# Patient Record
Sex: MALE | Race: WHITE | NOT HISPANIC OR LATINO | Employment: PART TIME | URBAN - METROPOLITAN AREA
[De-identification: names, ages, dates, MRNs, and addresses within clinical notes are randomized per-mention and may not be internally consistent; named-entity substitution may affect disease eponyms.]

---

## 2017-02-15 ENCOUNTER — ALLSCRIPTS OFFICE VISIT (OUTPATIENT)
Dept: OTHER | Facility: OTHER | Age: 57
End: 2017-02-15

## 2017-02-15 DIAGNOSIS — R59.1 GENERALIZED ENLARGED LYMPH NODES: ICD-10-CM

## 2017-04-14 ENCOUNTER — GENERIC CONVERSION - ENCOUNTER (OUTPATIENT)
Dept: OTHER | Facility: OTHER | Age: 57
End: 2017-04-14

## 2017-08-14 ENCOUNTER — GENERIC CONVERSION - ENCOUNTER (OUTPATIENT)
Dept: OTHER | Facility: OTHER | Age: 57
End: 2017-08-14

## 2017-08-18 ENCOUNTER — APPOINTMENT (OUTPATIENT)
Dept: LAB | Facility: CLINIC | Age: 57
End: 2017-08-18
Payer: COMMERCIAL

## 2017-08-18 ENCOUNTER — TRANSCRIBE ORDERS (OUTPATIENT)
Dept: LAB | Facility: CLINIC | Age: 57
End: 2017-08-18

## 2017-08-18 DIAGNOSIS — E78.5 OTHER AND UNSPECIFIED HYPERLIPIDEMIA: Primary | ICD-10-CM

## 2017-08-18 LAB
ALBUMIN SERPL BCP-MCNC: 3.8 G/DL (ref 3.5–5)
ALP SERPL-CCNC: 86 U/L (ref 46–116)
ALT SERPL W P-5'-P-CCNC: 26 U/L (ref 12–78)
ANION GAP SERPL CALCULATED.3IONS-SCNC: 8 MMOL/L (ref 4–13)
AST SERPL W P-5'-P-CCNC: 13 U/L (ref 5–45)
BILIRUB SERPL-MCNC: 0.49 MG/DL (ref 0.2–1)
BUN SERPL-MCNC: 14 MG/DL (ref 5–25)
CALCIUM SERPL-MCNC: 9.1 MG/DL (ref 8.3–10.1)
CHLORIDE SERPL-SCNC: 102 MMOL/L (ref 100–108)
CHOLEST SERPL-MCNC: 179 MG/DL (ref 50–200)
CO2 SERPL-SCNC: 28 MMOL/L (ref 21–32)
CREAT SERPL-MCNC: 0.93 MG/DL (ref 0.6–1.3)
GFR SERPL CREATININE-BSD FRML MDRD: 90 ML/MIN/1.73SQ M
GLUCOSE P FAST SERPL-MCNC: 103 MG/DL (ref 65–99)
HDLC SERPL-MCNC: 55 MG/DL (ref 40–60)
LDLC SERPL CALC-MCNC: 102 MG/DL (ref 0–100)
POTASSIUM SERPL-SCNC: 4.4 MMOL/L (ref 3.5–5.3)
PROT SERPL-MCNC: 7.6 G/DL (ref 6.4–8.2)
SODIUM SERPL-SCNC: 138 MMOL/L (ref 136–145)
TRIGL SERPL-MCNC: 109 MG/DL

## 2017-08-18 PROCEDURE — 36415 COLL VENOUS BLD VENIPUNCTURE: CPT

## 2017-08-18 PROCEDURE — 80061 LIPID PANEL: CPT

## 2017-08-18 PROCEDURE — 80053 COMPREHEN METABOLIC PANEL: CPT

## 2017-08-25 ENCOUNTER — GENERIC CONVERSION - ENCOUNTER (OUTPATIENT)
Dept: OTHER | Facility: OTHER | Age: 57
End: 2017-08-25

## 2017-08-25 DIAGNOSIS — E72.01 CYSTINURIA (HCC): ICD-10-CM

## 2017-08-25 DIAGNOSIS — N20.0 CALCULUS OF KIDNEY: ICD-10-CM

## 2017-11-14 ENCOUNTER — GENERIC CONVERSION - ENCOUNTER (OUTPATIENT)
Dept: OTHER | Facility: OTHER | Age: 57
End: 2017-11-14

## 2018-01-13 NOTE — RESULT NOTES
Message   lipids significantly elevated  Recommend statins, such as Lipitor 20mg daily  Check again in 3 months  Verified Results  (1) COMPREHENSIVE METABOLIC PANEL [id number] 90:93WO Yumiko Dye Order Number: [id number]  TW Order Number: [id number] Order Number: [id number]     Test Name Result Flag Reference   GLUCOSE,RANDM 96 mg/dL     If the patient is fasting, the ADA then defines impaired fasting glucose as > 100 mg/dL and diabetes as > or equal to 123 mg/dL  SODIUM 139 mmol/L  136-145   POTASSIUM 4 7 mmol/L  3 5-5 3   CHLORIDE 103 mmol/L  100-108   CARBON DIOXIDE 30 mmol/L  21-32   ANION GAP (CALC) 6 mmol/L  4-13   BLOOD UREA NITROGEN 12 mg/dL  5-25   CREATININE 0 96 mg/dL  0 60-1 30   Standardized to IDMS reference method   CALCIUM 9 0 mg/dL  8 3-10 1   BILI, TOTAL 0 43 mg/dL  0 20-1 00   ALK PHOSPHATAS 65 U/L     ALT (SGPT) 26 U/L  12-78   AST(SGOT) 14 U/L  5-45   ALBUMIN 3 6 g/dL  3 5-5 0   TOTAL PROTEIN 6 6 g/dL  6 4-8 2   eGFR Non-African American      >60 0 ml/min/1 73sq [facility] Disease Education Program recommendations are as follows:  GFR calculation is accurate only with a steady state creatinine  Chronic Kidney disease less than 60 ml/min/1 73 sq  meters  Kidney failure less than 15 ml/min/1 73 sq  meters  (1) LIPID PANEL, FASTING [id number] 12:07PM Yumiko Dye Order Number: [id number]   Order Number: [id number] Order Number: [id number]     Test Name Result Flag Reference   CHOLESTEROL 240 mg/dL H    HDL,DIRECT 49 mg/dL  40-60   Specimen collection should occur prior to Metamizole administration due to the potential for falsely depressed results     LDL CHOLESTEROL CALCULATED 168 mg/dL H 0-100   Triglyceride:         Normal              <150 mg/dl       Borderline High    150-199 mg/dl       High               200-499 mg/dl       Very High          >499 mg/dl  Cholesterol:         Desirable        <200 mg/dl      Borderline High 200-239 mg/dl      High             >239 mg/dl  HDL Cholesterol:        High    >59 mg/dL      Low     <41 mg/dL  LDL CALCULATED:    This screening LDL is a calculated result  It does not have the accuracy of the Direct Measured LDL in the monitoring of patients with hyperlipidemia and/or statin therapy  Direct Measure LDL (BAS015) must be ordered separately in these patients  TRIGLYCERIDES 114 mg/dL  <=150   Specimen collection should occur prior to N-Acetylcysteine or Metamizole administration due to the potential for falsely depressed results         Plan  Allergic rhinitis    · Fluticasone Propionate 50 MCG/ACT Nasal Suspension; 2 SPRAYS EACH  NOSTRIL EVERY DAY

## 2018-01-14 VITALS
SYSTOLIC BLOOD PRESSURE: 122 MMHG | BODY MASS INDEX: 24.95 KG/M2 | RESPIRATION RATE: 16 BRPM | WEIGHT: 184.25 LBS | DIASTOLIC BLOOD PRESSURE: 64 MMHG | HEIGHT: 72 IN | HEART RATE: 64 BPM

## 2018-01-22 VITALS
WEIGHT: 188.13 LBS | HEIGHT: 72 IN | SYSTOLIC BLOOD PRESSURE: 112 MMHG | DIASTOLIC BLOOD PRESSURE: 70 MMHG | HEART RATE: 72 BPM | BODY MASS INDEX: 25.48 KG/M2

## 2018-01-31 DIAGNOSIS — F41.9 ANXIETY: Primary | ICD-10-CM

## 2018-01-31 RX ORDER — ALPRAZOLAM 0.5 MG/1
0.5 TABLET ORAL
Qty: 30 TABLET | Refills: 1 | OUTPATIENT
Start: 2018-01-31 | End: 2018-03-16 | Stop reason: SDUPTHER

## 2018-01-31 RX ORDER — ALPRAZOLAM 0.5 MG/1
0.5 TABLET ORAL
COMMUNITY
Start: 2012-11-08 | End: 2018-01-31 | Stop reason: SDUPTHER

## 2018-02-02 ENCOUNTER — TELEPHONE (OUTPATIENT)
Dept: FAMILY MEDICINE CLINIC | Facility: CLINIC | Age: 58
End: 2018-02-02

## 2018-02-07 DIAGNOSIS — F41.9 ANXIETY: Primary | ICD-10-CM

## 2018-02-07 DIAGNOSIS — F51.01 PRIMARY INSOMNIA: ICD-10-CM

## 2018-02-07 RX ORDER — TRAZODONE HYDROCHLORIDE 50 MG/1
TABLET ORAL
Qty: 30 TABLET | Refills: 5 | Status: SHIPPED | OUTPATIENT
Start: 2018-02-07 | End: 2018-08-01 | Stop reason: SDUPTHER

## 2018-02-25 DIAGNOSIS — D72.829 ELEVATED WHITE BLOOD CELL COUNT: ICD-10-CM

## 2018-02-25 DIAGNOSIS — E78.5 HYPERLIPIDEMIA: ICD-10-CM

## 2018-02-25 DIAGNOSIS — I10 ESSENTIAL (PRIMARY) HYPERTENSION: ICD-10-CM

## 2018-02-25 DIAGNOSIS — G89.29 OTHER CHRONIC PAIN: ICD-10-CM

## 2018-03-12 DIAGNOSIS — J30.9 CHRONIC ALLERGIC RHINITIS, UNSPECIFIED SEASONALITY, UNSPECIFIED TRIGGER: Primary | ICD-10-CM

## 2018-03-12 DIAGNOSIS — M19.91 PRIMARY OSTEOARTHRITIS, UNSPECIFIED SITE: Primary | ICD-10-CM

## 2018-03-12 RX ORDER — MELOXICAM 15 MG/1
TABLET ORAL
Qty: 30 TABLET | Refills: 5 | Status: SHIPPED | OUTPATIENT
Start: 2018-03-12 | End: 2019-03-17 | Stop reason: SDUPTHER

## 2018-03-13 RX ORDER — FLUTICASONE PROPIONATE 50 MCG
SPRAY, SUSPENSION (ML) NASAL
Qty: 1 BOTTLE | Refills: 5 | Status: SHIPPED | OUTPATIENT
Start: 2018-03-13 | End: 2018-08-28 | Stop reason: SDUPTHER

## 2018-03-14 ENCOUNTER — OFFICE VISIT (OUTPATIENT)
Dept: FAMILY MEDICINE CLINIC | Facility: CLINIC | Age: 58
End: 2018-03-14
Payer: COMMERCIAL

## 2018-03-14 VITALS
HEART RATE: 72 BPM | SYSTOLIC BLOOD PRESSURE: 114 MMHG | HEIGHT: 72 IN | BODY MASS INDEX: 25.87 KG/M2 | WEIGHT: 191 LBS | DIASTOLIC BLOOD PRESSURE: 72 MMHG

## 2018-03-14 DIAGNOSIS — Z72.0 TOBACCO ABUSE: ICD-10-CM

## 2018-03-14 DIAGNOSIS — F41.9 ANXIETY: ICD-10-CM

## 2018-03-14 DIAGNOSIS — E72.01 CYSTINURIA (HCC): Primary | ICD-10-CM

## 2018-03-14 DIAGNOSIS — I10 ESSENTIAL HYPERTENSION: ICD-10-CM

## 2018-03-14 DIAGNOSIS — N20.0 NEPHROLITHIASIS: ICD-10-CM

## 2018-03-14 LAB
SL AMB  POCT GLUCOSE, UA: NORMAL
SL AMB LEUKOCYTE ESTERASE,UA: NORMAL
SL AMB POCT BILIRUBIN,UA: NORMAL
SL AMB POCT BLOOD,UA: NORMAL
SL AMB POCT CLARITY,UA: CLEAR
SL AMB POCT COLOR,UA: YELLOW
SL AMB POCT KETONES,UA: NORMAL
SL AMB POCT NITRITE,UA: NORMAL
SL AMB POCT PH,UA: 8.5
SL AMB POCT SPECIFIC GRAVITY,UA: 1.01
SL AMB POCT URINE PROTEIN: NORMAL
SL AMB POCT UROBILINOGEN: 0.2

## 2018-03-14 PROCEDURE — 99214 OFFICE O/P EST MOD 30 MIN: CPT | Performed by: FAMILY MEDICINE

## 2018-03-14 PROCEDURE — 81003 URINALYSIS AUTO W/O SCOPE: CPT | Performed by: FAMILY MEDICINE

## 2018-03-14 RX ORDER — SILDENAFIL 100 MG/1
100 TABLET, FILM COATED ORAL
COMMUNITY
Start: 2017-11-29 | End: 2021-10-05

## 2018-03-14 RX ORDER — MONTELUKAST SODIUM 10 MG/1
10 TABLET ORAL
COMMUNITY
Start: 2016-01-29 | End: 2021-10-05

## 2018-03-14 RX ORDER — AMLODIPINE BESYLATE 5 MG/1
1 TABLET ORAL DAILY
COMMUNITY
Start: 2016-01-12 | End: 2018-03-16 | Stop reason: SDUPTHER

## 2018-03-14 RX ORDER — MORPHINE SULFATE 30 MG/1
TABLET, FILM COATED, EXTENDED RELEASE ORAL
COMMUNITY
Start: 2016-01-08 | End: 2021-06-14

## 2018-03-14 RX ORDER — SODIUM BICARBONATE 650 MG/1
TABLET ORAL 4 TIMES DAILY
COMMUNITY
Start: 2016-07-29 | End: 2018-03-16 | Stop reason: SDUPTHER

## 2018-03-14 RX ORDER — TIOPRONIN 100 MG/1
1 TABLET, SUGAR COATED ORAL 4 TIMES DAILY
COMMUNITY
Start: 2014-09-10 | End: 2021-06-14 | Stop reason: SDUPTHER

## 2018-03-14 RX ORDER — OXYCODONE AND ACETAMINOPHEN 10; 325 MG/1; MG/1
1 TABLET ORAL EVERY 6 HOURS PRN
COMMUNITY
Start: 2016-02-12 | End: 2021-06-14

## 2018-03-14 RX ORDER — ATORVASTATIN CALCIUM 20 MG/1
1 TABLET, FILM COATED ORAL DAILY
COMMUNITY
Start: 2016-06-07 | End: 2018-03-16 | Stop reason: SDUPTHER

## 2018-03-14 RX ORDER — BENAZEPRIL HYDROCHLORIDE 20 MG/1
1 TABLET ORAL DAILY
COMMUNITY
Start: 2016-01-12 | End: 2018-03-16 | Stop reason: SDUPTHER

## 2018-03-14 NOTE — PATIENT INSTRUCTIONS
Problem List Items Addressed This Visit     Anxiety     Stable  Negative SI, positive contract  No changes  Cystinuria [facility]) - Primary     Patient should follow with Urology and Nephrology  At this point, I would not make any changes  Relevant Orders    POCT urine dip auto non-scope (Completed)    Ambulatory referral to Urology    Hypertension     Stable  Blood pressure doing quite well today  No changes  Relevant Medications    benazepril (LOTENSIN) 20 mg tablet    amLODIPine (NORVASC) 5 mg tablet    Nephrolithiasis     Urinalysis did not reveal any abnormalities  There were small nonobstructing stones  Again, follow with Urology in the near future  Relevant Medications    sodium bicarbonate 650 mg tablet    tiopronin (THIOLA) 100 MG tablet    morphine (MS CONTIN) 30 mg 12 hr tablet    oxyCODONE-acetaminophen (PERCOCET)  mg per tablet    Other Relevant Orders    Ambulatory referral to Urology    Tobacco abuse     Recommend quit smoking  Patient does not wish to stop at this point  He understands risks, will consider for the future

## 2018-03-14 NOTE — PROGRESS NOTES
Assessment and Plan:    Problem List Items Addressed This Visit     Anxiety     Stable  Negative SI, positive contract  No changes  Cystinuria [facility]) - Primary     Patient should follow with Urology and Nephrology  At this point, I would not make any changes  Relevant Orders    POCT urine dip auto non-scope (Completed)    Ambulatory referral to Urology    Hypertension     Stable  Blood pressure doing quite well today  No changes  Relevant Medications    benazepril (LOTENSIN) 20 mg tablet    amLODIPine (NORVASC) 5 mg tablet    Nephrolithiasis     Urinalysis did not reveal any abnormalities  There were small nonobstructing stones  Again, follow with Urology in the near future  Relevant Medications    sodium bicarbonate 650 mg tablet    tiopronin (THIOLA) 100 MG tablet    morphine (MS CONTIN) 30 mg 12 hr tablet    oxyCODONE-acetaminophen (PERCOCET)  mg per tablet    Other Relevant Orders    Ambulatory referral to Urology    Tobacco abuse     Recommend quit smoking  Patient does not wish to stop at this point  He understands risks, will consider for the future  Diagnoses and all orders for this visit:    Cystinuria ([address] )  -     POCT urine dip auto non-scope  -     Ambulatory referral to Urology; Future    Nephrolithiasis  -     Ambulatory referral to Urology; Future    Essential hypertension    Anxiety    Tobacco abuse    Other orders  -     sodium bicarbonate 650 mg tablet; Take by mouth 4 times daily  -     tiopronin (THIOLA) 100 MG tablet; Take 1 tablet by mouth 4 (four) times a day  -     morphine (MS CONTIN) 30 mg 12 hr tablet; daily  Reported on 2/28/2017   -     oxyCODONE-acetaminophen (PERCOCET)  mg per tablet; Take 1 tablet by mouth  -     sildenafil (VIAGRA) 100 mg tablet; Take 100 mg by mouth  -     montelukast (SINGULAIR) 10 mg tablet; Take 10 mg by mouth  -     benazepril (LOTENSIN) 20 mg tablet;  Take 1 tablet by mouth daily  - atorvastatin (LIPITOR) 20 mg tablet; Take 1 tablet by mouth daily  -     amLODIPine (NORVASC) 5 mg tablet; Take 1 tablet by mouth daily              Subjective:      Patient ID: Ju Gonzalez is a 60 y o  male  CC:Follow up and discuss November labs  kw    No chief complaint on file  HPI:    Patient did have a CT scan in September of 2017 at [facility]  Reviewed this in [employer]  It did show a 2 x 3 mm stone on the right, as well as a 2 mm stone on the right  There was no ureteral obstructions on either side  There was a significant amount of left cortical scarring  There was no to minimal right cortical scarring  Patient reports that he is currently having a significant amount of pain on the left  Also, and feels that he is passing a stone for the last 2 weeks or so  He does report that he was at Urology recently, i e  the beginning of this year  He is still following Urology for cystinuria  For hypertension, no particular concerns at the moment  He did mention that he is occasionally having some dysphagia  He was unsure what it was from  Liquid seem to be the worst for him  The following portions of the patient's history were reviewed and updated as appropriate: allergies, current medications, past family history, past medical history, past social history, past surgical history and problem list       Review of Systems   Constitutional: Negative  HENT:        Per HPI   Eyes: Negative  Respiratory: Negative  Genitourinary:        Per HPI   All other systems reviewed and are negative  Data to review:       Objective:    Vitals:    03/14/18 1453   BP: 114/72   BP Location: Left arm   Pulse: 72   Weight: 86 6 kg (191 lb)   Height: 6' (1 829 m)        Physical Exam   Constitutional: He appears well-developed and well-nourished  HENT:   Head: Normocephalic and atraumatic     Mouth/Throat: No oropharyngeal exudate (Posterior oropharynx with a significant amount of postnasal drip noted  )  Neck: Normal range of motion  Neck supple  Cardiovascular: Normal rate, regular rhythm and normal heart sounds  Exam reveals no gallop and no friction rub  No murmur heard  Pulses:       Carotid pulses are 2+ on the right side, and 2+ on the left side  Pulmonary/Chest: Effort normal and breath sounds normal  No respiratory distress  He has no wheezes  He has no rales  Abdominal: There is CVA tenderness (Left side, quite significant  )  Lymphadenopathy:     He has no cervical adenopathy  Nursing note and vitals reviewed

## 2018-03-14 NOTE — ASSESSMENT & PLAN NOTE
Urinalysis did not reveal any abnormalities  There were small nonobstructing stones  Again, follow with Urology in the near future

## 2018-03-14 NOTE — ASSESSMENT & PLAN NOTE
Recommend quit smoking  Patient does not wish to stop at this point  He understands risks, will consider for the future

## 2018-03-16 DIAGNOSIS — I10 ESSENTIAL HYPERTENSION: Primary | ICD-10-CM

## 2018-03-16 DIAGNOSIS — N20.0 NEPHROLITHIASIS: ICD-10-CM

## 2018-03-16 DIAGNOSIS — F41.9 ANXIETY: ICD-10-CM

## 2018-03-16 DIAGNOSIS — E78.2 MIXED HYPERLIPIDEMIA: ICD-10-CM

## 2018-03-16 RX ORDER — AMLODIPINE BESYLATE 5 MG/1
5 TABLET ORAL DAILY
Qty: 30 TABLET | Refills: 5 | Status: SHIPPED | OUTPATIENT
Start: 2018-03-16 | End: 2018-12-22 | Stop reason: SDUPTHER

## 2018-03-16 RX ORDER — SODIUM BICARBONATE 650 MG/1
TABLET ORAL
Qty: 360 TABLET | Refills: 0 | Status: SHIPPED | OUTPATIENT
Start: 2018-03-16 | End: 2021-10-05

## 2018-03-16 RX ORDER — ATORVASTATIN CALCIUM 20 MG/1
20 TABLET, FILM COATED ORAL DAILY
Qty: 30 TABLET | Refills: 5 | Status: SHIPPED | OUTPATIENT
Start: 2018-03-16 | End: 2018-10-23 | Stop reason: SDUPTHER

## 2018-03-16 RX ORDER — BENAZEPRIL HYDROCHLORIDE 20 MG/1
20 TABLET ORAL DAILY
Qty: 30 TABLET | Refills: 5 | Status: SHIPPED | OUTPATIENT
Start: 2018-03-16 | End: 2018-10-23 | Stop reason: SDUPTHER

## 2018-03-16 RX ORDER — ALPRAZOLAM 0.5 MG/1
0.5 TABLET ORAL
Qty: 30 TABLET | Refills: 0 | OUTPATIENT
Start: 2018-03-16 | End: 2018-05-02 | Stop reason: SDUPTHER

## 2018-04-13 ENCOUNTER — TRANSCRIBE ORDERS (OUTPATIENT)
Dept: FAMILY MEDICINE CLINIC | Facility: CLINIC | Age: 58
End: 2018-04-13

## 2018-04-13 DIAGNOSIS — H33.20: Primary | ICD-10-CM

## 2018-04-20 ENCOUNTER — TRANSCRIBE ORDERS (OUTPATIENT)
Dept: FAMILY MEDICINE CLINIC | Facility: CLINIC | Age: 58
End: 2018-04-20

## 2018-04-20 DIAGNOSIS — G89.29 CHRONIC BILATERAL LOW BACK PAIN WITHOUT SCIATICA: Primary | ICD-10-CM

## 2018-04-20 DIAGNOSIS — M54.50 CHRONIC BILATERAL LOW BACK PAIN WITHOUT SCIATICA: Primary | ICD-10-CM

## 2018-05-02 DIAGNOSIS — F41.9 ANXIETY: ICD-10-CM

## 2018-05-03 RX ORDER — ALPRAZOLAM 0.5 MG/1
TABLET ORAL
Qty: 30 TABLET | Refills: 0 | Status: SHIPPED | OUTPATIENT
Start: 2018-05-03 | End: 2018-06-04 | Stop reason: SDUPTHER

## 2018-05-04 ENCOUNTER — TRANSCRIBE ORDERS (OUTPATIENT)
Dept: FAMILY MEDICINE CLINIC | Facility: CLINIC | Age: 58
End: 2018-05-04

## 2018-05-04 DIAGNOSIS — G89.29 CHRONIC RIGHT-SIDED LOW BACK PAIN WITHOUT SCIATICA: Primary | ICD-10-CM

## 2018-05-04 DIAGNOSIS — M54.50 CHRONIC RIGHT-SIDED LOW BACK PAIN WITHOUT SCIATICA: Primary | ICD-10-CM

## 2018-06-04 DIAGNOSIS — F41.9 ANXIETY: ICD-10-CM

## 2018-06-05 RX ORDER — ALPRAZOLAM 0.5 MG/1
TABLET ORAL
Qty: 30 TABLET | Refills: 0 | Status: SHIPPED | OUTPATIENT
Start: 2018-06-05 | End: 2018-07-09 | Stop reason: SDUPTHER

## 2018-06-07 ENCOUNTER — OFFICE VISIT (OUTPATIENT)
Dept: FAMILY MEDICINE CLINIC | Facility: CLINIC | Age: 58
End: 2018-06-07
Payer: COMMERCIAL

## 2018-06-07 VITALS
BODY MASS INDEX: 26.48 KG/M2 | HEIGHT: 70 IN | SYSTOLIC BLOOD PRESSURE: 110 MMHG | HEART RATE: 72 BPM | WEIGHT: 185 LBS | DIASTOLIC BLOOD PRESSURE: 60 MMHG

## 2018-06-07 DIAGNOSIS — D72.829 LEUKOCYTOSIS, UNSPECIFIED TYPE: ICD-10-CM

## 2018-06-07 DIAGNOSIS — E72.01 CYSTINURIA (HCC): ICD-10-CM

## 2018-06-07 DIAGNOSIS — E78.2 MIXED HYPERLIPIDEMIA: ICD-10-CM

## 2018-06-07 DIAGNOSIS — F41.9 ANXIETY: ICD-10-CM

## 2018-06-07 DIAGNOSIS — Z12.5 SCREENING FOR PROSTATE CANCER: ICD-10-CM

## 2018-06-07 DIAGNOSIS — I10 ESSENTIAL HYPERTENSION: ICD-10-CM

## 2018-06-07 DIAGNOSIS — Z72.0 TOBACCO ABUSE: ICD-10-CM

## 2018-06-07 DIAGNOSIS — Z12.11 COLON CANCER SCREENING: ICD-10-CM

## 2018-06-07 DIAGNOSIS — N20.0 NEPHROLITHIASIS: Primary | ICD-10-CM

## 2018-06-07 PROCEDURE — 99214 OFFICE O/P EST MOD 30 MIN: CPT | Performed by: FAMILY MEDICINE

## 2018-06-07 PROCEDURE — 3074F SYST BP LT 130 MM HG: CPT | Performed by: FAMILY MEDICINE

## 2018-06-07 PROCEDURE — 3078F DIAST BP <80 MM HG: CPT | Performed by: FAMILY MEDICINE

## 2018-06-07 PROCEDURE — 3725F SCREEN DEPRESSION PERFORMED: CPT | Performed by: FAMILY MEDICINE

## 2018-06-07 PROCEDURE — 4004F PT TOBACCO SCREEN RCVD TLK: CPT | Performed by: FAMILY MEDICINE

## 2018-06-07 PROCEDURE — 3008F BODY MASS INDEX DOCD: CPT | Performed by: FAMILY MEDICINE

## 2018-06-07 NOTE — ASSESSMENT & PLAN NOTE
Patient does continue to smoke  He understands risks  He was not interested in quitting at this point

## 2018-06-07 NOTE — ASSESSMENT & PLAN NOTE
Patient currently using Xanax, as well as trazodone  I did review with him about decreasing to Xanax usage, but he is only using 1 a day  He does understand the risk of addiction potential, and the fact that we can ask for a urinalysis at any point  He is stable at the moment, and I did not change anything

## 2018-06-07 NOTE — PROGRESS NOTES
Assessment/Plan:    No problem-specific Assessment & Plan notes found for this encounter  Diagnoses and all orders for this visit:    Screening for prostate cancer  -     PSA, Total Screen; Future          Subjective: Follow up for Anxiety, cystinuria, HTN, and Nephrolithiasis  Pt declines colonoscopy and FIT testing--bb     Patient ID: Amber Barbosa is a 60 y o  male  Patient did pass a very large kidney stone recently  He does report that there was no blood prior, during, or after the stone passed  The stone was exceptionally large, approximately 5-10 mm in diameter  Denies any problems since  Patient has not had any labs ordered recently for cholesterol  Ordered labs today  He is on atorvastatin currently  No concerns  Currently using Xanax and trazodone for anxiety  With regard to this, I did discuss with him the possibility of decreasing the Xanax  At the moment, he is using it approximately 1 per day  This does seem to help him quite a bit  He was unsure about decreasing its use  The following portions of the patient's history were reviewed and updated as appropriate: allergies, current medications, past family history, past medical history, past social history, past surgical history and problem list     Review of Systems      Objective: There were no vitals taken for this visit           Physical Exam

## 2018-06-07 NOTE — ASSESSMENT & PLAN NOTE
Patient did pass a quite large stone recently  He has also had several smaller stones  At this point, he is following with Urology, but they are unsure what to do with him other than come into the ER if he has severe pain and can't pass a stone  For the moment, we will re-evaluate only as needed  Continue on Thiola, sodium bicarb, fluids, re-evaluate as needed

## 2018-06-07 NOTE — PATIENT INSTRUCTIONS
Problem List Items Addressed This Visit        Cardiovascular and Mediastinum    Hypertension     Blood pressure today is normal   No changes  Follow-up in the future  Relevant Orders    Comprehensive metabolic panel    Comprehensive metabolic panel       Genitourinary    Cystinuria ([address] )     Stable at the moment  Patient continues to have stones  Follow-up with urology/nephrology  Relevant Orders    Comprehensive metabolic panel    Nephrolithiasis - Primary     Patient did pass a quite large stone recently  He has also had several smaller stones  At this point, he is following with Urology, but they are unsure what to do with him other than come into the ER if he has severe pain and can't pass a stone  For the moment, we will re-evaluate only as needed  Continue on Thiola, sodium bicarb, fluids, re-evaluate as needed  Relevant Orders    Comprehensive metabolic panel       Other    Anxiety     Patient currently using Xanax, as well as trazodone  I did review with him about decreasing to Xanax usage, but he is only using 1 a day  He does understand the risk of addiction potential, and the fact that we can ask for a urinalysis at any point  He is stable at the moment, and I did not change anything  Hyperlipidemia     Patient is currently on atorvastatin at 20 mg daily  Recommend follow-up in the future, check laboratory studies now, and 6 months from now  Will need to follow up after that  Relevant Orders    Comprehensive metabolic panel    Lipid Panel with Direct LDL reflex    Comprehensive metabolic panel    Lipid panel    Leucocytosis     Stable at the moment  Check CB C  Follow up after that  Relevant Orders    CBC and differential    Tobacco abuse     Patient does continue to smoke  He understands risks  He was not interested in quitting at this point             Other Visit Diagnoses     Screening for prostate cancer        Relevant Orders    PSA, Total Screen    Colon cancer screening        Recommended stool for blood test

## 2018-06-07 NOTE — ASSESSMENT & PLAN NOTE
Patient is currently on atorvastatin at 20 mg daily  Recommend follow-up in the future, check laboratory studies now, and 6 months from now  Will need to follow up after that

## 2018-06-08 ENCOUNTER — APPOINTMENT (OUTPATIENT)
Dept: LAB | Facility: CLINIC | Age: 58
End: 2018-06-08
Payer: COMMERCIAL

## 2018-06-08 ENCOUNTER — TRANSCRIBE ORDERS (OUTPATIENT)
Dept: LAB | Facility: CLINIC | Age: 58
End: 2018-06-08

## 2018-06-08 DIAGNOSIS — I10 ESSENTIAL HYPERTENSION: ICD-10-CM

## 2018-06-08 DIAGNOSIS — E78.2 MIXED HYPERLIPIDEMIA: ICD-10-CM

## 2018-06-08 DIAGNOSIS — N20.0 NEPHROLITHIASIS: ICD-10-CM

## 2018-06-08 DIAGNOSIS — E72.01 CYSTINURIA (HCC): ICD-10-CM

## 2018-06-08 DIAGNOSIS — N17.9 AKI (ACUTE KIDNEY INJURY) (HCC): Primary | ICD-10-CM

## 2018-06-08 DIAGNOSIS — Z12.5 SCREENING FOR PROSTATE CANCER: ICD-10-CM

## 2018-06-08 DIAGNOSIS — D72.829 LEUKOCYTOSIS, UNSPECIFIED TYPE: ICD-10-CM

## 2018-06-08 LAB
ALBUMIN SERPL BCP-MCNC: 3.9 G/DL (ref 3.5–5)
ALP SERPL-CCNC: 75 U/L (ref 46–116)
ALT SERPL W P-5'-P-CCNC: 23 U/L (ref 12–78)
ANION GAP SERPL CALCULATED.3IONS-SCNC: 6 MMOL/L (ref 4–13)
AST SERPL W P-5'-P-CCNC: 12 U/L (ref 5–45)
BASOPHILS # BLD AUTO: 0.09 THOUSANDS/ΜL (ref 0–0.1)
BASOPHILS NFR BLD AUTO: 1 % (ref 0–1)
BILIRUB SERPL-MCNC: 0.41 MG/DL (ref 0.2–1)
BUN SERPL-MCNC: 33 MG/DL (ref 5–25)
CALCIUM SERPL-MCNC: 9.3 MG/DL (ref 8.3–10.1)
CHLORIDE SERPL-SCNC: 102 MMOL/L (ref 100–108)
CHOLEST SERPL-MCNC: 171 MG/DL (ref 50–200)
CO2 SERPL-SCNC: 29 MMOL/L (ref 21–32)
CREAT SERPL-MCNC: 2.06 MG/DL (ref 0.6–1.3)
EOSINOPHIL # BLD AUTO: 0.71 THOUSAND/ΜL (ref 0–0.61)
EOSINOPHIL NFR BLD AUTO: 8 % (ref 0–6)
ERYTHROCYTE [DISTWIDTH] IN BLOOD BY AUTOMATED COUNT: 12.2 % (ref 11.6–15.1)
GFR SERPL CREATININE-BSD FRML MDRD: 34 ML/MIN/1.73SQ M
GLUCOSE P FAST SERPL-MCNC: 99 MG/DL (ref 65–99)
HCT VFR BLD AUTO: 36.2 % (ref 36.5–49.3)
HDLC SERPL-MCNC: 43 MG/DL (ref 40–60)
HGB BLD-MCNC: 11.4 G/DL (ref 12–17)
IMM GRANULOCYTES # BLD AUTO: 0.02 THOUSAND/UL (ref 0–0.2)
IMM GRANULOCYTES NFR BLD AUTO: 0 % (ref 0–2)
LDLC SERPL CALC-MCNC: 98 MG/DL (ref 0–100)
LYMPHOCYTES # BLD AUTO: 2.11 THOUSANDS/ΜL (ref 0.6–4.47)
LYMPHOCYTES NFR BLD AUTO: 24 % (ref 14–44)
MCH RBC QN AUTO: 29.7 PG (ref 26.8–34.3)
MCHC RBC AUTO-ENTMCNC: 31.5 G/DL (ref 31.4–37.4)
MCV RBC AUTO: 94 FL (ref 82–98)
MONOCYTES # BLD AUTO: 0.91 THOUSAND/ΜL (ref 0.17–1.22)
MONOCYTES NFR BLD AUTO: 11 % (ref 4–12)
NEUTROPHILS # BLD AUTO: 4.85 THOUSANDS/ΜL (ref 1.85–7.62)
NEUTS SEG NFR BLD AUTO: 56 % (ref 43–75)
NRBC BLD AUTO-RTO: 0 /100 WBCS
PLATELET # BLD AUTO: 297 THOUSANDS/UL (ref 149–390)
PMV BLD AUTO: 12.4 FL (ref 8.9–12.7)
POTASSIUM SERPL-SCNC: 4.5 MMOL/L (ref 3.5–5.3)
PROT SERPL-MCNC: 7.4 G/DL (ref 6.4–8.2)
PSA SERPL-MCNC: 0.2 NG/ML (ref 0–4)
RBC # BLD AUTO: 3.84 MILLION/UL (ref 3.88–5.62)
SODIUM SERPL-SCNC: 137 MMOL/L (ref 136–145)
TRIGL SERPL-MCNC: 148 MG/DL
WBC # BLD AUTO: 8.69 THOUSAND/UL (ref 4.31–10.16)

## 2018-06-08 PROCEDURE — 36415 COLL VENOUS BLD VENIPUNCTURE: CPT

## 2018-06-08 PROCEDURE — G0103 PSA SCREENING: HCPCS

## 2018-06-08 PROCEDURE — 80053 COMPREHEN METABOLIC PANEL: CPT

## 2018-06-08 PROCEDURE — 85025 COMPLETE CBC W/AUTO DIFF WBC: CPT

## 2018-06-08 PROCEDURE — 80061 LIPID PANEL: CPT

## 2018-06-08 NOTE — PROGRESS NOTES
Problem List Items Addressed This Visit        Genitourinary    Nephrolithiasis    Relevant Orders    US kidney and bladder    Basic metabolic panel    ROSENDO (acute kidney injury) ([facility] [address] ) - Primary    Relevant Orders    US kidney and bladder    Basic metabolic panel

## 2018-07-09 DIAGNOSIS — F41.9 ANXIETY: ICD-10-CM

## 2018-07-09 RX ORDER — ALPRAZOLAM 0.5 MG/1
0.5 TABLET ORAL
Qty: 30 TABLET | Refills: 0 | Status: SHIPPED | OUTPATIENT
Start: 2018-07-09 | End: 2018-08-09 | Stop reason: SDUPTHER

## 2018-08-01 DIAGNOSIS — F41.9 ANXIETY: ICD-10-CM

## 2018-08-01 DIAGNOSIS — F51.01 PRIMARY INSOMNIA: ICD-10-CM

## 2018-08-01 RX ORDER — TRAZODONE HYDROCHLORIDE 50 MG/1
TABLET ORAL
Qty: 30 TABLET | Refills: 5 | Status: SHIPPED | OUTPATIENT
Start: 2018-08-01 | End: 2019-01-21 | Stop reason: SDUPTHER

## 2018-08-09 DIAGNOSIS — F41.9 ANXIETY: ICD-10-CM

## 2018-08-09 RX ORDER — ALPRAZOLAM 0.5 MG/1
0.5 TABLET ORAL
Qty: 30 TABLET | Refills: 0 | Status: SHIPPED | OUTPATIENT
Start: 2018-08-09 | End: 2018-09-11 | Stop reason: SDUPTHER

## 2018-08-28 DIAGNOSIS — J30.9 CHRONIC ALLERGIC RHINITIS: ICD-10-CM

## 2018-08-28 RX ORDER — FLUTICASONE PROPIONATE 50 MCG
SPRAY, SUSPENSION (ML) NASAL
Qty: 1 BOTTLE | Refills: 5 | Status: SHIPPED | OUTPATIENT
Start: 2018-08-28 | End: 2021-06-14 | Stop reason: SDUPTHER

## 2018-09-11 DIAGNOSIS — F41.9 ANXIETY: ICD-10-CM

## 2018-09-12 RX ORDER — ALPRAZOLAM 0.5 MG/1
0.5 TABLET ORAL
Qty: 30 TABLET | Refills: 0 | Status: SHIPPED | OUTPATIENT
Start: 2018-09-12 | End: 2018-10-12 | Stop reason: SDUPTHER

## 2018-09-17 ENCOUNTER — APPOINTMENT (OUTPATIENT)
Dept: URGENT CARE | Facility: MEDICAL CENTER | Age: 58
End: 2018-09-17
Payer: OTHER MISCELLANEOUS

## 2018-09-17 PROCEDURE — 99283 EMERGENCY DEPT VISIT LOW MDM: CPT

## 2018-09-17 PROCEDURE — G0382 LEV 3 HOSP TYPE B ED VISIT: HCPCS

## 2018-09-19 ENCOUNTER — APPOINTMENT (OUTPATIENT)
Dept: URGENT CARE | Facility: MEDICAL CENTER | Age: 58
End: 2018-09-19
Payer: OTHER MISCELLANEOUS

## 2018-09-19 PROCEDURE — 99213 OFFICE O/P EST LOW 20 MIN: CPT

## 2018-10-12 DIAGNOSIS — F41.9 ANXIETY: ICD-10-CM

## 2018-10-12 RX ORDER — ALPRAZOLAM 0.5 MG/1
0.5 TABLET ORAL
Qty: 30 TABLET | Refills: 0 | Status: SHIPPED | OUTPATIENT
Start: 2018-10-12 | End: 2018-11-12 | Stop reason: SDUPTHER

## 2018-10-23 DIAGNOSIS — F41.9 ANXIETY: ICD-10-CM

## 2018-10-23 DIAGNOSIS — E78.2 MIXED HYPERLIPIDEMIA: ICD-10-CM

## 2018-10-23 DIAGNOSIS — I10 ESSENTIAL HYPERTENSION: ICD-10-CM

## 2018-10-23 DIAGNOSIS — F51.01 PRIMARY INSOMNIA: Primary | ICD-10-CM

## 2018-10-23 RX ORDER — TRAZODONE HYDROCHLORIDE 100 MG/1
TABLET ORAL
Qty: 30 TABLET | Refills: 5 | Status: SHIPPED | OUTPATIENT
Start: 2018-10-23 | End: 2019-04-23 | Stop reason: SDUPTHER

## 2018-10-23 RX ORDER — ATORVASTATIN CALCIUM 20 MG/1
20 TABLET, FILM COATED ORAL DAILY
Qty: 30 TABLET | Refills: 5 | Status: SHIPPED | OUTPATIENT
Start: 2018-10-23 | End: 2019-04-23 | Stop reason: SDUPTHER

## 2018-10-23 RX ORDER — BENAZEPRIL HYDROCHLORIDE 20 MG/1
20 TABLET ORAL DAILY
Qty: 30 TABLET | Refills: 5 | Status: SHIPPED | OUTPATIENT
Start: 2018-10-23 | End: 2019-04-23 | Stop reason: SDUPTHER

## 2018-10-30 ENCOUNTER — TRANSCRIBE ORDERS (OUTPATIENT)
Dept: FAMILY MEDICINE CLINIC | Facility: CLINIC | Age: 58
End: 2018-10-30

## 2018-10-30 DIAGNOSIS — M54.50 LOW BACK PAIN: Primary | ICD-10-CM

## 2018-11-12 DIAGNOSIS — F41.9 ANXIETY: ICD-10-CM

## 2018-11-14 RX ORDER — ALPRAZOLAM 0.5 MG/1
0.5 TABLET ORAL
Qty: 30 TABLET | Refills: 0 | Status: SHIPPED | OUTPATIENT
Start: 2018-11-14 | End: 2018-12-14 | Stop reason: SDUPTHER

## 2018-11-14 NOTE — TELEPHONE ENCOUNTER
Problem List Items Addressed This Visit        Other    Anxiety     Controlled Substance Prescription   Libia Dsouza is a 60 y o  male, and has requested a medication that is a controlled substance  PDMP queried  Patient has been counseled about medication side effects and addiction potential: yes  Patient has a Controlled Substance agreement in chart: no     Prescription signed           Relevant Medications    ALPRAZolam (XANAX) 0 5 mg tablet

## 2018-11-14 NOTE — ASSESSMENT & PLAN NOTE
Controlled Substance Prescription   Tori Diggs is a 60 y o  male, and has requested a medication that is a controlled substance  PDMP queried  Patient has been counseled about medication side effects and addiction potential: yes  Patient has a Controlled Substance agreement in chart: no     Prescription signed

## 2018-12-14 ENCOUNTER — OFFICE VISIT (OUTPATIENT)
Dept: FAMILY MEDICINE CLINIC | Facility: CLINIC | Age: 58
End: 2018-12-14
Payer: COMMERCIAL

## 2018-12-14 VITALS
BODY MASS INDEX: 26.34 KG/M2 | RESPIRATION RATE: 16 BRPM | HEART RATE: 68 BPM | DIASTOLIC BLOOD PRESSURE: 72 MMHG | HEIGHT: 70 IN | SYSTOLIC BLOOD PRESSURE: 114 MMHG | WEIGHT: 184 LBS

## 2018-12-14 DIAGNOSIS — Z12.11 SCREENING FOR COLON CANCER: Primary | ICD-10-CM

## 2018-12-14 DIAGNOSIS — Z72.0 TOBACCO ABUSE: ICD-10-CM

## 2018-12-14 DIAGNOSIS — N17.9 AKI (ACUTE KIDNEY INJURY) (HCC): ICD-10-CM

## 2018-12-14 DIAGNOSIS — E66.3 OVERWEIGHT (BMI 25.0-29.9): ICD-10-CM

## 2018-12-14 DIAGNOSIS — E78.2 MIXED HYPERLIPIDEMIA: ICD-10-CM

## 2018-12-14 DIAGNOSIS — E72.01 CYSTINURIA (HCC): ICD-10-CM

## 2018-12-14 DIAGNOSIS — I10 ESSENTIAL HYPERTENSION: ICD-10-CM

## 2018-12-14 DIAGNOSIS — F41.9 ANXIETY: ICD-10-CM

## 2018-12-14 PROCEDURE — 4004F PT TOBACCO SCREEN RCVD TLK: CPT | Performed by: FAMILY MEDICINE

## 2018-12-14 PROCEDURE — 3078F DIAST BP <80 MM HG: CPT | Performed by: FAMILY MEDICINE

## 2018-12-14 PROCEDURE — 3008F BODY MASS INDEX DOCD: CPT | Performed by: FAMILY MEDICINE

## 2018-12-14 PROCEDURE — 3074F SYST BP LT 130 MM HG: CPT | Performed by: FAMILY MEDICINE

## 2018-12-14 PROCEDURE — 99214 OFFICE O/P EST MOD 30 MIN: CPT | Performed by: FAMILY MEDICINE

## 2018-12-14 RX ORDER — ALPRAZOLAM 0.5 MG/1
0.5 TABLET ORAL
Qty: 30 TABLET | Refills: 0 | Status: SHIPPED | OUTPATIENT
Start: 2018-12-14 | End: 2019-01-14 | Stop reason: SDUPTHER

## 2018-12-14 NOTE — ASSESSMENT & PLAN NOTE
Reviewed smoking  Patient is not interested in quitting at this point  He understands risks including lung cancer, death, heart disease, strokes, oral cancer, COPD

## 2018-12-14 NOTE — ASSESSMENT & PLAN NOTE
Continues to follow with Urology  Patient had been referred to Nephrology before  Of note, his urine pH was 8 5 at urology office recently    Continue on Thiola, follow-up with Urology and Renal

## 2018-12-14 NOTE — PROGRESS NOTES
Assessment/Plan:    ROSENDO (acute kidney injury) ([facility] )  Patient's kidney function previously was significantly elevated  Recommend repeat  Follow up after that  Cystinuria ([facility] )  Continues to follow with Urology  Patient had been referred to Nephrology before  Of note, his urine pH was 8 5 at urology office recently  Continue on Thiola, follow-up with Urology and Renal     Hyperlipidemia  Patient is on Lipitor 20 mg   Recommend check labs  Follow up after that  Hypertension  Blood pressure stable  Continue current treatments  No changes  Continue medications  Tobacco abuse  Reviewed smoking  Patient is not interested in quitting at this point  He understands risks including lung cancer, death, heart disease, strokes, oral cancer, COPD  Anxiety  Controlled Substance Prescription   Mary Schreiber is a 60 y o  male, and has requested a medication that is a controlled substance  PDMP queried  Patient has been counseled about medication side effects and addiction potential: yes  Patient has a Controlled Substance agreement in chart: no     Reviewed the potential interactions of the alprazolam with his medications from Pain Management  Patient does follow with pain management for his narcotics  Prescription signed  Diagnoses and all orders for this visit:    Screening for colon cancer  -     Ambulatory referral to Gastroenterology; Future    Overweight (BMI 25 0-29  9)    ROSENDO (acute kidney injury) ([facility] )  -     Comprehensive metabolic panel; Future    Mixed hyperlipidemia  -     Lipid Panel with Direct LDL reflex; Future  -     Comprehensive metabolic panel; Future  -     Comprehensive metabolic panel; Future  -     Lipid panel; Future    Essential hypertension  -     Comprehensive metabolic panel; Future  -     CBC and differential; Future    Cystinuria ([facility] )  -     Comprehensive metabolic panel; Future    Tobacco abuse    Anxiety  -     ALPRAZolam (XANAX) 0 5 mg tablet;  Take 1 tablet (0 5 mg total) by mouth daily at bedtime          Subjective: Pt here for a follow up on chronic conditions  JUSTIN Marcelino     Patient ID: Mary Schreiber is a 60 y o  male  Patient did see Urology recently  At that point, he was doing relatively well with the exception of some right-sided pain  Urology ordered a stat CT scan  Please see the result in note today  Patient did mention that in May he had some kidney stone symptoms, and did pass a stone then  Since then, is not passed a stone  Patient reports that the CT scan from June did show a large stone, and he does not recall passing any stones between June and now  Patient smoking 1/2 PPD  Patient is on atorvastatin for cholesterol  Denies any current problems with it  Does have history of hypertension, is currently on multiple meds  Seems to be doing well  No orthostasis  Cystinuria:  Patient is following with Urology as well as recommending follow with Nephrology  No changes at the moment  He is on Thiola, and has been doing relatively well  He has had some stone issues recently, however  He did have flank pain, and was seen by Urology  CT scan as above  The following portions of the patient's history were reviewed and updated as appropriate: allergies, current medications, past family history, past medical history, past social history, past surgical history and problem list     Review of Systems   Constitutional: Negative  HENT: Negative  Eyes: Negative  Respiratory: Negative  Cardiovascular: Negative  Gastrointestinal: Negative  Endocrine: Negative  Genitourinary: Negative  Musculoskeletal: Negative  Skin: Negative  Allergic/Immunologic: Negative  Neurological: Negative  Hematological: Negative  Psychiatric/Behavioral: Negative  CT scan reviewed from [facility]  Final impression no hydronephrosis or ureteral calculus    Left small calculi potentially related to rim calcifications of the renal cyst or small nephrolithiasis  Left renal cortical scarring  Objective:        Vitals:    12/14/18 0939   BP: 114/72   BP Location: Left arm   Patient Position: Sitting   Cuff Size: Large   Pulse: 68   Resp: 16   Weight: 83 5 kg (184 lb)   Height: 5' 10" (1 778 m)        Physical Exam   Constitutional: He appears well-developed and well-nourished  HENT:   Head: Normocephalic and atraumatic  Neck: Normal range of motion  Neck supple  Cardiovascular: Normal rate, regular rhythm and normal heart sounds  Exam reveals no gallop and no friction rub  No murmur heard  Pulses:       Carotid pulses are 2+ on the right side, and 2+ on the left side  Pulmonary/Chest: Effort normal and breath sounds normal  No respiratory distress  He has no wheezes  He has no rales  Nursing note and vitals reviewed

## 2018-12-14 NOTE — ASSESSMENT & PLAN NOTE
Patient's kidney function previously was significantly elevated  Recommend repeat  Follow up after that

## 2018-12-14 NOTE — PATIENT INSTRUCTIONS
Low Fat Diet   AMBULATORY CARE:   A low-fat diet  is an eating plan that is low in total fat, unhealthy fat, and cholesterol  You may need to follow a low-fat diet if you have trouble digesting or absorbing fat  You may also need to follow this diet if you have high cholesterol  You can also lower your cholesterol by increasing the amount of fiber in your diet  Soluble fiber is a type of fiber that helps to decrease cholesterol levels  Different types of fat in food:   · Limit unhealthy fats  A diet that is high in cholesterol, saturated fat, and trans fat may cause unhealthy cholesterol levels  Unhealthy cholesterol levels increase your risk of heart disease  ¨ Cholesterol:  Limit intake of cholesterol to less than 200 mg per day  Cholesterol is found in meat, eggs, and dairy  ¨ Saturated fat:  Limit saturated fat to less than 7% of your total daily calories  Ask your dietitian how many calories you need each day  Saturated fat is found in butter, cheese, ice cream, whole milk, and palm oil  Saturated fat is also found in meat, such as beef, pork, chicken skin, and processed meats  Processed meats include sausage, hot dogs, and bologna  ¨ Trans fat:  Avoid trans fat as much as possible  Trans fat is used in fried and baked foods  Foods that say trans fat free on the label may still have up to 0 5 grams of trans fat per serving  · Include healthy fats  Replace foods that are high in saturated and trans fat with foods high in healthy fats  This may help to decrease high cholesterol levels  ¨ Monounsaturated fats: These are found in avocados, nuts, and vegetable oils, such as olive, canola, and sunflower oil  ¨ Polyunsaturated fats: These can be found in vegetable oils, such as soybean or corn oil  Omega-3 fats can help to decrease the risk of heart disease  Omega-3 fats are found in fish, such as salmon, herring, trout, and tuna   Omega-3 fats can also be found in plant foods, such as walnuts, flaxseed, soybeans, and canola oil    Foods to limit or avoid:   · Grains:      ¨ Snacks that are made with partially hydrogenated oils, such as chips, regular crackers, and butter-flavored popcorn    ¨ High-fat baked goods, such as biscuits, croissants, doughnuts, pies, cookies, and pastries    · Dairy:      ¨ Whole milk, 2% milk, and yogurt and ice cream made with whole milk    ¨ Half and half creamer, heavy cream, and whipping cream    ¨ Cheese, cream cheese, and sour cream    · Meats and proteins:      ¨ High-fat cuts of meat (T-bone steak, regular hamburger, and ribs)    ¨ Fried meat, poultry (turkey and chicken), and fish    ¨ Poultry (chicken and turkey) with skin    ¨ Cold cuts (salami or bologna), hot dogs, healy, and sausage    ¨ Whole eggs and egg yolks    · Vegetables and fruits with added fat:      ¨ Fried vegetables or vegetables in butter or high-fat sauces, such as cream or cheese sauces    ¨ Fried fruit or fruit served with butter or cream    · Fats:      ¨ Butter, stick margarine, and shortening    ¨ Coconut, palm oil, and palm kernel oil  Foods to include:   · Grains:      ¨ Whole-grain breads, cereals, pasta, and brown rice    ¨ Low-fat crackers and pretzels    · Vegetables and fruits:      ¨ Fresh, frozen, or canned vegetables (no salt or low-sodium)    ¨ Fresh, frozen, dried, or canned fruit (canned in light syrup or fruit juice)    ¨ Avocado    · Low-fat dairy products:      ¨ Nonfat (skim) or 1% milk    ¨ Nonfat or low-fat cheese, yogurt, and cottage cheese    · Meats and proteins:      ¨ Chicken or turkey with no skin    ¨ Baked or broiled fish    ¨ Lean beef and pork (loin, round, extra lean hamburger)    ¨ Beans and peas, unsalted nuts, soy products    ¨ Egg whites and substitutes    ¨ Seeds and nuts    · Fats:      ¨ Unsaturated oil, such as canola, olive, peanut, soybean, or sunflower oil    ¨ Soft or liquid margarine and vegetable oil spread    ¨ Low-fat salad dressing  Other ways to decrease fat:   · Read food labels before you buy foods  Choose foods that have less than 30% of calories from fat  Choose low-fat or fat-free dairy products  Remember that fat free does not mean calorie free  These foods still contain calories, and too many calories can lead to weight gain  · Trim fat from meat and avoid fried food  Trim all visible fat from meat before you cook it  Remove the skin from poultry  Do not quiñonez meat, fish, or poultry  Bake, roast, boil, or broil these foods instead  Avoid fried foods  Eat a baked potato instead of [place] fries  Steam vegetables instead of sautéing them in butter  · Add less fat to foods  Use imitation healy bits on salads and baked potatoes instead of regular healy bits  Use fat-free or low-fat salad dressings instead of regular dressings  Use low-fat or nonfat butter-flavored topping instead of regular butter or margarine on popcorn and other foods  Ways to decrease fat in recipes:  Replace high-fat ingredients with low-fat or nonfat ones  This may cause baked goods to be drier than usual  You may need to use nonfat cooking spray on pans to prevent food from sticking  You also may need to change the amount of other ingredients, such as water, in the recipe  Try the following:  · Use low-fat or light margarine instead of regular margarine or shortening  · Use lean ground turkey breast or chicken, or lean ground beef (less than 5% fat) instead of hamburger  · Add 1 teaspoon of canola oil to 8 ounces of skim milk instead of using cream or half and half  · Use grated zucchini, carrots, or apples in breads instead of coconut  · Use blenderized, low-fat cottage cheese, plain tofu, or low-fat ricotta cheese instead of cream cheese  · Use 1 egg white and 1 teaspoon of canola oil, or use ¼ cup (2 ounces) of fat-free egg substitute instead of a whole egg       · Replace half of the oil that is called for in a recipe with applesauce when you bake  Use 3 tablespoons of cocoa powder and 1 tablespoon of canola oil instead of a square of baking chocolate  How to increase fiber:  Eat enough high-fiber foods to get 20 to 30 grams of fiber every day  Slowly increase your fiber intake to avoid stomach cramps, gas, and other problems  · Eat 3 ounces of whole-grain foods each day  An ounce is about 1 slice of bread  Eat whole-grain breads, such as whole-wheat bread  Whole wheat, whole-wheat flour, or other whole grains should be listed as the first ingredient on the food label  Replace white flour with whole-grain flour or use half of each in recipes  Whole-grain flour is heavier than white flour, so you may have to add more yeast or baking powder  · Eat a high-fiber cereal for breakfast   Oatmeal is a good source of soluble fiber  Look for cereals that have bran or fiber in the name  Choose whole-grain products, such as brown rice, barley, and whole-wheat pasta  · Eat more beans, peas, and lentils  For example, add beans to soups or salads  Eat at least 5 cups of fruits and vegetables each day  Eat fruits and vegetables with the peel because the peel is high in fiber  © 2017 [address] [employer] Information is for End User's use only and may not be sold, redistributed or otherwise used for commercial purposes  All illustrations and images included in CareNotes® are the copyrighted property of A [employer] M [employer]  or Tyrone Engel  The above information is an  only  It is not intended as medical advice for individual conditions or treatments  Talk to your doctor, nurse or pharmacist before following any medical regimen to see if it is safe and effective for you  Problem List Items Addressed This Visit        Cardiovascular and Mediastinum    Hypertension     Blood pressure stable  Continue current treatments  No changes  Continue medications           Relevant Orders    Comprehensive metabolic panel    CBC and differential       Genitourinary    Cystinuria ([facility] [address] )     Continues to follow with Urology  Patient had been referred to Nephrology before  Of note, his urine pH was 8 5 at urology office recently  Continue on Thiola, follow-up with Urology and Renal          Relevant Orders    Comprehensive metabolic panel    ROSENDO (acute kidney injury) ([facility] [address] )     Patient's kidney function previously was significantly elevated  Recommend repeat  Follow up after that  Relevant Orders    Comprehensive metabolic panel       Other    Anxiety     Controlled Substance Prescription   Mila Giordano is a 60 y o  male, and has requested a medication that is a controlled substance  PDMP queried  Patient has been counseled about medication side effects and addiction potential: yes  Patient has a Controlled Substance agreement in chart: no     Reviewed the potential interactions of the alprazolam with his medications from Pain Management  Patient does follow with pain management for his narcotics  Prescription signed  Relevant Medications    ALPRAZolam (XANAX) 0 5 mg tablet    Hyperlipidemia     Patient is on Lipitor 20 mg   Recommend check labs  Follow up after that  Relevant Orders    Lipid Panel with Direct LDL reflex    Comprehensive metabolic panel    Comprehensive metabolic panel    Lipid panel    Tobacco abuse     Reviewed smoking  Patient is not interested in quitting at this point  He understands risks including lung cancer, death, heart disease, strokes, oral cancer, COPD  Other Visit Diagnoses     Screening for colon cancer    -  Primary    Relevant Orders    Ambulatory referral to Gastroenterology    Overweight (BMI 25 0-29  9)

## 2018-12-14 NOTE — ASSESSMENT & PLAN NOTE
Controlled Substance Prescription   Esdras Su is a 60 y o  male, and has requested a medication that is a controlled substance  PDMP queried  Patient has been counseled about medication side effects and addiction potential: yes  Patient has a Controlled Substance agreement in chart: no     Reviewed the potential interactions of the alprazolam with his medications from Pain Management  Patient does follow with pain management for his narcotics  Prescription signed

## 2018-12-14 NOTE — PROGRESS NOTES
BMI Counseling: Body mass index is 26 4 kg/m²  Discussed the patient's BMI with him  The BMI is above average  No BMI follow-up plan is appropriate  Patient refused follow-up plan

## 2018-12-22 DIAGNOSIS — I10 ESSENTIAL HYPERTENSION: ICD-10-CM

## 2018-12-22 RX ORDER — AMLODIPINE BESYLATE 5 MG/1
5 TABLET ORAL DAILY
Qty: 30 TABLET | Refills: 5 | Status: SHIPPED | OUTPATIENT
Start: 2018-12-22 | End: 2019-12-15 | Stop reason: SDUPTHER

## 2019-01-14 DIAGNOSIS — F41.9 ANXIETY: ICD-10-CM

## 2019-01-14 RX ORDER — ALPRAZOLAM 0.5 MG/1
0.5 TABLET ORAL
Qty: 30 TABLET | Refills: 0 | Status: SHIPPED | OUTPATIENT
Start: 2019-01-14 | End: 2019-02-18 | Stop reason: SDUPTHER

## 2019-01-21 DIAGNOSIS — F51.01 PRIMARY INSOMNIA: ICD-10-CM

## 2019-01-21 DIAGNOSIS — F41.9 ANXIETY: ICD-10-CM

## 2019-01-21 RX ORDER — TRAZODONE HYDROCHLORIDE 50 MG/1
TABLET ORAL
Qty: 30 TABLET | Refills: 5 | Status: SHIPPED | OUTPATIENT
Start: 2019-01-21 | End: 2020-02-05

## 2019-01-23 ENCOUNTER — TRANSCRIBE ORDERS (OUTPATIENT)
Dept: FAMILY MEDICINE CLINIC | Facility: CLINIC | Age: 59
End: 2019-01-23

## 2019-01-23 DIAGNOSIS — M54.50 LOW BACK PAIN: Primary | ICD-10-CM

## 2019-02-18 DIAGNOSIS — F41.9 ANXIETY: ICD-10-CM

## 2019-02-18 RX ORDER — ALPRAZOLAM 0.5 MG/1
0.5 TABLET ORAL
Qty: 30 TABLET | Refills: 0 | Status: SHIPPED | OUTPATIENT
Start: 2019-02-18 | End: 2019-03-21 | Stop reason: SDUPTHER

## 2019-03-17 DIAGNOSIS — M19.91 PRIMARY OSTEOARTHRITIS, UNSPECIFIED SITE: ICD-10-CM

## 2019-03-18 RX ORDER — MELOXICAM 15 MG/1
TABLET ORAL
Qty: 30 TABLET | Refills: 3 | Status: SHIPPED | OUTPATIENT
Start: 2019-03-18 | End: 2021-06-14 | Stop reason: SDUPTHER

## 2019-03-21 DIAGNOSIS — F41.9 ANXIETY: ICD-10-CM

## 2019-03-21 RX ORDER — ALPRAZOLAM 0.5 MG/1
0.5 TABLET ORAL
Qty: 30 TABLET | Refills: 0 | Status: SHIPPED | OUTPATIENT
Start: 2019-03-21 | End: 2019-04-24 | Stop reason: SDUPTHER

## 2019-03-25 RX ORDER — TIOPRONIN 100 MG/1
100 TABLET, SUGAR COATED ORAL 4 TIMES DAILY
Qty: 120 TABLET | Refills: 5 | Status: CANCELLED | OUTPATIENT
Start: 2019-03-25

## 2019-04-23 DIAGNOSIS — F51.01 PRIMARY INSOMNIA: ICD-10-CM

## 2019-04-23 DIAGNOSIS — E78.2 MIXED HYPERLIPIDEMIA: ICD-10-CM

## 2019-04-23 DIAGNOSIS — I10 ESSENTIAL HYPERTENSION: ICD-10-CM

## 2019-04-23 DIAGNOSIS — F41.9 ANXIETY: ICD-10-CM

## 2019-04-23 RX ORDER — BENAZEPRIL HYDROCHLORIDE 20 MG/1
TABLET ORAL
Qty: 30 TABLET | Refills: 5 | Status: SHIPPED | OUTPATIENT
Start: 2019-04-23 | End: 2021-06-14

## 2019-04-23 RX ORDER — TRAZODONE HYDROCHLORIDE 100 MG/1
TABLET ORAL
Qty: 30 TABLET | Refills: 5 | Status: SHIPPED | OUTPATIENT
Start: 2019-04-23 | End: 2021-06-14

## 2019-04-23 RX ORDER — ATORVASTATIN CALCIUM 20 MG/1
TABLET, FILM COATED ORAL
Qty: 30 TABLET | Refills: 5 | Status: SHIPPED | OUTPATIENT
Start: 2019-04-23 | End: 2021-07-16 | Stop reason: SDUPTHER

## 2019-04-24 DIAGNOSIS — F41.9 ANXIETY: ICD-10-CM

## 2019-04-25 RX ORDER — ALPRAZOLAM 0.5 MG/1
0.5 TABLET ORAL
Qty: 30 TABLET | Refills: 0 | Status: SHIPPED | OUTPATIENT
Start: 2019-04-25 | End: 2019-05-28 | Stop reason: SDUPTHER

## 2019-05-24 ENCOUNTER — APPOINTMENT (OUTPATIENT)
Dept: URGENT CARE | Facility: MEDICAL CENTER | Age: 59
End: 2019-05-24
Payer: OTHER MISCELLANEOUS

## 2019-05-24 ENCOUNTER — APPOINTMENT (OUTPATIENT)
Dept: RADIOLOGY | Facility: MEDICAL CENTER | Age: 59
End: 2019-05-24
Payer: OTHER MISCELLANEOUS

## 2019-05-24 ENCOUNTER — TRANSCRIBE ORDERS (OUTPATIENT)
Dept: URGENT CARE | Facility: MEDICAL CENTER | Age: 59
End: 2019-05-24

## 2019-05-24 DIAGNOSIS — M79.672 LEFT FOOT PAIN: ICD-10-CM

## 2019-05-24 DIAGNOSIS — M79.675 PAIN OF TOE OF LEFT FOOT: ICD-10-CM

## 2019-05-24 DIAGNOSIS — M79.672 LEFT FOOT PAIN: Primary | ICD-10-CM

## 2019-05-24 DIAGNOSIS — M79.675 PAIN OF TOE OF LEFT FOOT: Primary | ICD-10-CM

## 2019-05-24 PROCEDURE — 99284 EMERGENCY DEPT VISIT MOD MDM: CPT | Performed by: FAMILY MEDICINE

## 2019-05-24 PROCEDURE — 73630 X-RAY EXAM OF FOOT: CPT

## 2019-05-24 PROCEDURE — G0383 LEV 4 HOSP TYPE B ED VISIT: HCPCS | Performed by: FAMILY MEDICINE

## 2019-05-28 DIAGNOSIS — F41.9 ANXIETY: ICD-10-CM

## 2019-05-31 RX ORDER — ALPRAZOLAM 0.5 MG/1
0.5 TABLET ORAL
Qty: 30 TABLET | Refills: 0 | Status: SHIPPED | OUTPATIENT
Start: 2019-05-31 | End: 2019-07-05 | Stop reason: SDUPTHER

## 2019-07-05 DIAGNOSIS — F41.9 ANXIETY: ICD-10-CM

## 2019-07-05 RX ORDER — ALPRAZOLAM 0.5 MG/1
0.5 TABLET ORAL
Qty: 30 TABLET | Refills: 0 | Status: SHIPPED | OUTPATIENT
Start: 2019-07-05 | End: 2019-08-15 | Stop reason: SDUPTHER

## 2019-08-08 DIAGNOSIS — F41.9 ANXIETY: ICD-10-CM

## 2019-08-08 RX ORDER — ALPRAZOLAM 0.5 MG/1
0.5 TABLET ORAL
Qty: 30 TABLET | Refills: 0 | OUTPATIENT
Start: 2019-08-08

## 2019-08-08 NOTE — TELEPHONE ENCOUNTER
Since I do not know this pt bring issue to 's attention-the office can't prescribe controlled substances without some sort of follow up

## 2019-08-15 DIAGNOSIS — F41.9 ANXIETY: ICD-10-CM

## 2019-08-15 RX ORDER — ALPRAZOLAM 0.5 MG/1
0.5 TABLET ORAL
Qty: 15 TABLET | Refills: 0 | Status: SHIPPED | OUTPATIENT
Start: 2019-08-15 | End: 2021-06-14

## 2019-12-15 DIAGNOSIS — I10 ESSENTIAL HYPERTENSION: ICD-10-CM

## 2019-12-16 RX ORDER — AMLODIPINE BESYLATE 5 MG/1
TABLET ORAL
Qty: 30 TABLET | Refills: 5 | Status: SHIPPED | OUTPATIENT
Start: 2019-12-16 | End: 2021-06-14

## 2020-02-05 DIAGNOSIS — F51.01 PRIMARY INSOMNIA: ICD-10-CM

## 2020-02-05 DIAGNOSIS — F41.9 ANXIETY: ICD-10-CM

## 2020-02-05 RX ORDER — TRAZODONE HYDROCHLORIDE 50 MG/1
TABLET ORAL
Qty: 15 TABLET | Refills: 0 | Status: SHIPPED | OUTPATIENT
Start: 2020-02-05 | End: 2021-06-14 | Stop reason: SDUPTHER

## 2020-02-05 NOTE — TELEPHONE ENCOUNTER
Called pt, spoke to pt to schedule appt, pt states he doesn't have any medical insurance and will try to come in soon

## 2020-05-06 DIAGNOSIS — F41.9 ANXIETY: ICD-10-CM

## 2020-05-06 DIAGNOSIS — F51.01 PRIMARY INSOMNIA: ICD-10-CM

## 2020-05-06 RX ORDER — TRAZODONE HYDROCHLORIDE 100 MG/1
TABLET ORAL
Qty: 30 TABLET | Refills: 5 | OUTPATIENT
Start: 2020-05-06

## 2020-05-28 DIAGNOSIS — F41.9 ANXIETY: ICD-10-CM

## 2020-05-28 DIAGNOSIS — F51.01 PRIMARY INSOMNIA: ICD-10-CM

## 2020-05-28 RX ORDER — TRAZODONE HYDROCHLORIDE 100 MG/1
TABLET ORAL
Qty: 30 TABLET | Refills: 5 | OUTPATIENT
Start: 2020-05-28

## 2021-03-29 ENCOUNTER — IMMUNIZATIONS (OUTPATIENT)
Dept: FAMILY MEDICINE CLINIC | Facility: HOSPITAL | Age: 61
End: 2021-03-29

## 2021-03-29 DIAGNOSIS — Z23 ENCOUNTER FOR IMMUNIZATION: Primary | ICD-10-CM

## 2021-03-29 PROCEDURE — 91301 SARS-COV-2 / COVID-19 MRNA VACCINE (MODERNA) 100 MCG: CPT

## 2021-03-29 PROCEDURE — 0011A SARS-COV-2 / COVID-19 MRNA VACCINE (MODERNA) 100 MCG: CPT

## 2021-04-29 ENCOUNTER — IMMUNIZATIONS (OUTPATIENT)
Dept: FAMILY MEDICINE CLINIC | Facility: HOSPITAL | Age: 61
End: 2021-04-29

## 2021-04-29 DIAGNOSIS — Z23 ENCOUNTER FOR IMMUNIZATION: Primary | ICD-10-CM

## 2021-04-29 PROCEDURE — 91301 SARS-COV-2 / COVID-19 MRNA VACCINE (MODERNA) 100 MCG: CPT

## 2021-04-29 PROCEDURE — 0012A SARS-COV-2 / COVID-19 MRNA VACCINE (MODERNA) 100 MCG: CPT

## 2021-06-14 ENCOUNTER — OFFICE VISIT (OUTPATIENT)
Dept: FAMILY MEDICINE CLINIC | Facility: CLINIC | Age: 61
End: 2021-06-14
Payer: COMMERCIAL

## 2021-06-14 VITALS
BODY MASS INDEX: 25.87 KG/M2 | RESPIRATION RATE: 16 BRPM | HEART RATE: 80 BPM | DIASTOLIC BLOOD PRESSURE: 88 MMHG | HEIGHT: 72 IN | WEIGHT: 191 LBS | SYSTOLIC BLOOD PRESSURE: 158 MMHG

## 2021-06-14 DIAGNOSIS — Z12.2 ENCOUNTER FOR SCREENING FOR LUNG CANCER: ICD-10-CM

## 2021-06-14 DIAGNOSIS — M19.91 PRIMARY OSTEOARTHRITIS, UNSPECIFIED SITE: ICD-10-CM

## 2021-06-14 DIAGNOSIS — E78.2 MIXED HYPERLIPIDEMIA: ICD-10-CM

## 2021-06-14 DIAGNOSIS — F51.01 PRIMARY INSOMNIA: ICD-10-CM

## 2021-06-14 DIAGNOSIS — N20.0 NEPHROLITHIASIS: ICD-10-CM

## 2021-06-14 DIAGNOSIS — Z12.12 SCREENING FOR COLORECTAL CANCER: ICD-10-CM

## 2021-06-14 DIAGNOSIS — E72.01 CYSTINURIA (HCC): ICD-10-CM

## 2021-06-14 DIAGNOSIS — Z12.5 PROSTATE CANCER SCREENING: ICD-10-CM

## 2021-06-14 DIAGNOSIS — Z12.11 SCREENING FOR COLORECTAL CANCER: ICD-10-CM

## 2021-06-14 DIAGNOSIS — I10 ESSENTIAL HYPERTENSION: Primary | ICD-10-CM

## 2021-06-14 DIAGNOSIS — Z11.4 SCREENING FOR HIV (HUMAN IMMUNODEFICIENCY VIRUS): ICD-10-CM

## 2021-06-14 DIAGNOSIS — Z87.891 PERSONAL HISTORY OF NICOTINE DEPENDENCE: ICD-10-CM

## 2021-06-14 DIAGNOSIS — F41.9 ANXIETY: ICD-10-CM

## 2021-06-14 DIAGNOSIS — Z11.59 NEED FOR HEPATITIS C SCREENING TEST: ICD-10-CM

## 2021-06-14 DIAGNOSIS — J30.9 CHRONIC ALLERGIC RHINITIS: ICD-10-CM

## 2021-06-14 DIAGNOSIS — Z72.0 TOBACCO ABUSE: ICD-10-CM

## 2021-06-14 DIAGNOSIS — H33.20 RETINAL DETACHMENT, UNSPECIFIED LATERALITY: ICD-10-CM

## 2021-06-14 LAB
SL AMB  POCT GLUCOSE, UA: NORMAL
SL AMB LEUKOCYTE ESTERASE,UA: NORMAL
SL AMB POCT BILIRUBIN,UA: NORMAL
SL AMB POCT BLOOD,UA: NORMAL
SL AMB POCT CLARITY,UA: CLEAR
SL AMB POCT COLOR,UA: NORMAL
SL AMB POCT KETONES,UA: NORMAL
SL AMB POCT NITRITE,UA: NORMAL
SL AMB POCT PH,UA: 6.5
SL AMB POCT SPECIFIC GRAVITY,UA: >=1.03
SL AMB POCT URINE PROTEIN: >=300
SL AMB POCT UROBILINOGEN: 0.2

## 2021-06-14 PROCEDURE — 81002 URINALYSIS NONAUTO W/O SCOPE: CPT | Performed by: FAMILY MEDICINE

## 2021-06-14 PROCEDURE — 99214 OFFICE O/P EST MOD 30 MIN: CPT | Performed by: FAMILY MEDICINE

## 2021-06-14 PROCEDURE — 3725F SCREEN DEPRESSION PERFORMED: CPT | Performed by: FAMILY MEDICINE

## 2021-06-14 PROCEDURE — 3008F BODY MASS INDEX DOCD: CPT | Performed by: FAMILY MEDICINE

## 2021-06-14 PROCEDURE — 4004F PT TOBACCO SCREEN RCVD TLK: CPT | Performed by: FAMILY MEDICINE

## 2021-06-14 RX ORDER — LOSARTAN POTASSIUM 50 MG/1
50 TABLET ORAL DAILY
Qty: 30 TABLET | Refills: 5 | Status: SHIPPED | OUTPATIENT
Start: 2021-06-14 | End: 2021-10-05

## 2021-06-14 RX ORDER — TRAZODONE HYDROCHLORIDE 50 MG/1
50 TABLET ORAL
Qty: 30 TABLET | Refills: 5 | Status: SHIPPED | OUTPATIENT
Start: 2021-06-14 | End: 2021-10-05

## 2021-06-14 RX ORDER — MELOXICAM 15 MG/1
15 TABLET ORAL DAILY
Qty: 30 TABLET | Refills: 3 | Status: SHIPPED | OUTPATIENT
Start: 2021-06-14 | End: 2021-10-13

## 2021-06-14 RX ORDER — ACETAMINOPHEN 500 MG
500 TABLET ORAL EVERY 6 HOURS PRN
COMMUNITY

## 2021-06-14 RX ORDER — FLUTICASONE PROPIONATE 50 MCG
2 SPRAY, SUSPENSION (ML) NASAL DAILY
Qty: 16 G | Refills: 5 | Status: SHIPPED | OUTPATIENT
Start: 2021-06-14 | End: 2021-10-05

## 2021-06-14 RX ORDER — TIOPRONIN 100 MG/1
TABLET, SUGAR COATED ORAL
Qty: 120 TABLET | Refills: 5 | OUTPATIENT
Start: 2021-06-14

## 2021-06-14 RX ORDER — TIOPRONIN 100 MG/1
100 TABLET, SUGAR COATED ORAL 4 TIMES DAILY
Qty: 120 TABLET | Refills: 5 | Status: SHIPPED | OUTPATIENT
Start: 2021-06-14 | End: 2021-10-05

## 2021-06-14 NOTE — ASSESSMENT & PLAN NOTE
Patient following with Ophthalmology for retinal detachment  No changes at the moment  He does report he needs cataract surgery in the left eye  No change at the moment otherwise

## 2021-06-14 NOTE — ASSESSMENT & PLAN NOTE
Patient does follow with Urology and with Nephrology  At this point, will restart the Thiola  Patient should follow with Uro and Nephro  Check urinalysis to see he if he has hematuria currently or not  Will also check 24 hour urine for cystine

## 2021-06-14 NOTE — ASSESSMENT & PLAN NOTE
Patient has been off atorvastatin for a while now  He is not sure he wanted to restart on cholesterol meds  Check labs  Follow-up afterwards as appropriate

## 2021-06-14 NOTE — PATIENT INSTRUCTIONS
Problem List Items Addressed This Visit     Anxiety    Relevant Medications    traZODone (DESYREL) 50 mg tablet    Cystinuria [facility])     Patient does follow with Urology and with Nephrology  At this point, will restart the Thiola  Patient should follow with Uro and Nephro  Check urinalysis to see he if he has hematuria currently or not  Will also check 24 hour urine for cystine  Relevant Medications    tiopronin (Thiola) 100 MG tablet    Other Relevant Orders    Cystine,Quantitative 24 HR Urine    Hyperlipidemia     Patient has been off atorvastatin for a while now  He is not sure he wanted to restart on cholesterol meds  Check labs  Follow-up afterwards as appropriate  Relevant Orders    Comprehensive metabolic panel    Lipid Panel with Direct LDL reflex    Hypertension - Primary     Blood pressure certainly is elevated today  Restart with blood pressure meds  In the past, he was on Hyzaar, therefore I would recommend starting on Cozaar 50 mg  Will recheck in approximately 1 month, and then adjust as appropriate  Trying to limit number blood pressure meds initially  Relevant Medications    losartan (COZAAR) 50 mg tablet    Other Relevant Orders    CBC and differential    Comprehensive metabolic panel    TSH, 3rd generation    Insomnia     Patient reports he is doing better  Continue on trazodone 50 mg  Relevant Medications    traZODone (DESYREL) 50 mg tablet    Nephrolithiasis     Noted before  Check UA to see if he had hematuria  Relevant Medications    tiopronin (Thiola) 100 MG tablet    Other Relevant Orders    Comprehensive metabolic panel    Cystine,Quantitative 24 HR Urine    Retinal detachment     Patient following with Ophthalmology for retinal detachment  No changes at the moment  He does report he needs cataract surgery in the left eye  No change at the moment otherwise  Tobacco abuse     Patient continues to smoke    Understands risk   Recommend quit smoking  Other Visit Diagnoses     Chronic allergic rhinitis        Relevant Medications    fluticasone (FLONASE) 50 mcg/act nasal spray    meloxicam (MOBIC) 15 mg tablet    Primary osteoarthritis, unspecified site        Relevant Medications    meloxicam (MOBIC) 15 mg tablet    Need for hepatitis C screening test        Relevant Orders    Hepatitis C Antibody ([facility])    Screening for HIV (human immunodeficiency virus)        Relevant Orders    HIV 1/2 Antigen/Antibody (4th Generation) w Reflex [facility]    Encounter for screening for lung cancer        Relevant Orders    CT lung screening program    Personal history of nicotine dependence        Relevant Orders    CT lung screening program    Screening for colorectal cancer        Relevant Orders    Ambulatory referral to Gastroenterology    Cologuard    Prostate cancer screening        Relevant Orders    PSA Total, Diagnostic          COVID 19 Instructions    Sohail Caputos was advised to limit contact with others to essential tasks such as getting food, medications, and medical care  Proper handwashing reviewed, and Hand sanitzer when washing is not available  If the patient develops symptoms of COVID 19, the patient should call the office as soon as possible  For 1661-7442 Flu season, it is strongly recommended that Flu Vaccinations be obtained  Virtual Visits may be conducted in several ways: Diallo: You should get a text message when the provider is ready to see you  Click on the link in the text message, and the call should start  You will need to type in your name, and allow camera and microphone access  This is HIPPA compliant, and secure  Please try to download Google Duo  Once you do download this on your phone, you will be prompted to add your phone number to the account  After that, he should receive a text from [employer], and use that code to verify your phone number    After that, you should be able to use Google Duo to receive and make video calls  Please download Microsoft Teams to your phone or computer  You would get an email with the meeting after scheduling with the office  You will Join the meeting, and wait there till the provider joins as well  Instructions for downloading this are available from the office  This is HIPPA compliant, and secure  We are committed to getting you vaccinated as soon as possible and will be closely following CDC and [facility]  guidelines as they are released and revised  Please refer to our COVID-19 vaccine webpage for the most up to date information on the vaccine and our distribution efforts  [employer] [email or web address]    OUR NEW LOCATION:  Starting around 28June2021, our new location and phone are:    [address], [address] [place], [place], [address]  Fax: [phone]    Lab services and OB/GYN will be at this location as well

## 2021-06-14 NOTE — PROGRESS NOTES
Assessment and Plan:    Problem List Items Addressed This Visit     Anxiety    Relevant Medications    traZODone (DESYREL) 50 mg tablet    Cystinuria [facility])     Patient does follow with Urology and with Nephrology  At this point, will restart the Thiola  Patient should follow with Uro and Nephro  Check urinalysis to see he if he has hematuria currently or not  Will also check 24 hour urine for cystine  Relevant Medications    tiopronin (Thiola) 100 MG tablet    Other Relevant Orders    Cystine,Quantitative 24 HR Urine    Hyperlipidemia     Patient has been off atorvastatin for a while now  He is not sure he wanted to restart on cholesterol meds  Check labs  Follow-up afterwards as appropriate  Relevant Orders    Comprehensive metabolic panel    Lipid Panel with Direct LDL reflex    Hypertension - Primary     Blood pressure certainly is elevated today  Restart with blood pressure meds  In the past, he was on Hyzaar, therefore I would recommend starting on Cozaar 50 mg  Will recheck in approximately 1 month, and then adjust as appropriate  Trying to limit number blood pressure meds initially  Relevant Medications    losartan (COZAAR) 50 mg tablet    Other Relevant Orders    CBC and differential    Comprehensive metabolic panel    TSH, 3rd generation    Insomnia     Patient reports he is doing better  Continue on trazodone 50 mg  Relevant Medications    traZODone (DESYREL) 50 mg tablet    Nephrolithiasis     Noted before  Check UA to see if he had hematuria  Relevant Medications    tiopronin (Thiola) 100 MG tablet    Other Relevant Orders    Comprehensive metabolic panel    Cystine,Quantitative 24 HR Urine    Retinal detachment     Patient following with Ophthalmology for retinal detachment  No changes at the moment  He does report he needs cataract surgery in the left eye  No change at the moment otherwise  Tobacco abuse     Patient continues to smoke  Understands risk  Recommend quit smoking  Other Visit Diagnoses     Chronic allergic rhinitis        Relevant Medications    fluticasone (FLONASE) 50 mcg/act nasal spray    meloxicam (MOBIC) 15 mg tablet    Primary osteoarthritis, unspecified site        Relevant Medications    meloxicam (MOBIC) 15 mg tablet    Need for hepatitis C screening test        Relevant Orders    Hepatitis C Antibody ([facility])    Screening for HIV (human immunodeficiency virus)        Relevant Orders    HIV 1/2 Antigen/Antibody (4th Generation) w Reflex [facility]    Encounter for screening for lung cancer        Relevant Orders    CT lung screening program    Personal history of nicotine dependence        Relevant Orders    CT lung screening program    Screening for colorectal cancer        Relevant Orders    Ambulatory referral to Gastroenterology    Cologuard    Prostate cancer screening        Relevant Orders    PSA Total, Diagnostic                 Diagnoses and all orders for this visit:    Essential hypertension  -     losartan (COZAAR) 50 mg tablet; Take 1 tablet (50 mg total) by mouth daily  -     CBC and differential; Future  -     Comprehensive metabolic panel; Future  -     TSH, 3rd generation; Future    Mixed hyperlipidemia  -     Comprehensive metabolic panel; Future  -     Lipid Panel with Direct LDL reflex; Future    Retinal detachment, unspecified laterality    Nephrolithiasis  -     Comprehensive metabolic panel; Future  -     Cystine,Quantitative 24 HR Urine    Chronic allergic rhinitis  -     fluticasone (FLONASE) 50 mcg/act nasal spray; 2 sprays into each nostril daily    Primary osteoarthritis, unspecified site  -     meloxicam (MOBIC) 15 mg tablet; Take 1 tablet (15 mg total) by mouth daily    Primary insomnia  -     traZODone (DESYREL) 50 mg tablet; Take 1 tablet (50 mg total) by mouth daily at bedtime as needed for sleep    Cystinuria ([address] )  -     tiopronin (Thiola) 100 MG tablet;  Take 1 tablet (100 mg total) by mouth 4 (four) times a day  -     Cystine,Quantitative 24 HR Urine    Tobacco abuse    Need for hepatitis C screening test  -     Hepatitis C Antibody ([facility]); Future    Screening for HIV (human immunodeficiency virus)  -     HIV 1/2 Antigen/Antibody (4th Generation) w Reflex [facility]; Future    Encounter for screening for lung cancer  -     CT lung screening program; Future    Personal history of nicotine dependence  -     CT lung screening program; Future    Screening for colorectal cancer  -     Ambulatory referral to Gastroenterology; Future  -     Cologuard; Future    Anxiety  -     traZODone (DESYREL) 50 mg tablet; Take 1 tablet (50 mg total) by mouth daily at bedtime as needed for sleep    Prostate cancer screening  -     PSA Total, Diagnostic; Future    Other orders  -     acetaminophen (TYLENOL) 500 mg tablet; Take 500 mg by mouth every 6 (six) hours as needed for mild pain              Subjective:      Patient ID: Jasmin Newman is a 57 y o  male  CC:    Chief Complaint   Patient presents with   [employer] Establish Care     Patient present today to establish his care again with us  HPI:    Patient is here to reestablish care  Unfortunately, due to insurance issues he was not able to see this office  He did not have insurance to be able to get anything  He was not on medications  Hypertension:  Patient was on Hyzaar 1 point  After his switch in insurance, he was then on amlodipine and benazepril  He would like to go back to the Hyzaar as he felt that work for him  Hyperlipidemia: It appears patient had a prescription previously for atorvastatin, but he does not like the cholesterol medications  Allergic rhinitis:  Patient did have Singulair for allergies  Also Flonase  Lately, he is only needed the Flonase occasionally  Kidney stones:  Patient has been on Thiola for quite some time now  Has been off of it for the last 2 years or so      Insomnia:  Patient has been on trazodone before  Lately, he has been doing better  Currently on trazodone 50 mg  Detached retina:  Previously noted in 2013 in the right eye  In 2019 it was his left eye  That 1 seem to be worse  Patient following with Ophthalmology  The following portions of the patient's history were reviewed and updated as appropriate: allergies, current medications, past family history, past medical history, past social history, past surgical history and problem list       Review of Systems   Constitutional: Negative  HENT: Negative  Eyes: Negative  Respiratory: Negative  Cardiovascular: Negative  Gastrointestinal: Negative  All other systems reviewed and are negative  Data to review:       Objective:    Vitals:    06/14/21 1600   BP: 158/88   BP Location: Left arm   Patient Position: Sitting   Cuff Size: Large   Pulse: 80   Resp: 16   Weight: 86 6 kg (191 lb)   Height: 6' (1 829 m)        Physical Exam  Vitals and nursing note reviewed  Constitutional:       Appearance: Normal appearance  Neck:      Vascular: No carotid bruit  Cardiovascular:      Rate and Rhythm: Normal rate and regular rhythm  Pulses: Normal pulses  Carotid pulses are 2+ on the right side and 2+ on the left side  Heart sounds: Normal heart sounds  No murmur heard  No gallop  Pulmonary:      Effort: Pulmonary effort is normal  No respiratory distress  Breath sounds: Normal breath sounds  No stridor  No wheezing, rhonchi or rales  Chest:      Chest wall: No tenderness  Neurological:      Mental Status: He is alert  BMI Counseling: Body mass index is 25 9 kg/m²   The BMI is above normal  Nutrition recommendations include decreasing portion sizes, encouraging healthy choices of fruits and vegetables, decreasing fast food intake, consuming healthier snacks, limiting drinks that contain sugar, moderation in carbohydrate intake, increasing intake of lean protein, reducing intake of saturated and trans fat and reducing intake of cholesterol  Exercise recommendations include exercising 3-5 times per week  No pharmacotherapy was ordered

## 2021-06-14 NOTE — TELEPHONE ENCOUNTER
Patient is going to check with specialty pharmacy that has been giving him this medication the past

## 2021-06-14 NOTE — ASSESSMENT & PLAN NOTE
Blood pressure certainly is elevated today  Restart with blood pressure meds  In the past, he was on Hyzaar, therefore I would recommend starting on Cozaar 50 mg  Will recheck in approximately 1 month, and then adjust as appropriate  Trying to limit number blood pressure meds initially

## 2021-06-16 ENCOUNTER — APPOINTMENT (OUTPATIENT)
Dept: LAB | Facility: CLINIC | Age: 61
End: 2021-06-16
Payer: COMMERCIAL

## 2021-06-16 DIAGNOSIS — Z11.59 NEED FOR HEPATITIS C SCREENING TEST: ICD-10-CM

## 2021-06-16 DIAGNOSIS — I10 ESSENTIAL HYPERTENSION: ICD-10-CM

## 2021-06-16 DIAGNOSIS — Z12.5 PROSTATE CANCER SCREENING: ICD-10-CM

## 2021-06-16 DIAGNOSIS — E78.2 MIXED HYPERLIPIDEMIA: ICD-10-CM

## 2021-06-16 DIAGNOSIS — N20.0 NEPHROLITHIASIS: ICD-10-CM

## 2021-06-16 DIAGNOSIS — Z11.4 SCREENING FOR HIV (HUMAN IMMUNODEFICIENCY VIRUS): ICD-10-CM

## 2021-06-16 LAB
ALBUMIN SERPL BCP-MCNC: 3.6 G/DL (ref 3.5–5)
ALP SERPL-CCNC: 76 U/L (ref 46–116)
ALT SERPL W P-5'-P-CCNC: 21 U/L (ref 12–78)
ANION GAP SERPL CALCULATED.3IONS-SCNC: 10 MMOL/L (ref 4–13)
AST SERPL W P-5'-P-CCNC: 17 U/L (ref 5–45)
BASOPHILS # BLD AUTO: 0.07 THOUSANDS/ΜL (ref 0–0.1)
BASOPHILS NFR BLD AUTO: 1 % (ref 0–1)
BILIRUB SERPL-MCNC: 0.54 MG/DL (ref 0.2–1)
BUN SERPL-MCNC: 18 MG/DL (ref 5–25)
CALCIUM SERPL-MCNC: 9.1 MG/DL (ref 8.3–10.1)
CHLORIDE SERPL-SCNC: 107 MMOL/L (ref 100–108)
CHOLEST SERPL-MCNC: 305 MG/DL (ref 50–200)
CO2 SERPL-SCNC: 23 MMOL/L (ref 21–32)
CREAT SERPL-MCNC: 1.09 MG/DL (ref 0.6–1.3)
EOSINOPHIL # BLD AUTO: 0.56 THOUSAND/ΜL (ref 0–0.61)
EOSINOPHIL NFR BLD AUTO: 6 % (ref 0–6)
ERYTHROCYTE [DISTWIDTH] IN BLOOD BY AUTOMATED COUNT: 13 % (ref 11.6–15.1)
GFR SERPL CREATININE-BSD FRML MDRD: 72 ML/MIN/1.73SQ M
GLUCOSE P FAST SERPL-MCNC: 96 MG/DL (ref 65–99)
HCT VFR BLD AUTO: 45.9 % (ref 36.5–49.3)
HCV AB SER QL: NORMAL
HDLC SERPL-MCNC: 52 MG/DL
HGB BLD-MCNC: 15.1 G/DL (ref 12–17)
IMM GRANULOCYTES # BLD AUTO: 0.03 THOUSAND/UL (ref 0–0.2)
IMM GRANULOCYTES NFR BLD AUTO: 0 % (ref 0–2)
LDLC SERPL CALC-MCNC: 213 MG/DL (ref 0–100)
LYMPHOCYTES # BLD AUTO: 2.21 THOUSANDS/ΜL (ref 0.6–4.47)
LYMPHOCYTES NFR BLD AUTO: 25 % (ref 14–44)
MCH RBC QN AUTO: 30 PG (ref 26.8–34.3)
MCHC RBC AUTO-ENTMCNC: 32.9 G/DL (ref 31.4–37.4)
MCV RBC AUTO: 91 FL (ref 82–98)
MONOCYTES # BLD AUTO: 0.91 THOUSAND/ΜL (ref 0.17–1.22)
MONOCYTES NFR BLD AUTO: 10 % (ref 4–12)
NEUTROPHILS # BLD AUTO: 4.95 THOUSANDS/ΜL (ref 1.85–7.62)
NEUTS SEG NFR BLD AUTO: 58 % (ref 43–75)
NRBC BLD AUTO-RTO: 0 /100 WBCS
PLATELET # BLD AUTO: 330 THOUSANDS/UL (ref 149–390)
PMV BLD AUTO: 12.2 FL (ref 8.9–12.7)
POTASSIUM SERPL-SCNC: 4.3 MMOL/L (ref 3.5–5.3)
PROT SERPL-MCNC: 7.1 G/DL (ref 6.4–8.2)
PSA SERPL-MCNC: 0.3 NG/ML (ref 0–4)
RBC # BLD AUTO: 5.04 MILLION/UL (ref 3.88–5.62)
SODIUM SERPL-SCNC: 140 MMOL/L (ref 136–145)
TRIGL SERPL-MCNC: 198 MG/DL
TSH SERPL DL<=0.05 MIU/L-ACNC: 6.32 UIU/ML (ref 0.36–3.74)
WBC # BLD AUTO: 8.73 THOUSAND/UL (ref 4.31–10.16)

## 2021-06-16 PROCEDURE — 84153 ASSAY OF PSA TOTAL: CPT

## 2021-06-16 PROCEDURE — 85025 COMPLETE CBC W/AUTO DIFF WBC: CPT

## 2021-06-16 PROCEDURE — 84443 ASSAY THYROID STIM HORMONE: CPT

## 2021-06-16 PROCEDURE — 87389 HIV-1 AG W/HIV-1&-2 AB AG IA: CPT

## 2021-06-16 PROCEDURE — 80061 LIPID PANEL: CPT

## 2021-06-16 PROCEDURE — 86803 HEPATITIS C AB TEST: CPT

## 2021-06-16 PROCEDURE — 80053 COMPREHEN METABOLIC PANEL: CPT

## 2021-06-16 PROCEDURE — 36415 COLL VENOUS BLD VENIPUNCTURE: CPT

## 2021-06-17 LAB — HIV 1+2 AB+HIV1 P24 AG SERPL QL IA: NORMAL

## 2021-07-16 ENCOUNTER — OFFICE VISIT (OUTPATIENT)
Dept: FAMILY MEDICINE CLINIC | Facility: CLINIC | Age: 61
End: 2021-07-16
Payer: COMMERCIAL

## 2021-07-16 VITALS
SYSTOLIC BLOOD PRESSURE: 126 MMHG | WEIGHT: 189.25 LBS | BODY MASS INDEX: 25.63 KG/M2 | HEART RATE: 80 BPM | HEIGHT: 72 IN | DIASTOLIC BLOOD PRESSURE: 84 MMHG | TEMPERATURE: 97.9 F

## 2021-07-16 DIAGNOSIS — E03.9 ACQUIRED HYPOTHYROIDISM: ICD-10-CM

## 2021-07-16 DIAGNOSIS — J30.1 SEASONAL ALLERGIC RHINITIS DUE TO POLLEN: ICD-10-CM

## 2021-07-16 DIAGNOSIS — E78.2 MIXED HYPERLIPIDEMIA: Primary | ICD-10-CM

## 2021-07-16 DIAGNOSIS — D72.829 LEUKOCYTOSIS, UNSPECIFIED TYPE: ICD-10-CM

## 2021-07-16 DIAGNOSIS — Z72.0 TOBACCO ABUSE: ICD-10-CM

## 2021-07-16 DIAGNOSIS — Z12.5 PROSTATE CANCER SCREENING: ICD-10-CM

## 2021-07-16 DIAGNOSIS — E72.01 CYSTINURIA (HCC): ICD-10-CM

## 2021-07-16 DIAGNOSIS — N20.0 NEPHROLITHIASIS: ICD-10-CM

## 2021-07-16 DIAGNOSIS — I10 ESSENTIAL HYPERTENSION: ICD-10-CM

## 2021-07-16 DIAGNOSIS — Z12.11 COLON CANCER SCREENING: ICD-10-CM

## 2021-07-16 DIAGNOSIS — F51.01 PRIMARY INSOMNIA: ICD-10-CM

## 2021-07-16 PROBLEM — N17.9 AKI (ACUTE KIDNEY INJURY) (HCC): Status: RESOLVED | Noted: 2018-06-08 | Resolved: 2021-07-16

## 2021-07-16 PROCEDURE — 99214 OFFICE O/P EST MOD 30 MIN: CPT | Performed by: FAMILY MEDICINE

## 2021-07-16 PROCEDURE — 3008F BODY MASS INDEX DOCD: CPT | Performed by: FAMILY MEDICINE

## 2021-07-16 RX ORDER — ATORVASTATIN CALCIUM 20 MG/1
20 TABLET, FILM COATED ORAL DAILY
Qty: 30 TABLET | Refills: 5 | Status: SHIPPED | OUTPATIENT
Start: 2021-07-16 | End: 2021-10-25 | Stop reason: HOSPADM

## 2021-07-16 RX ORDER — LEVOTHYROXINE SODIUM 0.03 MG/1
25 TABLET ORAL
Qty: 30 TABLET | Refills: 5 | Status: SHIPPED | OUTPATIENT
Start: 2021-07-16 | End: 2022-02-28 | Stop reason: ALTCHOICE

## 2021-07-16 NOTE — PROGRESS NOTES
Assessment and Plan:    Problem List Items Addressed This Visit     Allergic rhinitis     Patient does have allergic rhinitis  Will get Flonase over-the-counter  Follow-up afterwards  Reviewed with him that we did send the prescription to the pharmacy, some not sure what happened once the electronic order was entered  Cystinuria [facility])     Follow-up with urology  Consider follow-up with Nephrology  Hyperlipidemia - Primary     Cholesterol clearly is not at goal   He has been off medication for a while due to insurance issues, however  Restart atorvastatin, check in 6 months  Relevant Medications    atorvastatin (LIPITOR) 20 mg tablet    Other Relevant Orders    Comprehensive metabolic panel    Lipid panel    Hypertension     Blood pressure excellent today  No change  Hypothyroid     TSH is slightly elevated, suggesting that he does not have hypothyroidism  This would explain some fatigue  Would recommend start Synthroid 25 mcg, and recheck in approximately 3 months  Relevant Medications    levothyroxine 25 mcg tablet    Other Relevant Orders    TSH, 3rd generation    Insomnia     Patient with some symptoms of RLS  Will reeval with next OV  I prefer to not add too many new meds  Leucocytosis     White count was normal on CBC  Nephrolithiasis     CT stone protocol  Relevant Orders    CT renal stone study abdomen pelvis wo contrast    Tobacco abuse     Patient does smoke  Not interested in reviewing quitting at this time  Understands risks  Other Visit Diagnoses     Prostate cancer screening        PSA was reasonable  Offered JULIO CESAR, but he deferred to urology  Colon cancer screening        Patient declined colon cancer screening  Understands risks  Diagnoses and all orders for this visit:    Mixed hyperlipidemia  -     atorvastatin (LIPITOR) 20 mg tablet;  Take 1 tablet (20 mg total) by mouth daily  -     Comprehensive metabolic panel; Future  -     Lipid panel; Future    Essential hypertension    Cystinuria (HCC)    Seasonal allergic rhinitis due to pollen    Tobacco abuse    Leukocytosis, unspecified type    Acquired hypothyroidism  -     levothyroxine 25 mcg tablet; Take 1 tablet (25 mcg total) by mouth daily in the early morning  -     TSH, 3rd generation; Future    Prostate cancer screening  Comments:  PSA was reasonable  Offered JULIO CESAR, but he deferred to urology  Colon cancer screening  Comments:  Patient declined colon cancer screening  Understands risks  Nephrolithiasis  -     CT renal stone study abdomen pelvis wo contrast; Future    Primary insomnia              Subjective:      Patient ID: Rolinda Bosworth is a 57 y o  male  CC:    Chief Complaint   Patient presents with    Follow-up     pt states to discuss labs    mgb       HPI:    Patient is here to follow-up on multiple issues  Reviewed lipid panel  It is quite abnormal, but he has not been on the medication for a year or more due to insurance issues  He was not able to get the Flonase after the last visit  Unfortunately, his nose still runs quite a bit  Hypertension:  Patient is back on blood pressure medications, and reviewed his blood pressure  Cystinuria:  Unfortunate, patient was not able to get the thiola yet  Has not been able to get Urology at either  The following portions of the patient's history were reviewed and updated as appropriate: allergies, current medications, past family history, past medical history, past social history, past surgical history and problem list       Review of Systems   Constitutional: Negative  HENT: Negative  Eyes: Negative  Respiratory: Negative  Cardiovascular: Negative  Gastrointestinal: Negative  Endocrine: Negative  Genitourinary: Negative  Musculoskeletal: Negative  Skin: Negative  Allergic/Immunologic: Negative  Neurological: Negative      Hematological: Negative  Psychiatric/Behavioral: Negative  Data to review:       Objective:    Vitals:    07/16/21 0814   BP: 126/84   BP Location: Right arm   Patient Position: Sitting   Cuff Size: Standard   Pulse: 80   Temp: 97 9 °F (36 6 °C)   TempSrc: Temporal   Weight: 85 8 kg (189 lb 4 oz)   Height: 6' (1 829 m)        Physical Exam  Vitals and nursing note reviewed  Constitutional:       Appearance: Normal appearance  Neck:      Vascular: No carotid bruit  Cardiovascular:      Rate and Rhythm: Normal rate and regular rhythm  Pulses: Normal pulses  Carotid pulses are 2+ on the right side and 2+ on the left side  Heart sounds: Normal heart sounds  No murmur heard  No gallop  Pulmonary:      Effort: Pulmonary effort is normal  No respiratory distress  Breath sounds: Normal breath sounds  No stridor  No wheezing, rhonchi or rales  Chest:      Chest wall: No tenderness  Neurological:      Mental Status: He is alert  Tobacco Cessation Counseling: Tobacco cessation counseling was provided  The patient is sincerely urged to quit consumption of tobacco  He is not ready to quit tobacco  Medication options and side effects of medication discussed  Patient refused medication

## 2021-07-16 NOTE — ASSESSMENT & PLAN NOTE
Cholesterol clearly is not at goal   He has been off medication for a while due to insurance issues, however  Restart atorvastatin, check in 6 months

## 2021-07-16 NOTE — ASSESSMENT & PLAN NOTE
Patient does have allergic rhinitis  Will get Flonase over-the-counter  Follow-up afterwards  Reviewed with him that we did send the prescription to the pharmacy, some not sure what happened once the electronic order was entered

## 2021-07-16 NOTE — ASSESSMENT & PLAN NOTE
TSH is slightly elevated, suggesting that he does not have hypothyroidism  This would explain some fatigue  Would recommend start Synthroid 25 mcg, and recheck in approximately 3 months

## 2021-07-16 NOTE — PATIENT INSTRUCTIONS
Problem List Items Addressed This Visit     Allergic rhinitis     Patient does have allergic rhinitis  Will get Flonase over-the-counter  Follow-up afterwards  Reviewed with him that we did send the prescription to the pharmacy, some not sure what happened once the electronic order was entered  Cystinuria [facility])     Follow-up with urology  Consider follow-up with Nephrology  Hyperlipidemia - Primary     Cholesterol clearly is not at goal   He has been off medication for a while due to insurance issues, however  Restart atorvastatin, check in 6 months  Relevant Medications    atorvastatin (LIPITOR) 20 mg tablet    Other Relevant Orders    Comprehensive metabolic panel    Lipid panel    Hypertension     Blood pressure excellent today  No change  Hypothyroid     TSH is slightly elevated, suggesting that he does not have hypothyroidism  This would explain some fatigue  Would recommend start Synthroid 25 mcg, and recheck in approximately 3 months  Relevant Medications    levothyroxine 25 mcg tablet    Other Relevant Orders    TSH, 3rd generation    Insomnia     Patient with some symptoms of RLS  Will reeval with next OV  I prefer to not add too many new meds  Leucocytosis     White count was normal on CBC  Nephrolithiasis     CT stone protocol  Relevant Orders    CT renal stone study abdomen pelvis wo contrast    Tobacco abuse     Patient does smoke  Not interested in reviewing quitting at this time  Understands risks  Other Visit Diagnoses     Prostate cancer screening        PSA was reasonable  Offered JULIO CESAR, but he deferred to urology  Colon cancer screening        Patient declined colon cancer screening  Understands risks  COVID 19 Instructions    Brittany Lindsey was advised to limit contact with others to essential tasks such as getting food, medications, and medical care      Proper handwashing reviewed, and Hand sanitzer when washing is not available  If the patient develops symptoms of COVID 19, the patient should call the office as soon as possible  For [id number] Flu season, it is strongly recommended that Flu Vaccinations be obtained  Virtual Visits may be conducted in several ways: Diallo: You should get a text message when the provider is ready to see you  Click on the link in the text message, and the call should start  You will need to type in your name, and allow camera and microphone access  This is HIPPA compliant, and secure  Please try to download Google Duo  Once you do download this on your phone, you will be prompted to add your phone number to the account  After that, he should receive a text from [employer], and use that code to verify your phone number  After that, you should be able to use Google Duo to receive and make video calls  Please download Microsoft Teams to your phone or computer  You would get an email with the meeting after scheduling with the office  You will Join the meeting, and wait there till the provider joins as well  Instructions for downloading this are available from the office  This is HIPPA compliant, and secure  We are committed to getting you vaccinated as soon as possible and will be closely following CDC and [facility]  guidelines as they are released and revised  Please refer to our COVID-19 vaccine webpage for the most up to date information on the vaccine and our distribution efforts  [employer] tn    OUR NEW LOCATION:  Starting around 28June2021, our new location and phone are:    [address], [address] [place] [address], [address]  Fax: [phone]    Lab services and OB/GYN will be at this location as well

## 2021-10-05 ENCOUNTER — OFFICE VISIT (OUTPATIENT)
Dept: FAMILY MEDICINE CLINIC | Facility: CLINIC | Age: 61
End: 2021-10-05
Payer: COMMERCIAL

## 2021-10-05 VITALS
WEIGHT: 194.25 LBS | SYSTOLIC BLOOD PRESSURE: 156 MMHG | BODY MASS INDEX: 26.31 KG/M2 | HEIGHT: 72 IN | TEMPERATURE: 97.6 F | HEART RATE: 73 BPM | DIASTOLIC BLOOD PRESSURE: 90 MMHG | OXYGEN SATURATION: 97 %

## 2021-10-05 DIAGNOSIS — I10 PRIMARY HYPERTENSION: ICD-10-CM

## 2021-10-05 DIAGNOSIS — Z01.818 PRE-OP EXAM: Primary | ICD-10-CM

## 2021-10-05 DIAGNOSIS — Z23 ENCOUNTER FOR IMMUNIZATION: ICD-10-CM

## 2021-10-05 PROCEDURE — 99214 OFFICE O/P EST MOD 30 MIN: CPT

## 2021-10-05 PROCEDURE — 90471 IMMUNIZATION ADMIN: CPT

## 2021-10-05 PROCEDURE — 90682 RIV4 VACC RECOMBINANT DNA IM: CPT

## 2021-10-05 RX ORDER — LOSARTAN POTASSIUM 100 MG/1
100 TABLET ORAL DAILY
Qty: 30 TABLET | Refills: 2 | Status: SHIPPED | OUTPATIENT
Start: 2021-10-05 | End: 2022-02-17

## 2021-10-12 DIAGNOSIS — M19.91 PRIMARY OSTEOARTHRITIS, UNSPECIFIED SITE: ICD-10-CM

## 2021-10-13 RX ORDER — MELOXICAM 15 MG/1
15 TABLET ORAL DAILY
Qty: 30 TABLET | Refills: 3 | OUTPATIENT
Start: 2021-10-13

## 2021-10-13 RX ORDER — MELOXICAM 15 MG/1
TABLET ORAL
Qty: 30 TABLET | Refills: 3 | Status: SHIPPED | OUTPATIENT
Start: 2021-10-13 | End: 2022-02-17

## 2021-10-14 ENCOUNTER — OFFICE VISIT (OUTPATIENT)
Dept: FAMILY MEDICINE CLINIC | Facility: CLINIC | Age: 61
End: 2021-10-14
Payer: COMMERCIAL

## 2021-10-14 VITALS
BODY MASS INDEX: 26.04 KG/M2 | SYSTOLIC BLOOD PRESSURE: 128 MMHG | TEMPERATURE: 98.1 F | WEIGHT: 192.25 LBS | DIASTOLIC BLOOD PRESSURE: 90 MMHG | HEIGHT: 72 IN

## 2021-10-14 DIAGNOSIS — T16.2XXA FOREIGN BODY OF LEFT EAR, INITIAL ENCOUNTER: Primary | ICD-10-CM

## 2021-10-14 DIAGNOSIS — H69.83 DYSFUNCTION OF BOTH EUSTACHIAN TUBES: ICD-10-CM

## 2021-10-14 DIAGNOSIS — I10 PRIMARY HYPERTENSION: ICD-10-CM

## 2021-10-14 PROBLEM — H69.93 DYSFUNCTION OF BOTH EUSTACHIAN TUBES: Status: ACTIVE | Noted: 2021-10-14

## 2021-10-14 PROCEDURE — 99214 OFFICE O/P EST MOD 30 MIN: CPT | Performed by: NURSE PRACTITIONER

## 2021-10-14 PROCEDURE — 69200 CLEAR OUTER EAR CANAL: CPT | Performed by: NURSE PRACTITIONER

## 2021-10-14 PROCEDURE — 4004F PT TOBACCO SCREEN RCVD TLK: CPT | Performed by: NURSE PRACTITIONER

## 2021-10-14 RX ORDER — NEOMYCIN SULFATE, POLYMYXIN B SULFATE AND DEXAMETHASONE 3.5; 10000; 1 MG/ML; [USP'U]/ML; MG/ML
SUSPENSION/ DROPS OPHTHALMIC
COMMUNITY
Start: 2021-10-05

## 2021-10-14 RX ORDER — CYCLOPENTOLATE HYDROCHLORIDE 10 MG/ML
SOLUTION/ DROPS OPHTHALMIC
COMMUNITY
Start: 2021-10-06

## 2021-10-23 ENCOUNTER — HOSPITAL ENCOUNTER (EMERGENCY)
Facility: HOSPITAL | Age: 61
Discharge: STILL ADMITTED OR ADMINISTRATIVE | End: 2021-10-24
Attending: FAMILY MEDICINE
Payer: COMMERCIAL

## 2021-10-23 ENCOUNTER — APPOINTMENT (EMERGENCY)
Dept: CT IMAGING | Facility: HOSPITAL | Age: 61
End: 2021-10-23
Payer: COMMERCIAL

## 2021-10-23 DIAGNOSIS — I10 HYPERTENSION: ICD-10-CM

## 2021-10-23 DIAGNOSIS — T68.XXXA HYPOTHERMIA, INITIAL ENCOUNTER: ICD-10-CM

## 2021-10-23 DIAGNOSIS — R29.90 STROKE-LIKE SYMPTOMS: ICD-10-CM

## 2021-10-23 DIAGNOSIS — I63.9 CEREBROVASCULAR ACCIDENT (CVA), UNSPECIFIED MECHANISM (HCC): Primary | ICD-10-CM

## 2021-10-23 LAB
ALBUMIN SERPL BCP-MCNC: 4.6 G/DL (ref 3.5–5.7)
ALP SERPL-CCNC: 60 U/L (ref 55–165)
ALT SERPL W P-5'-P-CCNC: 14 U/L (ref 7–52)
ANION GAP SERPL CALCULATED.3IONS-SCNC: 11 MMOL/L (ref 4–13)
APTT PPP: 34 SECONDS (ref 23–37)
AST SERPL W P-5'-P-CCNC: 13 U/L (ref 13–39)
BACTERIA UR QL AUTO: NORMAL /HPF
BASOPHILS # BLD AUTO: 0.1 THOUSANDS/ΜL (ref 0–0.1)
BASOPHILS NFR BLD AUTO: 1 % (ref 0–2)
BILIRUB SERPL-MCNC: 0.5 MG/DL (ref 0.2–1)
BILIRUB UR QL STRIP: NEGATIVE
BNP SERPL-MCNC: 31 PG/ML (ref 1–100)
BUN SERPL-MCNC: 19 MG/DL (ref 7–25)
CALCIUM SERPL-MCNC: 10 MG/DL (ref 8.6–10.5)
CHLORIDE SERPL-SCNC: 99 MMOL/L (ref 98–107)
CK SERPL-CCNC: 139 U/L (ref 30–308)
CLARITY UR: CLEAR
CO2 SERPL-SCNC: 28 MMOL/L (ref 21–31)
COLOR UR: YELLOW
CREAT SERPL-MCNC: 1.29 MG/DL (ref 0.7–1.3)
EOSINOPHIL # BLD AUTO: 0.4 THOUSAND/ΜL (ref 0–0.61)
EOSINOPHIL NFR BLD AUTO: 4 % (ref 0–5)
ERYTHROCYTE [DISTWIDTH] IN BLOOD BY AUTOMATED COUNT: 13.2 % (ref 11.5–14.5)
FLUAV RNA RESP QL NAA+PROBE: NEGATIVE
FLUBV RNA RESP QL NAA+PROBE: NEGATIVE
GFR SERPL CREATININE-BSD FRML MDRD: 59 ML/MIN/1.73SQ M
GLUCOSE SERPL-MCNC: 89 MG/DL (ref 65–99)
GLUCOSE SERPL-MCNC: 94 MG/DL (ref 65–140)
GLUCOSE UR STRIP-MCNC: NEGATIVE MG/DL
HCT VFR BLD AUTO: 45.6 % (ref 42–47)
HGB BLD-MCNC: 15.3 G/DL (ref 14–18)
HGB UR QL STRIP.AUTO: NEGATIVE
INR PPP: 0.83 (ref 0.84–1.19)
KETONES UR STRIP-MCNC: NEGATIVE MG/DL
LEUKOCYTE ESTERASE UR QL STRIP: NEGATIVE
LIPASE SERPL-CCNC: 27 U/L (ref 11–82)
LYMPHOCYTES # BLD AUTO: 2.5 THOUSANDS/ΜL (ref 0.6–4.47)
LYMPHOCYTES NFR BLD AUTO: 25 % (ref 21–51)
MAGNESIUM SERPL-MCNC: 2.1 MG/DL (ref 1.9–2.7)
MCH RBC QN AUTO: 31.2 PG (ref 26–34)
MCHC RBC AUTO-ENTMCNC: 33.6 G/DL (ref 31–37)
MCV RBC AUTO: 93 FL (ref 81–99)
MONOCYTES # BLD AUTO: 1 THOUSAND/ΜL (ref 0.17–1.22)
MONOCYTES NFR BLD AUTO: 10 % (ref 2–12)
NEUTROPHILS # BLD AUTO: 5.9 THOUSANDS/ΜL (ref 1.4–6.5)
NEUTS SEG NFR BLD AUTO: 59 % (ref 42–75)
NITRITE UR QL STRIP: NEGATIVE
NON-SQ EPI CELLS URNS QL MICRO: NORMAL /HPF
PH UR STRIP.AUTO: 7.5 [PH]
PHOSPHATE SERPL-MCNC: 2.6 MG/DL (ref 3–5.5)
PLATELET # BLD AUTO: 309 THOUSANDS/UL (ref 149–390)
PMV BLD AUTO: 10.1 FL (ref 8.6–11.7)
POTASSIUM SERPL-SCNC: 4.3 MMOL/L (ref 3.5–5.5)
PROT SERPL-MCNC: 7.6 G/DL (ref 6.4–8.9)
PROT UR STRIP-MCNC: ABNORMAL MG/DL
PROTHROMBIN TIME: 11.6 SECONDS (ref 11.6–14.5)
RBC # BLD AUTO: 4.92 MILLION/UL (ref 4.3–5.9)
RBC #/AREA URNS AUTO: NORMAL /HPF
RSV RNA RESP QL NAA+PROBE: NEGATIVE
SARS-COV-2 RNA RESP QL NAA+PROBE: NEGATIVE
SODIUM SERPL-SCNC: 138 MMOL/L (ref 134–143)
SP GR UR STRIP.AUTO: 1.01 (ref 1–1.03)
TROPONIN I SERPL-MCNC: <0.03 NG/ML
TSH SERPL DL<=0.05 MIU/L-ACNC: 3.68 UIU/ML (ref 0.45–5.33)
UROBILINOGEN UR QL STRIP.AUTO: 0.2 E.U./DL
WBC # BLD AUTO: 10 THOUSAND/UL (ref 4.8–10.8)
WBC #/AREA URNS AUTO: NORMAL /HPF

## 2021-10-23 PROCEDURE — 84443 ASSAY THYROID STIM HORMONE: CPT | Performed by: PHYSICIAN ASSISTANT

## 2021-10-23 PROCEDURE — 85610 PROTHROMBIN TIME: CPT | Performed by: PHYSICIAN ASSISTANT

## 2021-10-23 PROCEDURE — 0241U HB NFCT DS VIR RESP RNA 4 TRGT: CPT | Performed by: PHYSICIAN ASSISTANT

## 2021-10-23 PROCEDURE — 96365 THER/PROPH/DIAG IV INF INIT: CPT

## 2021-10-23 PROCEDURE — 81001 URINALYSIS AUTO W/SCOPE: CPT | Performed by: PHYSICIAN ASSISTANT

## 2021-10-23 PROCEDURE — 82948 REAGENT STRIP/BLOOD GLUCOSE: CPT

## 2021-10-23 PROCEDURE — 74176 CT ABD & PELVIS W/O CONTRAST: CPT

## 2021-10-23 PROCEDURE — 71250 CT THORAX DX C-: CPT

## 2021-10-23 PROCEDURE — 87040 BLOOD CULTURE FOR BACTERIA: CPT | Performed by: PHYSICIAN ASSISTANT

## 2021-10-23 PROCEDURE — 70496 CT ANGIOGRAPHY HEAD: CPT

## 2021-10-23 PROCEDURE — 84484 ASSAY OF TROPONIN QUANT: CPT | Performed by: PHYSICIAN ASSISTANT

## 2021-10-23 PROCEDURE — 99285 EMERGENCY DEPT VISIT HI MDM: CPT | Performed by: PHYSICIAN ASSISTANT

## 2021-10-23 PROCEDURE — 99291 CRITICAL CARE FIRST HOUR: CPT

## 2021-10-23 PROCEDURE — 80053 COMPREHEN METABOLIC PANEL: CPT | Performed by: PHYSICIAN ASSISTANT

## 2021-10-23 PROCEDURE — G1004 CDSM NDSC: HCPCS

## 2021-10-23 PROCEDURE — 96375 TX/PRO/DX INJ NEW DRUG ADDON: CPT

## 2021-10-23 PROCEDURE — 81003 URINALYSIS AUTO W/O SCOPE: CPT | Performed by: PHYSICIAN ASSISTANT

## 2021-10-23 PROCEDURE — 82550 ASSAY OF CK (CPK): CPT | Performed by: PHYSICIAN ASSISTANT

## 2021-10-23 PROCEDURE — 84100 ASSAY OF PHOSPHORUS: CPT | Performed by: PHYSICIAN ASSISTANT

## 2021-10-23 PROCEDURE — 70498 CT ANGIOGRAPHY NECK: CPT

## 2021-10-23 PROCEDURE — 83735 ASSAY OF MAGNESIUM: CPT | Performed by: PHYSICIAN ASSISTANT

## 2021-10-23 PROCEDURE — 85025 COMPLETE CBC W/AUTO DIFF WBC: CPT | Performed by: PHYSICIAN ASSISTANT

## 2021-10-23 PROCEDURE — G0508 CRIT CARE TELEHEA CONSULT 60: HCPCS | Performed by: PSYCHIATRY & NEUROLOGY

## 2021-10-23 PROCEDURE — 93005 ELECTROCARDIOGRAM TRACING: CPT

## 2021-10-23 PROCEDURE — 83880 ASSAY OF NATRIURETIC PEPTIDE: CPT | Performed by: PHYSICIAN ASSISTANT

## 2021-10-23 PROCEDURE — 85730 THROMBOPLASTIN TIME PARTIAL: CPT | Performed by: PHYSICIAN ASSISTANT

## 2021-10-23 PROCEDURE — 96366 THER/PROPH/DIAG IV INF ADDON: CPT

## 2021-10-23 PROCEDURE — 36415 COLL VENOUS BLD VENIPUNCTURE: CPT | Performed by: PHYSICIAN ASSISTANT

## 2021-10-23 PROCEDURE — 83690 ASSAY OF LIPASE: CPT | Performed by: PHYSICIAN ASSISTANT

## 2021-10-23 PROCEDURE — 96361 HYDRATE IV INFUSION ADD-ON: CPT

## 2021-10-23 RX ORDER — ASPIRIN 325 MG
325 TABLET ORAL ONCE
Status: COMPLETED | OUTPATIENT
Start: 2021-10-23 | End: 2021-10-23

## 2021-10-23 RX ORDER — NICOTINE 21 MG/24HR
21 PATCH, TRANSDERMAL 24 HOURS TRANSDERMAL ONCE
Status: DISCONTINUED | OUTPATIENT
Start: 2021-10-23 | End: 2021-10-24 | Stop reason: HOSPADM

## 2021-10-23 RX ORDER — LABETALOL 20 MG/4 ML (5 MG/ML) INTRAVENOUS SYRINGE
10 ONCE
Status: COMPLETED | OUTPATIENT
Start: 2021-10-23 | End: 2021-10-23

## 2021-10-23 RX ORDER — LORAZEPAM 2 MG/ML
0.5 INJECTION INTRAMUSCULAR ONCE
Status: COMPLETED | OUTPATIENT
Start: 2021-10-23 | End: 2021-10-23

## 2021-10-23 RX ORDER — ONDANSETRON 2 MG/ML
4 INJECTION INTRAMUSCULAR; INTRAVENOUS ONCE
Status: COMPLETED | OUTPATIENT
Start: 2021-10-23 | End: 2021-10-23

## 2021-10-23 RX ORDER — MECLIZINE HCL 12.5 MG/1
25 TABLET ORAL ONCE
Status: COMPLETED | OUTPATIENT
Start: 2021-10-23 | End: 2021-10-23

## 2021-10-23 RX ORDER — CEFEPIME HYDROCHLORIDE 2 G/50ML
2000 INJECTION, SOLUTION INTRAVENOUS ONCE
Status: COMPLETED | OUTPATIENT
Start: 2021-10-23 | End: 2021-10-23

## 2021-10-23 RX ADMIN — SODIUM CHLORIDE 1000 ML: 0.9 INJECTION, SOLUTION INTRAVENOUS at 13:26

## 2021-10-23 RX ADMIN — CEFEPIME HYDROCHLORIDE 2000 MG: 2 INJECTION, SOLUTION INTRAVENOUS at 14:06

## 2021-10-23 RX ADMIN — LORAZEPAM 0.5 MG: 2 INJECTION INTRAMUSCULAR; INTRAVENOUS at 16:19

## 2021-10-23 RX ADMIN — LORAZEPAM 0.5 MG: 2 INJECTION INTRAMUSCULAR; INTRAVENOUS at 16:38

## 2021-10-23 RX ADMIN — ALTEPLASE 74.5 MG: KIT at 15:28

## 2021-10-23 RX ADMIN — ASPIRIN 325 MG: 325 TABLET ORAL at 14:13

## 2021-10-23 RX ADMIN — SODIUM CHLORIDE 50 ML: 9 INJECTION, SOLUTION INTRAVENOUS at 16:28

## 2021-10-23 RX ADMIN — IOHEXOL 85 ML: 350 INJECTION, SOLUTION INTRAVENOUS at 13:43

## 2021-10-23 RX ADMIN — MECLIZINE 25 MG: 12.5 TABLET ORAL at 15:58

## 2021-10-23 RX ADMIN — ONDANSETRON 4 MG: 2 INJECTION INTRAMUSCULAR; INTRAVENOUS at 13:25

## 2021-10-23 RX ADMIN — NICOTINE 21 MG: 21 PATCH, EXTENDED RELEASE TRANSDERMAL at 18:22

## 2021-10-23 RX ADMIN — SODIUM CHLORIDE 5 MG/HR: 0.9 INJECTION, SOLUTION INTRAVENOUS at 15:13

## 2021-10-23 RX ADMIN — LABETALOL 20 MG/4 ML (5 MG/ML) INTRAVENOUS SYRINGE 10 MG: at 14:11

## 2021-10-24 ENCOUNTER — HOSPITAL ENCOUNTER (INPATIENT)
Facility: HOSPITAL | Age: 61
LOS: 1 days | Discharge: HOME | DRG: 149 | End: 2021-10-25
Attending: INTERNAL MEDICINE
Payer: COMMERCIAL

## 2021-10-24 ENCOUNTER — APPOINTMENT (INPATIENT)
Dept: MRI IMAGING | Facility: HOSPITAL | Age: 61
DRG: 149 | End: 2021-10-24
Payer: COMMERCIAL

## 2021-10-24 ENCOUNTER — APPOINTMENT (INPATIENT)
Dept: CT IMAGING | Facility: HOSPITAL | Age: 61
DRG: 149 | End: 2021-10-24
Payer: COMMERCIAL

## 2021-10-24 VITALS
HEART RATE: 64 BPM | OXYGEN SATURATION: 97 % | RESPIRATION RATE: 22 BRPM | TEMPERATURE: 97.3 F | BODY MASS INDEX: 27.51 KG/M2 | WEIGHT: 202.82 LBS | DIASTOLIC BLOOD PRESSURE: 86 MMHG | SYSTOLIC BLOOD PRESSURE: 148 MMHG

## 2021-10-24 DIAGNOSIS — R42 VERTIGO: ICD-10-CM

## 2021-10-24 DIAGNOSIS — E78.2 MIXED HYPERLIPIDEMIA: ICD-10-CM

## 2021-10-24 DIAGNOSIS — G45.9 TIA (TRANSIENT ISCHEMIC ATTACK): ICD-10-CM

## 2021-10-24 DIAGNOSIS — I63.9 STROKE (HCC): Primary | ICD-10-CM

## 2021-10-24 PROBLEM — Z98.49 S/P CATARACT SURGERY: Status: ACTIVE | Noted: 2021-10-24

## 2021-10-24 PROBLEM — I63.212 STROKE DUE TO OCCLUSION OF LEFT VERTEBRAL ARTERY (HCC): Status: ACTIVE | Noted: 2021-10-24

## 2021-10-24 PROBLEM — Z92.82 RECEIVED INTRAVENOUS TISSUE PLASMINOGEN ACTIVATOR (TPA) IN EMERGENCY DEPARTMENT: Status: ACTIVE | Noted: 2021-10-24

## 2021-10-24 PROBLEM — R29.90 STROKE-LIKE SYMPTOMS: Status: ACTIVE | Noted: 2021-10-24

## 2021-10-24 LAB
ANION GAP SERPL CALCULATED.3IONS-SCNC: 8 MMOL/L (ref 4–13)
ATRIAL RATE: 72 BPM
BASOPHILS # BLD AUTO: 0.06 THOUSANDS/ΜL (ref 0–0.1)
BASOPHILS NFR BLD AUTO: 1 % (ref 0–1)
BUN SERPL-MCNC: 20 MG/DL (ref 5–25)
CA-I BLD-SCNC: 1.08 MMOL/L (ref 1.12–1.32)
CALCIUM SERPL-MCNC: 8.3 MG/DL (ref 8.3–10.1)
CHLORIDE SERPL-SCNC: 103 MMOL/L (ref 100–108)
CHOLEST SERPL-MCNC: 285 MG/DL (ref 50–200)
CO2 SERPL-SCNC: 27 MMOL/L (ref 21–32)
CREAT SERPL-MCNC: 1.27 MG/DL (ref 0.6–1.3)
EOSINOPHIL # BLD AUTO: 0.49 THOUSAND/ΜL (ref 0–0.61)
EOSINOPHIL NFR BLD AUTO: 4 % (ref 0–6)
ERYTHROCYTE [DISTWIDTH] IN BLOOD BY AUTOMATED COUNT: 12.7 % (ref 11.6–15.1)
GFR SERPL CREATININE-BSD FRML MDRD: 60 ML/MIN/1.73SQ M
GLUCOSE SERPL-MCNC: 101 MG/DL (ref 65–140)
GLUCOSE SERPL-MCNC: 121 MG/DL (ref 65–140)
HCT VFR BLD AUTO: 42.2 % (ref 36.5–49.3)
HDLC SERPL-MCNC: 42 MG/DL
HGB BLD-MCNC: 14.2 G/DL (ref 12–17)
IMM GRANULOCYTES # BLD AUTO: 0.06 THOUSAND/UL (ref 0–0.2)
IMM GRANULOCYTES NFR BLD AUTO: 1 % (ref 0–2)
LDLC SERPL CALC-MCNC: 193 MG/DL (ref 0–100)
LYMPHOCYTES # BLD AUTO: 2.12 THOUSANDS/ΜL (ref 0.6–4.47)
LYMPHOCYTES NFR BLD AUTO: 16 % (ref 14–44)
MAGNESIUM SERPL-MCNC: 2.1 MG/DL (ref 1.6–2.6)
MCH RBC QN AUTO: 31.1 PG (ref 26.8–34.3)
MCHC RBC AUTO-ENTMCNC: 33.6 G/DL (ref 31.4–37.4)
MCV RBC AUTO: 92 FL (ref 82–98)
MONOCYTES # BLD AUTO: 0.99 THOUSAND/ΜL (ref 0.17–1.22)
MONOCYTES NFR BLD AUTO: 8 % (ref 4–12)
NEUTROPHILS # BLD AUTO: 9.55 THOUSANDS/ΜL (ref 1.85–7.62)
NEUTS SEG NFR BLD AUTO: 70 % (ref 43–75)
NONHDLC SERPL-MCNC: 243 MG/DL
NRBC BLD AUTO-RTO: 0 /100 WBCS
P AXIS: 61 DEGREES
PHOSPHATE SERPL-MCNC: 3.9 MG/DL (ref 2.3–4.1)
PLATELET # BLD AUTO: 283 THOUSANDS/UL (ref 149–390)
PMV BLD AUTO: 11.6 FL (ref 8.9–12.7)
POTASSIUM SERPL-SCNC: 4.1 MMOL/L (ref 3.5–5.3)
PR INTERVAL: 148 MS
QRS AXIS: 34 DEGREES
QRSD INTERVAL: 94 MS
QT INTERVAL: 408 MS
QTC INTERVAL: 446 MS
RBC # BLD AUTO: 4.57 MILLION/UL (ref 3.88–5.62)
SODIUM SERPL-SCNC: 138 MMOL/L (ref 136–145)
T WAVE AXIS: 74 DEGREES
TRIGL SERPL-MCNC: 251 MG/DL
VENTRICULAR RATE: 72 BPM
WBC # BLD AUTO: 13.27 THOUSAND/UL (ref 4.31–10.16)

## 2021-10-24 PROCEDURE — 83735 ASSAY OF MAGNESIUM: CPT | Performed by: PHYSICIAN ASSISTANT

## 2021-10-24 PROCEDURE — 93005 ELECTROCARDIOGRAM TRACING: CPT

## 2021-10-24 PROCEDURE — 96375 TX/PRO/DX INJ NEW DRUG ADDON: CPT

## 2021-10-24 PROCEDURE — 80048 BASIC METABOLIC PNL TOTAL CA: CPT | Performed by: PHYSICIAN ASSISTANT

## 2021-10-24 PROCEDURE — 82330 ASSAY OF CALCIUM: CPT | Performed by: PHYSICIAN ASSISTANT

## 2021-10-24 PROCEDURE — 93010 ELECTROCARDIOGRAM REPORT: CPT | Performed by: INTERNAL MEDICINE

## 2021-10-24 PROCEDURE — 70450 CT HEAD/BRAIN W/O DYE: CPT

## 2021-10-24 PROCEDURE — 99291 CRITICAL CARE FIRST HOUR: CPT | Performed by: PHYSICIAN ASSISTANT

## 2021-10-24 PROCEDURE — G1004 CDSM NDSC: HCPCS

## 2021-10-24 PROCEDURE — 84100 ASSAY OF PHOSPHORUS: CPT | Performed by: PHYSICIAN ASSISTANT

## 2021-10-24 PROCEDURE — 83036 HEMOGLOBIN GLYCOSYLATED A1C: CPT | Performed by: PHYSICIAN ASSISTANT

## 2021-10-24 PROCEDURE — 80061 LIPID PANEL: CPT | Performed by: PHYSICIAN ASSISTANT

## 2021-10-24 PROCEDURE — 85025 COMPLETE CBC W/AUTO DIFF WBC: CPT | Performed by: PHYSICIAN ASSISTANT

## 2021-10-24 PROCEDURE — 99223 1ST HOSP IP/OBS HIGH 75: CPT | Performed by: PSYCHIATRY & NEUROLOGY

## 2021-10-24 PROCEDURE — 70551 MRI BRAIN STEM W/O DYE: CPT

## 2021-10-24 PROCEDURE — 82948 REAGENT STRIP/BLOOD GLUCOSE: CPT

## 2021-10-24 PROCEDURE — NC001 PR NO CHARGE: Performed by: PSYCHIATRY & NEUROLOGY

## 2021-10-24 PROCEDURE — NC001 PR NO CHARGE: Performed by: INTERNAL MEDICINE

## 2021-10-24 RX ORDER — HYDRALAZINE HYDROCHLORIDE 20 MG/ML
5 INJECTION INTRAMUSCULAR; INTRAVENOUS EVERY 6 HOURS PRN
Status: DISCONTINUED | OUTPATIENT
Start: 2021-10-24 | End: 2021-10-25 | Stop reason: HOSPADM

## 2021-10-24 RX ORDER — ONDANSETRON 2 MG/ML
4 INJECTION INTRAMUSCULAR; INTRAVENOUS EVERY 4 HOURS PRN
Status: DISCONTINUED | OUTPATIENT
Start: 2021-10-24 | End: 2021-10-25 | Stop reason: HOSPADM

## 2021-10-24 RX ORDER — HEPARIN SODIUM 5000 [USP'U]/ML
5000 INJECTION, SOLUTION INTRAVENOUS; SUBCUTANEOUS EVERY 8 HOURS SCHEDULED
Status: DISCONTINUED | OUTPATIENT
Start: 2021-10-24 | End: 2021-10-25 | Stop reason: HOSPADM

## 2021-10-24 RX ORDER — LOSARTAN POTASSIUM 50 MG/1
100 TABLET ORAL DAILY
Status: DISCONTINUED | OUTPATIENT
Start: 2021-10-24 | End: 2021-10-24

## 2021-10-24 RX ORDER — LEVOTHYROXINE SODIUM 0.03 MG/1
25 TABLET ORAL
Status: DISCONTINUED | OUTPATIENT
Start: 2021-10-24 | End: 2021-10-25 | Stop reason: HOSPADM

## 2021-10-24 RX ORDER — NICOTINE 21 MG/24HR
21 PATCH, TRANSDERMAL 24 HOURS TRANSDERMAL DAILY
Status: DISCONTINUED | OUTPATIENT
Start: 2021-10-24 | End: 2021-10-25 | Stop reason: HOSPADM

## 2021-10-24 RX ORDER — MECLIZINE HYDROCHLORIDE 25 MG/1
25 TABLET ORAL EVERY 8 HOURS SCHEDULED
Status: DISCONTINUED | OUTPATIENT
Start: 2021-10-24 | End: 2021-10-25 | Stop reason: HOSPADM

## 2021-10-24 RX ORDER — LANOLIN ALCOHOL/MO/W.PET/CERES
3 CREAM (GRAM) TOPICAL
Status: DISCONTINUED | OUTPATIENT
Start: 2021-10-24 | End: 2021-10-25 | Stop reason: HOSPADM

## 2021-10-24 RX ORDER — ONDANSETRON 2 MG/ML
4 INJECTION INTRAMUSCULAR; INTRAVENOUS ONCE
Status: COMPLETED | OUTPATIENT
Start: 2021-10-24 | End: 2021-10-24

## 2021-10-24 RX ORDER — LORAZEPAM 2 MG/ML
2 INJECTION INTRAMUSCULAR ONCE
Status: COMPLETED | OUTPATIENT
Start: 2021-10-24 | End: 2021-10-24

## 2021-10-24 RX ORDER — LOSARTAN POTASSIUM 50 MG/1
100 TABLET ORAL DAILY
Status: DISCONTINUED | OUTPATIENT
Start: 2021-10-24 | End: 2021-10-25 | Stop reason: HOSPADM

## 2021-10-24 RX ORDER — ATORVASTATIN CALCIUM 20 MG/1
20 TABLET, FILM COATED ORAL DAILY
Status: DISCONTINUED | OUTPATIENT
Start: 2021-10-24 | End: 2021-10-25

## 2021-10-24 RX ORDER — TRAZODONE HYDROCHLORIDE 50 MG/1
50 TABLET ORAL
Status: DISCONTINUED | OUTPATIENT
Start: 2021-10-24 | End: 2021-10-25 | Stop reason: HOSPADM

## 2021-10-24 RX ORDER — LORAZEPAM 2 MG/ML
1 INJECTION INTRAMUSCULAR ONCE
Status: COMPLETED | OUTPATIENT
Start: 2021-10-24 | End: 2021-10-24

## 2021-10-24 RX ORDER — PROMETHAZINE HYDROCHLORIDE 25 MG/ML
25 INJECTION, SOLUTION INTRAMUSCULAR; INTRAVENOUS EVERY 6 HOURS PRN
Status: DISCONTINUED | OUTPATIENT
Start: 2021-10-24 | End: 2021-10-25 | Stop reason: HOSPADM

## 2021-10-24 RX ORDER — NEOMYCIN SULFATE, POLYMYXIN B SULFATE AND DEXAMETHASONE 3.5; 10000; 1 MG/ML; [USP'U]/ML; MG/ML
1 SUSPENSION/ DROPS OPHTHALMIC 4 TIMES DAILY
Status: DISCONTINUED | OUTPATIENT
Start: 2021-10-24 | End: 2021-10-25 | Stop reason: HOSPADM

## 2021-10-24 RX ORDER — ACETAMINOPHEN 325 MG/1
650 TABLET ORAL 4 TIMES DAILY PRN
Status: DISCONTINUED | OUTPATIENT
Start: 2021-10-24 | End: 2021-10-25 | Stop reason: HOSPADM

## 2021-10-24 RX ADMIN — ACETAMINOPHEN 650 MG: 325 TABLET ORAL at 04:33

## 2021-10-24 RX ADMIN — NEOMYCIN SULFATE, POLYMYXIN B SULFATE AND DEXAMETHASONE 1 DROP: 3.5; 10000; 1 SUSPENSION OPHTHALMIC at 22:30

## 2021-10-24 RX ADMIN — HYDRALAZINE HYDROCHLORIDE 5 MG: 20 INJECTION, SOLUTION INTRAMUSCULAR; INTRAVENOUS at 23:22

## 2021-10-24 RX ADMIN — Medication 3 MG: at 22:25

## 2021-10-24 RX ADMIN — HEPARIN SODIUM 5000 UNITS: 5000 INJECTION INTRAVENOUS; SUBCUTANEOUS at 14:04

## 2021-10-24 RX ADMIN — MECLIZINE HYDROCHLORIDE 25 MG: 25 TABLET ORAL at 18:40

## 2021-10-24 RX ADMIN — HEPARIN SODIUM 5000 UNITS: 5000 INJECTION INTRAVENOUS; SUBCUTANEOUS at 22:25

## 2021-10-24 RX ADMIN — ONDANSETRON 4 MG: 2 INJECTION INTRAMUSCULAR; INTRAVENOUS at 00:15

## 2021-10-24 RX ADMIN — PROMETHAZINE HYDROCHLORIDE 25 MG: 25 INJECTION INTRAMUSCULAR; INTRAVENOUS at 02:01

## 2021-10-24 RX ADMIN — TRAZODONE HYDROCHLORIDE 50 MG: 50 TABLET ORAL at 22:24

## 2021-10-24 RX ADMIN — NICOTINE 21 MG: 21 PATCH, EXTENDED RELEASE TRANSDERMAL at 09:39

## 2021-10-24 RX ADMIN — NEOMYCIN SULFATE, POLYMYXIN B SULFATE AND DEXAMETHASONE 1 DROP: 3.5; 10000; 1 SUSPENSION OPHTHALMIC at 15:48

## 2021-10-24 RX ADMIN — LEVOTHYROXINE SODIUM 25 MCG: 25 TABLET ORAL at 05:10

## 2021-10-24 RX ADMIN — HYDRALAZINE HYDROCHLORIDE 5 MG: 20 INJECTION, SOLUTION INTRAMUSCULAR; INTRAVENOUS at 17:11

## 2021-10-24 RX ADMIN — ATORVASTATIN CALCIUM 20 MG: 20 TABLET, FILM COATED ORAL at 09:39

## 2021-10-24 RX ADMIN — LOSARTAN POTASSIUM 100 MG: 50 TABLET, FILM COATED ORAL at 18:37

## 2021-10-24 RX ADMIN — Medication 3 MG: at 02:55

## 2021-10-24 RX ADMIN — LORAZEPAM 1 MG: 2 INJECTION INTRAMUSCULAR; INTRAVENOUS at 05:10

## 2021-10-24 RX ADMIN — LORAZEPAM 2 MG: 2 INJECTION INTRAMUSCULAR; INTRAVENOUS at 12:20

## 2021-10-25 ENCOUNTER — APPOINTMENT (INPATIENT)
Dept: NON INVASIVE DIAGNOSTICS | Facility: HOSPITAL | Age: 61
DRG: 149 | End: 2021-10-25
Payer: COMMERCIAL

## 2021-10-25 VITALS
TEMPERATURE: 98.5 F | RESPIRATION RATE: 16 BRPM | DIASTOLIC BLOOD PRESSURE: 93 MMHG | WEIGHT: 192 LBS | HEIGHT: 72 IN | OXYGEN SATURATION: 95 % | SYSTOLIC BLOOD PRESSURE: 165 MMHG | BODY MASS INDEX: 26.01 KG/M2 | HEART RATE: 84 BPM

## 2021-10-25 PROBLEM — Z92.82 RECEIVED INTRAVENOUS TISSUE PLASMINOGEN ACTIVATOR (TPA) IN EMERGENCY DEPARTMENT: Status: RESOLVED | Noted: 2021-10-24 | Resolved: 2021-10-25

## 2021-10-25 PROBLEM — R42 VERTIGO: Status: ACTIVE | Noted: 2021-10-24

## 2021-10-25 LAB
ANION GAP SERPL CALCULATED.3IONS-SCNC: 13 MMOL/L (ref 4–13)
ATRIAL RATE: 60 BPM
BUN SERPL-MCNC: 22 MG/DL (ref 5–25)
CALCIUM SERPL-MCNC: 8.9 MG/DL (ref 8.3–10.1)
CHLORIDE SERPL-SCNC: 102 MMOL/L (ref 100–108)
CO2 SERPL-SCNC: 22 MMOL/L (ref 21–32)
CREAT SERPL-MCNC: 1.46 MG/DL (ref 0.6–1.3)
ERYTHROCYTE [DISTWIDTH] IN BLOOD BY AUTOMATED COUNT: 12.7 % (ref 11.6–15.1)
EST. AVERAGE GLUCOSE BLD GHB EST-MCNC: 114 MG/DL
GFR SERPL CREATININE-BSD FRML MDRD: 51 ML/MIN/1.73SQ M
GLUCOSE SERPL-MCNC: 94 MG/DL (ref 65–140)
HBA1C MFR BLD: 5.6 %
HCT VFR BLD AUTO: 44.9 % (ref 36.5–49.3)
HGB BLD-MCNC: 15 G/DL (ref 12–17)
MCH RBC QN AUTO: 30.8 PG (ref 26.8–34.3)
MCHC RBC AUTO-ENTMCNC: 33.4 G/DL (ref 31.4–37.4)
MCV RBC AUTO: 92 FL (ref 82–98)
P AXIS: 64 DEGREES
PLATELET # BLD AUTO: 292 THOUSANDS/UL (ref 149–390)
PMV BLD AUTO: 11.8 FL (ref 8.9–12.7)
POTASSIUM SERPL-SCNC: 4.4 MMOL/L (ref 3.5–5.3)
PR INTERVAL: 162 MS
QRS AXIS: 50 DEGREES
QRSD INTERVAL: 94 MS
QT INTERVAL: 452 MS
QTC INTERVAL: 452 MS
RBC # BLD AUTO: 4.87 MILLION/UL (ref 3.88–5.62)
SODIUM SERPL-SCNC: 137 MMOL/L (ref 136–145)
T WAVE AXIS: 65 DEGREES
VENTRICULAR RATE: 60 BPM
WBC # BLD AUTO: 11.1 THOUSAND/UL (ref 4.31–10.16)

## 2021-10-25 PROCEDURE — 80048 BASIC METABOLIC PNL TOTAL CA: CPT | Performed by: INTERNAL MEDICINE

## 2021-10-25 PROCEDURE — 99239 HOSP IP/OBS DSCHRG MGMT >30: CPT | Performed by: GENERAL PRACTICE

## 2021-10-25 PROCEDURE — 93010 ELECTROCARDIOGRAM REPORT: CPT | Performed by: INTERNAL MEDICINE

## 2021-10-25 PROCEDURE — 93306 TTE W/DOPPLER COMPLETE: CPT

## 2021-10-25 PROCEDURE — 93306 TTE W/DOPPLER COMPLETE: CPT | Performed by: INTERNAL MEDICINE

## 2021-10-25 PROCEDURE — 99232 SBSQ HOSP IP/OBS MODERATE 35: CPT | Performed by: PSYCHIATRY & NEUROLOGY

## 2021-10-25 PROCEDURE — 85027 COMPLETE CBC AUTOMATED: CPT | Performed by: INTERNAL MEDICINE

## 2021-10-25 PROCEDURE — 97163 PT EVAL HIGH COMPLEX 45 MIN: CPT

## 2021-10-25 RX ORDER — LORAZEPAM 2 MG/ML
0.5 INJECTION INTRAMUSCULAR ONCE
Status: COMPLETED | OUTPATIENT
Start: 2021-10-25 | End: 2021-10-25

## 2021-10-25 RX ORDER — ATORVASTATIN CALCIUM 80 MG/1
80 TABLET, FILM COATED ORAL DAILY
Qty: 30 TABLET | Refills: 0 | Status: SHIPPED | OUTPATIENT
Start: 2021-10-26 | End: 2022-05-05

## 2021-10-25 RX ORDER — MECLIZINE HYDROCHLORIDE 25 MG/1
25 TABLET ORAL EVERY 8 HOURS SCHEDULED
Qty: 30 TABLET | Refills: 0 | Status: SHIPPED | OUTPATIENT
Start: 2021-10-25 | End: 2022-02-28 | Stop reason: ALTCHOICE

## 2021-10-25 RX ORDER — ATORVASTATIN CALCIUM 40 MG/1
80 TABLET, FILM COATED ORAL DAILY
Status: DISCONTINUED | OUTPATIENT
Start: 2021-10-26 | End: 2021-10-25 | Stop reason: HOSPADM

## 2021-10-25 RX ADMIN — LORAZEPAM 0.5 MG: 2 INJECTION INTRAMUSCULAR; INTRAVENOUS at 03:21

## 2021-10-25 RX ADMIN — LOSARTAN POTASSIUM 100 MG: 50 TABLET, FILM COATED ORAL at 08:33

## 2021-10-25 RX ADMIN — HEPARIN SODIUM 5000 UNITS: 5000 INJECTION INTRAVENOUS; SUBCUTANEOUS at 08:34

## 2021-10-25 RX ADMIN — NEOMYCIN SULFATE, POLYMYXIN B SULFATE AND DEXAMETHASONE 1 DROP: 3.5; 10000; 1 SUSPENSION OPHTHALMIC at 08:35

## 2021-10-25 RX ADMIN — LEVOTHYROXINE SODIUM 25 MCG: 25 TABLET ORAL at 08:33

## 2021-10-25 RX ADMIN — NICOTINE 21 MG: 21 PATCH, EXTENDED RELEASE TRANSDERMAL at 08:35

## 2021-10-25 RX ADMIN — MECLIZINE HYDROCHLORIDE 25 MG: 25 TABLET ORAL at 08:33

## 2021-10-25 RX ADMIN — MECLIZINE HYDROCHLORIDE 25 MG: 25 TABLET ORAL at 14:44

## 2021-10-25 RX ADMIN — ATORVASTATIN CALCIUM 20 MG: 20 TABLET, FILM COATED ORAL at 08:33

## 2021-10-25 RX ADMIN — NEOMYCIN SULFATE, POLYMYXIN B SULFATE AND DEXAMETHASONE 1 DROP: 3.5; 10000; 1 SUSPENSION OPHTHALMIC at 12:08

## 2021-10-26 LAB
AORTIC ROOT: 3.1 CM
APICAL FOUR CHAMBER EJECTION FRACTION: 47 %
ASCENDING AORTA: 3.6 CM
AV LVOT PEAK GRADIENT: 3 MMHG
AV PEAK GRADIENT: 9 MMHG
DOP CALC RVOT PEAK VEL: 0.6 M/S
E WAVE DECELERATION TIME: 231 MS
FRACTIONAL SHORTENING: 22 % (ref 28–44)
INTERVENTRICULAR SEPTUM IN DIASTOLE (PARASTERNAL SHORT AXIS VIEW): 1.2 CM
LEFT INTERNAL DIMENSION IN SYSTOLE: 3.6 CM (ref 2.1–4)
LEFT VENTRICULAR INTERNAL DIMENSION IN DIASTOLE: 4.6 CM (ref 5.42–8.07)
LEFT VENTRICULAR POSTERIOR WALL IN END DIASTOLE: 1.2 CM
LEFT VENTRICULAR STROKE VOLUME: 45 ML
LV EF: 37 %
MV E'TISSUE VEL-SEP: 5 CM/S
MV PEAK A VEL: 0.77 M/S
MV PEAK E VEL: 46 CM/S
MV STENOSIS PRESSURE HALF TIME: 0 MS
PV PEAK GRADIENT: 2 MMHG
RIGHT VENTRICLE ID DIMENSION: 3.9 CM
SL CV LV EF: 55
SL CV PED ECHO LEFT VENTRICLE DIASTOLIC VOLUME (MOD BIPLANE) 2D: 99 ML
SL CV PED ECHO LEFT VENTRICLE SYSTOLIC VOLUME (MOD BIPLANE) 2D: 54 ML
SL CV RVOT MAX GRADIENT: 1 MMHG
TR PEAK VELOCITY: 1.8 M/S
TRICUSPID VALVE PEAK REGURGITATION VELOCITY: 1.84 M/S
TRICUSPID VALVE S': 0.5 CM/S
TV PEAK GRADIENT: 14 MMHG
Z-SCORE OF LEFT VENTRICULAR DIMENSION IN END SYSTOLE: -3.63

## 2021-10-27 ENCOUNTER — TRANSITIONAL CARE MANAGEMENT (OUTPATIENT)
Dept: FAMILY MEDICINE CLINIC | Facility: CLINIC | Age: 61
End: 2021-10-27

## 2021-10-28 ENCOUNTER — TELEPHONE (OUTPATIENT)
Dept: NEUROLOGY | Facility: CLINIC | Age: 61
End: 2021-10-28

## 2021-10-28 ENCOUNTER — OFFICE VISIT (OUTPATIENT)
Dept: FAMILY MEDICINE CLINIC | Facility: CLINIC | Age: 61
End: 2021-10-28
Payer: COMMERCIAL

## 2021-10-28 VITALS
DIASTOLIC BLOOD PRESSURE: 90 MMHG | WEIGHT: 194 LBS | HEART RATE: 68 BPM | HEIGHT: 72 IN | RESPIRATION RATE: 16 BRPM | SYSTOLIC BLOOD PRESSURE: 148 MMHG | BODY MASS INDEX: 26.28 KG/M2 | OXYGEN SATURATION: 98 %

## 2021-10-28 DIAGNOSIS — Z72.0 TOBACCO ABUSE: ICD-10-CM

## 2021-10-28 DIAGNOSIS — H65.03 NON-RECURRENT ACUTE SEROUS OTITIS MEDIA OF BOTH EARS: ICD-10-CM

## 2021-10-28 DIAGNOSIS — E03.9 ACQUIRED HYPOTHYROIDISM: ICD-10-CM

## 2021-10-28 DIAGNOSIS — I65.23 BILATERAL CAROTID ARTERY STENOSIS: ICD-10-CM

## 2021-10-28 DIAGNOSIS — Z98.49 STATUS POST CATARACT EXTRACTION, UNSPECIFIED LATERALITY: ICD-10-CM

## 2021-10-28 DIAGNOSIS — F51.01 PRIMARY INSOMNIA: ICD-10-CM

## 2021-10-28 DIAGNOSIS — I10 PRIMARY HYPERTENSION: ICD-10-CM

## 2021-10-28 DIAGNOSIS — R93.89 ABNORMAL COMPUTED TOMOGRAPHY ANGIOGRAPHY (CTA): ICD-10-CM

## 2021-10-28 DIAGNOSIS — H81.10 BENIGN PAROXYSMAL POSITIONAL VERTIGO, UNSPECIFIED LATERALITY: ICD-10-CM

## 2021-10-28 DIAGNOSIS — E78.2 MIXED HYPERLIPIDEMIA: ICD-10-CM

## 2021-10-28 DIAGNOSIS — I63.9 CEREBROVASCULAR ACCIDENT (CVA), UNSPECIFIED MECHANISM (HCC): Primary | ICD-10-CM

## 2021-10-28 DIAGNOSIS — D18.00 CAVERNOUS ANGIOMA: ICD-10-CM

## 2021-10-28 PROBLEM — I65.29 CAROTID STENOSIS: Status: ACTIVE | Noted: 2021-10-28

## 2021-10-28 LAB
BACTERIA BLD CULT: NORMAL
BACTERIA BLD CULT: NORMAL

## 2021-10-28 PROCEDURE — 99496 TRANSJ CARE MGMT HIGH F2F 7D: CPT | Performed by: FAMILY MEDICINE

## 2021-10-28 PROCEDURE — 3008F BODY MASS INDEX DOCD: CPT | Performed by: NURSE PRACTITIONER

## 2021-10-28 PROCEDURE — 1111F DSCHRG MED/CURRENT MED MERGE: CPT | Performed by: FAMILY MEDICINE

## 2021-10-28 RX ORDER — TRAZODONE HYDROCHLORIDE 50 MG/1
50 TABLET ORAL
COMMUNITY
End: 2021-11-22 | Stop reason: SDUPTHER

## 2021-10-28 RX ORDER — AMOXICILLIN 875 MG/1
875 TABLET, COATED ORAL 2 TIMES DAILY
Qty: 20 TABLET | Refills: 0 | Status: SHIPPED | OUTPATIENT
Start: 2021-10-28 | End: 2021-11-07

## 2021-10-28 RX ORDER — ALPRAZOLAM 0.25 MG/1
0.25 TABLET ORAL
Qty: 15 TABLET | Refills: 0 | Status: SHIPPED | OUTPATIENT
Start: 2021-10-28 | End: 2022-02-28 | Stop reason: SDUPTHER

## 2021-11-03 ENCOUNTER — HOSPITAL ENCOUNTER (OUTPATIENT)
Dept: NON INVASIVE DIAGNOSTICS | Facility: HOSPITAL | Age: 61
Discharge: HOME | End: 2021-11-03
Payer: COMMERCIAL

## 2021-11-03 ENCOUNTER — OFFICE VISIT (OUTPATIENT)
Dept: NEUROLOGY | Facility: CLINIC | Age: 61
End: 2021-11-03
Payer: COMMERCIAL

## 2021-11-03 VITALS
WEIGHT: 198.9 LBS | HEIGHT: 74 IN | SYSTOLIC BLOOD PRESSURE: 124 MMHG | TEMPERATURE: 97.8 F | BODY MASS INDEX: 25.53 KG/M2 | HEART RATE: 79 BPM | DIASTOLIC BLOOD PRESSURE: 88 MMHG

## 2021-11-03 DIAGNOSIS — R51.9 ACUTE INTRACTABLE HEADACHE, UNSPECIFIED HEADACHE TYPE: ICD-10-CM

## 2021-11-03 DIAGNOSIS — I63.9 CEREBROVASCULAR ACCIDENT (CVA), UNSPECIFIED MECHANISM (HCC): ICD-10-CM

## 2021-11-03 DIAGNOSIS — G45.9 TIA (TRANSIENT ISCHEMIC ATTACK): Primary | ICD-10-CM

## 2021-11-03 DIAGNOSIS — I10 PRIMARY HYPERTENSION: ICD-10-CM

## 2021-11-03 DIAGNOSIS — E78.2 MIXED HYPERLIPIDEMIA: ICD-10-CM

## 2021-11-03 DIAGNOSIS — D18.00 CAVERNOUS ANGIOMA: ICD-10-CM

## 2021-11-03 DIAGNOSIS — H81.10 BENIGN PAROXYSMAL POSITIONAL VERTIGO, UNSPECIFIED LATERALITY: ICD-10-CM

## 2021-11-03 DIAGNOSIS — Z72.0 TOBACCO ABUSE: ICD-10-CM

## 2021-11-03 DIAGNOSIS — F51.01 PRIMARY INSOMNIA: ICD-10-CM

## 2021-11-03 DIAGNOSIS — I65.23 BILATERAL CAROTID ARTERY STENOSIS: ICD-10-CM

## 2021-11-03 DIAGNOSIS — R93.89 ABNORMAL COMPUTED TOMOGRAPHY ANGIOGRAPHY (CTA): ICD-10-CM

## 2021-11-03 DIAGNOSIS — G47.33 OBSTRUCTIVE SLEEP APNEA: ICD-10-CM

## 2021-11-03 PROCEDURE — 1111F DSCHRG MED/CURRENT MED MERGE: CPT | Performed by: PHYSICIAN ASSISTANT

## 2021-11-03 PROCEDURE — 93225 XTRNL ECG REC<48 HRS REC: CPT

## 2021-11-03 PROCEDURE — 4004F PT TOBACCO SCREEN RCVD TLK: CPT | Performed by: PHYSICIAN ASSISTANT

## 2021-11-03 PROCEDURE — 99215 OFFICE O/P EST HI 40 MIN: CPT | Performed by: PHYSICIAN ASSISTANT

## 2021-11-03 PROCEDURE — 93226 XTRNL ECG REC<48 HR SCAN A/R: CPT

## 2021-11-03 PROCEDURE — 99417 PROLNG OP E/M EACH 15 MIN: CPT | Performed by: PHYSICIAN ASSISTANT

## 2021-11-03 RX ORDER — KETOROLAC TROMETHAMINE 10 MG/1
10 TABLET, FILM COATED ORAL AS NEEDED
Qty: 10 TABLET | Refills: 0 | Status: SHIPPED | OUTPATIENT
Start: 2021-11-03 | End: 2022-02-28 | Stop reason: ALTCHOICE

## 2021-11-03 RX ORDER — PROCHLORPERAZINE MALEATE 10 MG
10 TABLET ORAL AS NEEDED
Qty: 10 TABLET | Refills: 0 | Status: SHIPPED | OUTPATIENT
Start: 2021-11-03 | End: 2022-02-28 | Stop reason: ALTCHOICE

## 2021-11-03 RX ORDER — PHENOL 1.4 %
10 AEROSOL, SPRAY (ML) MUCOUS MEMBRANE
COMMUNITY

## 2021-11-03 RX ORDER — GABAPENTIN 100 MG/1
CAPSULE ORAL
Qty: 72 CAPSULE | Refills: 0 | Status: SHIPPED | OUTPATIENT
Start: 2021-11-03 | End: 2022-02-28 | Stop reason: ALTCHOICE

## 2021-11-04 ENCOUNTER — RA CDI HCC (OUTPATIENT)
Dept: OTHER | Facility: HOSPITAL | Age: 61
End: 2021-11-04

## 2021-11-11 ENCOUNTER — TELEPHONE (OUTPATIENT)
Dept: NEUROLOGY | Facility: CLINIC | Age: 61
End: 2021-11-11

## 2021-11-12 PROCEDURE — 93227 XTRNL ECG REC<48 HR R&I: CPT

## 2021-11-19 ENCOUNTER — TELEPHONE (OUTPATIENT)
Dept: FAMILY MEDICINE CLINIC | Facility: CLINIC | Age: 61
End: 2021-11-19

## 2021-11-19 DIAGNOSIS — F51.01 PRIMARY INSOMNIA: Primary | ICD-10-CM

## 2021-11-22 RX ORDER — TRAZODONE HYDROCHLORIDE 50 MG/1
50 TABLET ORAL
Qty: 30 TABLET | Refills: 0 | Status: SHIPPED | OUTPATIENT
Start: 2021-11-22 | End: 2021-11-30 | Stop reason: SDUPTHER

## 2021-11-30 ENCOUNTER — OFFICE VISIT (OUTPATIENT)
Dept: FAMILY MEDICINE CLINIC | Facility: CLINIC | Age: 61
End: 2021-11-30
Payer: COMMERCIAL

## 2021-11-30 VITALS
HEIGHT: 72 IN | SYSTOLIC BLOOD PRESSURE: 120 MMHG | BODY MASS INDEX: 26.55 KG/M2 | HEART RATE: 77 BPM | DIASTOLIC BLOOD PRESSURE: 86 MMHG | TEMPERATURE: 98.3 F | WEIGHT: 196 LBS | RESPIRATION RATE: 16 BRPM

## 2021-11-30 DIAGNOSIS — I10 PRIMARY HYPERTENSION: ICD-10-CM

## 2021-11-30 DIAGNOSIS — H91.92 HEARING LOSS OF LEFT EAR, UNSPECIFIED HEARING LOSS TYPE: ICD-10-CM

## 2021-11-30 DIAGNOSIS — F51.01 PRIMARY INSOMNIA: Primary | ICD-10-CM

## 2021-11-30 DIAGNOSIS — I63.9 CEREBROVASCULAR ACCIDENT (CVA), UNSPECIFIED MECHANISM (HCC): ICD-10-CM

## 2021-11-30 PROBLEM — H91.90 HEARING LOSS: Status: ACTIVE | Noted: 2021-11-30

## 2021-11-30 PROBLEM — G45.9 TIA (TRANSIENT ISCHEMIC ATTACK): Status: RESOLVED | Noted: 2021-11-03 | Resolved: 2021-11-30

## 2021-11-30 PROCEDURE — 99214 OFFICE O/P EST MOD 30 MIN: CPT | Performed by: FAMILY MEDICINE

## 2021-11-30 PROCEDURE — 3008F BODY MASS INDEX DOCD: CPT | Performed by: PHYSICIAN ASSISTANT

## 2021-11-30 RX ORDER — TRAZODONE HYDROCHLORIDE 50 MG/1
100 TABLET ORAL
Qty: 60 TABLET | Refills: 2 | Status: SHIPPED | OUTPATIENT
Start: 2021-11-30 | End: 2022-02-17

## 2021-12-22 ENCOUNTER — TELEPHONE (OUTPATIENT)
Dept: FAMILY MEDICINE CLINIC | Facility: CLINIC | Age: 61
End: 2021-12-22

## 2022-01-21 ENCOUNTER — HOSPITAL ENCOUNTER (OUTPATIENT)
Dept: NON INVASIVE DIAGNOSTICS | Facility: HOSPITAL | Age: 62
Discharge: HOME | End: 2022-01-21
Payer: COMMERCIAL

## 2022-01-21 DIAGNOSIS — G45.9 TIA (TRANSIENT ISCHEMIC ATTACK): ICD-10-CM

## 2022-01-21 PROCEDURE — 93880 EXTRACRANIAL BILAT STUDY: CPT | Performed by: SURGERY

## 2022-01-21 PROCEDURE — 93880 EXTRACRANIAL BILAT STUDY: CPT

## 2022-01-25 ENCOUNTER — TELEPHONE (OUTPATIENT)
Dept: NEUROLOGY | Facility: CLINIC | Age: 62
End: 2022-01-25

## 2022-02-07 ENCOUNTER — OFFICE VISIT (OUTPATIENT)
Dept: NEUROLOGY | Facility: CLINIC | Age: 62
End: 2022-02-07
Payer: COMMERCIAL

## 2022-02-07 VITALS
SYSTOLIC BLOOD PRESSURE: 149 MMHG | BODY MASS INDEX: 26.95 KG/M2 | DIASTOLIC BLOOD PRESSURE: 92 MMHG | HEART RATE: 72 BPM | HEIGHT: 72 IN | WEIGHT: 199 LBS

## 2022-02-07 DIAGNOSIS — I10 PRIMARY HYPERTENSION: ICD-10-CM

## 2022-02-07 DIAGNOSIS — G47.33 OBSTRUCTIVE SLEEP APNEA: ICD-10-CM

## 2022-02-07 DIAGNOSIS — I65.23 BILATERAL CAROTID ARTERY STENOSIS: ICD-10-CM

## 2022-02-07 DIAGNOSIS — E78.2 MIXED HYPERLIPIDEMIA: ICD-10-CM

## 2022-02-07 DIAGNOSIS — Z98.49 STATUS POST CATARACT EXTRACTION, UNSPECIFIED LATERALITY: ICD-10-CM

## 2022-02-07 DIAGNOSIS — G45.9 TIA (TRANSIENT ISCHEMIC ATTACK): Primary | ICD-10-CM

## 2022-02-07 DIAGNOSIS — Z72.0 TOBACCO ABUSE: ICD-10-CM

## 2022-02-07 PROCEDURE — 99215 OFFICE O/P EST HI 40 MIN: CPT | Performed by: PHYSICIAN ASSISTANT

## 2022-02-07 RX ORDER — ASPIRIN 81 MG/1
81 TABLET, CHEWABLE ORAL DAILY
COMMUNITY

## 2022-02-07 NOTE — PATIENT INSTRUCTIONS
 Continue with combination of aspirin 81mg and atorvastatin 80mg daily for secondary stroke prevention   BP goal < 130/80, BP is at goal     LDL goal < 100   Repeat lipid panel - this is a fasting testing, nothing to eat or drink after midnight   Defer to PCP regarding glycemic and blood pressure management    Counseling    Smoking cessation   I advised patient to avoid using NSAIDs for headaches or other pain and to stick to tylenol if needed   Recommend mediterranean diet & regular exercise regimen atleast 4-5 times a week for 20-30 minutes  [facility] Educated patient/family regarding medication compliance    Recommend follow up as needed basis  I would be happy to see the patient sooner if any new questions/concerns arise  Patient/Guardian was advised to the call the office if they have any questions and concerns in the meantime  Patient/Guardian does understand that if they have any new stroke like symptoms such as facial droop on one side, weakness/paralysis on either side, speech trouble, numbness on one side, balance issues, any vision changes, extreme dizziness or any new headache, to call 9-1-1 immediately or to proceed to the nearest ER immediately

## 2022-02-07 NOTE — PROGRESS NOTES
Patient ID: Mignon Reece is a 60 y o  male       Assessment/Plan:        Problem List Items Addressed This Visit        Respiratory    Obstructive sleep apnea     · Does not wish for referral to sleep medicine at this time             Cardiovascular and Mediastinum    Hypertension     · BP goal <130/80  Today in office 149/92         Carotid stenosis     · VAS carotid with <50% stenosis in bilateral carotid  · Recommend annual screening          TIA (transient ischemic attack) - Primary     TIA versus aborted stroke since he did receive tPA  · MRI brain without evidence of acute ischemic event  · On aspirin 81 mg daily and atorvastatin 80mg daily for secondary stroke prevention  · Defer management of blood pressure, cholesterol and glycemic control to PCP  · Discussed how patient would be high risk for cataract surgery coming off his aspirin  It is ideally recommended to wait 6 months however this being said, he does have the right to go forward with the surgical procedure  · Discussed smoking cessation  · Follow up on an as needed basis              Other    Hyperlipidemia     · LDL goal <100  · On atorvastatin 80mg daily  Does admit to experiencing muscle fatigue  · Repeat lipid panel ordered         Relevant Orders    Lipid panel    Tobacco abuse     · Smokes 3/4 a pack daily  · Discussed smoking cessation          S/P cataract surgery     · Pending right cataract surgery  · Discussed it is recommended that he waits 6 months following event  Explained that coming off aspirin would put him at a higher risk  History of Present Illness:   Mignon Reece is a 60 y o  ambidextrious handed male with past medical history of HTN, HLD, tobacco use and hypothyroidism who presents in outpatient neurology clinic for hospital follow up/referral from PCP in regards to his stroke like symptoms   He presented to [facility] on 10/23/2021 with sudden onset of dizziness, gait dysfunction (leaning to the left) and left sided face and arm numbness  He was in the backseat in a car when symptoms started  In ED he was pale and diaphoretic, hypertensive at 201/109 and hypothermic at 94 6  NIHSS reported 0 with only gait ataxia  On Neurology examination via telehealth his NIHSS was 2 due to left mouth numbness as well as dysmetria in LUE  He underwent CT head and CTA head/neck which was unremarkable with exception of age indeterminate abrupt occlusion of the left vertebral artery in the foramen magnum, no associated enhancement of the left PICA as well  Initially it was unclear whether or not to give tPA due to concern for infection due to hypothermia  He did undergo pan-scan that was unremarkable and WBC was normal the decision was made and tPA was administered at 15:27pm (delayed due to blood pressure elevation)  He was transferred to [facility] ICU post tPA administration  Shortly after arrival he did vomit for which a STAT CT head was obtained which was negative for hemorrhage  MRI brain completed without evidence of acute infarction, intracranial hemorrhage or mass effect  He is doing well on aspirin 81mg and atorvastatin 80mg  Denies any significant bruising or bleeding  Does admit to having some muscle fatigue for which he thought was due to him getting older  He denies any additional TIA or stroke like symptoms  He continues to smoke daily  He does have a history of ROSEMARIE for which he does not wish for another referral to sleep medicine  He states his headaches and dizziness has resolved  He complains of left hearing loss for which he follows with ENT  Imagin/21/22 VAS carotid complete:  RIGHT:  There is <50% stenosis noted in the internal carotid artery  Plaque is  homogenous and smooth  Vertebral artery flow is antegrade  There is no significant subclavian artery  disease  LEFT:  There is <50% stenosis noted in the internal carotid artery   Plaque is  homogenous and smooth  Vertebral artery flow is antegrade  There is no significant subclavian artery  disease  Results:     10/24/21 Lipid panel:  Cholesterol 285, Trig 251, HDL 42,         Past Medical history:     Past Medical History:   Diagnosis Date    Acute maxillary sinusitis     13 dec 2012    [facility] 6/8/2018 2018   Bone spur     toe       Patient Active Problem List   Diagnosis    Anxiety    Insomnia    Abnormal electrocardiogram    Allergic rhinitis    Chronic pain    [facility] ([facility] )    Erectile dysfunction    Hyperlipidemia    Hypertension    Leucocytosis    Nephrolithiasis    Obstructive sleep apnea    Retinal detachment    Tobacco abuse    Hypothyroid    Foreign body of left ear    Dysfunction of both eustachian tubes    C[facility]brovascular accident ([facility]A) ([facility]    BPPV (benign paroxysmal positional vertigo)    S/P cataract surgery    Abnormal computed tomography angiography (CTA)    Cavernous angioma    Carotid stenosis    Acute intractable headache    Hearing loss       Medications:      Current Outpatient Medications   Medication Sig Dispense Refill    acetaminophen (TYLENOL) 500 mg tablet Take 500 mg by mouth every 6 (six) hours as needed for mild pain      atorvastatin (LIPITOR) 80 mg tablet Take 1 tablet (80 mg total) by mouth daily 30 tablet 0    cyclopentolate (CYCLOGYL) 1 % ophthalmic solution       losartan (COZAAR) 100 MG tablet Take 1 tablet (100 mg total) by mouth daily 30 tablet 2    Melatonin 10 MG TABS Take 10 mg by mouth Pt takes 10mg at bedtime        meloxicam (MOBIC) 15 mg tablet TAKE 1 TABLET BY MOUTH EVERY DAY 30 tablet 3    traZODone (DESYREL) 50 mg tablet Take 2 tablets (100 mg total) by mouth daily at bedtime 60 tablet 2    ALPRAZolam (XANAX) 0 25 mg tablet Take 1 tablet (0 25 mg total) by mouth daily at bedtime as needed for anxiety (Patient not taking: Reported on 2/7/2022 ) 15 tablet 0    gabapentin (Neurontin) 100 mg capsule 1 tab qhs x 1 week; 2 tab qhs x 1 week; 3 tabs qhs x 1 week then increase 300mg BID (Patient not taking: Reported on 2/7/2022 ) 72 capsule 0    ketorolac (TORADOL) 10 mg tablet Take 1 tablet (10 mg total) by mouth as needed (at onset of migraines  may use every 8 hr) (Patient not taking: Reported on 2/7/2022 ) 10 tablet 0    levothyroxine 25 mcg tablet Take 1 tablet (25 mcg total) by mouth daily in the early morning (Patient not taking: Reported on 2/7/2022 ) 30 tablet 5    meclizine (ANTIVERT) 25 mg tablet Take 1 tablet (25 mg total) by mouth every 8 (eight) hours (Patient not taking: Reported on 2/7/2022 ) 30 tablet 0    neomycin-polymyxin-dexamethasone (MAXITROL) ophthalmic suspension  (Patient not taking: Reported on 2/7/2022 )      predniSONE 20 mg tablet 3 tabs by mouth days 1-5, 2 tabs by mouth days 6-10, 1 tab by mouth days 11-15 (Patient not taking: Reported on 2/7/2022 ) 30 tablet 0    prochlorperazine (COMPAZINE) 10 mg tablet Take 1 tablet (10 mg total) by mouth as needed (migraine, may use every 8 hours prn ) (Patient not taking: Reported on 2/7/2022 ) 10 tablet 0     No current facility-administered medications for this visit  Allergies: Allergies   Allergen Reactions    Eszopiclone     Zolpidem      Other reaction(s): sleepwalking         Family History:     Family History   Problem Relation Age of Onset    No Known Problems Mother     No Known Problems Father        Social History:     Social History     Socioeconomic History    Marital status: /Civil Union     Spouse name: Not on file    Number of children: Not on file    Years of education: Not on file    Highest education level: Not on file   Occupational History    Occupation: attendant for [employer]     Comment: bus maintenance (/)   Tobacco Use    Smoking status: Current Every Day Smoker     Packs/day: 1 00     Years: 40 00     Pack years: 40 00    Smokeless tobacco: Never Used  Tobacco comment: exposure to 2nd hand smoke   Vaping Use    Vaping Use: Never used   Substance and Sexual Activity    Alcohol use: Yes     Alcohol/week: 0 0 standard drinks     Comment: social    Drug use: No    Sexual activity: Yes     Partners: Female   Other Topics Concern    Not on file   Social History Narrative    Consumes 1 gallon of ice tea and 1-2 cups of coffee per day     Social Determinants of Health     Financial Resource Strain: Not on file   Food Insecurity: Not on file   Transportation Needs: Not on file   Physical Activity: Not on file   Stress: Not on file   Social Connections: Not on file   Intimate Partner Violence: Not on file   Housing Stability: Not on file        Review of Systems:   Review of Systems    Review of Systems   Constitutional: Negative  Negative for appetite change and fever  HENT: Negative  Negative for hearing loss, tinnitus, trouble swallowing and voice change  Eyes: Negative  Negative for photophobia and pain  Respiratory: Negative  Negative for shortness of breath  Cardiovascular: Negative  Negative for palpitations  Gastrointestinal: Negative  Negative for nausea and vomiting  Endocrine: Negative  Negative for cold intolerance  Genitourinary: Negative  Negative for dysuria, frequency and urgency  Musculoskeletal: Negative  Negative for myalgias and neck pain  Skin: Negative  Negative for rash  Neurological: Negative  Negative for dizziness, tremors, seizures, syncope, facial asymmetry, speech difficulty, weakness, light-headedness, numbness and headaches  Hematological: Negative  Does not bruise/bleed easily  Psychiatric/Behavioral: Negative  Negative for confusion, hallucinations and sleep disturbance  All other systems reviewed and are negative    ROS reviewed personally and edited as needed      Objective:   Physical Exam:  /92 (BP Location: Left arm, Patient Position: Sitting, Cuff Size: Standard)   Pulse 72   Ht 6' (1 829 m)   Wt 90 3 kg (199 lb)   BMI 26 99 kg/m²     Physical Exam  HENT:      Head: Normocephalic and atraumatic  Eyes:      Extraocular Movements: EOM normal       Pupils: Pupils are equal, round, and reactive to light  Comments: Glasses in place with only right lens present   Neurological:      Mental Status: He is alert and oriented to person, place, and time  Coordination: Finger-Nose-Finger Test normal       Gait: Gait is intact  Deep Tendon Reflexes: Strength normal       Reflex Scores:       Bicep reflexes are 1+ on the right side and 1+ on the left side  Brachioradialis reflexes are 1+ on the right side and 1+ on the left side  Patellar reflexes are 1+ on the right side and 1+ on the left side  Achilles reflexes are 1+ on the right side and 1+ on the left side  Psychiatric:         Speech: Speech normal         Neurologic Exam     Mental Status   Oriented to person, place, and time  Follows 2 step commands  Attention: normal  Concentration: normal    Speech: speech is normal   Level of consciousness: alert  Knowledge: good  Normal comprehension  Cranial Nerves     CN III, IV, VI   Pupils are equal, round, and reactive to light  Extraocular motions are normal    Right pupil: Size: 3 mm  Shape: regular  Left pupil: Size: 3 mm  Shape: regular  CN III: no CN III palsy  CN VI: no CN VI palsy  Nystagmus: none   Diplopia: none  Ophthalmoparesis: none  Upgaze: normal  Downgaze: normal  Conjugate gaze: present    CN V   Facial sensation intact  CN VII   Facial expression full, symmetric  CN IX, X   Palate: symmetric    CN XI   Right trapezius strength: normal  Left trapezius strength: normal    CN XII   Tongue: not atrophic  Fasciculations: absent  Tongue deviation: none    Motor Exam   Right arm pronator drift: absent  Left arm pronator drift: absent    Strength   Strength 5/5 throughout       Sensory Exam   Light touch normal      Gait, Coordination, and Reflexes     Gait  Gait: normal    Coordination   Finger to nose coordination: normal    Reflexes   Right brachioradialis: 1+  Left brachioradialis: 1+  Right biceps: 1+  Left biceps: 1+  Right patellar: 1+  Left patellar: 1+  Right achilles: 1+  Left achilles: 1+  Right ankle clonus: absent  Left ankle clonus: absent

## 2022-02-07 NOTE — ASSESSMENT & PLAN NOTE
· LDL goal <100  · On atorvastatin 80mg daily   Does admit to experiencing muscle fatigue  · Repeat lipid panel ordered

## 2022-02-07 NOTE — ASSESSMENT & PLAN NOTE
· Pending right cataract surgery  · Discussed it is recommended that he waits 6 months following event  Explained that coming off aspirin would put him at a higher risk

## 2022-02-07 NOTE — PROGRESS NOTES
Patient ID: Gerald Grande is a 60 y o  male  Assessment/Plan:    No problem-specific Assessment & Plan notes found for this encounter  {Assess/PlanSmartLinks:13708}       Subjective:    HPI    {[facility] HPI texts:82542}    {Common ambulatory SmartLinks:75397}         Objective: There were no vitals taken for this visit  Physical Exam    Neurological Exam      ROS:    Review of Systems   Constitutional: Negative  Negative for appetite change and fever  HENT: Negative  Negative for hearing loss, tinnitus, trouble swallowing and voice change  Eyes: Negative  Negative for photophobia and pain  Respiratory: Negative  Negative for shortness of breath  Cardiovascular: Negative  Negative for palpitations  Gastrointestinal: Negative  Negative for nausea and vomiting  Endocrine: Negative  Negative for cold intolerance  Genitourinary: Negative  Negative for dysuria, frequency and urgency  Musculoskeletal: Negative  Negative for myalgias and neck pain  Skin: Negative  Negative for rash  Neurological: Negative  Negative for dizziness, tremors, seizures, syncope, facial asymmetry, speech difficulty, weakness, light-headedness, numbness and headaches  Hematological: Negative  Does not bruise/bleed easily  Psychiatric/Behavioral: Negative  Negative for confusion, hallucinations and sleep disturbance  All other systems reviewed and are negative

## 2022-02-07 NOTE — ASSESSMENT & PLAN NOTE
TIA versus aborted stroke since he did receive tPA  · MRI brain without evidence of acute ischemic event  · On aspirin 81 mg daily and atorvastatin 80mg daily for secondary stroke prevention  · Defer management of blood pressure, cholesterol and glycemic control to PCP  · Discussed how patient would be high risk for cataract surgery coming off his aspirin  It is ideally recommended to wait 6 months however this being said, he does have the right to go forward with the surgical procedure     · Discussed smoking cessation  · Follow up on an as needed basis

## 2022-02-16 DIAGNOSIS — F51.01 PRIMARY INSOMNIA: ICD-10-CM

## 2022-02-17 DIAGNOSIS — I10 PRIMARY HYPERTENSION: ICD-10-CM

## 2022-02-17 DIAGNOSIS — M19.91 PRIMARY OSTEOARTHRITIS, UNSPECIFIED SITE: ICD-10-CM

## 2022-02-17 RX ORDER — TRAZODONE HYDROCHLORIDE 50 MG/1
100 TABLET ORAL
Qty: 180 TABLET | Refills: 1 | Status: SHIPPED | OUTPATIENT
Start: 2022-02-17 | End: 2022-08-04

## 2022-02-17 RX ORDER — LOSARTAN POTASSIUM 100 MG/1
TABLET ORAL
Qty: 30 TABLET | Refills: 2 | Status: SHIPPED | OUTPATIENT
Start: 2022-02-17 | End: 2022-05-13

## 2022-02-17 RX ORDER — MELOXICAM 15 MG/1
TABLET ORAL
Qty: 30 TABLET | Refills: 3 | Status: SHIPPED | OUTPATIENT
Start: 2022-02-17 | End: 2022-07-11

## 2022-02-22 ENCOUNTER — RA CDI HCC (OUTPATIENT)
Dept: OTHER | Facility: HOSPITAL | Age: 62
End: 2022-02-22

## 2022-02-22 NOTE — PROGRESS NOTES
[facility]  coding opportunities       Chart reviewed, no opportunity found: CHART REVIEWED, NO OPPORTUNITY FOUND                        Patients insurance company: [employer] (Medicare Advantage and Commercial)

## 2022-02-28 ENCOUNTER — OFFICE VISIT (OUTPATIENT)
Dept: FAMILY MEDICINE CLINIC | Facility: CLINIC | Age: 62
End: 2022-02-28
Payer: COMMERCIAL

## 2022-02-28 ENCOUNTER — TELEPHONE (OUTPATIENT)
Dept: FAMILY MEDICINE CLINIC | Facility: CLINIC | Age: 62
End: 2022-02-28

## 2022-02-28 VITALS
BODY MASS INDEX: 26.68 KG/M2 | HEIGHT: 72 IN | TEMPERATURE: 97 F | WEIGHT: 197 LBS | HEART RATE: 70 BPM | SYSTOLIC BLOOD PRESSURE: 132 MMHG | DIASTOLIC BLOOD PRESSURE: 78 MMHG

## 2022-02-28 DIAGNOSIS — E03.9 ACQUIRED HYPOTHYROIDISM: ICD-10-CM

## 2022-02-28 DIAGNOSIS — F51.01 PRIMARY INSOMNIA: ICD-10-CM

## 2022-02-28 DIAGNOSIS — G47.33 OBSTRUCTIVE SLEEP APNEA: ICD-10-CM

## 2022-02-28 DIAGNOSIS — H25.9 SENILE CATARACT OF RIGHT EYE, UNSPECIFIED AGE-RELATED CATARACT TYPE: ICD-10-CM

## 2022-02-28 DIAGNOSIS — G45.9 TIA (TRANSIENT ISCHEMIC ATTACK): ICD-10-CM

## 2022-02-28 DIAGNOSIS — E72.01 CYSTINURIA (HCC): ICD-10-CM

## 2022-02-28 DIAGNOSIS — E78.2 MIXED HYPERLIPIDEMIA: ICD-10-CM

## 2022-02-28 DIAGNOSIS — Z72.0 TOBACCO ABUSE: ICD-10-CM

## 2022-02-28 DIAGNOSIS — Z01.818 PRE-OP EXAMINATION: Primary | ICD-10-CM

## 2022-02-28 DIAGNOSIS — I65.23 BILATERAL CAROTID ARTERY STENOSIS: ICD-10-CM

## 2022-02-28 DIAGNOSIS — I10 PRIMARY HYPERTENSION: ICD-10-CM

## 2022-02-28 PROBLEM — I63.9 CEREBROVASCULAR ACCIDENT (CVA) (HCC): Status: RESOLVED | Noted: 2021-10-23 | Resolved: 2022-02-28

## 2022-02-28 PROBLEM — H26.9 CATARACT: Status: ACTIVE | Noted: 2022-02-28

## 2022-02-28 PROCEDURE — 99214 OFFICE O/P EST MOD 30 MIN: CPT | Performed by: FAMILY MEDICINE

## 2022-02-28 RX ORDER — ALPRAZOLAM 0.25 MG/1
0.25 TABLET ORAL
Qty: 15 TABLET | Refills: 0 | Status: SHIPPED | OUTPATIENT
Start: 2022-02-28

## 2022-02-28 NOTE — PROGRESS NOTES
Assessment and Plan:    Problem List Items Addressed This Visit        Cardiovascular and Mediastinum    Hypertension - Primary    TIA (transient ischemic attack)       Genitourinary    Cystinuria ([facility] [address] )                 {Assess/PlanSmartLinks:50175}          Subjective:      Patient ID: Tori Diggs is a 60 y o  male      CC:    Chief Complaint   Patient presents with    Follow-up     3 month f/u     Pre-op Exam     Pt has sx scheduled for right eye on 3/14/22        HPI:    HPI    {Common ambulatory SmartLinks:06725}      Review of Systems      Data to review:       Objective:    Vitals:    02/28/22 1123   BP: 144/82   BP Location: Left arm   Patient Position: Sitting   Cuff Size: Adult   Pulse: 70   Temp: (!) 97 °F (36 1 °C)   TempSrc: Temporal   Weight: 89 4 kg (197 lb)   Height: 6' (1 829 m)        Physical Exam

## 2022-02-28 NOTE — ASSESSMENT & PLAN NOTE
Noted before  Neurology felt that this was truly TIA, not CV A  They felt there was no residual   Imaging studies also did not show any specific abnormalities

## 2022-02-28 NOTE — ASSESSMENT & PLAN NOTE
Stable at the moment  I would recommend that we check CMP and lipids  Would need to make certain that the LFTs were stable with using high-dose atorvastatin  No other changes  Check labs

## 2022-02-28 NOTE — LETTER
February 28, 2022     Farheen Rollins MD  [address] [place] [address]    Patient: Rashawn Hope   YOB: 1960   Date of Visit: 2/28/2022       Dear Dr Bridgette Pham: Thank you for referring Courtenay Boast to me for evaluation  Below are my notes for this consultation  If you have questions, please do not hesitate to call me  I look forward to following your patient along with you  Sincerely,        Britany Alaniz MD        CC: No Recipients  Britany Alaniz MD  2/28/2022 11:48 AM  Incomplete  Presurgical Evaluation    Subjective:      Patient ID: Rashawn Hope is a 60 y o  male  Chief Complaint   Patient presents with    Follow-up     3 month f/u     Pre-op Exam     Pt has sx scheduled for right eye on 3/14/22        Patient is here to evaluate for preop  He is set to have cataract surgery for the right high the 14th of March  The following portions of the patient's history were reviewed and updated as appropriate: allergies, current medications, past family history, past medical history, past social history, past surgical history and problem list     Procedure date: [id number]    Surgeon:  Farheen Rollins  Planned procedure:  Cataract, OD  Diagnosis for procedure:  Cataract    Prior anesthesia: Yes   General; Complications:  None / Tolerated well  MAC; Complications:  None / Tolerated well    CAD History: None   NOTE: Patient should see Cardiology if time available before surgery, and if appropriate      Pulmonary History: ROSEMARIE (Sleep Apnea)    Renal history: None    Diabetes History:  None     Neurological History: TIA     On Immunosuppressant meds/biologics: No      Review of Systems      Current Outpatient Medications   Medication Sig Dispense Refill    acetaminophen (TYLENOL) 500 mg tablet Take 500 mg by mouth every 6 (six) hours as needed for mild pain      ALPRAZolam (XANAX) 0 25 mg tablet Take 1 tablet (0 25 mg total) by mouth daily at bedtime as needed for anxiety 15 tablet 0    aspirin 81 mg chewable tablet Chew 81 mg daily      atorvastatin (LIPITOR) 80 mg tablet Take 1 tablet (80 mg total) by mouth daily 30 tablet 0    cyclopentolate (CYCLOGYL) 1 % ophthalmic solution       losartan (COZAAR) 100 MG tablet TAKE 1 TABLET BY MOUTH EVERY DAY 30 tablet 2    Melatonin 10 MG TABS Take 10 mg by mouth Pt takes 10mg at bedtime   meloxicam (MOBIC) 15 mg tablet TAKE 1 TABLET BY MOUTH EVERY DAY 30 tablet 3    neomycin-polymyxin-dexamethasone (MAXITROL) ophthalmic suspension        traZODone (DESYREL) 50 mg tablet TAKE 2 TABLETS (100 MG TOTAL) BY MOUTH DAILY AT BEDTIME 180 tablet 1     No current facility-administered medications for this visit  Allergies on file:   Eszopiclone and Zolpidem    Patient Active Problem List   Diagnosis    Anxiety    Insomnia    Abnormal electrocardiogram    Allergic rhinitis    Chronic pain    Cystinuria ([facility] [address] )    Erectile dysfunction    Hyperlipidemia    Hypertension    Leucocytosis    Nephrolithiasis    Obstructive sleep apnea    Retinal detachment    Tobacco abuse    Hypothyroid    Foreign body of left ear    Dysfunction of both eustachian tubes    BPPV (benign paroxysmal positional vertigo)    S/P cataract surgery    Abnormal computed tomography angiography (CTA)    Cavernous angioma    Carotid stenosis    TIA (transient ischemic attack)    Acute intractable headache    Hearing loss        Past Medical History:   Diagnosis Date    Acute maxillary sinusitis     13 dec 2012    ROSENDO (acute kidney injury) ([facility] [address] ) 6/8/2018 2018       Bone spur     toe       Past Surgical History:   Procedure Laterality Date    APPENDECTOMY      RETINAL DETACHMENT SURGERY      SEPTOPLASTY      TONSILLECTOMY  2000    meg Vidal       Family History   Problem Relation Age of Onset    No Known Problems Mother     No Known Problems Father        Social History     Tobacco Use    Smoking status: Current Every Day Smoker Packs/day: 1 00     Years: 40 00     Pack years: 40 00    Smokeless tobacco: Never Used    Tobacco comment: exposure to 2nd hand smoke   Vaping Use    Vaping Use: Never used   Substance Use Topics    Alcohol use: Yes     Alcohol/week: 0 0 standard drinks     Comment: social    Drug use: No       Objective:    Vitals:    22 1123   BP: 144/82   BP Location: Left arm   Patient Position: Sitting   Cuff Size: Adult   Pulse: 70   Temp: (!) 97 °F (36 1 °C)   TempSrc: Temporal   Weight: 89 4 kg (197 lb)   Height: 6' (1 829 m)        Physical Exam      Preop labs/testing available and reviewed: no               EKG no    Echo no    Stress test/cath no    PFT/[place] no    Functional capacity: Shovel snow                          6 Mets   Pick the highest level patient can comfortably perform   4 mets or greater for surgery    RCRI  High Risk surgery? 1 Point  CAD History:         1 Point   MI; Positive Stress Test; CP due to Mi;  Nitrate Usage to control Angina; Pathologic Q wave on EKG  CHF Active:         1 Point   Pulm Edema; Paroxysmal Nocturnal Dyspnea;  Bibasilar Rales (crackles);S3; CHF on CXR  Cerebrovascular Disease (TIA or CVA):     1 Point  DM on Insulin:        1 Point  Serum Creat >2 0 mg/dl:       1 Point          Total Points: 0     Scorin: Class I, Very Low Risk (0 4%)     1: Class II, Low risk (0 9%)     2: Class III Moderate (6 6%)     3: Class IV High (>11%)      ROSENDO Risk:  GFR:        For PCP:  If GFR>60, Hold ACE/ARB/Diuretic on the day of surgery, and NSAIDS 10 days before  If GFR<45, Consider PRE and POST op Nephrology Consult  If 46 <GFR> 59 : Has Patient had ROSENDO in last 6 Months? no   If YES: Preop Nephrology consult   If No:  [facility] Nephrology consult  Assessment/Plan:    Patient is medically optimized (cleared) for the planned procedure  Further testing/evaluation is not required      Postop concerns: no    Problem List Items Addressed This Visit Cardiovascular and Mediastinum    Hypertension - Primary    TIA (transient ischemic attack)       Genitourinary    Cystinuria ([facility] [address] )       Other    Hyperlipidemia           Diagnoses and all orders for this visit:    Pre-op examination    Senile cataract of right eye, unspecified age-related cataract type    Primary hypertension    TIA (transient ischemic attack)    Cystinuria ([facility] [address] )    Mixed hyperlipidemia    Acquired hypothyroidism    Obstructive sleep apnea          Pre-Surgery Instructions:   Medication Instructions    acetaminophen (TYLENOL) 500 mg tablet per anesthesia guidelines     ALPRAZolam (XANAX) 0 25 mg tablet per anesthesia guidelines     aspirin 81 mg chewable tablet Stop taking 5 days prior to surgery    atorvastatin (LIPITOR) 80 mg tablet per anesthesia guidelines     cyclopentolate (CYCLOGYL) 1 % ophthalmic solution per anesthesia guidelines     losartan (COZAAR) 100 MG tablet per anesthesia guidelines     Melatonin 10 MG TABS per anesthesia guidelines     meloxicam (MOBIC) 15 mg tablet Stop taking 3 days prior to surgery    neomycin-polymyxin-dexamethasone (MAXITROL) ophthalmic suspension per anesthesia guidelines     traZODone (DESYREL) 50 mg tablet per anesthesia guidelines         NOTE: Please use the above to review important meds for your specialty, the remainder "per anesthesia Guidelines "    NOTE: Please place an Inbasket message for "[facility]" pool for complicated patients

## 2022-02-28 NOTE — PATIENT INSTRUCTIONS
Pre-Surgery Instructions:   Medication Instructions    acetaminophen (TYLENOL) 500 mg tablet per anesthesia guidelines     ALPRAZolam (XANAX) 0 25 mg tablet per anesthesia guidelines     aspirin 81 mg chewable tablet Stop taking 5 days prior to surgery    atorvastatin (LIPITOR) 80 mg tablet per anesthesia guidelines     cyclopentolate (CYCLOGYL) 1 % ophthalmic solution per anesthesia guidelines     losartan (COZAAR) 100 MG tablet per anesthesia guidelines     Melatonin 10 MG TABS per anesthesia guidelines     meloxicam (MOBIC) 15 mg tablet Stop taking 3 days prior to surgery    neomycin-polymyxin-dexamethasone (MAXITROL) ophthalmic suspension per anesthesia guidelines     traZODone (DESYREL) 50 mg tablet per anesthesia guidelines      Problem List Items Addressed This Visit     Carotid stenosis     Patient did have carotid ultrasound  Per Neurology, 1 year repeat recommended  Cataract    Cystinuria [facility])     Following with Urology  No change at the moment  Hyperlipidemia     Stable at the moment  I would recommend that we check CMP and lipids  Would need to make certain that the LFTs were stable with using high-dose atorvastatin  No other changes  Check labs  Relevant Orders    Comprehensive metabolic panel    Lipid Panel with Direct LDL reflex    Hypertension     Stable at the moment  Blood pressure is a bit elevated  Would recommend adding amlodipine, but would prefer to wait until after surgery so that there is no question about whether not he can proceed  Relevant Orders    Comprehensive metabolic panel    Hypothyroid     Stable  Not currently on medications  Insomnia    Relevant Medications    ALPRAZolam (XANAX) 0 25 mg tablet    Obstructive sleep apnea     Patient does have sleep apnea  Was not interested in CPAP at this time  TIA (transient ischemic attack)     Noted before  Neurology felt that this was truly TIA, not CV A    They felt there was no residual   Imaging studies also did not show any specific abnormalities  Tobacco abuse     Recommend quit smoking  Patient understands risks  Other Visit Diagnoses     Pre-op examination    -  Primary    Patient appears to be at relatively low risk for the planned cataract surgery  May proceed at this time  COVID 19 Instructions    Jamaica Norris was advised to limit contact with others to essential tasks such as getting food, medications, and medical care  Proper handwashing reviewed, and Hand sanitzer when washing is not available  If the patient develops symptoms of COVID 19, the patient should call the office as soon as possible  For [id number] Flu season, it is strongly recommended that Flu Vaccinations be obtained  Virtual Visits:  Diallo: This works on smart phones (any phone with Internet browsing capability)  You should get a text message when the provider is ready to see you  Click on the link in the text message, and the call should start  You will need to type in your name, and allow camera and microphone access  This is HIPPA compliant, and secure  If you have not already done so, get immunized to COVID 19  We are committed to getting you vaccinated as soon as possible and will be closely following CDC and [facility]  guidelines as they are released and revised  Please refer to our COVID-19 vaccine webpage for the most up to date information on the vaccine and our distribution efforts  This site will also have the most up to date recommendations for COVID booster vaccine  Hyun butcher    Call [phone] ([phone]), option 7    OUR NEW LOCATION:    [address], [address] [place], [place] [address] , [place], [address]  Fax: [phone]    Lab services and OB/GYN are at this location as well

## 2022-02-28 NOTE — PROGRESS NOTES
Presurgical Evaluation    Subjective:      Patient ID: Brittnee Daigle is a 60 y o  male  Chief Complaint   Patient presents with    Follow-up     3 month f/u     Pre-op Exam     Pt has sx scheduled for right eye on 3/14/22        Patient is here to evaluate for preop  He is set to have cataract surgery for the right high the 14th of March  Hypertension:  Due to prior issues with TIA, Neurology recommended having a blood pressure below 130/80  He has not really checked his blood pressure at home  Hyperlipidemia:  Patient is currently on atorvastatin at 80 mg  He did have an order for labs from Neurology, but has not done that yet  TIA:  Neurology reported he was doing well  They felt that there was not a CVA, just that there is TIA, they would like to do primary prevention for CVA  Also, MRI showed no acute evidence of CVA  Denies any current CVA symptoms, or residual symptoms  Osteoarthritis:  Patient is on meloxicam daily  He is not sure if it is helping  He wondered about taking it more often  Currently on 15 mg daily  The following portions of the patient's history were reviewed and updated as appropriate: allergies, current medications, past family history, past medical history, past social history, past surgical history and problem list     Procedure date: [id number]    Surgeon:  Mark Levi  Planned procedure:  Cataract, OD  Diagnosis for procedure:  Cataract    Prior anesthesia: Yes   General; Complications:  None / Tolerated well  MAC; Complications:  None / Tolerated well    CAD History: None   NOTE: Patient should see Cardiology if time available before surgery, and if appropriate  Pulmonary History: ROSEMARIE (Sleep Apnea)    Renal history: None    Diabetes History:  None     Neurological History: TIA     On Immunosuppressant meds/biologics: No      Review of Systems   Constitutional: Negative  HENT: Negative  Eyes: Negative  Respiratory: Negative  Cardiovascular: Negative  Gastrointestinal: Negative  Endocrine: Negative  Genitourinary: Negative  Musculoskeletal: Negative  Skin: Negative  Allergic/Immunologic: Negative  Neurological: Negative  Hematological: Negative  Psychiatric/Behavioral: Negative  Current Outpatient Medications   Medication Sig Dispense Refill    acetaminophen (TYLENOL) 500 mg tablet Take 500 mg by mouth every 6 (six) hours as needed for mild pain      ALPRAZolam (XANAX) 0 25 mg tablet Take 1 tablet (0 25 mg total) by mouth daily at bedtime as needed for anxiety 15 tablet 0    aspirin 81 mg chewable tablet Chew 81 mg daily      atorvastatin (LIPITOR) 80 mg tablet Take 1 tablet (80 mg total) by mouth daily 30 tablet 0    cyclopentolate (CYCLOGYL) 1 % ophthalmic solution       losartan (COZAAR) 100 MG tablet TAKE 1 TABLET BY MOUTH EVERY DAY 30 tablet 2    Melatonin 10 MG TABS Take 10 mg by mouth Pt takes 10mg at bedtime   meloxicam (MOBIC) 15 mg tablet TAKE 1 TABLET BY MOUTH EVERY DAY 30 tablet 3    neomycin-polymyxin-dexamethasone (MAXITROL) ophthalmic suspension        traZODone (DESYREL) 50 mg tablet TAKE 2 TABLETS (100 MG TOTAL) BY MOUTH DAILY AT BEDTIME 180 tablet 1     No current facility-administered medications for this visit         Allergies on file:   Eszopiclone and Zolpidem    Patient Active Problem List   Diagnosis    Anxiety    Insomnia    Abnormal electrocardiogram    Allergic rhinitis    Chronic pain    Cystinuria ([address] )    Erectile dysfunction    Hyperlipidemia    Hypertension    Leucocytosis    Nephrolithiasis    Obstructive sleep apnea    Retinal detachment    Tobacco abuse    Hypothyroid    Foreign body of left ear    Dysfunction of both eustachian tubes    BPPV (benign paroxysmal positional vertigo)    S/P cataract surgery    Abnormal computed tomography angiography (CTA)    Cavernous angioma    Carotid stenosis    TIA (transient ischemic attack)  Acute intractable headache    Hearing loss    Cataract        Past Medical History:   Diagnosis Date    Acute maxillary sinusitis     13 dec 2012    ROSENDO (acute kidney injury) ([facility] [address] ) 6/8/2018    2018   Bone spur     toe       Past Surgical History:   Procedure Laterality Date    APPENDECTOMY      RETINAL DETACHMENT SURGERY      SEPTOPLASTY      TONSILLECTOMY  2000    meg Vidal       Family History   Problem Relation Age of Onset    No Known Problems Mother     No Known Problems Father        Social History     Tobacco Use    Smoking status: Current Every Day Smoker     Packs/day: 1 00     Years: 40 00     Pack years: 40 00    Smokeless tobacco: Never Used    Tobacco comment: exposure to 2nd hand smoke   Vaping Use    Vaping Use: Never used   Substance Use Topics    Alcohol use: Yes     Alcohol/week: 0 0 standard drinks     Comment: social    Drug use: No       Objective:    Vitals:    02/28/22 1123 02/28/22 1200   BP: 144/82 132/78   BP Location: Left arm    Patient Position: Sitting    Cuff Size: Adult    Pulse: 70    Temp: (!) 97 °F (36 1 °C)    TempSrc: Temporal    Weight: 89 4 kg (197 lb)    Height: 6' (1 829 m)         Physical Exam  Vitals and nursing note reviewed  Constitutional:       General: He is not in acute distress  Appearance: He is well-developed  He is not diaphoretic  HENT:      Head: Normocephalic and atraumatic  Right Ear: Hearing, tympanic membrane, ear canal and external ear normal       Left Ear: Hearing, tympanic membrane, ear canal and external ear normal       Nose: Nose normal       Right Sinus: No maxillary sinus tenderness or frontal sinus tenderness  Left Sinus: No maxillary sinus tenderness or frontal sinus tenderness  Mouth/Throat:      Pharynx: Uvula midline  No oropharyngeal exudate  Eyes:      General: Lids are everted, no foreign bodies appreciated        Conjunctiva/sclera: Conjunctivae normal       Pupils: Pupils are equal, round, and reactive to light  Neck:      Thyroid: No thyromegaly  Vascular: No carotid bruit  Trachea: No tracheal deviation  Cardiovascular:      Rate and Rhythm: Normal rate and regular rhythm  Pulses:           Carotid pulses are 2+ on the right side and 2+ on the left side  Heart sounds: Normal heart sounds  No murmur heard  No friction rub  No gallop  Pulmonary:      Effort: Pulmonary effort is normal  No respiratory distress  Breath sounds: Normal breath sounds  No wheezing or rales  Abdominal:      General: Bowel sounds are normal  There is no distension  Palpations: Abdomen is soft  Tenderness: There is no abdominal tenderness  Musculoskeletal:      Cervical back: Normal range of motion and neck supple  Lymphadenopathy:      Cervical: No cervical adenopathy  Skin:     General: Skin is warm and dry  Neurological:      Mental Status: He is alert and oriented to person, place, and time  Coordination: Coordination normal    Psychiatric:         Behavior: Behavior normal          Thought Content: Thought content normal          Judgment: Judgment normal            Preop labs/testing available and reviewed: no               EKG no    Echo no    Stress test/cath no    PFT/Flushing no    Functional capacity: Shovel snow                          6 Mets   Pick the highest level patient can comfortably perform   4 mets or greater for surgery    RCRI  High Risk surgery? 1 Point  CAD History:         1 Point   MI; Positive Stress Test; CP due to Mi;  Nitrate Usage to control Angina;  Pathologic Q wave on EKG  CHF Active:         1 Point   Pulm Edema; Paroxysmal Nocturnal Dyspnea;  Bibasilar Rales (crackles);S3; CHF on CXR  Cerebrovascular Disease (TIA or CVA):     1 Point  DM on Insulin:        1 Point  Serum Creat >2 0 mg/dl:       1 Point          Total Points: 0     Scorin: Class I, Very Low Risk (0 4%)     1: Class II, Low risk (0 9%)     2: Class III Moderate (6 6%)     3: Class IV High (>11%)      ROSENDO Risk:  GFR:        For PCP:  If GFR>60, Hold ACE/ARB/Diuretic on the day of surgery, and NSAIDS 10 days before  If GFR<45, Consider PRE and POST op Nephrology Consult  If 46 <GFR> 59 : Has Patient had ROSENDO in last 6 Months? no   If YES: Preop Nephrology consult   If No:  [address] Nephrology consult  Assessment/Plan:    Patient is medically optimized (cleared) for the planned procedure  Further testing/evaluation is not required  Postop concerns: no    Problem List Items Addressed This Visit     Carotid stenosis     Patient did have carotid ultrasound  Per Neurology, 1 year repeat recommended  Cataract    Cystinuria [facility])     Following with Urology  No change at the moment  Hyperlipidemia     Stable at the moment  I would recommend that we check CMP and lipids  Would need to make certain that the LFTs were stable with using high-dose atorvastatin  No other changes  Check labs  Relevant Orders    Comprehensive metabolic panel    Lipid Panel with Direct LDL reflex    Hypertension     Stable at the moment  Blood pressure is a bit elevated  Would recommend adding amlodipine, but would prefer to wait until after surgery so that there is no question about whether not he can proceed  Relevant Orders    Comprehensive metabolic panel    Hypothyroid     Stable  Not currently on medications  Insomnia    Relevant Medications    ALPRAZolam (XANAX) 0 25 mg tablet    Obstructive sleep apnea     Patient does have sleep apnea  Was not interested in CPAP at this time  TIA (transient ischemic attack)     Noted before  Neurology felt that this was truly TIA, not CV A  They felt there was no residual   Imaging studies also did not show any specific abnormalities  Tobacco abuse     Recommend quit smoking  Patient understands risks             Other Visit Diagnoses     Pre-op examination    - Primary    Patient appears to be at relatively low risk for the planned cataract surgery  May proceed at this time  Diagnoses and all orders for this visit:    Pre-op examination  Comments:  Patient appears to be at relatively low risk for the planned cataract surgery  May proceed at this time  Senile cataract of right eye, unspecified age-related cataract type    Primary hypertension  -     Comprehensive metabolic panel; Future    TIA (transient ischemic attack)    Cystinuria ([address] )    Mixed hyperlipidemia  -     Comprehensive metabolic panel; Future  -     Lipid Panel with Direct LDL reflex; Future    Acquired hypothyroidism    Obstructive sleep apnea    Bilateral carotid artery stenosis    Tobacco abuse    Primary insomnia  -     ALPRAZolam (XANAX) 0 25 mg tablet; Take 1 tablet (0 25 mg total) by mouth daily at bedtime as needed for anxiety          Pre-Surgery Instructions:   Medication Instructions    acetaminophen (TYLENOL) 500 mg tablet per anesthesia guidelines     ALPRAZolam (XANAX) 0 25 mg tablet per anesthesia guidelines     aspirin 81 mg chewable tablet Stop taking 5 days prior to surgery    atorvastatin (LIPITOR) 80 mg tablet per anesthesia guidelines     cyclopentolate (CYCLOGYL) 1 % ophthalmic solution per anesthesia guidelines     losartan (COZAAR) 100 MG tablet per anesthesia guidelines     Melatonin 10 MG TABS per anesthesia guidelines     meloxicam (MOBIC) 15 mg tablet Stop taking 3 days prior to surgery    neomycin-polymyxin-dexamethasone (MAXITROL) ophthalmic suspension per anesthesia guidelines     traZODone (DESYREL) 50 mg tablet per anesthesia guidelines         NOTE: Please use the above to review important meds for your specialty, the remainder "per anesthesia Guidelines "    NOTE: Please place an Inbasket message for "[facility]" pool for complicated patients

## 2022-02-28 NOTE — ASSESSMENT & PLAN NOTE
Stable at the moment  Blood pressure is a bit elevated  Would recommend adding amlodipine, but would prefer to wait until after surgery so that there is no question about whether not he can proceed

## 2022-05-05 ENCOUNTER — OFFICE VISIT (OUTPATIENT)
Dept: FAMILY MEDICINE CLINIC | Facility: CLINIC | Age: 62
End: 2022-05-05
Payer: COMMERCIAL

## 2022-05-05 VITALS
DIASTOLIC BLOOD PRESSURE: 90 MMHG | HEIGHT: 72 IN | HEART RATE: 77 BPM | TEMPERATURE: 97.7 F | SYSTOLIC BLOOD PRESSURE: 126 MMHG | WEIGHT: 201.2 LBS | BODY MASS INDEX: 27.25 KG/M2 | RESPIRATION RATE: 16 BRPM

## 2022-05-05 DIAGNOSIS — E72.01 CYSTINURIA (HCC): ICD-10-CM

## 2022-05-05 DIAGNOSIS — E78.2 MIXED HYPERLIPIDEMIA: ICD-10-CM

## 2022-05-05 DIAGNOSIS — L30.9 ECZEMA, UNSPECIFIED TYPE: ICD-10-CM

## 2022-05-05 DIAGNOSIS — N20.0 NEPHROLITHIASIS: ICD-10-CM

## 2022-05-05 DIAGNOSIS — E03.9 ACQUIRED HYPOTHYROIDISM: ICD-10-CM

## 2022-05-05 DIAGNOSIS — Z12.11 SCREENING FOR COLON CANCER: Primary | ICD-10-CM

## 2022-05-05 DIAGNOSIS — I10 PRIMARY HYPERTENSION: ICD-10-CM

## 2022-05-05 PROCEDURE — 4004F PT TOBACCO SCREEN RCVD TLK: CPT | Performed by: FAMILY MEDICINE

## 2022-05-05 PROCEDURE — 99214 OFFICE O/P EST MOD 30 MIN: CPT | Performed by: FAMILY MEDICINE

## 2022-05-05 PROCEDURE — 3008F BODY MASS INDEX DOCD: CPT | Performed by: FAMILY MEDICINE

## 2022-05-05 RX ORDER — HYDROCORTISONE VALERATE 2 MG/G
OINTMENT TOPICAL 2 TIMES DAILY
Qty: 45 G | Refills: 0 | Status: SHIPPED | OUTPATIENT
Start: 2022-05-05

## 2022-05-05 NOTE — PROGRESS NOTES
Assessment and Plan:    Problem List Items Addressed This Visit     Cystinuria [facility])     Patient has been to Nephrology before  Not currently on the medication for this, Thiola  Again, not having significant problems, though does have some discomfort in the flank  It is not a new discomfort, rather present quite frequently  Hyperlipidemia     Patient is due for labs  Ordered previously  Recommend that he have this performed  Hypertension     Blood pressure was good as far as systolic, but diastolic still is slightly elevated  Increase fluid intake, which she already is doing, and try to limit sodium some  Hypothyroid     Stable  Follow-up with next blood work  Nephrolithiasis     Patient was concerned that he has been off medication to decrease kidney stones due to cistine  He did have CT abdomen pelvis chest done October of 2021, which showed no kidney stone evidence, and bladder that was also reasonable  Reviewed that we could order an ultrasound, but to prefer not to do an ultrasound  Given the concern that I would have about radiation, we elected not to do any further testing  He does have some odd symptoms, which could certainly be kidney stone  Other Visit Diagnoses     Screening for colon cancer    -  Primary    Relevant Orders    Occult Blood, Fecal Immunochemical    Eczema, unspecified type        Eczema versus psoriasis  Trial of Westcort ointment  Consider Dermatology if no change  Relevant Medications    hydrocortisone valerate (WEST-JAMIR) 0 2 % ointment                 Diagnoses and all orders for this visit:    Screening for colon cancer  -     Occult Blood, Fecal Immunochemical; Future    Primary hypertension    Mixed hyperlipidemia    Cystinuria ([facility] [address] )    Acquired hypothyroidism    Nephrolithiasis    Eczema, unspecified type  Comments:  Eczema versus psoriasis  Trial of Westcort ointment  Consider Dermatology if no change    Orders:  - hydrocortisone valerate (WEST-JAMIR) 0 2 % ointment; Apply topically 2 (two) times a day            Subjective:      Patient ID: Vandana Mathis is a 60 y o  male  CC:    Chief Complaint   Patient presents with    Follow-up     Patient here for follow up  Patient refused Colonoscopy referral and cologuard  RRivas  HPI:    Patient is here to follow-up on several issues  He did have cataract surgery recently  Currently, does not need to use glasses  Feels quite good about this  Hypertension:  Patient was noted to have high blood pressure previously  Currently on Cozaar 100 mg  Reviewed blood pressure  Hyperlipidemia:  Currently on atorvastatin  Due for labs  Rash: Noted on face, under beard  Has used lotion and OTC hydrocortisone without much changes  Scale, some itch (wife noted him scratching  Colon cancer screening:  Reviewed about different options including colonoscopy, Cologuard, fit test   Patient was not willing to consider colonoscopy, or the other testing options  Understands risks of colon cancer  The following portions of the patient's history were reviewed and updated as appropriate: allergies, current medications, past family history, past medical history, past social history, past surgical history and problem list       Review of Systems   Constitutional: Negative for chills and fever  HENT: Negative for ear pain and sore throat  Eyes: Negative for pain and visual disturbance  Respiratory: Negative for cough and shortness of breath  Cardiovascular: Negative for chest pain and palpitations  Gastrointestinal: Negative for abdominal pain and vomiting  Genitourinary: Negative for dysuria and hematuria  Musculoskeletal: Negative for arthralgias and back pain  Skin: Positive for rash  Negative for color change  Neurological: Negative for seizures and syncope  All other systems reviewed and are negative          Data to review: Objective:    Vitals:    05/05/22 1117   BP: 126/90   BP Location: Right arm   Patient Position: Sitting   Cuff Size: Adult   Pulse: 77   Resp: 16   Temp: 97 7 °F (36 5 °C)   TempSrc: Temporal   Weight: 91 3 kg (201 lb 3 2 oz)   Height: 6' (1 829 m)        Physical Exam  Vitals and nursing note reviewed  Constitutional:       Appearance: Normal appearance  HENT:      Head:     Neck:      Vascular: No carotid bruit  Cardiovascular:      Rate and Rhythm: Normal rate and regular rhythm  Pulses: Normal pulses  Carotid pulses are 2+ on the right side and 2+ on the left side  Heart sounds: Normal heart sounds  No murmur heard  No gallop  Pulmonary:      Effort: Pulmonary effort is normal  No respiratory distress  Breath sounds: Normal breath sounds  No stridor  No wheezing, rhonchi or rales  Chest:      Chest wall: No tenderness  Neurological:      Mental Status: He is alert  BMI Counseling: Body mass index is 27 29 kg/m²  The BMI is above normal  Nutrition recommendations include decreasing portion sizes, encouraging healthy choices of fruits and vegetables, decreasing fast food intake, consuming healthier snacks, limiting drinks that contain sugar, moderation in carbohydrate intake, increasing intake of lean protein, reducing intake of saturated and trans fat and reducing intake of cholesterol  Exercise recommendations include exercising 3-5 times per week  No pharmacotherapy was ordered  Rationale for BMI follow-up plan is due to patient being overweight or obese

## 2022-05-05 NOTE — ASSESSMENT & PLAN NOTE
Blood pressure was good as far as systolic, but diastolic still is slightly elevated  Increase fluid intake, which she already is doing, and try to limit sodium some

## 2022-05-05 NOTE — ASSESSMENT & PLAN NOTE
Patient has been to Nephrology before  Not currently on the medication for this, Thiola  Again, not having significant problems, though does have some discomfort in the flank  It is not a new discomfort, rather present quite frequently

## 2022-05-05 NOTE — PATIENT INSTRUCTIONS
Problem List Items Addressed This Visit     Cystinuria [facility])     Patient has been to Nephrology before  Not currently on the medication for this, Thiola  Again, not having significant problems, though does have some discomfort in the flank  It is not a new discomfort, rather present quite frequently  Hyperlipidemia     Patient is due for labs  Ordered previously  Recommend that he have this performed  Hypertension     Blood pressure was good as far as systolic, but diastolic still is slightly elevated  Increase fluid intake, which she already is doing, and try to limit sodium some  Hypothyroid     Stable  Follow-up with next blood work  Nephrolithiasis     Patient was concerned that he has been off medication to decrease kidney stones due to cistine  He did have CT abdomen pelvis chest done October of 2021, which showed no kidney stone evidence, and bladder that was also reasonable  Reviewed that we could order an ultrasound, but to prefer not to do an ultrasound  Given the concern that I would have about radiation, we elected not to do any further testing  He does have some odd symptoms, which could certainly be kidney stone  Other Visit Diagnoses     Screening for colon cancer    -  Primary    Relevant Orders    Occult Blood, Fecal Immunochemical    Eczema, unspecified type        Eczema versus psoriasis  Trial of Westcort ointment  Consider Dermatology if no change  Relevant Medications    hydrocortisone valerate (WEST-JAMIR) 0 2 % ointment          COVID 19 Instructions    Paxton Dill was advised to limit contact with others to essential tasks such as getting food, medications, and medical care  Proper handwashing reviewed, and Hand sanitzer when washing is not available  If the patient develops symptoms of COVID 19, the patient should call the office as soon as possible      For 0917-1170 Flu season, it is strongly recommended that Flu Vaccinations be obtained  Virtual Visits:  Diallo: This works on smart phones (any phone with Internet browsing capability)  You should get a text message when the provider is ready to see you  Click on the link in the text message, and the call should start  You will need to type in your name, and allow camera and microphone access  This is HIPPA compliant, and secure  If you have not already done so, get immunized to COVID 19  We are committed to getting you vaccinated as soon as possible and will be closely following CDC and [facility]  guidelines as they are released and revised  Please refer to our COVID-19 vaccine webpage for the most up to date information on the vaccine and our distribution efforts  This site will also have the most up to date recommendations for COVID booster vaccine  Hyun butcher    Call [phone] ([phone]), option 7    OUR NEW LOCATION:    [address], [address] [place], [place] [address], [place], [address]  Fax: [phone]    Lab services and OB/GYN are at this location as well

## 2022-05-05 NOTE — ASSESSMENT & PLAN NOTE
Patient was concerned that he has been off medication to decrease kidney stones due to cistine  He did have CT abdomen pelvis chest done October of 2021, which showed no kidney stone evidence, and bladder that was also reasonable  Reviewed that we could order an ultrasound, but to prefer not to do an ultrasound  Given the concern that I would have about radiation, we elected not to do any further testing  He does have some odd symptoms, which could certainly be kidney stone

## 2022-05-13 DIAGNOSIS — I10 PRIMARY HYPERTENSION: ICD-10-CM

## 2022-05-16 RX ORDER — LOSARTAN POTASSIUM 100 MG/1
TABLET ORAL
Qty: 90 TABLET | Refills: 1 | Status: SHIPPED | OUTPATIENT
Start: 2022-05-16

## 2022-07-10 DIAGNOSIS — M19.91 PRIMARY OSTEOARTHRITIS, UNSPECIFIED SITE: ICD-10-CM

## 2022-07-11 RX ORDER — MELOXICAM 15 MG/1
TABLET ORAL
Qty: 30 TABLET | Refills: 3 | Status: SHIPPED | OUTPATIENT
Start: 2022-07-11

## 2022-08-04 DIAGNOSIS — F51.01 PRIMARY INSOMNIA: ICD-10-CM

## 2022-08-04 RX ORDER — TRAZODONE HYDROCHLORIDE 50 MG/1
100 TABLET ORAL
Qty: 180 TABLET | Refills: 1 | Status: SHIPPED | OUTPATIENT
Start: 2022-08-04

## 2022-08-04 NOTE — TELEPHONE ENCOUNTER
Requested Prescriptions     Pending Prescriptions Disp Refills    traZODone (DESYREL) 50 mg tablet [Pharmacy Med Name: TRAZODONE 50 MG TABLET] 180 tablet 1     Sig: TAKE 2 TABLETS (100 MG TOTAL) BY MOUTH DAILY AT BEDTIME     LOV 5/5/22, F/U 11/14/22, labs active

## 2022-09-15 DIAGNOSIS — F51.01 PRIMARY INSOMNIA: ICD-10-CM

## 2022-09-20 RX ORDER — ALPRAZOLAM 0.25 MG/1
0.25 TABLET ORAL
Qty: 15 TABLET | Refills: 0 | Status: SHIPPED | OUTPATIENT
Start: 2022-09-20

## 2022-10-26 ENCOUNTER — APPOINTMENT (OUTPATIENT)
Dept: LAB | Facility: CLINIC | Age: 62
End: 2022-10-26

## 2022-10-26 ENCOUNTER — VBI (OUTPATIENT)
Dept: ADMINISTRATIVE | Facility: OTHER | Age: 62
End: 2022-10-26

## 2022-10-26 DIAGNOSIS — I10 PRIMARY HYPERTENSION: ICD-10-CM

## 2022-10-26 DIAGNOSIS — E78.2 MIXED HYPERLIPIDEMIA: ICD-10-CM

## 2022-10-26 LAB
ALBUMIN SERPL BCP-MCNC: 3.8 G/DL (ref 3.5–5)
ALP SERPL-CCNC: 62 U/L (ref 46–116)
ALT SERPL W P-5'-P-CCNC: 25 U/L (ref 12–78)
ANION GAP SERPL CALCULATED.3IONS-SCNC: 3 MMOL/L (ref 4–13)
AST SERPL W P-5'-P-CCNC: 13 U/L (ref 5–45)
BILIRUB SERPL-MCNC: 0.51 MG/DL (ref 0.2–1)
BUN SERPL-MCNC: 21 MG/DL (ref 5–25)
CALCIUM SERPL-MCNC: 10 MG/DL (ref 8.3–10.1)
CHLORIDE SERPL-SCNC: 106 MMOL/L (ref 96–108)
CHOLEST SERPL-MCNC: 296 MG/DL
CO2 SERPL-SCNC: 26 MMOL/L (ref 21–32)
CREAT SERPL-MCNC: 1.48 MG/DL (ref 0.6–1.3)
GFR SERPL CREATININE-BSD FRML MDRD: 49 ML/MIN/1.73SQ M
GLUCOSE P FAST SERPL-MCNC: 120 MG/DL (ref 65–99)
HDLC SERPL-MCNC: 40 MG/DL
LDLC SERPL CALC-MCNC: 202 MG/DL (ref 0–100)
POTASSIUM SERPL-SCNC: 4.3 MMOL/L (ref 3.5–5.3)
PROT SERPL-MCNC: 7.5 G/DL (ref 6.4–8.4)
SODIUM SERPL-SCNC: 135 MMOL/L (ref 135–147)
TRIGL SERPL-MCNC: 268 MG/DL

## 2022-11-12 DIAGNOSIS — I10 PRIMARY HYPERTENSION: ICD-10-CM

## 2022-11-14 ENCOUNTER — OFFICE VISIT (OUTPATIENT)
Dept: FAMILY MEDICINE CLINIC | Facility: CLINIC | Age: 62
End: 2022-11-14

## 2022-11-14 VITALS
HEIGHT: 72 IN | TEMPERATURE: 97.6 F | SYSTOLIC BLOOD PRESSURE: 148 MMHG | HEART RATE: 92 BPM | WEIGHT: 201 LBS | BODY MASS INDEX: 27.22 KG/M2 | DIASTOLIC BLOOD PRESSURE: 82 MMHG

## 2022-11-14 DIAGNOSIS — M25.571 ACUTE BILATERAL ANKLE PAIN: ICD-10-CM

## 2022-11-14 DIAGNOSIS — E78.2 MIXED HYPERLIPIDEMIA: ICD-10-CM

## 2022-11-14 DIAGNOSIS — I65.23 BILATERAL CAROTID ARTERY STENOSIS: ICD-10-CM

## 2022-11-14 DIAGNOSIS — R73.9 HYPERGLYCEMIA: ICD-10-CM

## 2022-11-14 DIAGNOSIS — E03.9 ACQUIRED HYPOTHYROIDISM: ICD-10-CM

## 2022-11-14 DIAGNOSIS — H91.92 HEARING LOSS OF LEFT EAR, UNSPECIFIED HEARING LOSS TYPE: ICD-10-CM

## 2022-11-14 DIAGNOSIS — M25.572 ACUTE BILATERAL ANKLE PAIN: ICD-10-CM

## 2022-11-14 DIAGNOSIS — Z23 NEED FOR INFLUENZA VACCINATION: ICD-10-CM

## 2022-11-14 DIAGNOSIS — I10 PRIMARY HYPERTENSION: Primary | ICD-10-CM

## 2022-11-14 DIAGNOSIS — F51.01 PRIMARY INSOMNIA: ICD-10-CM

## 2022-11-14 DIAGNOSIS — G45.9 TIA (TRANSIENT ISCHEMIC ATTACK): ICD-10-CM

## 2022-11-14 DIAGNOSIS — Z72.0 TOBACCO ABUSE: ICD-10-CM

## 2022-11-14 PROBLEM — H81.10 BPPV (BENIGN PAROXYSMAL POSITIONAL VERTIGO): Status: RESOLVED | Noted: 2021-10-24 | Resolved: 2022-11-14

## 2022-11-14 PROBLEM — M25.579 ANKLE PAIN: Status: ACTIVE | Noted: 2022-11-14

## 2022-11-14 PROBLEM — R51.9 ACUTE INTRACTABLE HEADACHE: Status: RESOLVED | Noted: 2021-11-03 | Resolved: 2022-11-14

## 2022-11-14 RX ORDER — ALPRAZOLAM 0.25 MG/1
0.25 TABLET ORAL
Qty: 15 TABLET | Refills: 0 | Status: SHIPPED | OUTPATIENT
Start: 2022-11-14

## 2022-11-14 RX ORDER — LOSARTAN POTASSIUM 100 MG/1
100 TABLET ORAL DAILY
Qty: 90 TABLET | Refills: 1 | Status: SHIPPED | OUTPATIENT
Start: 2022-11-14

## 2022-11-14 RX ORDER — ROSUVASTATIN CALCIUM 10 MG/1
10 TABLET, COATED ORAL DAILY
Qty: 30 TABLET | Refills: 5 | Status: SHIPPED | OUTPATIENT
Start: 2022-11-14

## 2022-11-14 RX ORDER — LOSARTAN POTASSIUM 100 MG/1
TABLET ORAL
Qty: 90 TABLET | Refills: 1 | OUTPATIENT
Start: 2022-11-14

## 2022-11-14 NOTE — ASSESSMENT & PLAN NOTE
Blood sugar was elevated in the October blood work, but it has not been that elevated before  Limit carbohydrates, increase exercise  Reviewed about carbohydrates  This would include pasta, rice, potatoes, bread, starches, sugars

## 2022-11-14 NOTE — ASSESSMENT & PLAN NOTE
Patient does have history of carotid stenosis  His last ultrasound was January of 2022  Recommend repeat

## 2022-11-14 NOTE — ASSESSMENT & PLAN NOTE
Patient did not take his blood pressure medications today  Based on that, would recommend that he take his medication regularly, and will recheck in the future

## 2022-11-14 NOTE — PROGRESS NOTES
Name: Sohail Blackwell      : 1960      MRN: [id number]  Encounter Provider: Josué Lopes MD  Encounter Date: 2022   Encounter department: [place] [address]  Primary hypertension  Assessment & Plan:  Patient did not take his blood pressure medications today  Based on that, would recommend that he take his medication regularly, and will recheck in the future  Orders:  -     losartan (COZAAR) 100 MG tablet; Take 1 tablet (100 mg total) by mouth daily  -     Comprehensive metabolic panel; Future; Expected date: 2023  -     TSH, 3rd generation; Future; Expected date: 2023    2  Mixed hyperlipidemia  Assessment & Plan:  Cholesterol is clearly elevated  Patient was quite concerned about statin use, therefore he was not using medication recently  In the hospital in 2021 it was recommended that he go to atorvastatin 80, but he clearly did not do that  Recommend trial of Crestor 10 mg, as that has a history of causing less myalgias subjectively then atorvastatin  With that, hopefully he will improve the cholesterol numbers without causing more aches or pains that he already has  As an alternate, we could consider using plant sterols, such as CholestOff, fish oils, and Zetia  Orders:  -     rosuvastatin (CRESTOR) 10 MG tablet; Take 1 tablet (10 mg total) by mouth daily  -     Cholesterol, total; Future; Expected date: 2023  -     Comprehensive metabolic panel; Future; Expected date: 2023  -     HDL cholesterol; Future; Expected date: 2023  -     LDL cholesterol, direct; Future; Expected date: 2023    3  Acquired hypothyroidism  Assessment & Plan:  Check TSH with next blood work  Orders:  -     TSH, 3rd generation; Future; Expected date: 2023    4  Bilateral carotid artery stenosis  Assessment & Plan:  Patient does have history of carotid stenosis  His last ultrasound was 2022    Recommend repeat  Orders:  -     VAS carotid complete study; Future; Expected date: 01/14/2023    5  TIA (transient ischemic attack)  Assessment & Plan:  History of TIA  Reviewed about secondary prevention, i e  Need to improve risk factors to try and prevent a stroke  6  Hyperglycemia  Assessment & Plan:  Blood sugar was elevated in the October blood work, but it has not been that elevated before  Limit carbohydrates, increase exercise  Reviewed about carbohydrates  This would include pasta, rice, potatoes, bread, starches, sugars  7  Tobacco abuse    8  Hearing loss of left ear, unspecified hearing loss type  Assessment & Plan:  Patient still has hearing loss  Recommend follow-up with ENT  9  Acute bilateral ankle pain  Assessment & Plan:  Question musle  Check XR, CPK  Follow after  Consider Ortho vs pod  Takes Meloxicam daily  Orders:  -     XR ankle 3+ vw left; Future; Expected date: 11/14/2022  -     XR ankle 3+ vw right; Future; Expected date: 11/14/2022  -     CK (with reflex to MB); Future    10  Need for influenza vaccination  -     influenza vaccine, quadrivalent, recombinant, PF, 0 5 mL, for patients 18 yr+ (FLUBLOK)           Subjective     Chief Complaint   Patient presents with   • Follow-up     6 month f/u review labs         Patient's here to follow up on multiple issues  Hyperlipidemia:  Reviewed blood work  Patient is not currently on statins  Was concerned about them as he had heard some bad news about statins overall  Reviewed benefits and risks with statins  HTN:  Currently on losartan  TIA:  Noted before  No specific changes at the moment  Hearing loss:  Patient did have significant hearing loss before  He went to ENT  They did steroids in the ear canal, and patient reports that there was nothing else they recommended doing at that time  Unfortunately, he does continue to have some hearing loss    Patient feels like he has pressure in the ear canal  He would like to have his ear pop, and then equalize pressure is  Unfortunately, that never happens  Ankle pain:  Patient reports that after walking for even a blocker so, he notes significant pain in the ankles, i e  “It feels like somebody hit knee with baseball bat ”  Both ankles  Review of Systems   Constitutional: Negative  HENT: Negative  Eyes: Negative  Respiratory: Negative  Cardiovascular: Negative  Gastrointestinal: Negative  Endocrine: Negative  Genitourinary: Negative  Musculoskeletal:        Per HPI     Skin: Negative  Allergic/Immunologic: Negative  Neurological: Negative  Hematological: Negative  Psychiatric/Behavioral: Negative  Current Outpatient Medications on File Prior to Visit   Medication Sig   • acetaminophen (TYLENOL) 500 mg tablet Take 500 mg by mouth every 6 (six) hours as needed for mild pain   • ALPRAZolam (XANAX) 0 25 mg tablet Take 1 tablet (0 25 mg total) by mouth daily at bedtime as needed for anxiety   • aspirin 81 mg chewable tablet Chew 81 mg daily   • cyclopentolate (CYCLOGYL) 1 % ophthalmic solution    • hydrocortisone valerate (WEST-JAMIR) 0 2 % ointment Apply topically 2 (two) times a day   • Melatonin 10 MG TABS Take 10 mg by mouth Pt takes 10mg at bedtime  • meloxicam (MOBIC) 15 mg tablet TAKE 1 TABLET BY MOUTH EVERY DAY   • neomycin-polymyxin-dexamethasone (MAXITROL) ophthalmic suspension     • traZODone (DESYREL) 50 mg tablet TAKE 2 TABLETS (100 MG TOTAL) BY MOUTH DAILY AT BEDTIME   • [DISCONTINUED] atorvastatin (LIPITOR) 80 mg tablet Take 1 tablet (80 mg total) by mouth daily   • [DISCONTINUED] losartan (COZAAR) 100 MG tablet TAKE 1 TABLET BY MOUTH EVERY DAY       Objective     Na 135, K 4 3, Ca 10 0  Creat 1 48, GFR 49  Sugar 120  AST 13, ALT 25  Cholesterol 296, , HDL 40,       /82 (BP Location: Right arm, Patient Position: Sitting, Cuff Size: Adult)   Pulse 92   Temp 97 6 °F (36 4 °C) (Temporal)   Ht 6' (1 829 m) Comment: on record  Wt 91 2 kg (201 lb)   BMI 27 26 kg/m²     Physical Exam  Vitals and nursing note reviewed  Constitutional:       Appearance: He is well-developed  HENT:      Head: Normocephalic and atraumatic  Cardiovascular:      Rate and Rhythm: Normal rate and regular rhythm  Pulses:           Carotid pulses are 2+ on the right side and 2+ on the left side  Heart sounds: Normal heart sounds  No murmur heard  No friction rub  No gallop  Pulmonary:      Effort: Pulmonary effort is normal  No respiratory distress  Breath sounds: Normal breath sounds  No wheezing or rales  Musculoskeletal:      Cervical back: Normal range of motion and neck supple         Shira German MD

## 2022-11-14 NOTE — PATIENT INSTRUCTIONS
COVID 19 Instructions    Juli Adrienne was advised to limit contact with others to essential tasks such as getting food, medications, and medical care  Proper handwashing reviewed, and Hand sanitzer when washing is not available  If the patient develops symptoms of COVID 19, the patient should call the office as soon as possible  For 9315-6551 Flu season, it is strongly recommended that Flu Vaccinations be obtained  Virtual Visits:  Diallo: This works on smart phones (any phone with Internet browsing capability)  You should get a text message when the provider is ready to see you  Click on the link in the text message, and the call should start  You will need to type in your name, and allow camera and microphone access  This is HIPPA compliant, and secure  If you have not already done so, get immunized to COVID 19  We are committed to getting you vaccinated as soon as possible and will be closely following CDC and [facility]  guidelines as they are released and revised  Please refer to our COVID-19 vaccine webpage for the most up to date information on the vaccine and our distribution efforts  This site will also have the most up to date recommendations for COVID booster vaccine  Hyun tn    Call [phone] ([phone]), option 7    OUR NEW LOCATION:    [address], [address] [place], [place] [address] [address] , [place], [address]  Fax: [phone]    Lab services and OB/GYN are at this location as well  1  Primary hypertension  Assessment & Plan:  Patient did not take his blood pressure medications today  Based on that, would recommend that he take his medication regularly, and will recheck in the future  Orders:  -     losartan (COZAAR) 100 MG tablet; Take 1 tablet (100 mg total) by mouth daily  -     Comprehensive metabolic panel;  Future; Expected date: 05/14/2023  -     TSH, 3rd generation; Future; Expected date: 05/14/2023    2  Mixed hyperlipidemia  Assessment & Plan:  Cholesterol is clearly elevated  Patient was quite concerned about statin use, therefore he was not using medication recently  In the hospital in October of 2021 it was recommended that he go to atorvastatin 80, but he clearly did not do that  Recommend trial of Crestor 10 mg, as that has a history of causing less myalgias subjectively then atorvastatin  With that, hopefully he will improve the cholesterol numbers without causing more aches or pains that he already has  As an alternate, we could consider using plant sterols, such as CholestOff, fish oils, and Zetia  Orders:  -     rosuvastatin (CRESTOR) 10 MG tablet; Take 1 tablet (10 mg total) by mouth daily  -     Cholesterol, total; Future; Expected date: 05/14/2023  -     Comprehensive metabolic panel; Future; Expected date: 05/14/2023  -     HDL cholesterol; Future; Expected date: 05/14/2023  -     LDL cholesterol, direct; Future; Expected date: 05/14/2023    3  Acquired hypothyroidism  Assessment & Plan:  Check TSH with next blood work  Orders:  -     TSH, 3rd generation; Future; Expected date: 05/14/2023    4  Bilateral carotid artery stenosis  Assessment & Plan:  Patient does have history of carotid stenosis  His last ultrasound was January of 2022  Recommend repeat  Orders:  -     VAS carotid complete study; Future; Expected date: 01/14/2023    5  TIA (transient ischemic attack)  Assessment & Plan:  History of TIA  Reviewed about secondary prevention, i e  Need to improve risk factors to try and prevent a stroke  6  Hyperglycemia  Assessment & Plan:  Blood sugar was elevated in the October blood work, but it has not been that elevated before  Limit carbohydrates, increase exercise  Reviewed about carbohydrates  This would include pasta, rice, potatoes, bread, starches, sugars  7  Tobacco abuse    8   Hearing loss of left ear, unspecified hearing loss type  Assessment & Plan:  Patient still has hearing loss  Recommend follow-up with ENT  9  Acute bilateral ankle pain  Assessment & Plan:  Question musle  Check XR, CPK  Follow after  Consider Ortho vs pod  Takes Meloxicam daily  Orders:  -     XR ankle 3+ vw left; Future; Expected date: 11/14/2022  -     XR ankle 3+ vw right; Future; Expected date: 11/14/2022  -     CK (with reflex to MB); Future    10  Need for influenza vaccination  -     influenza vaccine, quadrivalent, recombinant, PF, 0 5 mL, for patients 18 yr+ (FLUBLOK)    11  Primary insomnia  -     ALPRAZolam (XANAX) 0 25 mg tablet;  Take 1 tablet (0 25 mg total) by mouth daily at bedtime as needed for anxiety

## 2022-11-14 NOTE — ASSESSMENT & PLAN NOTE
Cholesterol is clearly elevated  Patient was quite concerned about statin use, therefore he was not using medication recently  In the hospital in October of 2021 it was recommended that he go to atorvastatin 80, but he clearly did not do that  Recommend trial of Crestor 10 mg, as that has a history of causing less myalgias subjectively then atorvastatin  With that, hopefully he will improve the cholesterol numbers without causing more aches or pains that he already has  As an alternate, we could consider using plant sterols, such as CholestOff, fish oils, and Zetia

## 2022-11-14 NOTE — ASSESSMENT & PLAN NOTE
History of TIA  Reviewed about secondary prevention, i e  Need to improve risk factors to try and prevent a stroke

## 2022-11-19 DIAGNOSIS — F51.01 PRIMARY INSOMNIA: ICD-10-CM

## 2022-11-19 RX ORDER — TRAZODONE HYDROCHLORIDE 50 MG/1
100 TABLET ORAL
Qty: 180 TABLET | Refills: 1 | Status: SHIPPED | OUTPATIENT
Start: 2022-11-19

## 2022-12-09 DIAGNOSIS — E78.2 MIXED HYPERLIPIDEMIA: ICD-10-CM

## 2022-12-09 RX ORDER — ROSUVASTATIN CALCIUM 10 MG/1
TABLET, COATED ORAL
Qty: 90 TABLET | Refills: 2 | Status: SHIPPED | OUTPATIENT
Start: 2022-12-09

## 2022-12-16 ENCOUNTER — VBI (OUTPATIENT)
Dept: ADMINISTRATIVE | Facility: OTHER | Age: 62
End: 2022-12-16

## 2023-02-10 ENCOUNTER — HOSPITAL ENCOUNTER (OUTPATIENT)
Dept: NON INVASIVE DIAGNOSTICS | Facility: HOSPITAL | Age: 63
Discharge: HOME | End: 2023-02-10

## 2023-02-10 DIAGNOSIS — I65.23 BILATERAL CAROTID ARTERY STENOSIS: ICD-10-CM

## 2023-03-08 DIAGNOSIS — F51.01 PRIMARY INSOMNIA: ICD-10-CM

## 2023-03-09 RX ORDER — ALPRAZOLAM 0.25 MG/1
0.25 TABLET ORAL
Qty: 15 TABLET | Refills: 0 | Status: SHIPPED | OUTPATIENT
Start: 2023-03-09

## 2023-03-09 NOTE — TELEPHONE ENCOUNTER
I was calling with prescription refill line and I just got the general area here  My name is Rivka Meier, date of birth [birth year] Was calling for a refill for alprazolam  25 milligram quantity 15, phone number [phone]  Thank you          LOV 11/14/22, F/U 5/22/23, Labs active

## 2023-04-22 DIAGNOSIS — M19.91 PRIMARY OSTEOARTHRITIS, UNSPECIFIED SITE: ICD-10-CM

## 2023-04-24 RX ORDER — MELOXICAM 15 MG/1
TABLET ORAL
Qty: 30 TABLET | Refills: 3 | Status: SHIPPED | OUTPATIENT
Start: 2023-04-24

## 2023-05-12 ENCOUNTER — RA CDI HCC (OUTPATIENT)
Dept: OTHER | Facility: HOSPITAL | Age: 63
End: 2023-05-12

## 2023-05-12 DIAGNOSIS — I10 PRIMARY HYPERTENSION: ICD-10-CM

## 2023-05-12 RX ORDER — LOSARTAN POTASSIUM 100 MG/1
TABLET ORAL
Qty: 90 TABLET | Refills: 1 | Status: SHIPPED | OUTPATIENT
Start: 2023-05-12

## 2023-05-12 NOTE — PROGRESS NOTES
[facility]  coding opportunities       Chart reviewed, no opportunity found: CHART REVIEWED, NO OPPORTUNITY FOUND        Patients Insurance        Commercial Insurance: [address] [place] [address]

## 2023-05-22 ENCOUNTER — OFFICE VISIT (OUTPATIENT)
Dept: FAMILY MEDICINE CLINIC | Facility: CLINIC | Age: 63
End: 2023-05-22

## 2023-05-22 VITALS
WEIGHT: 197 LBS | SYSTOLIC BLOOD PRESSURE: 132 MMHG | HEIGHT: 72 IN | DIASTOLIC BLOOD PRESSURE: 84 MMHG | HEART RATE: 80 BPM | BODY MASS INDEX: 26.68 KG/M2 | OXYGEN SATURATION: 95 %

## 2023-05-22 DIAGNOSIS — Z12.11 SCREEN FOR COLON CANCER: ICD-10-CM

## 2023-05-22 DIAGNOSIS — I73.9 PAD (PERIPHERAL ARTERY DISEASE) (HCC): ICD-10-CM

## 2023-05-22 DIAGNOSIS — F51.01 PRIMARY INSOMNIA: Primary | ICD-10-CM

## 2023-05-22 DIAGNOSIS — E72.01 CYSTINURIA (HCC): ICD-10-CM

## 2023-05-22 DIAGNOSIS — E03.9 ACQUIRED HYPOTHYROIDISM: ICD-10-CM

## 2023-05-22 DIAGNOSIS — M25.551 BILATERAL HIP PAIN: ICD-10-CM

## 2023-05-22 DIAGNOSIS — M25.552 BILATERAL HIP PAIN: ICD-10-CM

## 2023-05-22 DIAGNOSIS — E78.2 MIXED HYPERLIPIDEMIA: ICD-10-CM

## 2023-05-22 DIAGNOSIS — I10 PRIMARY HYPERTENSION: ICD-10-CM

## 2023-05-22 PROBLEM — M25.559 HIP PAIN: Status: ACTIVE | Noted: 2023-05-22

## 2023-05-22 RX ORDER — ALPRAZOLAM 0.25 MG/1
0.25 TABLET ORAL
Qty: 15 TABLET | Refills: 0 | Status: SHIPPED | OUTPATIENT
Start: 2023-05-22

## 2023-05-22 NOTE — PROGRESS NOTES
Name: Dangelo Wade      : 1960      MRN: [id number]  Encounter Provider: Maye Carrero MD  Encounter Date: 2023   Encounter department: [place] [address]  Primary insomnia  Assessment & Plan:  Currently taking trazodone, 100 mg at at bedtime  Does seem to work most days  There is some occasions where he needs a little extra, and at that point he does take the Xanax  Rarely needs to take that so far  Could consider trying Belsomra, except for cost   For now, continue on trazodone without change  Orders:  -     ALPRAZolam (XANAX) 0 25 mg tablet; Take 1 tablet (0 25 mg total) by mouth daily at bedtime as needed for anxiety  -     CBC and differential; Future    2  Primary hypertension  Assessment & Plan:  Blood pressure stable at the moment  No specific change  3  Mixed hyperlipidemia  Assessment & Plan:  Patient is due for lipid panel  Follow-up afterwards  4  Acquired hypothyroidism  Assessment & Plan:  Check labs  Not currently on Synthroid  He did mention that he gets quite a bit of fatigue  5  PAD (peripheral artery disease) ([facility] [address] )  Assessment & Plan:  Patient does have what appears to be peripheral artery disease  Check LORI, follow-up afterwards  Does get significant cramping in the legs with walking for distance, then after resting for a bit it improves (never completely goes away)  ABIs should help to determine if this is arterial issue or not  Orders:  -     VAS LORI & waveform analysis, multiple levels; Future; Expected date: 2023  -     XR hips bilateral 3-4 vw w pelvis if performed; Future; Expected date: 2023  -     CBC and differential; Future    6  Bilateral hip pain  Assessment & Plan:  Patient has issues with steps and walking distance  Check XR  Orders:  -     XR hips bilateral 3-4 vw w pelvis if performed; Future; Expected date: 2023    7   Screen for colon cancer  -     Ambulatory Referral to Gastroenterology; Future    8  Cystinuria [facility])  Assessment & Plan:  Follow with Urology  Subjective      Chief Complaint   Patient presents with   • Follow-up     He didn't get labs done  Labs due now  Did not know he was due  Hyperlipidemia: Currently on Crestor 10 mg  Denies any current issues or problems  Hypertension: Currently on losartan 100 mg  Again, no specific issues or problems  Denies any orthostasis  Insomnia: Currently on trazodone 50  Takes 2 tablets  Has variable response to this  He did mention that sometimes he waits till about 3:00 in the morning, and if he has not fallen asleep he will try Xanax  That seems to work most times  Does not happen very often, however  He has noticed lately that he feels older and weaker  More specifically, walks using a cane  Is not all the time, just some of the time  Mostly for distance  He noted that he gets cramping in the lower extremity with distance  Mostly in the calf  Starts in the heel  Review of Systems   Constitutional: Negative  HENT: Negative  Eyes: Negative  Respiratory: Negative  Cardiovascular: Negative  Gastrointestinal: Negative  Endocrine: Negative  Genitourinary: Negative  Musculoskeletal: Positive for gait problem  Skin: Negative  Allergic/Immunologic: Negative  Hematological: Negative  Psychiatric/Behavioral: Positive for sleep disturbance  Current Outpatient Medications on File Prior to Visit   Medication Sig   • acetaminophen (TYLENOL) 500 mg tablet Take 500 mg by mouth every 6 (six) hours as needed for mild pain   • hydrocortisone valerate (WEST-JAMIR) 0 2 % ointment Apply topically 2 (two) times a day   • losartan (COZAAR) 100 MG tablet TAKE 1 TABLET BY MOUTH EVERY DAY   • Melatonin 10 MG TABS Take 10 mg by mouth Pt takes 10mg at bedtime     • meloxicam (MOBIC) 15 mg tablet TAKE 1 TABLET BY MOUTH EVERY DAY   • rosuvastatin (CRESTOR) 10 MG tablet TAKE 1 TABLET BY MOUTH EVERY DAY   • traZODone (DESYREL) 50 mg tablet TAKE 2 TABLETS (100 MG TOTAL) BY MOUTH DAILY AT BEDTIME   • [DISCONTINUED] ALPRAZolam (XANAX) 0 25 mg tablet Take 1 tablet (0 25 mg total) by mouth daily at bedtime as needed for anxiety   • [DISCONTINUED] aspirin 81 mg chewable tablet Chew 81 mg daily   • [DISCONTINUED] cyclopentolate (CYCLOGYL) 1 % ophthalmic solution    • [DISCONTINUED] neomycin-polymyxin-dexamethasone (MAXITROL) ophthalmic suspension         Objective     /84   Pulse 80   Ht 6' (1 829 m)   Wt 89 4 kg (197 lb)   SpO2 95%   BMI 26 72 kg/m²     Physical Exam  Vitals and nursing note reviewed  Constitutional:       Appearance: He is well-developed  HENT:      Head: Normocephalic and atraumatic  Cardiovascular:      Rate and Rhythm: Normal rate and regular rhythm  Pulses:           Carotid pulses are 2+ on the right side and 2+ on the left side  Heart sounds: Normal heart sounds  No murmur heard  No friction rub  No gallop  Pulmonary:      Effort: Pulmonary effort is normal  No respiratory distress  Breath sounds: Normal breath sounds  No wheezing or rales  Musculoskeletal:      Cervical back: Normal range of motion and neck supple         Fredy Cooper MD

## 2023-05-22 NOTE — ASSESSMENT & PLAN NOTE
Currently taking trazodone, 100 mg at at bedtime  Does seem to work most days  There is some occasions where he needs a little extra, and at that point he does take the Xanax  Rarely needs to take that so far  Could consider trying Belsomra, except for cost   For now, continue on trazodone without change

## 2023-05-22 NOTE — ASSESSMENT & PLAN NOTE
Patient does have what appears to be peripheral artery disease  Check LORI, follow-up afterwards  Does get significant cramping in the legs with walking for distance, then after resting for a bit it improves (never completely goes away)  ABIs should help to determine if this is arterial issue or not

## 2023-05-22 NOTE — PATIENT INSTRUCTIONS
1  Primary insomnia  Assessment & Plan:  Currently taking trazodone, 100 mg at at bedtime  Does seem to work most days  There is some occasions where he needs a little extra, and at that point he does take the Xanax  Rarely needs to take that so far  Could consider trying Belsomra, except for cost   For now, continue on trazodone without change  Orders:  -     ALPRAZolam (XANAX) 0 25 mg tablet; Take 1 tablet (0 25 mg total) by mouth daily at bedtime as needed for anxiety  -     CBC and differential; Future    2  Primary hypertension  Assessment & Plan:  Blood pressure stable at the moment  No specific change  3  Mixed hyperlipidemia  Assessment & Plan:  Patient is due for lipid panel  Follow-up afterwards  4  Acquired hypothyroidism  Assessment & Plan:  Check labs  Not currently on Synthroid  He did mention that he gets quite a bit of fatigue  5  PAD (peripheral artery disease) ([facility] [address] )  Assessment & Plan:  Patient does have what appears to be peripheral artery disease  Check LORI, follow-up afterwards  Does get significant cramping in the legs with walking for distance, then after resting for a bit it improves (never completely goes away)  ABIs should help to determine if this is arterial issue or not  Orders:  -     VAS LORI & waveform analysis, multiple levels; Future; Expected date: 05/22/2023  -     XR hips bilateral 3-4 vw w pelvis if performed; Future; Expected date: 05/22/2023  -     CBC and differential; Future    6  Bilateral hip pain  Assessment & Plan:  Patient has issues with steps and walking distance  Check XR  Orders:  -     XR hips bilateral 3-4 vw w pelvis if performed; Future; Expected date: 05/22/2023    7  Screen for colon cancer  -     Ambulatory Referral to Gastroenterology; Future    8  Cystinuria [facility])  Assessment & Plan:  Follow with Urology            COVID 19 Instructions    Roxann Pratt was advised to limit contact with others to essential tasks such as getting food, medications, and medical care  Proper handwashing reviewed, and Hand sanitzer when washing is not available  If the patient develops symptoms of COVID 19, the patient should call the office as soon as possible  For 3014-4734 Flu season, it is strongly recommended that Flu Vaccinations be obtained  Virtual Visits:  Diallo: This works on smart phones (any phone with Internet browsing capability)  You should get a text message when the provider is ready to see you  Click on the link in the text message, and the call should start  You will need to type in your name, and allow camera and microphone access  This is HIPPA compliant, and secure  If you have not already done so, get immunized to COVID 19  We are committed to getting you vaccinated as soon as possible and will be closely following CDC and [facility]  guidelines as they are released and revised  Please refer to our COVID-19 vaccine webpage for the most up to date information on the vaccine and our distribution efforts  This site will also have the most up to date recommendations for COVID booster vaccine  Hyun butcher    Call [phone] ([phone]), option 7    OUR NEW LOCATION:    [address], [address] [place] [address] [address], [place], [address]  Fax: [phone]    Lab services and OB/GYN are at this location as well

## 2023-06-15 ENCOUNTER — APPOINTMENT (OUTPATIENT)
Dept: LAB | Facility: CLINIC | Age: 63
End: 2023-06-15
Payer: COMMERCIAL

## 2023-06-15 DIAGNOSIS — F51.01 PRIMARY INSOMNIA: ICD-10-CM

## 2023-06-15 DIAGNOSIS — I73.9 PAD (PERIPHERAL ARTERY DISEASE) (HCC): ICD-10-CM

## 2023-06-15 LAB
BASOPHILS # BLD AUTO: 0.08 THOUSANDS/ÂΜL (ref 0–0.1)
BASOPHILS NFR BLD AUTO: 1 % (ref 0–1)
EOSINOPHIL # BLD AUTO: 0.38 THOUSAND/ÂΜL (ref 0–0.61)
EOSINOPHIL NFR BLD AUTO: 4 % (ref 0–6)
ERYTHROCYTE [DISTWIDTH] IN BLOOD BY AUTOMATED COUNT: 12.6 % (ref 11.6–15.1)
HCT VFR BLD AUTO: 47.7 % (ref 36.5–49.3)
HGB BLD-MCNC: 15.6 G/DL (ref 12–17)
IMM GRANULOCYTES # BLD AUTO: 0.02 THOUSAND/UL (ref 0–0.2)
IMM GRANULOCYTES NFR BLD AUTO: 0 % (ref 0–2)
LYMPHOCYTES # BLD AUTO: 2.52 THOUSANDS/ÂΜL (ref 0.6–4.47)
LYMPHOCYTES NFR BLD AUTO: 26 % (ref 14–44)
MCH RBC QN AUTO: 30.2 PG (ref 26.8–34.3)
MCHC RBC AUTO-ENTMCNC: 32.7 G/DL (ref 31.4–37.4)
MCV RBC AUTO: 92 FL (ref 82–98)
MONOCYTES # BLD AUTO: 0.84 THOUSAND/ÂΜL (ref 0.17–1.22)
MONOCYTES NFR BLD AUTO: 9 % (ref 4–12)
NEUTROPHILS # BLD AUTO: 5.99 THOUSANDS/ÂΜL (ref 1.85–7.62)
NEUTS SEG NFR BLD AUTO: 60 % (ref 43–75)
NRBC BLD AUTO-RTO: 0 /100 WBCS
PLATELET # BLD AUTO: 244 THOUSANDS/UL (ref 149–390)
PMV BLD AUTO: 12.3 FL (ref 8.9–12.7)
RBC # BLD AUTO: 5.17 MILLION/UL (ref 3.88–5.62)
WBC # BLD AUTO: 9.83 THOUSAND/UL (ref 4.31–10.16)

## 2023-06-15 PROCEDURE — 36415 COLL VENOUS BLD VENIPUNCTURE: CPT

## 2023-06-15 PROCEDURE — 85025 COMPLETE CBC W/AUTO DIFF WBC: CPT

## 2023-06-21 ENCOUNTER — HOSPITAL ENCOUNTER (OUTPATIENT)
Dept: NON INVASIVE DIAGNOSTICS | Facility: HOSPITAL | Age: 63
Discharge: HOME | End: 2023-06-21
Payer: COMMERCIAL

## 2023-06-21 DIAGNOSIS — I73.9 PAD (PERIPHERAL ARTERY DISEASE) (HCC): ICD-10-CM

## 2023-06-21 PROCEDURE — 93925 LOWER EXTREMITY STUDY: CPT

## 2023-06-21 PROCEDURE — 93923 UPR/LXTR ART STDY 3+ LVLS: CPT

## 2023-06-22 PROCEDURE — 93925 LOWER EXTREMITY STUDY: CPT | Performed by: SURGERY

## 2023-06-22 PROCEDURE — 93922 UPR/L XTREMITY ART 2 LEVELS: CPT | Performed by: SURGERY

## 2023-06-23 NOTE — RESULT ENCOUNTER NOTE
Tests were not normal, and should follow at  your scheduled Office visit  Please call about this, if [employer] message is not available or not read by patient  Significant arterial issues with the lower extremities  Recommend follow-up with vascular surgery

## 2023-07-13 DIAGNOSIS — F51.01 PRIMARY INSOMNIA: ICD-10-CM

## 2023-07-14 RX ORDER — ALPRAZOLAM 0.25 MG/1
0.25 TABLET ORAL
Qty: 15 TABLET | Refills: 0 | Status: SHIPPED | OUTPATIENT
Start: 2023-07-14

## 2023-07-26 ENCOUNTER — OFFICE VISIT (OUTPATIENT)
Dept: FAMILY MEDICINE CLINIC | Facility: CLINIC | Age: 63
End: 2023-07-26
Payer: COMMERCIAL

## 2023-07-26 VITALS
BODY MASS INDEX: 26.41 KG/M2 | SYSTOLIC BLOOD PRESSURE: 138 MMHG | WEIGHT: 195 LBS | HEIGHT: 72 IN | RESPIRATION RATE: 18 BRPM | DIASTOLIC BLOOD PRESSURE: 86 MMHG | OXYGEN SATURATION: 95 % | HEART RATE: 86 BPM

## 2023-07-26 DIAGNOSIS — I73.9 PAD (PERIPHERAL ARTERY DISEASE) (HCC): ICD-10-CM

## 2023-07-26 DIAGNOSIS — F51.01 PRIMARY INSOMNIA: ICD-10-CM

## 2023-07-26 DIAGNOSIS — M79.672 FOOT PAIN, LEFT: ICD-10-CM

## 2023-07-26 DIAGNOSIS — L03.032 CELLULITIS OF TOE OF LEFT FOOT: Primary | ICD-10-CM

## 2023-07-26 PROCEDURE — 99213 OFFICE O/P EST LOW 20 MIN: CPT | Performed by: FAMILY MEDICINE

## 2023-07-26 RX ORDER — CEFADROXIL 1000 MG/1
1 TABLET ORAL DAILY
Qty: 10 TABLET | Refills: 0 | Status: SHIPPED | OUTPATIENT
Start: 2023-07-26 | End: 2023-08-05

## 2023-07-26 RX ORDER — TRAZODONE HYDROCHLORIDE 50 MG/1
100 TABLET ORAL
Qty: 180 TABLET | Refills: 1 | Status: SHIPPED | OUTPATIENT
Start: 2023-07-26

## 2023-07-26 NOTE — ASSESSMENT & PLAN NOTE
Patient does continue to have foot pain on the left. There is some color change as well. This is consistent with the vascular problems that he has that were noted on the LORI. Recommend follow-up with vascular.

## 2023-07-26 NOTE — PROGRESS NOTES
Name: Patrizia Kamara      : 1960      MRN: [id number]  Encounter Provider: Derek Morse MD  Encounter Date: 2023   Encounter department: [facility] PRIMARY CARE    Assessment & Plan     1. Cellulitis of toe of left foot  Assessment & Plan:  Appears to be on the second toe on the left. Recommend Duricef. Follow-up with vascular. Orders:  -     cefadroxil (DURICEF) 1 g tablet; Take 1 tablet (1 g total) by mouth in the morning for 10 days    2. Primary insomnia  Assessment & Plan:  Patient is not sleeping well. Significant issues with regard to leg movement, as well as just not being able to get to sleep or stay asleep. Question if this is related to vascular issues, which were noted on the LORI. Follow-up with vascular. Continue on trazodone for now. Orders:  -     traZODone (DESYREL) 50 mg tablet; Take 2 tablets (100 mg total) by mouth daily at bedtime    3. Foot pain, left  Assessment & Plan:  Patient does continue to have foot pain on the left. There is some color change as well. This is consistent with the vascular problems that he has that were noted on the LORI. Recommend follow-up with vascular. Orders:  -     Ambulatory Referral to Vascular Surgery; Future    4. PAD (peripheral artery disease) ([facility])  Assessment & Plan:  Significant findings on LORI. Would recommend follow with vascular. Orders:  -     Ambulatory Referral to Vascular Surgery; Future           Subjective      Here today to follow-up on multiple issues. Using Trazodone for sleep, but not able to sllep much. He is having problems in the left foot, with some discoloration. There is also a wound on the 2nd toe. Has been using antiseptic. Also has irritation along the lateral border of the left foot. Goes from the fifth toe down towards the heel. Some numbness across the top of the foot on the left. Has also noted some changes in RLS-like symptoms of the right leg.   When he is finally sleeping, the right leg seems to be moving quite a bit. This is per his wife. Review of Systems   Constitutional: Negative. HENT: Negative. Respiratory: Negative. Cardiovascular: Negative. Gastrointestinal: Negative. Musculoskeletal:        Per HPI   Skin: Positive for wound. Current Outpatient Medications on File Prior to Visit   Medication Sig   • acetaminophen (TYLENOL) 500 mg tablet Take 500 mg by mouth every 6 (six) hours as needed for mild pain   • ALPRAZolam (XANAX) 0.25 mg tablet Take 1 tablet (0.25 mg total) by mouth daily at bedtime as needed for anxiety   • hydrocortisone valerate (WEST-JAMIR) 0.2 % ointment Apply topically 2 (two) times a day   • losartan (COZAAR) 100 MG tablet TAKE 1 TABLET BY MOUTH EVERY DAY   • Melatonin 10 MG TABS Take 10 mg by mouth Pt takes 10mg at bedtime. • meloxicam (MOBIC) 15 mg tablet TAKE 1 TABLET BY MOUTH EVERY DAY   • rosuvastatin (CRESTOR) 10 MG tablet TAKE 1 TABLET BY MOUTH EVERY DAY   • [DISCONTINUED] traZODone (DESYREL) 50 mg tablet TAKE 2 TABLETS (100 MG TOTAL) BY MOUTH DAILY AT BEDTIME       Objective     /86 (BP Location: Right arm, Patient Position: Sitting, Cuff Size: Large)   Pulse 86   Resp 18   Ht 6' (1.829 m)   Wt 88.5 kg (195 lb)   SpO2 95%   BMI 26.45 kg/m²     Physical Exam  Vitals and nursing note reviewed. Constitutional:       Appearance: He is well-developed. HENT:      Head: Normocephalic and atraumatic. Cardiovascular:      Rate and Rhythm: Normal rate and regular rhythm. Pulses:           Carotid pulses are 2+ on the right side and 2+ on the left side. Heart sounds: Normal heart sounds. No murmur heard. No friction rub. No gallop. Pulmonary:      Effort: Pulmonary effort is normal. No respiratory distress. Breath sounds: Normal breath sounds. No wheezing or rales. Musculoskeletal:      Cervical back: Normal range of motion and neck supple.        Mark Xiong MD

## 2023-07-26 NOTE — LETTER
2023     Laura Fabian, [address] [place] [address]    Patient: Delicia Garcia   YOB: 1960   Date of Visit: 2023       Dear Dr. Delgadillo Player:    Thank you for referring Davidson Smallwood to me for evaluation. Below are my notes for this consultation. If you have questions, please do not hesitate to call me. I look forward to following your patient along with you. Sincerely,        Chano Jacinto MD        CC: No Recipients    Chano Jacinto MD  2023  4:29 PM  Sign when Signing Visit  Name: Delicia Garcia      : 1960      MRN: [id number]  Encounter Provider: Chano Jacinto MD  Encounter Date: 2023   Encounter department: [place] [address]     1. Cellulitis of toe of left foot  Assessment & Plan:  Appears to be on the second toe on the left. Recommend Duricef. Follow-up with vascular. Orders:  -     cefadroxil (DURICEF) 1 g tablet; Take 1 tablet (1 g total) by mouth in the morning for 10 days    2. Primary insomnia  Assessment & Plan:  Patient is not sleeping well. Significant issues with regard to leg movement, as well as just not being able to get to sleep or stay asleep. Question if this is related to vascular issues, which were noted on the LORI. Follow-up with vascular. Continue on trazodone for now. Orders:  -     traZODone (DESYREL) 50 mg tablet; Take 2 tablets (100 mg total) by mouth daily at bedtime    3. Foot pain, left  Assessment & Plan:  Patient does continue to have foot pain on the left. There is some color change as well. This is consistent with the vascular problems that he has that were noted on the LORI. Recommend follow-up with vascular. Orders:  -     Ambulatory Referral to Vascular Surgery; Future    4. PAD (peripheral artery disease) ([facility])  Assessment & Plan:  Significant findings on LORI. Would recommend follow with vascular.         Orders:  - Ambulatory Referral to Vascular Surgery; Future         Subjective      Here today to follow-up on multiple issues. Using Trazodone for sleep, but not able to sllep much. He is having problems in the left foot, with some discoloration. There is also a wound on the 2nd toe. Has been using antiseptic. Also has irritation along the lateral border of the left foot. Goes from the fifth toe down towards the heel. Some numbness across the top of the foot on the left. Has also noted some changes in RLS-like symptoms of the right leg. When he is finally sleeping, the right leg seems to be moving quite a bit. This is per his wife. Review of Systems   Constitutional: Negative. HENT: Negative. Respiratory: Negative. Cardiovascular: Negative. Gastrointestinal: Negative. Musculoskeletal:        Per HPI   Skin: Positive for wound. Current Outpatient Medications on File Prior to Visit   Medication Sig   • acetaminophen (TYLENOL) 500 mg tablet Take 500 mg by mouth every 6 (six) hours as needed for mild pain   • ALPRAZolam (XANAX) 0.25 mg tablet Take 1 tablet (0.25 mg total) by mouth daily at bedtime as needed for anxiety   • hydrocortisone valerate (WEST-JAMIR) 0.2 % ointment Apply topically 2 (two) times a day   • losartan (COZAAR) 100 MG tablet TAKE 1 TABLET BY MOUTH EVERY DAY   • Melatonin 10 MG TABS Take 10 mg by mouth Pt takes 10mg at bedtime. • meloxicam (MOBIC) 15 mg tablet TAKE 1 TABLET BY MOUTH EVERY DAY   • rosuvastatin (CRESTOR) 10 MG tablet TAKE 1 TABLET BY MOUTH EVERY DAY   • [DISCONTINUED] traZODone (DESYREL) 50 mg tablet TAKE 2 TABLETS (100 MG TOTAL) BY MOUTH DAILY AT BEDTIME       Objective     /86 (BP Location: Right arm, Patient Position: Sitting, Cuff Size: Large)   Pulse 86   Resp 18   Ht 6' (1.829 m)   Wt 88.5 kg (195 lb)   SpO2 95%   BMI 26.45 kg/m²     Physical Exam  Vitals and nursing note reviewed.    Constitutional:       Appearance: He is well-developed. HENT:      Head: Normocephalic and atraumatic. Cardiovascular:      Rate and Rhythm: Normal rate and regular rhythm. Pulses:           Carotid pulses are 2+ on the right side and 2+ on the left side. Heart sounds: Normal heart sounds. No murmur heard. No friction rub. No gallop. Pulmonary:      Effort: Pulmonary effort is normal. No respiratory distress. Breath sounds: Normal breath sounds. No wheezing or rales. Musculoskeletal:      Cervical back: Normal range of motion and neck supple.        Flor Sheth MD

## 2023-07-26 NOTE — PATIENT INSTRUCTIONS
1. Cellulitis of toe of left foot  Assessment & Plan:  Appears to be on the second toe on the left. Recommend Duricef. Follow-up with vascular. Orders:  -     cefadroxil (DURICEF) 1 g tablet; Take 1 tablet (1 g total) by mouth in the morning for 10 days    2. Primary insomnia  Assessment & Plan:  Patient is not sleeping well. Significant issues with regard to leg movement, as well as just not being able to get to sleep or stay asleep. Question if this is related to vascular issues, which were noted on the LORI. Follow-up with vascular. Continue on trazodone for now. Orders:  -     traZODone (DESYREL) 50 mg tablet; Take 2 tablets (100 mg total) by mouth daily at bedtime    3. Foot pain, left  Assessment & Plan:  Patient does continue to have foot pain on the left. There is some color change as well. This is consistent with the vascular problems that he has that were noted on the LORI. Recommend follow-up with vascular. Orders:  -     Ambulatory Referral to Vascular Surgery; Future    4. PAD (peripheral artery disease) ([facility])  Assessment & Plan:  Significant findings on LORI. Would recommend follow with vascular. Orders:  -     Ambulatory Referral to Vascular Surgery; Future        COVID 19 Instructions    Bill England was advised to limit contact with others to essential tasks such as getting food, medications, and medical care. Proper handwashing reviewed, and Hand sanitzer when washing is not available. If the patient develops symptoms of COVID 19, the patient should call the office as soon as possible. For 8535-8494 Flu season, it is strongly recommended that Flu Vaccinations be obtained. Virtual Visits:  Diallo: This works on smart phones (any phone with Internet browsing capability). You should get a text message when the provider is ready to see you. Click on the link in the text message, and the call should start.   You will need to type in your name, and allow camera and microphone access. This is HIPPA compliant, and secure. If you have not already done so, get immunized to COVID 19. We are committed to getting you vaccinated as soon as possible and will be closely following CDC and [facility] guidelines as they are released and revised. Please refer to our COVID-19 vaccine webpage for the most up to date information on the vaccine and our distribution efforts. This site will also have the most up to date recommendations for COVID booster vaccine. [email or web address]    Call [phone] ([phone]), option 7    OUR NEW LOCATION:    [facility]  [address], [address]  Fax: [phone]    Lab services and OB/GYN are at this location as well.

## 2023-07-26 NOTE — ASSESSMENT & PLAN NOTE
Patient is not sleeping well. Significant issues with regard to leg movement, as well as just not being able to get to sleep or stay asleep. Question if this is related to vascular issues, which were noted on the LORI. Follow-up with vascular. Continue on trazodone for now.

## 2023-07-28 ENCOUNTER — TELEPHONE (OUTPATIENT)
Dept: NEPHROLOGY | Facility: CLINIC | Age: 63
End: 2023-07-28

## 2023-07-28 ENCOUNTER — OFFICE VISIT (OUTPATIENT)
Dept: VASCULAR SURGERY | Facility: CLINIC | Age: 63
End: 2023-07-28
Payer: COMMERCIAL

## 2023-07-28 ENCOUNTER — TELEPHONE (OUTPATIENT)
Dept: VASCULAR SURGERY | Facility: CLINIC | Age: 63
End: 2023-07-28

## 2023-07-28 VITALS
HEIGHT: 72 IN | OXYGEN SATURATION: 96 % | DIASTOLIC BLOOD PRESSURE: 80 MMHG | SYSTOLIC BLOOD PRESSURE: 126 MMHG | BODY MASS INDEX: 26.68 KG/M2 | HEART RATE: 80 BPM | WEIGHT: 197 LBS

## 2023-07-28 DIAGNOSIS — Z72.0 TOBACCO ABUSE: ICD-10-CM

## 2023-07-28 DIAGNOSIS — E78.2 MIXED HYPERLIPIDEMIA: ICD-10-CM

## 2023-07-28 DIAGNOSIS — I73.9 PAD (PERIPHERAL ARTERY DISEASE) (HCC): Primary | ICD-10-CM

## 2023-07-28 DIAGNOSIS — M79.672 FOOT PAIN, LEFT: ICD-10-CM

## 2023-07-28 PROCEDURE — 99204 OFFICE O/P NEW MOD 45 MIN: CPT

## 2023-07-28 RX ORDER — ROSUVASTATIN CALCIUM 20 MG/1
20 TABLET, COATED ORAL DAILY
Qty: 90 TABLET | Refills: 0 | Status: SHIPPED | OUTPATIENT
Start: 2023-07-28

## 2023-07-28 RX ORDER — GABAPENTIN 100 MG/1
100 CAPSULE ORAL 3 TIMES DAILY
Qty: 90 CAPSULE | Refills: 0 | Status: SHIPPED | OUTPATIENT
Start: 2023-07-28 | End: 2023-08-03 | Stop reason: DRUGHIGH

## 2023-07-28 RX ORDER — ASPIRIN 81 MG/1
81 TABLET, CHEWABLE ORAL DAILY
Start: 2023-07-28

## 2023-07-28 NOTE — LETTER
23    Re: Cardiology  Clearance    Patient Name: Lolis Garcia   Patient : 1960   Patient MRN: [id number]   Patient Phone: [phone]     Pamela Parnell Dr. Flora Rivera MD is requesting clearance for above patient, prior to proceeding with aortobifemoral bypass . Patient's procedure will be scheduled at [address] [place] [address] once clearance has been obtained. Patient will be given general anesthesia. We spoke with your office today regarding clearance request.  [address] Please fax your recommendations, including any medication recommendations, to [phone], [facility] nursing. Or route your recommendations to [facility] pool [facility] Clearance Pool [[id number]]. Please reach out with any questions or concerns.     Sincerely,    [facility]

## 2023-07-28 NOTE — TELEPHONE ENCOUNTER
New Patient Intake Form   Patient Details   Cayetano Moralez     1960     [id number]     Insurance Information   Name of [place] [id number]   Does the patient need an insurance referral? NO   If patient has Pitney Karyna, please ask if they will be using their Pitney Karyna. Appointment Information   Who is calling to schedule? If not patient, what is callers name? Kay Hermosillo from [facility]) Vascular    Referring Provider [address] [facility], [address] [facility]   Reason for Appt (Diagnosis) Clearance for CT angiogram for severe PAD requiring intervention   Does Patient have labs/urine done at [facility]? If not, where do they go? List the date of last lab / urine  *Please try to get labs 2 years back if not at [address] [place] [address]   Has patient been hospitalized recently? If yes, list name and location of hospital they were in NO   Has patient been seen by a Nephrologist before? If yes, list name, location and phone number NO   Has the patient had renal imaging done? If so, list the most recent date and type of imaging NO   Does patient have a history of Kidney Stones? YES   Appointment Details   Is there a referral on file?  YES    Appointment Date 11/30/23   Location [place]    Miscellaneous  STAT - ON WAIT LIST

## 2023-07-28 NOTE — PROGRESS NOTES
NEPHROLOGY OUTPATIENT CONSULTATION   Dorita Dunne 58 y.o. male MRN: [id number]  Date: 7/31/2023  Reason for consultation:   Chief Complaint   Patient presents with   • Consult   • Nephrolithiasis   • Hypertension       ASSESSMENT AND PLAN:  Chronic Kidney Disease Stage 3a  -Baseline Creatinine: 1.4 mg/dl  -Renal Function has been  Stable . Last Creatinine was  1.48  mg/dl  -Etiology: Chronic kidney disease likely due to left renal atrophy and hypertensive nephrosclerosis repeated urology procedures, nephrolithiasis-recurrent and use of NSAIDs  -Prior UA from 2021 was positive for 2+ protein  -CT chest abdomen pelvis from 2021, left kidney atrophic. Bilateral renal hypodensities more on the left. -Plan:   • Continue to monitor renal function. Follow-up BMP from today. If stable at creatinine one-point is stable, would clear for CTA  • Discussed about risk of contrast nephropathy and possibility of dialysis and he verbalized understanding. Discussed about possibility of elevated creatinine from use of meloxicam and recommended avoiding meloxicam in future. He mentioned he was not aware and he has stopped meloxicam for last 2 days and will not take it. • Ordered for another BMP to be done in a month. will also need a BMP 3 to 4 days after CTA done in future  • Check PTH before the next office visit  • Avoid Nephrotoxins like NSAIDs and IV contrast.  Avoid meloxicam  • CKD Education: We will discuss about CKD education during the next office visit  • Follow-up in 3 to 4 months with repeat labs    Primary Hypertension with chronic kidney disease stage III:   -Current medication: Losartan 100 mg daily     -Current blood pressure: elevated, possibly due to anxiety  -Plan:    ·  recommend home monitoring of BP. IF persistently elevated , would add CCB. -Recommend 2 g sodium diet.     -Recommend daily exercise and weight loss  -Discussed home monitoring of BP and maintaining a log of blood pressure.  -Recommend goal BP less than 130/80. Preprocedure clearance-noted plan for CTA with runoff for peripheral arterial disease involving left lower extremity. Reviewed vascular surgery note  I have personally discussed the risks and benefits of the surgery from a renal standpoint with the patient in depth, and advised the patient about the risk of ROSENDO and the course of ROSENDO if it occurs with the small probability of the need for renal replacement therapy in the worse case scenario. Patient voiced understanding. • From a renal standpoint the patient is renally optimized for CT study with contrast with the following recommendations: Waiting for repeat BMP results from today and if BMP shows renal function stable at creatinine less than 1.6, would clear for CTA. This was discussed with patient and he verbalized understanding  • If cleared please see below recommendations-  • Fluids to administer: If Patient is having a CT study with contrast - Please follow the Vascular ROSENDO protocol   • Medication Recommendations:  • Minimize any IV contrast use (If IV contrast is used, please check BMP POD # 1)  • Hold NSAIDs for at least 10 days prior to surgery  • Hold ACEi/ARB starting per the Vascular ROSENDO Protocol . recommend holding losartan on the day of procedure and the previous day  • Not on diuretics  • Hemodynamic Recommendations:  • Ideally, target MAPs greater than 65 mmHg in the perioperative period, and minimize operative time with MAPs < 65 mmHg. • Avoid intraop hemodynamic instability to decrease risk for ROSENDO occurrence. • Other Recommendations:  • BMP 3 to 4 days after CTA      Persistent Proteinuria  -Onset: Found to have trace urine protein on UA from 2014  -Last UA from 2021 October positive for 2+ protein, UPC ratio not checked previously.   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 -Stressed on Goal A1c <7.0 and Goal BP <130/80 mmHg  -Continue to monitor. Bilateral renal cyst/left renal atrophy  -Last CT abdomen from 2021 suggestive of left kidney atrophy. Finding of bilateral renal hypodensities more on the left and larger most likely represent cyst.  -not interested in renal u/s, discussed about doing kidney ultrasound but is not interested  -follow up result of CTA planned by vascular. Nephrolithiasis  -Due to cystinuria  -Reviewed stone analysis from November 2008 from [address] from [facility], suggestive of 97% cystine stone  - status post multiple urology procedure including laser lithotripsy in 1996, 2004, 2008, 2010. In the past also had percutaneous nephrolithotripsy in 1986, 1988. History of nephrolithotomy in 1988, ureterolithotomy 1977, 1978, 1993, 1992 as per urology note from [facility]  -as per patient he had 27 + surgery. -CT abdomen pelvis without contrast from December 2018, suggestive of renal cortical scarring involving left kidney moderate. Left renal cyst measuring 2.3 cm. Finding of left calculi up to 5 mm abutting the renal cyst.  -was on Thiola per urology at [facility],  on 100 mg 4 times a day but stopped in 2019 when he lost insurance. He was still passing stone while he was taking Thiola. Last stone passage was in 2019 but still then has been asymptomatic. Says he is not interested in the medication  -Reviewed last urology note from 2018 was told to continue Thiola as well as sodium bicarbonate tablet  -Drinking about a gallon of liquid a day. -CT from 2021 did not show any nephrolithiasis, so no further work-up ordered, patient is asymptomatic currently. Discussed with patient that if found to have recurrent nephrolithiasis in future may need 24-hour stone risk profile. He mentioned he had one done 30 years ago but does not remember the findings.     Hyperlipidemia, last LDL above goal at 202, dose of statin was increased by vascular surgery. Currently on Crestor    Peripheral arterial disease, continue follow-up with vascular surgery, noted plan for CTA with runoff        Diagnoses and all orders for this visit:    Stage 3a chronic kidney disease ([address])  -     POCT urine dip  -     Basic metabolic panel; Future  -     Basic metabolic panel; Future  -     Phosphorus; Future  -     Protein / creatinine ratio, urine; Future  -     Urinalysis with microscopic; Future  -     PTH, intact; Future  -     Albumin / creatinine urine ratio; Future  -     Magnesium; Future    Benign hypertension with chronic kidney disease, stage III (HCC)  -     POCT urine dip  -     Basic metabolic panel; Future    Nephrolithiasis  -     POCT urine dip  -     Basic metabolic panel; Future  -     Protein / creatinine ratio, urine; Future  -     Albumin / creatinine urine ratio; Future    Left renal atrophy  -     POCT urine dip  -     Basic metabolic panel; Future    Bilateral renal cysts  -     POCT urine dip    PAD (peripheral artery disease) ([address])  -     Ambulatory Referral to Nephrology  -     POCT urine dip        Patient Instructions   -Renal Function is stable   -You have Chronic Kidney Disease Stage 3, we will follow-up the results of blood work from today  -Avoid NSAIDs like Ibuprofen/Motrin, Naproxen/Aleve, Celebrex, meloxicam/Mobic, Diclofenac and other NSAIDs.  -Okay to take Acetaminophen/Tylenol if you do not have any liver problems  -Avoid IV contrast used for CT scan and cardiac catheterization.    -If plan for any study with IV contrast, please let me know so we could hydrate with fluids before and after IV contrast  -Dosage  of certain medications may need to be adjusted depending on Kidney function. Blood pressure is elevated, recommend home monitoring  -Recommend 2 g sodium diet.     -Recommend daily exercise and weight loss  -Discussed home monitoring of BP and maintaining a log of blood pressure.  -Recommend goal BP less than 130/80. Please inform me if SBP below 110 or above 140's persistently. - Continue oral hydration. If renal function remains stable, would clear for CTA, recommend holding losartan on the day of procedure as well as the previous day to decrease the risk of kidney injury. Would also check blood work 4 to 5 days after CTA to monitor renal function    Follow-up in 3 months with repeat labs             HISTORY OF PRESENT ILLNESS:  Jensen Bedolla is a 58y.o. year old male with medical issues of hypertension, nephrolithiasis, cystinosis following with [facility] urology, hyperlipidemia peripheral arterial disease, left foot pain who presents for initial consultation for chronic kidney disease stage III and for clearance for lower extremity angiogram for peripheral arterial disease    Patient seen by vascular surgery for peripheral arterial disease and complaint of pain and numbness to bilateral lower extremities. Dose of Crestor was increased. Patient was also taking meloxicam as per the note and was told to stop meloxicam and referred for smoking cessation program.  Was referred for nephrology clearance has plan for CTA with runoff to check aortobiiliac disease    On review of records, patient's creatinine was elevated at 2.06 in June 2018 but then renal function improved after acute kidney injury episode. Renal function was stable at creatinine 1.0-1.2 in 2021, worsened to creatinine 1.46 in October 2021 and on last blood work from October 26, 2022 was 1.48 mg/dL indicating chronic kidney disease stage III, GFR 49    Complaining of pain left lower extremity  Took meloxicam for more than a year to help inflammation in general. Has stopped it for last one week. No family history of kidney stones     No urinary symptoms     REVIEW OF SYSTEMS:    Review of Systems   Constitutional: Negative for chills and fever. HENT: Negative for ear pain and sore throat.     Eyes: Negative for pain and visual disturbance. Respiratory: Negative for cough and shortness of breath. Cardiovascular: Negative for chest pain and palpitations. Gastrointestinal: Negative for abdominal pain and vomiting. Genitourinary: Negative for dysuria and hematuria. Musculoskeletal: Negative for arthralgias and back pain. Skin: Negative for color change and rash. Neurological: Negative for seizures and syncope. All other systems reviewed and are negative. More than 10 point review of systems were obtained and discussed in length with the patient. PAST MEDICAL HISTORY:  Past Medical History:   Diagnosis Date   • Acute intractable headache 11/03/2021   • Acute maxillary sinusitis     13 dec 2012   • ROSENDO (acute kidney injury) ([address]) 06/08/2018 2018. • Bone spur     toe   • BPPV (benign paroxysmal positional vertigo) 10/24/2021   • Hyperlipidemia    • Hypertension    • Kidney stone    • PVD (peripheral vascular disease) ([address] [facility])        PAST SURGICAL HISTORY:  Past Surgical History:   Procedure Laterality Date   • APPENDECTOMY     • KIDNEY STONE SURGERY      multiple surgery in the past   • RETINAL DETACHMENT SURGERY     • SEPTOPLASTY     • TONSILLECTOMY  2000    meg Vidal       ALLERGIES:  Allergies   Allergen Reactions   • Eszopiclone    • Zolpidem      Other reaction(s): sleepwalking.        SOCIAL HISTORY:  Social History     Substance and Sexual Activity   Alcohol Use Yes   • Alcohol/week: 0.0 standard drinks of alcohol    Comment: social     Social History     Substance and Sexual Activity   Drug Use No     Social History     Tobacco Use   Smoking Status Every Day   • Packs/day: 1.00   • Years: 40.00   • Total pack years: 40.00   • Types: Cigarettes   Smokeless Tobacco Never   Tobacco Comments    exposure to 2nd hand smoke       FAMILY HISTORY:  Family History   Problem Relation Age of Onset   • Stroke Mother    • No Known Problems Father        MEDICATIONS:    Current Outpatient Medications:   •  acetaminophen (TYLENOL) 500 mg tablet, Take 500 mg by mouth every 6 (six) hours as needed for mild pain, Disp: , Rfl:   •  ALPRAZolam (XANAX) 0.25 mg tablet, Take 1 tablet (0.25 mg total) by mouth daily at bedtime as needed for anxiety, Disp: 15 tablet, Rfl: 0  •  aspirin 81 mg chewable tablet, Chew 1 tablet (81 mg total) daily, Disp: , Rfl:   •  cefadroxil (DURICEF) 1 g tablet, Take 1 tablet (1 g total) by mouth in the morning for 10 days, Disp: 10 tablet, Rfl: 0  •  gabapentin (Neurontin) 100 mg capsule, Take 1 capsule (100 mg total) by mouth 3 (three) times a day, Disp: 90 capsule, Rfl: 0  •  hydrocortisone valerate (WEST-JAMIR) 0.2 % ointment, Apply topically 2 (two) times a day, Disp: 45 g, Rfl: 0  •  losartan (COZAAR) 100 MG tablet, TAKE 1 TABLET BY MOUTH EVERY DAY, Disp: 90 tablet, Rfl: 1  •  Melatonin 10 MG TABS, Take 10 mg by mouth Pt takes 10mg at bedtime. , Disp: , Rfl:   •  rosuvastatin (CRESTOR) 20 MG tablet, Take 1 tablet (20 mg total) by mouth daily, Disp: 90 tablet, Rfl: 0  •  traZODone (DESYREL) 50 mg tablet, Take 2 tablets (100 mg total) by mouth daily at bedtime, Disp: 180 tablet, Rfl: 1      PHYSICAL EXAM:  Vitals:    07/31/23 1200 07/31/23 1224   BP:  148/92   Pulse:  66   Weight: 89.8 kg (198 lb)    Height: 6' (1.829 m)      Body mass index is 26.85 kg/m². Wt Readings from Last 3 Encounters:   07/31/23 89.8 kg (198 lb)   07/28/23 89.4 kg (197 lb)   07/26/23 88.5 kg (195 lb)     Physical Exam  Constitutional:       General: He is not in acute distress. Appearance: He is well-developed. He is not diaphoretic. HENT:      Head: Normocephalic and atraumatic. Mouth/Throat:      Mouth: Mucous membranes are moist.   Eyes:      General: No scleral icterus. Conjunctiva/sclera: Conjunctivae normal.      Pupils: Pupils are equal, round, and reactive to light. Neck:      Thyroid: No thyromegaly. Cardiovascular:      Rate and Rhythm: Normal rate and regular rhythm. Heart sounds: Normal heart sounds. No murmur heard. No friction rub. Pulmonary:      Effort: Pulmonary effort is normal. No respiratory distress. Breath sounds: Normal breath sounds. No wheezing or rales. Abdominal:      General: Bowel sounds are normal. There is no distension. Palpations: Abdomen is soft. Tenderness: There is no abdominal tenderness. Musculoskeletal:         General: No deformity. Cervical back: Neck supple. Right lower leg: No edema. Left lower leg: No edema. Lymphadenopathy:      Cervical: No cervical adenopathy. Skin:     Coloration: Skin is not pale. Nails: There is no clubbing. Neurological:      Mental Status: He is alert and oriented to person, place, and time. He is not disoriented. Psychiatric:         Mood and Affect: Mood normal. Mood is not anxious. Affect is not inappropriate. Behavior: Behavior normal.         Thought Content: Thought content normal.           Lab Results:   Results for orders placed or performed in visit on 07/31/23   POCT urine dip   Result Value Ref Range    LEUKOCYTE ESTERASE,UA -     NITRITE,UA -     SL AMB POCT UROBILINOGEN -     POCT URINE PROTEIN 100+      PH,UA 6.0     BLOOD,UA -     SPECIFIC GRAVITY,UA 1.015     KETONES,UA -     BILIRUBIN,UA -     GLUCOSE, UA -      COLOR,UA yellow     CLARITY,UA clear              Invalid input(s): "ALBUMIN"    Portions of the record may have been created with voice recognition software. Occasional wrong word or "sound a like" substitutions may have occurred due to the inherent limitations of voice recognition software. Read the chart carefully and recognize, using context, where substitutions have occurred.

## 2023-07-28 NOTE — TELEPHONE ENCOUNTER
Pt scheduled for 7/31/23 with Dr. Tyesha Velasquez at the [facility]. Stan Horan from Vascular is also aware.

## 2023-07-28 NOTE — PROGRESS NOTES
Assessment/Plan:    PAD (peripheral artery disease) ([address])  Patient reports pain and numbness to bilateral lower extremities, L>R. Pain has been ongoing for the last several months. Pain occurs after ambulating ~50 feet and is associated with cramping that ascends from foot to calf. Pain resolves after ~1 minute at rest. Patient is also reporting swelling to toes on left foot, as well as red purple discoloration to toes. In addition, patient is reporting burning sensation and numbness to left foot at night that either prevents him from falling asleep or wakes him from sleep. Patient notes that he developed swelling and a wound to the top of his left 2nd toe that he is currently taking antibiotics for. Imaging:  LEAD 6/21/23  RIGHT LOWER LIMB:  Monophasic waveforms are noted in the common femoral artery suggesting more proximal disease. > 75% stenosis is noted in the proximal profunda with diffuse disease noted throughout the remaining portions of the femoral-popliteal arteries. LORI 0.46/-/-, flatlined waveform. LEFT LOWER LIMB:  Monophasic waveforms are noted in the common femoral artery suggesting more proximal disease. > 75% stenosis is noted in the profunda and SFA, an occlusion of the distal SFA with distal reconstitution, and diffuse disease noted throughout the remaining portions of the femoral-popliteal arteries. LORI: 0.20/-/-, flatlined waveform. Plan:  -We discussed the pathophysiology of peripheral arterial disease as well as contributing lifestyle factors.  -We discussed the results of LEAD at length  -Initiate medical optimization with aspirin 81mg daily. Patient educated on increased risk of GI bleeding while taking aspirin and meloxicam simultaneously. Patient plans to stop taking meloxicam.  - Increase rosuvastatin to 20mg daily.  -The long-term benefits of smoking cessation were explained to the patient.   Including but not limited to decreased mortality and morbidity from cardiovascular causes, respiratory diseases and decrease cancer risk. Strong recommendation for cessation was given to the patient. Pharmacological help was offered as well emotional support was given to the patient. Referral placed to Smoking Cessation Program.  -Will order CTA abdomen with runoff to check for aortoiliac disease. As patient renal function appears mildly decreased on most recent BMP, will refer patient to nephrology for evaluation and guidance regarding hydration prior to CTA. -Repeat BMP before CTA. -Instructed patient to report to the ED for any symptoms of acute limb ischemia (color/temperature change, motor/sensory loss, tissue loss). -Referral to cardiology for cardiac clearance for anticipated surgical intervention.  -Order placed for gabapentin 100mg TID for nerve pain.  -Follow up in the office after completion of CTA. Diagnoses and all orders for this visit:    PAD (peripheral artery disease) ([address])  -     Ambulatory Referral to Vascular Surgery  -     Cancel: Ambulatory Referral to Nephrology; Future  -     CTA abdominal w run off w wo contrast; Future  -     Basic metabolic panel; Future  -     aspirin 81 mg chewable tablet; Chew 1 tablet (81 mg total) daily  -     gabapentin (Neurontin) 100 mg capsule; Take 1 capsule (100 mg total) by mouth 3 (three) times a day  -     rosuvastatin (CRESTOR) 20 MG tablet; Take 1 tablet (20 mg total) by mouth daily  -     Ambulatory Referral to Nephrology; Future  -     Ambulatory Referral to Cardiology; Future    Foot pain, left  -     Ambulatory Referral to Vascular Surgery  -     gabapentin (Neurontin) 100 mg capsule; Take 1 capsule (100 mg total) by mouth 3 (three) times a day    Mixed hyperlipidemia  -     rosuvastatin (CRESTOR) 20 MG tablet; Take 1 tablet (20 mg total) by mouth daily    Tobacco abuse  -     Ambulatory Referral to Smoking Cessation Program; Future          Subjective:      Patient ID: Del Lomas is a 58 y.o. male.     Patient is a 58year old male, current smoker (1PPD), with PMH hypothyroidism, HTN, TIA 2021, and HLD. Patient is presenting to the vascular surgery office upon referral by his PCP for evaluation of lower extremity pain. Patient was sent for LEAD which was completed 6/21/23 and is here to review results. Patient reports pain and numbness to bilateral lower extremities, L>R. Pain has been ongoing for the last several months. Pain occurs after ambulating ~50 feet and is associated with cramping that ascends from foot to calf. Pain resolves after ~1 minute at rest. Patient is also reporting swelling to toes on left foot, as well as red purple discoloration to toes. In addition, patient is reporting burning sensation and numbness to left foot at night that either prevents him from falling asleep or wakes him from sleep. Patient notes that he developed swelling and a wound to the top of his left 2nd toe that he is currently taking antibiotics for. Patient is a current smoker, smoking 1/2PPD. He has smoked for 50 years. He reports history of stroke in his mother, as well as CAD in his father. The following portions of the patient's history were reviewed and updated as appropriate: allergies, current medications, past family history, past medical history, past social history, past surgical history and problem list.    Review of Systems   Constitutional: Positive for activity change. HENT: Negative. Eyes: Negative. Respiratory: Negative. Negative for shortness of breath. Cardiovascular: Positive for leg swelling. Negative for chest pain and palpitations. Gastrointestinal: Negative. Endocrine: Positive for cold intolerance. Genitourinary: Negative. Musculoskeletal: Positive for gait problem. Skin: Positive for color change. Allergic/Immunologic: Negative. Neurological: Positive for numbness. Hematological: Negative. Psychiatric/Behavioral: Negative.       Objective:    /80 (BP Location: Left arm, Patient Position: Sitting, Cuff Size: Standard)   Pulse 80   Ht 6' (1.829 m)   Wt 89.4 kg (197 lb)   SpO2 96%   BMI 26.72 kg/m²       Physical Exam  Vitals reviewed. Constitutional:       General: He is not in acute distress. Appearance: Normal appearance. HENT:      Head: Normocephalic and atraumatic. Cardiovascular:      Rate and Rhythm: Normal rate and regular rhythm. Pulses:           Radial pulses are 2+ on the right side and 2+ on the left side. Femoral pulses are 0 on the right side and 0 on the left side. Popliteal pulses are 0 on the right side and 0 on the left side. Dorsalis pedis pulses are detected w/ Doppler on the right side and detected w/ Doppler on the left side. Posterior tibial pulses are detected w/ Doppler on the right side and detected w/ Doppler on the left side. Heart sounds: Normal heart sounds. No murmur heard. Comments: Monophasic doppler signals  Pulmonary:      Effort: Pulmonary effort is normal. No respiratory distress. Breath sounds: Normal breath sounds. Abdominal:      General: Bowel sounds are normal.      Palpations: Abdomen is soft. Musculoskeletal:         General: No swelling or tenderness. Right lower leg: No edema. Left lower leg: No edema. Feet:    Feet:      Right foot:      Skin integrity: Skin integrity normal.      Left foot:      Skin integrity: Skin integrity normal.   Skin:     General: Skin is warm and dry. Capillary Refill: Capillary refill takes less than 2 seconds. Findings: No rash or wound. Neurological:      General: No focal deficit present. Mental Status: He is alert and oriented to person, place, and time. Psychiatric:         Mood and Affect: Mood normal.         Behavior: Behavior normal.       I have reviewed and made appropriate changes to the review of systems input by the medical assistant.     Vitals:    07/28/23 1255   BP: 126/80   BP Location: Left arm   Patient Position: Sitting   Cuff Size: Standard   Pulse: 80   SpO2: 96%   Weight: 89.4 kg (197 lb)   Height: 6' (1.829 m)       Patient Active Problem List   Diagnosis   • Anxiety   • Insomnia   • Abnormal electrocardiogram   • Allergic rhinitis   • Chronic pain   • Cystinuria (HCC)   • Erectile dysfunction   • Hyperlipidemia   • Hypertension   • Leucocytosis   • Nephrolithiasis   • Obstructive sleep apnea   • Retinal detachment   • Tobacco abuse   • Hypothyroid   • Foreign body of left ear   • Dysfunction of both eustachian tubes   • S/P cataract surgery   • Abnormal computed tomography angiography (CTA)   • Cavernous angioma   • Carotid stenosis   • TIA (transient ischemic attack)   • Hearing loss   • Cataract   • Hyperglycemia   • Ankle pain   • PAD (peripheral artery disease) (HCC)   • Hip pain   • Cellulitis of toe of left foot   • Foot pain, left       Past Surgical History:   Procedure Laterality Date   • APPENDECTOMY     • RETINAL DETACHMENT SURGERY     • SEPTOPLASTY     • TONSILLECTOMY  2000    meg Vidal       Family History   Problem Relation Age of Onset   • No Known Problems Mother    • No Known Problems Father        Social History     Socioeconomic History   • Marital status: /Civil Union     Spouse name: Not on file   • Number of children: Not on file   • Years of education: Not on file   • Highest education level: Not on file   Occupational History   • Occupation: attendant for [employer]     Comment: bus maintenance (/)   Tobacco Use   • Smoking status: Every Day     Packs/day: 1.00     Years: 40.00     Total pack years: 40.00     Types: Cigarettes   • Smokeless tobacco: Never   • Tobacco comments:     exposure to 2nd hand smoke   Vaping Use   • Vaping Use: Never used   Substance and Sexual Activity   • Alcohol use:  Yes     Alcohol/week: 0.0 standard drinks of alcohol     Comment: social   • Drug use: No   • Sexual activity: Yes     Partners: Female   Other Topics Concern   • Not on file   Social History Narrative    Consumes 1 gallon of ice tea and 1-2 cups of coffee per day     Social Determinants of Health     Financial Resource Strain: Not on file   Food Insecurity: Not on file   Transportation Needs: Not on file   Physical Activity: Not on file   Stress: Not on file   Social Connections: Not on file   Intimate Partner Violence: Not on file   Housing Stability: Not on file       Allergies   Allergen Reactions   • Eszopiclone    • Zolpidem      Other reaction(s): sleepwalking. Current Outpatient Medications:   •  acetaminophen (TYLENOL) 500 mg tablet, Take 500 mg by mouth every 6 (six) hours as needed for mild pain, Disp: , Rfl:   •  ALPRAZolam (XANAX) 0.25 mg tablet, Take 1 tablet (0.25 mg total) by mouth daily at bedtime as needed for anxiety, Disp: 15 tablet, Rfl: 0  •  aspirin 81 mg chewable tablet, Chew 1 tablet (81 mg total) daily, Disp: , Rfl:   •  cefadroxil (DURICEF) 1 g tablet, Take 1 tablet (1 g total) by mouth in the morning for 10 days, Disp: 10 tablet, Rfl: 0  •  gabapentin (Neurontin) 100 mg capsule, Take 1 capsule (100 mg total) by mouth 3 (three) times a day, Disp: 90 capsule, Rfl: 0  •  hydrocortisone valerate (WEST-JAMIR) 0.2 % ointment, Apply topically 2 (two) times a day, Disp: 45 g, Rfl: 0  •  losartan (COZAAR) 100 MG tablet, TAKE 1 TABLET BY MOUTH EVERY DAY, Disp: 90 tablet, Rfl: 1  •  Melatonin 10 MG TABS, Take 10 mg by mouth Pt takes 10mg at bedtime. , Disp: , Rfl:   •  meloxicam (MOBIC) 15 mg tablet, TAKE 1 TABLET BY MOUTH EVERY DAY, Disp: 30 tablet, Rfl: 3  •  rosuvastatin (CRESTOR) 20 MG tablet, Take 1 tablet (20 mg total) by mouth daily, Disp: 90 tablet, Rfl: 0  •  traZODone (DESYREL) 50 mg tablet, Take 2 tablets (100 mg total) by mouth daily at bedtime, Disp: 180 tablet, Rfl: 1    I have spent a total time of 50 minutes on 07/28/23 in caring for this patient including Diagnostic results, Prognosis, Risks and benefits of tx options, Instructions for management, Patient and family education, Importance of tx compliance, Risk factor reductions, Impressions, Counseling / Coordination of care, Documenting in the medical record, Reviewing / ordering tests, medicine, procedures  , Obtaining or reviewing history   and Communicating with other healthcare professionals .

## 2023-07-28 NOTE — TELEPHONE ENCOUNTER
REMINDER: Under Reason For Call, comments MUST be formatted as:   (Surgeon's Initials) / (Procedure)      Special Instructions / FYI: ALSO CONTACTED CARDIOLOGY TO [facility]. Procedure: HYDRATION needed for CTA ABDOMENT WITH RUN OFF  W- W/O CONTRAST    Level: 1 - Route clearance(s) to [facility] Clearance Pool     Allergies: Eszopiclone and Zolpidem    Instructions Given: CT / CTA Instructions    Dialysis: Patient is not on dialysis. Return Visit Required Prior to Procedure: No.    Consent: NO CONSENT REQUIRED    For Surgical Clearances     Levels   1-3   ROUTE this encounter to [facility] Clearance Pool (AND)   [facility] Surgery Coordinator Pool     Level   4   ROUTE this encounter to [facility] Surgery Coordinator Pool       HYDRATION CLEARANCES   ONLY ROUTE TO  [facility] Clearance Pool     (2) TWO CLEARANCES NEEDED - Nephrology Clearance // Clearing Provider's Name (Salomon Zara): [address] [place] [address] //  Spoke with: [facility]  //  Office Contact Information P: [phone] - f: EPIC POOL  (AND)   Cardiology  Clearance // Clearing Provider's Name (Salomon Stinsonily): [place] //  Spoke with: Jordan Moore  //  Office Contact Information P: [phone] - f: EPIC POOL    Yes, I have ROUTED this encounter to [facility] Surgery Coordinator and/or [facility] Clearance Pool.

## 2023-07-28 NOTE — LETTER
23    Re: Nephrology Clearance    Patient Name: Mehran Evnas   Patient : 1960   Patient MRN: [id number]   Patient Phone: [phone]     Dr. Edin Padron MD is requesting clearance for above patient, prior to proceeding with CTA abdomen with contrast.  Patient's procedure will be scheduled at [address] [place] [address] once clearance has been obtained. Patient will be given IV contrast and will require hydration recommendations. We spoke with your office today regarding clearance request.  [address]    Please fax your recommendations, including any medication recommendations, to [phone], attention nursing. Or route your recommendations to [facility] pool [facility] Clearance Pool [[id number]]. Please reach out with any questions or concerns.     Sincerely,    [facility]

## 2023-07-28 NOTE — TELEPHONE ENCOUNTER
Spoke to John from [facility] about scheduling a stat referral. Pt on wait list. Please assist on scheduling. Pt is scheduled but needs a much sooner appt.      Clearance for CT angiogram for severe PAD requiring intervention

## 2023-07-28 NOTE — ASSESSMENT & PLAN NOTE
Patient reports pain and numbness to bilateral lower extremities, L>R. Pain has been ongoing for the last several months. Pain occurs after ambulating ~50 feet and is associated with cramping that ascends from foot to calf. Pain resolves after ~1 minute at rest. Patient is also reporting swelling to toes on left foot, as well as red purple discoloration to toes. In addition, patient is reporting burning sensation and numbness to left foot at night that either prevents him from falling asleep or wakes him from sleep. Patient notes that he developed swelling and a wound to the top of his left 2nd toe that he is currently taking antibiotics for. Imaging:  LEAD 6/21/23  RIGHT LOWER LIMB:  Monophasic waveforms are noted in the common femoral artery suggesting more proximal disease. > 75% stenosis is noted in the proximal profunda with diffuse disease noted throughout the remaining portions of the femoral-popliteal arteries. LORI 0.46/-/-, flatlined waveform. LEFT LOWER LIMB:  Monophasic waveforms are noted in the common femoral artery suggesting more proximal disease. > 75% stenosis is noted in the profunda and SFA, an occlusion of the distal SFA with distal reconstitution, and diffuse disease noted throughout the remaining portions of the femoral-popliteal arteries. LORI: 0.20/-/-, flatlined waveform. Plan:  -We discussed the pathophysiology of peripheral arterial disease as well as contributing lifestyle factors.  -We discussed the results of LEAD at length  -Initiate medical optimization with aspirin 81mg daily. Patient educated on increased risk of GI bleeding while taking aspirin and meloxicam simultaneously. Patient plans to stop taking meloxicam.  - Increase rosuvastatin to 20mg daily.  -The long-term benefits of smoking cessation were explained to the patient. Including but not limited to decreased mortality and morbidity from cardiovascular causes, respiratory diseases and decrease cancer risk. Strong recommendation for cessation was given to the patient. Pharmacological help was offered as well emotional support was given to the patient. Referral placed to Smoking Cessation Program.  -Will order CTA abdomen with runoff to check for aortoiliac disease. As patient renal function appears mildly decreased on most recent BMP, will refer patient to nephrology for evaluation and guidance regarding hydration prior to CTA. -Repeat BMP before CTA. -Instructed patient to report to the ED for any symptoms of acute limb ischemia (color/temperature change, motor/sensory loss, tissue loss). -Referral to cardiology for cardiac clearance for anticipated surgical intervention.  -Order placed for gabapentin 100mg TID for nerve pain.  -Follow up in the office after completion of CTA.

## 2023-07-31 ENCOUNTER — APPOINTMENT (OUTPATIENT)
Dept: LAB | Facility: CLINIC | Age: 63
End: 2023-07-31
Payer: COMMERCIAL

## 2023-07-31 ENCOUNTER — CONSULT (OUTPATIENT)
Dept: NEPHROLOGY | Facility: CLINIC | Age: 63
End: 2023-07-31
Payer: COMMERCIAL

## 2023-07-31 ENCOUNTER — PATIENT OUTREACH (OUTPATIENT)
Dept: OTHER | Facility: CLINIC | Age: 63
End: 2023-07-31

## 2023-07-31 VITALS
SYSTOLIC BLOOD PRESSURE: 148 MMHG | HEIGHT: 72 IN | BODY MASS INDEX: 26.82 KG/M2 | WEIGHT: 198 LBS | HEART RATE: 66 BPM | DIASTOLIC BLOOD PRESSURE: 92 MMHG

## 2023-07-31 DIAGNOSIS — I73.9 PAD (PERIPHERAL ARTERY DISEASE) (HCC): ICD-10-CM

## 2023-07-31 DIAGNOSIS — N28.1 BILATERAL RENAL CYSTS: ICD-10-CM

## 2023-07-31 DIAGNOSIS — I12.9 BENIGN HYPERTENSION WITH CHRONIC KIDNEY DISEASE, STAGE III (HCC): ICD-10-CM

## 2023-07-31 DIAGNOSIS — N18.30 BENIGN HYPERTENSION WITH CHRONIC KIDNEY DISEASE, STAGE III (HCC): ICD-10-CM

## 2023-07-31 DIAGNOSIS — N18.31 STAGE 3A CHRONIC KIDNEY DISEASE (HCC): Primary | ICD-10-CM

## 2023-07-31 DIAGNOSIS — N20.0 NEPHROLITHIASIS: ICD-10-CM

## 2023-07-31 DIAGNOSIS — N26.1 LEFT RENAL ATROPHY: ICD-10-CM

## 2023-07-31 LAB
ANION GAP SERPL CALCULATED.3IONS-SCNC: 2 MMOL/L
BUN SERPL-MCNC: 19 MG/DL (ref 5–25)
CALCIUM SERPL-MCNC: 8.9 MG/DL (ref 8.3–10.1)
CHLORIDE SERPL-SCNC: 107 MMOL/L (ref 96–108)
CO2 SERPL-SCNC: 28 MMOL/L (ref 21–32)
CREAT SERPL-MCNC: 1.53 MG/DL (ref 0.6–1.3)
GFR SERPL CREATININE-BSD FRML MDRD: 47 ML/MIN/1.73SQ M
GLUCOSE P FAST SERPL-MCNC: 127 MG/DL (ref 65–99)
POTASSIUM SERPL-SCNC: 4.3 MMOL/L (ref 3.5–5.3)
SL AMB  POCT GLUCOSE, UA: ABNORMAL
SL AMB LEUKOCYTE ESTERASE,UA: ABNORMAL
SL AMB POCT BILIRUBIN,UA: ABNORMAL
SL AMB POCT BLOOD,UA: ABNORMAL
SL AMB POCT CLARITY,UA: CLEAR
SL AMB POCT COLOR,UA: YELLOW
SL AMB POCT KETONES,UA: ABNORMAL
SL AMB POCT NITRITE,UA: ABNORMAL
SL AMB POCT PH,UA: 6
SL AMB POCT SPECIFIC GRAVITY,UA: 1.01
SL AMB POCT URINE PROTEIN: ABNORMAL
SL AMB POCT UROBILINOGEN: ABNORMAL
SODIUM SERPL-SCNC: 137 MMOL/L (ref 135–147)

## 2023-07-31 PROCEDURE — 36415 COLL VENOUS BLD VENIPUNCTURE: CPT

## 2023-07-31 PROCEDURE — 80048 BASIC METABOLIC PNL TOTAL CA: CPT

## 2023-07-31 PROCEDURE — 99244 OFF/OP CNSLTJ NEW/EST MOD 40: CPT | Performed by: INTERNAL MEDICINE

## 2023-07-31 PROCEDURE — 81002 URINALYSIS NONAUTO W/O SCOPE: CPT | Performed by: INTERNAL MEDICINE

## 2023-07-31 NOTE — PATIENT INSTRUCTIONS
-Renal Function is stable   -You have Chronic Kidney Disease Stage 3, we will follow-up the results of blood work from today  -Avoid NSAIDs like Ibuprofen/Motrin, Naproxen/Aleve, Celebrex, meloxicam/Mobic, Diclofenac and other NSAIDs.  -Okay to take Acetaminophen/Tylenol if you do not have any liver problems  -Avoid IV contrast used for CT scan and cardiac catheterization.    -If plan for any study with IV contrast, please let me know so we could hydrate with fluids before and after IV contrast  -Dosage  of certain medications may need to be adjusted depending on Kidney function. Blood pressure is elevated, recommend home monitoring  -Recommend 2 g sodium diet. -Recommend daily exercise and weight loss  -Discussed home monitoring of BP and maintaining a log of blood pressure.  -Recommend goal BP less than 130/80. Please inform me if SBP below 110 or above 140's persistently. - Continue oral hydration. If renal function remains stable, would clear for CTA, recommend holding losartan on the day of procedure as well as the previous day to decrease the risk of kidney injury.   Would also check blood work 4 to 5 days after CTA to monitor renal function    Follow-up in 3 months with repeat labs

## 2023-08-01 RX ORDER — SODIUM CHLORIDE 9 MG/ML
1.25 INJECTION, SOLUTION INTRAVENOUS CONTINUOUS
Status: CANCELLED | OUTPATIENT
Start: 2023-08-11

## 2023-08-01 RX ORDER — SODIUM CHLORIDE 9 MG/ML
3 INJECTION, SOLUTION INTRAVENOUS CONTINUOUS
Status: CANCELLED | OUTPATIENT
Start: 2023-08-11

## 2023-08-01 NOTE — TELEPHONE ENCOUNTER
Hydration orders signed. Scheduling was made aware to schedule the CTA with pre and post IV hydration, and OV with VS after.

## 2023-08-01 NOTE — TELEPHONE ENCOUNTER
Hydration Protocol pre CTA  Received: Today  CHRISTIE Grullon [facility],     Can we please order IV hydration protocol for this patient and schedule CTA abdomen with runoff as previously ordered?      Thank you!

## 2023-08-02 RX ORDER — SODIUM CHLORIDE 9 MG/ML
1.25 INJECTION, SOLUTION INTRAVENOUS CONTINUOUS
Status: CANCELLED | OUTPATIENT
Start: 2023-08-09 | End: 2023-08-09

## 2023-08-02 RX ORDER — SODIUM CHLORIDE 9 MG/ML
3 INJECTION, SOLUTION INTRAVENOUS CONTINUOUS
Status: CANCELLED | OUTPATIENT
Start: 2023-08-09

## 2023-08-02 NOTE — TELEPHONE ENCOUNTER
Hydration orders were canceled. New orders placed and routed to provider to sign. Spoke with pt and made him aware why the pre and post IV hydration is necessary.

## 2023-08-03 ENCOUNTER — TELEPHONE (OUTPATIENT)
Dept: VASCULAR SURGERY | Facility: CLINIC | Age: 63
End: 2023-08-03

## 2023-08-03 DIAGNOSIS — I73.9 PAD (PERIPHERAL ARTERY DISEASE) (HCC): Primary | ICD-10-CM

## 2023-08-03 RX ORDER — GABAPENTIN 300 MG/1
300 CAPSULE ORAL
Qty: 90 CAPSULE | Refills: 0 | Status: ON HOLD | OUTPATIENT
Start: 2023-08-03

## 2023-08-03 NOTE — TELEPHONE ENCOUNTER
Pt called. He has a cardiac clearance appointment on 8/10/23, in preparation for anticipation of surgical intervention for PAD. CTA with run off is scheduled for 8/9/22. Pt was calling to see if something can be ordered for left foot pain. He is unable to sleep at night because of pain in the left foot. If he does fall asleep, he wakes up "screaming" because it feels like his foot is on fire. Pt takes 2 tabs of Tylenol prior to bedtime. Also takes gabapentin 100 mg during the day, and 200 mg at night. Asked pt if the gabapentin helps with the foot pain, and he stated that he is unable to tell. Routed to triage to advise.

## 2023-08-03 NOTE — TELEPHONE ENCOUNTER
Please advise patient that I am recommending we change gabapentin 300mg once daily before bed. Also inform patient that pain medication will likely not completely resolve his pain as he pain is due to lack of blood flow.  Once we are able to restore blood flow, we will hopefully see improvement in pain

## 2023-08-03 NOTE — TELEPHONE ENCOUNTER
CTA scheduled for 8/9/23 at [address] [facility] w/ [facility] on 8/10 in [address] [place] [address]. Spoke with patient gave him dates, times, and locations for all appointments.

## 2023-08-09 ENCOUNTER — HOSPITAL ENCOUNTER (OUTPATIENT)
Dept: INFUSION CENTER | Facility: HOSPITAL | Age: 63
Discharge: HOME | End: 2023-08-09
Payer: COMMERCIAL

## 2023-08-09 ENCOUNTER — HOSPITAL ENCOUNTER (OUTPATIENT)
Dept: CT IMAGING | Facility: HOSPITAL | Age: 63
Discharge: HOME | End: 2023-08-09
Payer: COMMERCIAL

## 2023-08-09 VITALS
TEMPERATURE: 98 F | SYSTOLIC BLOOD PRESSURE: 168 MMHG | HEART RATE: 96 BPM | DIASTOLIC BLOOD PRESSURE: 87 MMHG | RESPIRATION RATE: 18 BRPM

## 2023-08-09 DIAGNOSIS — N18.31 STAGE 3A CHRONIC KIDNEY DISEASE (HCC): Primary | ICD-10-CM

## 2023-08-09 DIAGNOSIS — I73.9 PAD (PERIPHERAL ARTERY DISEASE) (HCC): ICD-10-CM

## 2023-08-09 PROCEDURE — G1004 CDSM NDSC: HCPCS

## 2023-08-09 PROCEDURE — 75635 CT ANGIO ABDOMINAL ARTERIES: CPT

## 2023-08-09 RX ORDER — SODIUM CHLORIDE 9 MG/ML
3 INJECTION, SOLUTION INTRAVENOUS CONTINUOUS
Status: DISPENSED | OUTPATIENT
Start: 2023-08-09 | End: 2023-08-09

## 2023-08-09 RX ORDER — SODIUM CHLORIDE 9 MG/ML
1.25 INJECTION, SOLUTION INTRAVENOUS CONTINUOUS
Status: CANCELLED | OUTPATIENT
Start: 2023-08-09

## 2023-08-09 RX ORDER — SODIUM CHLORIDE 9 MG/ML
3 INJECTION, SOLUTION INTRAVENOUS CONTINUOUS
Status: CANCELLED | OUTPATIENT
Start: 2023-08-09

## 2023-08-09 RX ORDER — SODIUM CHLORIDE 9 MG/ML
1.25 INJECTION, SOLUTION INTRAVENOUS CONTINUOUS
Status: DISCONTINUED | OUTPATIENT
Start: 2023-08-09 | End: 2023-08-09 | Stop reason: HOSPADM

## 2023-08-09 RX ADMIN — SODIUM CHLORIDE 3 ML/KG/HR: 0.9 INJECTION, SOLUTION INTRAVENOUS at 09:25

## 2023-08-09 RX ADMIN — SODIUM CHLORIDE 1.25 ML/KG/HR: 0.9 INJECTION, SOLUTION INTRAVENOUS at 11:01

## 2023-08-09 RX ADMIN — IOHEXOL 100 ML: 350 INJECTION, SOLUTION INTRAVENOUS at 10:51

## 2023-08-10 ENCOUNTER — OFFICE VISIT (OUTPATIENT)
Dept: VASCULAR SURGERY | Facility: CLINIC | Age: 63
End: 2023-08-10
Payer: COMMERCIAL

## 2023-08-10 ENCOUNTER — PATIENT OUTREACH (OUTPATIENT)
Dept: OTHER | Facility: CLINIC | Age: 63
End: 2023-08-10

## 2023-08-10 ENCOUNTER — CONSULT (OUTPATIENT)
Dept: CARDIOLOGY CLINIC | Facility: CLINIC | Age: 63
End: 2023-08-10
Payer: COMMERCIAL

## 2023-08-10 ENCOUNTER — TELEPHONE (OUTPATIENT)
Dept: VASCULAR SURGERY | Facility: CLINIC | Age: 63
End: 2023-08-10

## 2023-08-10 VITALS
SYSTOLIC BLOOD PRESSURE: 126 MMHG | HEIGHT: 72 IN | BODY MASS INDEX: 26.55 KG/M2 | DIASTOLIC BLOOD PRESSURE: 72 MMHG | WEIGHT: 196 LBS | HEART RATE: 86 BPM | OXYGEN SATURATION: 95 %

## 2023-08-10 VITALS
BODY MASS INDEX: 26.58 KG/M2 | DIASTOLIC BLOOD PRESSURE: 80 MMHG | SYSTOLIC BLOOD PRESSURE: 122 MMHG | HEART RATE: 68 BPM | WEIGHT: 196 LBS

## 2023-08-10 DIAGNOSIS — Z01.810 PREOP CARDIOVASCULAR EXAM: Primary | ICD-10-CM

## 2023-08-10 DIAGNOSIS — I10 PRIMARY HYPERTENSION: ICD-10-CM

## 2023-08-10 DIAGNOSIS — I10 ESSENTIAL HYPERTENSION: ICD-10-CM

## 2023-08-10 DIAGNOSIS — N18.31 STAGE 3A CHRONIC KIDNEY DISEASE (HCC): ICD-10-CM

## 2023-08-10 DIAGNOSIS — Z72.0 TOBACCO ABUSE: ICD-10-CM

## 2023-08-10 DIAGNOSIS — I25.10 CORONARY ARTERY DISEASE INVOLVING NATIVE CORONARY ARTERY OF NATIVE HEART WITHOUT ANGINA PECTORIS: ICD-10-CM

## 2023-08-10 DIAGNOSIS — E78.2 MIXED HYPERLIPIDEMIA: ICD-10-CM

## 2023-08-10 DIAGNOSIS — I73.9 PAD (PERIPHERAL ARTERY DISEASE) (HCC): ICD-10-CM

## 2023-08-10 DIAGNOSIS — I70.222 ATHEROSCLEROSIS OF NATIVE ARTERIES OF EXTREMITIES WITH REST PAIN, LEFT LEG (HCC): Primary | ICD-10-CM

## 2023-08-10 PROCEDURE — 99244 OFF/OP CNSLTJ NEW/EST MOD 40: CPT | Performed by: INTERNAL MEDICINE

## 2023-08-10 PROCEDURE — 93000 ELECTROCARDIOGRAM COMPLETE: CPT | Performed by: INTERNAL MEDICINE

## 2023-08-10 PROCEDURE — 99215 OFFICE O/P EST HI 40 MIN: CPT | Performed by: SURGERY

## 2023-08-10 RX ORDER — ROSUVASTATIN CALCIUM 40 MG/1
40 TABLET, COATED ORAL DAILY
Qty: 90 TABLET | Refills: 3 | Status: SHIPPED | OUTPATIENT
Start: 2023-08-10

## 2023-08-10 RX ORDER — CHLORHEXIDINE GLUCONATE 0.12 MG/ML
15 RINSE ORAL ONCE
Status: CANCELLED | OUTPATIENT
Start: 2023-08-10 | End: 2023-08-10

## 2023-08-10 NOTE — TELEPHONE ENCOUNTER
REMINDER: Under Reason For Call, comments MUST be formatted as:   (Surgeon's Initials) / (Procedure)      Special Instructions / FYI: WITHIN 2 WEEKS/CALL FOR SLB/HYBRID 8/30. THAT IS THE NEXT HYBRID DAY AVAILABLE. Procedure: Common Femoral Endartectomy     Level: 2 - Route clearance(s) to [facility] Clearance Pool     Allergies: Eszopiclone and Zolpidem    Instructions Given: NO Bowel Prep General Instructions     Dialysis: Patient is not on dialysis. Return Visit Required Prior to Procedure: No.    Consent: I certify that patient has signed, printed, timed, and dated their surgery consent. I certify that the patient's LEGAL NAME and DATE OF BIRTH are written in the upper left corner on BOTH sides of the consent. I certify that BOTH sides of the completed surgery consent have been scanned into the patient's Epic chart by myself on 8/10/2023. Yes, I have LABELED the consent in Epic as Consent for Vascular Procedure. For Surgical Clearances     Levels   1-3   ROUTE this encounter to [facility] Clearance Pool (AND)   [facility] Surgery Coordinator Pool     Level   4   ROUTE this encounter to [facility] Surgery Coordinator Pool       HYDRATION CLEARANCES   ONLY ROUTE TO  [facility] Clearance Pool     Patient does not require any pre operative clearance. Yes, I have ROUTED this encounter to [facility] Surgery Coordinator and/or [facility] Clearance Pool.

## 2023-08-10 NOTE — H&P (VIEW-ONLY)
Assessment/Plan:    PAD (peripheral artery disease) (HCC)  Multilevel occlusive disease present. Medical management and exercise therapy will be employed with the exception of the left common femoral artery as noted under his rest pain diagnosis. Hypertension  We discussed the role of hypertension and atherosclerosis and he is adherent to his current regimen as detailed by his primary care in the form of losartan. Stage 3a chronic kidney disease (HCC)  Due to his chronic kidney disease stage III we will employ rehydration for his endarterectomy procedure due to the planned contrast usage for his iliac arteriogram.    Tobacco abuse  We discussed his smoking cessation options and he wishes to pursue treatment options with the ambulatory smoking cessation team.  This consult was placed during this visit. Subjective:      Patient ID: Jensen Bedolla is a 58 y.o. male. HPI  Patient is here today to review results of a CTA ABD w/run off w/wo contrast done 8/9/2023. Patient had a wound on his left 2nd toe that has now healed. He c/o cramping pain in both calves after walking about 15 feet. His left leg is worse than the right leg. Patient is taking ASA 81 mg and Rosuvastatin. Patient is a current smoker. Mr. Carolyn Gomez again reiterates his left foot constant pain at night which does not allow him to sleep. The pain is also present intermittently but for a good portion of the day during his regular activities. He has noticed continued chronic color change to his foot. There is no new development of wounds. He has successfully cut back his cigarette usage to two thirds of the prior amount. He is adherent to his aspirin and statin therapy.     The following portions of the patient's history were reviewed and updated as appropriate: allergies, current medications, past family history, past medical history, past social history, past surgical history and problem list.    Review of Systems   Constitutional: Negative. HENT: Negative. Eyes: Negative. Respiratory: Negative. Cardiovascular: Negative. Gastrointestinal: Negative. Endocrine: Negative. Genitourinary: Negative. Musculoskeletal:        Pain in legs when walking   Skin: Negative. Allergic/Immunologic: Negative. Neurological: Negative. Hematological: Negative. Psychiatric/Behavioral: Negative. Objective:      /72 (BP Location: Left arm, Patient Position: Sitting, Cuff Size: Standard)   Pulse 86   Ht 6' (1.829 m)   Wt 88.9 kg (196 lb)   SpO2 95%   BMI 26.58 kg/m²          Physical Exam  Constitutional:       Appearance: Normal appearance. HENT:      Head: Normocephalic and atraumatic. Nose: Nose normal. No rhinorrhea. Eyes:      Extraocular Movements: Extraocular movements intact. Pupils: Pupils are equal, round, and reactive to light. Cardiovascular:      Rate and Rhythm: Normal rate and regular rhythm. Pulses:           Dorsalis pedis pulses are detected w/ Doppler on the right side and detected w/ Doppler on the left side. Posterior tibial pulses are detected w/ Doppler on the right side and detected w/ Doppler on the left side. Pulmonary:      Effort: Pulmonary effort is normal.      Breath sounds: No stridor. Abdominal:      General: There is no distension. Tenderness: There is no abdominal tenderness. Musculoskeletal:         General: No tenderness. Normal range of motion. Cervical back: Normal range of motion and neck supple. Skin:     General: Skin is warm. Capillary Refill: Capillary refill takes less than 2 seconds. Coloration: Skin is not jaundiced. Neurological:      General: No focal deficit present. Mental Status: He is alert and oriented to person, place, and time.    Psychiatric:         Mood and Affect: Mood normal.         Behavior: Behavior normal.       Operative Scheduling Information:    Hospital:  [facility]    Physician:  Edgardo    Surgery:   Left femoral endarteretomy with patch angioplasty  Retrograde iliac arteriogram    Urgency:  Urgent: within 2 weeks    Level:  Level 2: Outpatients to be scheduled for surgery with time dependent medical necessity within 2 weeks    Case Length:  2.5 hours    Post-op Bed:  Floor with tele    OR Table:  Discovery    Equipment Needs:  Product/Implant: Possibly need VBX stent    Medication Instructions:  None    Hydration:  ROSENDO/CKD protocol

## 2023-08-10 NOTE — PROGRESS NOTES
Assessment/Plan:    PAD (peripheral artery disease) (HCC)  Multilevel occlusive disease present. Medical management and exercise therapy will be employed with the exception of the left common femoral artery as noted under his rest pain diagnosis. Hypertension  We discussed the role of hypertension and atherosclerosis and he is adherent to his current regimen as detailed by his primary care in the form of losartan. Stage 3a chronic kidney disease (HCC)  Due to his chronic kidney disease stage III we will employ rehydration for his endarterectomy procedure due to the planned contrast usage for his iliac arteriogram.    Tobacco abuse  We discussed his smoking cessation options and he wishes to pursue treatment options with the ambulatory smoking cessation team.  This consult was placed during this visit. Subjective:      Patient ID: Sergio Melgar is a 58 y.o. male. HPI  Patient is here today to review results of a CTA ABD w/run off w/wo contrast done 8/9/2023. Patient had a wound on his left 2nd toe that has now healed. He c/o cramping pain in both calves after walking about 15 feet. His left leg is worse than the right leg. Patient is taking ASA 81 mg and Rosuvastatin. Patient is a current smoker. Mr. Cj Winn again reiterates his left foot constant pain at night which does not allow him to sleep. The pain is also present intermittently but for a good portion of the day during his regular activities. He has noticed continued chronic color change to his foot. There is no new development of wounds. He has successfully cut back his cigarette usage to two thirds of the prior amount. He is adherent to his aspirin and statin therapy.     The following portions of the patient's history were reviewed and updated as appropriate: allergies, current medications, past family history, past medical history, past social history, past surgical history and problem list.    Review of Systems   Constitutional: Negative. HENT: Negative. Eyes: Negative. Respiratory: Negative. Cardiovascular: Negative. Gastrointestinal: Negative. Endocrine: Negative. Genitourinary: Negative. Musculoskeletal:        Pain in legs when walking   Skin: Negative. Allergic/Immunologic: Negative. Neurological: Negative. Hematological: Negative. Psychiatric/Behavioral: Negative. Objective:      /72 (BP Location: Left arm, Patient Position: Sitting, Cuff Size: Standard)   Pulse 86   Ht 6' (1.829 m)   Wt 88.9 kg (196 lb)   SpO2 95%   BMI 26.58 kg/m²          Physical Exam  Constitutional:       Appearance: Normal appearance. HENT:      Head: Normocephalic and atraumatic. Nose: Nose normal. No rhinorrhea. Eyes:      Extraocular Movements: Extraocular movements intact. Pupils: Pupils are equal, round, and reactive to light. Cardiovascular:      Rate and Rhythm: Normal rate and regular rhythm. Pulses:           Dorsalis pedis pulses are detected w/ Doppler on the right side and detected w/ Doppler on the left side. Posterior tibial pulses are detected w/ Doppler on the right side and detected w/ Doppler on the left side. Pulmonary:      Effort: Pulmonary effort is normal.      Breath sounds: No stridor. Abdominal:      General: There is no distension. Tenderness: There is no abdominal tenderness. Musculoskeletal:         General: No tenderness. Normal range of motion. Cervical back: Normal range of motion and neck supple. Skin:     General: Skin is warm. Capillary Refill: Capillary refill takes less than 2 seconds. Coloration: Skin is not jaundiced. Neurological:      General: No focal deficit present. Mental Status: He is alert and oriented to person, place, and time.    Psychiatric:         Mood and Affect: Mood normal.         Behavior: Behavior normal.       Operative Scheduling Information:    Hospital:  [facility]    Physician:  Edgardo    Surgery:   Left femoral endarteretomy with patch angioplasty  Retrograde iliac arteriogram    Urgency:  Urgent: within 2 weeks    Level:  Level 2: Outpatients to be scheduled for surgery with time dependent medical necessity within 2 weeks    Case Length:  2.5 hours    Post-op Bed:  Floor with tele    OR Table:  Discovery    Equipment Needs:  Product/Implant: Possibly need VBX stent    Medication Instructions:  None    Hydration:  ROSENDO/CKD protocol

## 2023-08-10 NOTE — ASSESSMENT & PLAN NOTE
Due to his chronic kidney disease stage III we will employ rehydration for his endarterectomy procedure due to the planned contrast usage for his iliac arteriogram.
Multilevel occlusive disease present. Medical management and exercise therapy will be employed with the exception of the left common femoral artery as noted under his rest pain diagnosis.
We discussed his smoking cessation options and he wishes to pursue treatment options with the ambulatory smoking cessation team.  This consult was placed during this visit.
We discussed the role of hypertension and atherosclerosis and he is adherent to his current regimen as detailed by his primary care in the form of losartan.
Name band;

## 2023-08-10 NOTE — PROGRESS NOTES
Outpatient Consultation - General Cardiology   Demetrice Garvin 58 y.o. male   MRN: [id number]  Encounter: [id number]      PCP: Emily Tejada MD    History of Present Illness   Physician Requesting Consult: Consults   Reason for Consult / Principal Problem: cardiology clearance for vascular surgery. Assessment and Plan  Demetrice Garvin is a 58y.o. year old male with PMH of HTN, HLD, CKD3a, PAD with claudication, active smoker, hx of kidney stones,  who is presenting for pre-operative evaluation for left femoral endarterectomy. #PAD with rest pain. #pre-operative assessment for L femoral endarectomy  Recent CTA with run off showed atherosclerosis of the aorta, and multilevel occlusive atherosclerosis. He has been having resting left calf pain at night and claudication. Patient visited with vascular surgery today who recommended intervention of the L femoral artery, plus medical management of the other disease. He is high risk (RCRI 10.1% 30 day risk of MACE, [place]omack 1.2% 30 day risk of MI). EKG shows NSR with LVH and non-specific ST changes in V4-6. He denies any shortness of breath or chest pain on exertion and is able to do 4 METS. He regularly walks up a flight of stairs in his home.   -patient can proceed with surgery without further cardiac testing. He is high risk for the procedure.   -I had a long discussion with the patient about his overall CV risk and that his risk factors need to be optimized. Specifically he needs to stop smoking, increase statin dose, get better control of BP. Patient agreeable to increase the statin dose and will continue counseling and discussion at next visit. -patient is on aspirin 81mg daily, increase rosuvastatin to 40mg daily. #Coronary artery calcifications  Diffuse coronary artery calcifications on prior CT. Patient has no symptoms of angina currently.   After his re-vascularization will have ongoing discussion with patient about controlling atherosclerotic risk factors. #HTN  Patient is on losartan 100mg daily. His blood pressure has been borderline high and SBP 120s-140s. Ideally we would have better control however the patient does not want to start another agent. Patient will try to get a home monitor to record BP at home. #HLD: 10/2022 lipids: , , HDL 40, . He states rosuvastatin was recently increased from 10mg to 20mg. Due to LDL >190, hx of TIA and severe PAD will maximize dose and increase to 40mg today.   -rosuvastatin 40mg prescribed, recheck lipids at follow up. #CKD3a: Cr ~1.5. recently established care with nephrology. #tobacco use  Lifelong smoker. Current 0.5-0.75 ppd- cut down tfrom 1 ppd. I discussed the risks of ongoing tobacco use with the patient and he is not motivated to quit at this time. HPI: Rosy Anderson is a 58y.o. year old male with PMH of HTN, HLD, CKD3a, hx of TIA, PAD with claudication, active smoker, hx of kidney stones,  who is presenting for pre-operative evaluation for left femoral endarterectomy. The patient has been having bilateral leg claudication after ~15 feet of walking on flat ground. He had a L 2nd toe wound that has now healed. Recently he has also had rest left leg pain at night that limits him from sleeping, and intermittently at rest during the day. He says "it feels like my leg is dying". He denies any chest pain or shortness of breath. Despite his claudication he is still able to walk up stairs. He goes up a flight of stairs in his house and will have leg claudication but no chest pain or shortness of breath. Holter 11/2021  Rare PVCs and PACs. TTE 10/2021  LVEF 55% with normal wall motion, G1DD, mild MR, mild TR. Review of Systems  Review of system was conducted and was negative except for as stated in the HPI.       Historical Information   Past Medical History:   Diagnosis Date   • Acute intractable headache 11/03/2021   • Acute maxillary sinusitis     13 dec 2012   • ROSENDO (acute kidney injury) ([address]) 06/08/2018 2018. • Bone spur     toe   • BPPV (benign paroxysmal positional vertigo) 10/24/2021   • Hyperlipidemia    • Hypertension    • Kidney stone    • PVD (peripheral vascular disease) ([address])      Past Surgical History:   Procedure Laterality Date   • APPENDECTOMY     • KIDNEY STONE SURGERY      multiple surgery in the past   • RETINAL DETACHMENT SURGERY     • SEPTOPLASTY     • TONSILLECTOMY  2000    meg Vidal     Social History     Substance and Sexual Activity   Alcohol Use Yes   • Alcohol/week: 0.0 standard drinks of alcohol    Comment: social     Social History     Substance and Sexual Activity   Drug Use No     Social History     Tobacco Use   Smoking Status Every Day   • Packs/day: 1.00   • Years: 40.00   • Total pack years: 40.00   • Types: Cigarettes   Smokeless Tobacco Never   Tobacco Comments    exposure to 2nd hand smoke     Family History:no know heart conditions with parents. Meds/Allergies   Home Medications:   Current Outpatient Medications:   •  acetaminophen (TYLENOL) 500 mg tablet, Take 500 mg by mouth every 6 (six) hours as needed for mild pain, Disp: , Rfl:   •  ALPRAZolam (XANAX) 0.25 mg tablet, Take 1 tablet (0.25 mg total) by mouth daily at bedtime as needed for anxiety, Disp: 15 tablet, Rfl: 0  •  aspirin 81 mg chewable tablet, Chew 1 tablet (81 mg total) daily, Disp: , Rfl:   •  gabapentin (Neurontin) 300 mg capsule, Take 1 capsule (300 mg total) by mouth daily at bedtime, Disp: 90 capsule, Rfl: 0  •  hydrocortisone valerate (WEST-JAMIR) 0.2 % ointment, Apply topically 2 (two) times a day, Disp: 45 g, Rfl: 0  •  losartan (COZAAR) 100 MG tablet, TAKE 1 TABLET BY MOUTH EVERY DAY, Disp: 90 tablet, Rfl: 1  •  Melatonin 10 MG TABS, Take 10 mg by mouth Pt takes 10mg at bedtime. , Disp: , Rfl:   •  rosuvastatin (CRESTOR) 20 MG tablet, Take 1 tablet (20 mg total) by mouth daily, Disp: 90 tablet, Rfl: 0  • traZODone (DESYREL) 50 mg tablet, Take 2 tablets (100 mg total) by mouth daily at bedtime, Disp: 180 tablet, Rfl: 1  No current facility-administered medications for this visit. Facility-Administered Medications Ordered in Other Visits:   •  alteplase (CATHFLO) injection 2 mg, 2 mg, Intracatheter, Q1MIN PRN, Xavier Vuong MD    Allergies   Allergen Reactions   • Eszopiclone    • Zolpidem      Other reaction(s): sleepwalking. Objective   Vitals: There were no vitals taken for this visit. Physical Exam    GEN: Naye Helm appears well, alert and oriented x 3, pleasant and cooperative   HEENT:  Normocephalic, atraumatic, anicteric, moist mucous membranes  NECK: No JVD or carotid bruits   HEART: Reg rhythm, reg rate, normal S1 and S2, no murmurs, clicks, gallops or rubs   LUNGS: Clear to auscultation bilaterally; no wheezes, rales, or rhonchi; respiration nonlabored   ABDOMEN:  Normoactive bowel sounds, soft, no tenderness, no distention  EXTREMITIES: pedal pulses not palpable. Legs and feet are warm without ulcerations. NEURO: no gross focal findings; cranial nerves grossly intact   SKIN:  Dry, intact, warm to touch    Lab Results: I have personally reviewed pertinent lab results.     No results found for: "HSTNI0", "HSTNI2", "HSTNI4", "HSTNI"  No results found for: "NTBNP"  Lab Results   Component Value Date    CHOL 229 06/04/2015    TRIG 268 (H) 10/26/2022    HDL 40 10/26/2022    LDLCALC 202 (H) 10/26/2022     Lab Results   Component Value Date     06/04/2015    K 4.3 07/31/2023    CO2 28 07/31/2023     07/31/2023    BUN 19 07/31/2023    CREATININE 1.53 (H) 07/31/2023    ALT 25 10/26/2022    AST 13 10/26/2022     Lab Results   Component Value Date    WBC 9.83 06/15/2023    HGB 15.6 06/15/2023    HCT 47.7 06/15/2023    MCV 92 06/15/2023     06/15/2023     Lab Results   Component Value Date    INR 0.83 (L) 10/23/2021         Imaging: I have personally reviewed pertinent reports. EK/10/23 NSR, HR 68, LVH, lateral T wave flattening. ECHO:  No results found for this or any previous visit. Results for orders placed during the hospital encounter of 10/24/21    Echo complete w/ contrast if indicated    Interpretation Summary  •  Left Ventricle: Left ventricular cavity size is normal. The left ventricular ejection fraction is 55%. Systolic function is normal. Wall motion is normal. Diastolic function is mildly abnormal, consistent with grade I (abnormal) relaxation. Wall thickness is mildly increased. •  Mitral Valve: There is mild regurgitation. •  Tricuspid Valve: There is mild regurgitation. HOLTER    Results for orders placed during the hospital encounter of 21    Holter monitor    Interpretation Summary  PT NAME: Bud Smart  : 1960  AGE: 57 y.o. GENDER: male  MRN: [id number]   PROCEDURE: Holter monitor    INDICATIONS:  Syncope    FINDINGS:  Total beats: [id number]    Ventricular Ectopy  Total VE Beats: 7  VE percentage: 0%    Supraventricular Ectopy  Total SVE Beat: 24  SVE percentage: 0%    Atrial Fibrillation  AF beats: 0  AF percentage 0%    CONCLUSIONS:  Holter monitor reveals the underlying rhythm is sinus rhythm with an average heart rate of 74 beats per minute, a minimum heart rate of 42 beats per minute and a maximum heart rate of 107 beats per minute. There were about 7 ventricular ectopic beats, mostly occurring as single PVC's with no evidence of ventricular tachycardia. There were about 24 supraventricular ectopic beats, mostly occurring as single PAC's and late beats with no evidence of supraventricular tachycardia, atrial fibrillation, or atrial flutter. There is no evidence of significant bradyarrhythmia or advanced heart block. The longest R to R was 1.5 seconds. The patient's symptom diary reported no symptoms      Case discussed and reviewed with Dr. Kenny Rich who agrees with my assessment and plan.     Thank you for involving us in the care of your patient.       Carisa Landin MD  Cardiology Fellow   PGY-5      ===========================================================

## 2023-08-10 NOTE — TELEPHONE ENCOUNTER
Verified patient's insurance   CONFIRMED - Patient's insurance is [employer]  Is patient requesting a call when authorization has been obtained? Patient did not request a call. Surgery Date: 8/14/23  Primary Surgeon: ZE // Florentin Weber  Assisting Surgeon: Not Applicable (N/A)  Facility: [facility] (Tax: [id number] / NPI: [id number])  Inpatient / Outpatient: Inpatient  Level: 2    Clearance Received: No clearance ordered. Consent Received: Yes, scanned into Epic on 8/10/23. Medication Hold / Last Dose: no med hold  IR Notified: Not Applicable (N/A)  Rep. Notified: Not Applicable (N/A)  Equipment Needs: Product/Implant: Possibly need VBX stent     Vas Lab Requested: Not Applicable (N/A)  Patient Contacted: 8/10/23 spoke w/spouse    Diagnosis: I70.222 Atherosclerosis of native arteries of extremities with rest pain, left leg   Procedure/ CPT Code(s): Angiogram // CPT: 38120 // Procedure to take place in OR [Auth/ Cert Based] and Common Femoral Endartectomy // CPT: 18976    For varicose vein related procedures:   Last LEVDR: Not Applicable (N/A)  CEAP Classification: Not Applicable (N/A)  VCSS: Not Applicable (N/A)    Post Operative Date/ Time: TBD , TBD TBD with Lucho Reynoso (NPI: )     *Please review medication hold(s), PATs, and check H&P with patient. *  PATIENT WAS MAILED SURGERY/SHOWERING/DISCHARGE/COVID INSTRUCTIONS AFTER REVIEWING WITH THEM VIA PHONE CALL.

## 2023-08-13 ENCOUNTER — ANESTHESIA EVENT (OUTPATIENT)
Dept: PERIOP | Facility: HOSPITAL | Age: 63
DRG: 254 | End: 2023-08-13
Payer: COMMERCIAL

## 2023-08-14 ENCOUNTER — HOSPITAL ENCOUNTER (INPATIENT)
Facility: HOSPITAL | Age: 63
LOS: 1 days | Discharge: HOME | DRG: 254 | End: 2023-08-15
Attending: SURGERY | Admitting: SURGERY
Payer: COMMERCIAL

## 2023-08-14 ENCOUNTER — ANESTHESIA (OUTPATIENT)
Dept: PERIOP | Facility: HOSPITAL | Age: 63
DRG: 254 | End: 2023-08-14
Payer: COMMERCIAL

## 2023-08-14 ENCOUNTER — APPOINTMENT (OUTPATIENT)
Dept: RADIOLOGY | Facility: HOSPITAL | Age: 63
DRG: 254 | End: 2023-08-14
Payer: COMMERCIAL

## 2023-08-14 DIAGNOSIS — I70.222 ATHEROSCLEROSIS OF NATIVE ARTERIES OF EXTREMITIES WITH REST PAIN, LEFT LEG (HCC): Primary | ICD-10-CM

## 2023-08-14 LAB
ABO GROUP BLD: NORMAL
ABO GROUP BLD: NORMAL
BLD GP AB SCN SERPL QL: NEGATIVE
GLUCOSE SERPL-MCNC: 125 MG/DL (ref 65–140)
KCT BLD-ACNC: 233 SEC (ref 89–137)
KCT BLD-ACNC: 279 SEC (ref 89–137)
RH BLD: POSITIVE
RH BLD: POSITIVE
SPECIMEN EXPIRATION DATE: NORMAL
SPECIMEN SOURCE: ABNORMAL
SPECIMEN SOURCE: ABNORMAL

## 2023-08-14 PROCEDURE — C1769 GUIDE WIRE: HCPCS | Performed by: SURGERY

## 2023-08-14 PROCEDURE — 86850 RBC ANTIBODY SCREEN: CPT | Performed by: SURGERY

## 2023-08-14 PROCEDURE — 04UL0KZ SUPPLEMENT LEFT FEMORAL ARTERY WITH NONAUTOLOGOUS TISSUE SUBSTITUTE, OPEN APPROACH: ICD-10-PCS | Performed by: SURGERY

## 2023-08-14 PROCEDURE — 04CL0ZZ EXTIRPATION OF MATTER FROM LEFT FEMORAL ARTERY, OPEN APPROACH: ICD-10-PCS | Performed by: SURGERY

## 2023-08-14 PROCEDURE — 85347 COAGULATION TIME ACTIVATED: CPT

## 2023-08-14 PROCEDURE — C1894 INTRO/SHEATH, NON-LASER: HCPCS | Performed by: SURGERY

## 2023-08-14 PROCEDURE — 82948 REAGENT STRIP/BLOOD GLUCOSE: CPT

## 2023-08-14 PROCEDURE — 35371 RECHANNELING OF ARTERY: CPT | Performed by: SURGERY

## 2023-08-14 PROCEDURE — C1887 CATHETER, GUIDING: HCPCS | Performed by: SURGERY

## 2023-08-14 PROCEDURE — 86900 BLOOD TYPING SEROLOGIC ABO: CPT | Performed by: SURGERY

## 2023-08-14 PROCEDURE — 86920 COMPATIBILITY TEST SPIN: CPT

## 2023-08-14 PROCEDURE — 86901 BLOOD TYPING SEROLOGIC RH(D): CPT | Performed by: SURGERY

## 2023-08-14 DEVICE — XENOSURE BIOLOGIC PATCH, 0.8CM X 8CM, EIFU
Type: IMPLANTABLE DEVICE | Site: ARTERIAL | Status: FUNCTIONAL
Brand: XENOSURE BIOLOGIC PATCH

## 2023-08-14 RX ORDER — ASPIRIN 81 MG/1
81 TABLET, CHEWABLE ORAL DAILY
Status: DISCONTINUED | OUTPATIENT
Start: 2023-08-14 | End: 2023-08-15 | Stop reason: HOSPADM

## 2023-08-14 RX ORDER — OXYCODONE HYDROCHLORIDE 10 MG/1
10 TABLET ORAL EVERY 4 HOURS PRN
Status: DISCONTINUED | OUTPATIENT
Start: 2023-08-14 | End: 2023-08-15 | Stop reason: HOSPADM

## 2023-08-14 RX ORDER — ACETAMINOPHEN 325 MG/1
975 TABLET ORAL EVERY 8 HOURS SCHEDULED
Status: DISCONTINUED | OUTPATIENT
Start: 2023-08-14 | End: 2023-08-15 | Stop reason: HOSPADM

## 2023-08-14 RX ORDER — ATORVASTATIN CALCIUM 80 MG/1
80 TABLET, FILM COATED ORAL
Status: DISCONTINUED | OUTPATIENT
Start: 2023-08-14 | End: 2023-08-15 | Stop reason: HOSPADM

## 2023-08-14 RX ORDER — LABETALOL HYDROCHLORIDE 5 MG/ML
10 INJECTION, SOLUTION INTRAVENOUS ONCE
Status: COMPLETED | OUTPATIENT
Start: 2023-08-14 | End: 2023-08-14

## 2023-08-14 RX ORDER — PROTAMINE SULFATE 10 MG/ML
INJECTION, SOLUTION INTRAVENOUS AS NEEDED
Status: DISCONTINUED | OUTPATIENT
Start: 2023-08-14 | End: 2023-08-14

## 2023-08-14 RX ORDER — OXYCODONE HYDROCHLORIDE 5 MG/1
5 TABLET ORAL EVERY 4 HOURS PRN
Status: DISCONTINUED | OUTPATIENT
Start: 2023-08-14 | End: 2023-08-15 | Stop reason: HOSPADM

## 2023-08-14 RX ORDER — LIDOCAINE HYDROCHLORIDE 10 MG/ML
INJECTION, SOLUTION EPIDURAL; INFILTRATION; INTRACAUDAL; PERINEURAL AS NEEDED
Status: DISCONTINUED | OUTPATIENT
Start: 2023-08-14 | End: 2023-08-14

## 2023-08-14 RX ORDER — CEFAZOLIN SODIUM 2 G/50ML
SOLUTION INTRAVENOUS AS NEEDED
Status: DISCONTINUED | OUTPATIENT
Start: 2023-08-14 | End: 2023-08-14

## 2023-08-14 RX ORDER — TRAZODONE HYDROCHLORIDE 100 MG/1
100 TABLET ORAL
Status: DISCONTINUED | OUTPATIENT
Start: 2023-08-14 | End: 2023-08-15 | Stop reason: HOSPADM

## 2023-08-14 RX ORDER — LANOLIN ALCOHOL/MO/W.PET/CERES
9 CREAM (GRAM) TOPICAL
Status: DISCONTINUED | OUTPATIENT
Start: 2023-08-14 | End: 2023-08-15 | Stop reason: HOSPADM

## 2023-08-14 RX ORDER — HYDROMORPHONE HCL/PF 1 MG/ML
0.5 SYRINGE (ML) INJECTION EVERY 4 HOURS PRN
Status: DISCONTINUED | OUTPATIENT
Start: 2023-08-14 | End: 2023-08-15

## 2023-08-14 RX ORDER — ONDANSETRON 2 MG/ML
INJECTION INTRAMUSCULAR; INTRAVENOUS AS NEEDED
Status: DISCONTINUED | OUTPATIENT
Start: 2023-08-14 | End: 2023-08-14

## 2023-08-14 RX ORDER — HEPARIN SODIUM 1000 [USP'U]/ML
INJECTION, SOLUTION INTRAVENOUS; SUBCUTANEOUS AS NEEDED
Status: DISCONTINUED | OUTPATIENT
Start: 2023-08-14 | End: 2023-08-14

## 2023-08-14 RX ORDER — GABAPENTIN 300 MG/1
300 CAPSULE ORAL
Status: DISCONTINUED | OUTPATIENT
Start: 2023-08-14 | End: 2023-08-15 | Stop reason: HOSPADM

## 2023-08-14 RX ORDER — FENTANYL CITRATE 50 UG/ML
INJECTION, SOLUTION INTRAMUSCULAR; INTRAVENOUS AS NEEDED
Status: DISCONTINUED | OUTPATIENT
Start: 2023-08-14 | End: 2023-08-14

## 2023-08-14 RX ORDER — SENNOSIDES 8.6 MG
1 TABLET ORAL DAILY
Status: DISCONTINUED | OUTPATIENT
Start: 2023-08-14 | End: 2023-08-15 | Stop reason: HOSPADM

## 2023-08-14 RX ORDER — HEPARIN SODIUM 5000 [USP'U]/ML
5000 INJECTION, SOLUTION INTRAVENOUS; SUBCUTANEOUS EVERY 8 HOURS SCHEDULED
Status: DISCONTINUED | OUTPATIENT
Start: 2023-08-14 | End: 2023-08-15 | Stop reason: HOSPADM

## 2023-08-14 RX ORDER — HYDRALAZINE HYDROCHLORIDE 20 MG/ML
5 INJECTION INTRAMUSCULAR; INTRAVENOUS ONCE
Status: COMPLETED | OUTPATIENT
Start: 2023-08-14 | End: 2023-08-14

## 2023-08-14 RX ORDER — SODIUM CHLORIDE, SODIUM LACTATE, POTASSIUM CHLORIDE, CALCIUM CHLORIDE 600; 310; 30; 20 MG/100ML; MG/100ML; MG/100ML; MG/100ML
INJECTION, SOLUTION INTRAVENOUS CONTINUOUS PRN
Status: DISCONTINUED | OUTPATIENT
Start: 2023-08-14 | End: 2023-08-14

## 2023-08-14 RX ORDER — ALPRAZOLAM 0.5 MG/1
0.25 TABLET ORAL
Status: DISCONTINUED | OUTPATIENT
Start: 2023-08-14 | End: 2023-08-15

## 2023-08-14 RX ORDER — ONDANSETRON 2 MG/ML
4 INJECTION INTRAMUSCULAR; INTRAVENOUS EVERY 6 HOURS PRN
Status: DISCONTINUED | OUTPATIENT
Start: 2023-08-14 | End: 2023-08-15 | Stop reason: HOSPADM

## 2023-08-14 RX ORDER — HYDROMORPHONE HCL IN WATER/PF 6 MG/30 ML
0.2 PATIENT CONTROLLED ANALGESIA SYRINGE INTRAVENOUS
Status: COMPLETED | OUTPATIENT
Start: 2023-08-14 | End: 2023-08-14

## 2023-08-14 RX ORDER — DEXAMETHASONE SODIUM PHOSPHATE 10 MG/ML
INJECTION, SOLUTION INTRAMUSCULAR; INTRAVENOUS AS NEEDED
Status: DISCONTINUED | OUTPATIENT
Start: 2023-08-14 | End: 2023-08-14

## 2023-08-14 RX ORDER — FENTANYL CITRATE/PF 50 MCG/ML
25 SYRINGE (ML) INJECTION
Status: DISCONTINUED | OUTPATIENT
Start: 2023-08-14 | End: 2023-08-14 | Stop reason: HOSPADM

## 2023-08-14 RX ORDER — ONDANSETRON 2 MG/ML
4 INJECTION INTRAMUSCULAR; INTRAVENOUS ONCE AS NEEDED
Status: DISCONTINUED | OUTPATIENT
Start: 2023-08-14 | End: 2023-08-14 | Stop reason: HOSPADM

## 2023-08-14 RX ORDER — MIDAZOLAM HYDROCHLORIDE 2 MG/2ML
INJECTION, SOLUTION INTRAMUSCULAR; INTRAVENOUS AS NEEDED
Status: DISCONTINUED | OUTPATIENT
Start: 2023-08-14 | End: 2023-08-14

## 2023-08-14 RX ORDER — DIPHENHYDRAMINE HYDROCHLORIDE 50 MG/ML
12.5 INJECTION INTRAMUSCULAR; INTRAVENOUS ONCE AS NEEDED
Status: DISCONTINUED | OUTPATIENT
Start: 2023-08-14 | End: 2023-08-14 | Stop reason: HOSPADM

## 2023-08-14 RX ORDER — HYDROMORPHONE HYDROCHLORIDE 2 MG/ML
INJECTION, SOLUTION INTRAMUSCULAR; INTRAVENOUS; SUBCUTANEOUS AS NEEDED
Status: DISCONTINUED | OUTPATIENT
Start: 2023-08-14 | End: 2023-08-14

## 2023-08-14 RX ORDER — PROPOFOL 10 MG/ML
INJECTION, EMULSION INTRAVENOUS AS NEEDED
Status: DISCONTINUED | OUTPATIENT
Start: 2023-08-14 | End: 2023-08-14

## 2023-08-14 RX ORDER — CHLORHEXIDINE GLUCONATE 0.12 MG/ML
15 RINSE ORAL ONCE
Status: DISCONTINUED | OUTPATIENT
Start: 2023-08-14 | End: 2023-08-14 | Stop reason: HOSPADM

## 2023-08-14 RX ORDER — ROCURONIUM BROMIDE 10 MG/ML
INJECTION, SOLUTION INTRAVENOUS AS NEEDED
Status: DISCONTINUED | OUTPATIENT
Start: 2023-08-14 | End: 2023-08-14

## 2023-08-14 RX ADMIN — DEXAMETHASONE SODIUM PHOSPHATE 10 MG: 10 INJECTION, SOLUTION INTRAMUSCULAR; INTRAVENOUS at 10:25

## 2023-08-14 RX ADMIN — ONDANSETRON 4 MG: 2 INJECTION INTRAMUSCULAR; INTRAVENOUS at 11:48

## 2023-08-14 RX ADMIN — FENTANYL CITRATE 100 MCG: 50 INJECTION, SOLUTION INTRAMUSCULAR; INTRAVENOUS at 10:38

## 2023-08-14 RX ADMIN — PROTAMINE SULFATE 10 MG: 10 INJECTION, SOLUTION INTRAVENOUS at 11:57

## 2023-08-14 RX ADMIN — PROPOFOL 170 MG: 10 INJECTION, EMULSION INTRAVENOUS at 09:50

## 2023-08-14 RX ADMIN — HYDROMORPHONE HYDROCHLORIDE 0.2 MG: 0.2 INJECTION, SOLUTION INTRAMUSCULAR; INTRAVENOUS; SUBCUTANEOUS at 14:12

## 2023-08-14 RX ADMIN — FENTANYL CITRATE 25 MCG: 50 INJECTION INTRAMUSCULAR; INTRAVENOUS at 13:48

## 2023-08-14 RX ADMIN — HYDROMORPHONE HYDROCHLORIDE 0.2 MG: 0.2 INJECTION, SOLUTION INTRAMUSCULAR; INTRAVENOUS; SUBCUTANEOUS at 15:53

## 2023-08-14 RX ADMIN — FENTANYL CITRATE 100 MCG: 50 INJECTION, SOLUTION INTRAMUSCULAR; INTRAVENOUS at 09:50

## 2023-08-14 RX ADMIN — SODIUM CHLORIDE 10 MCG: 900 INJECTION INTRAVENOUS at 11:48

## 2023-08-14 RX ADMIN — HYDROMORPHONE HYDROCHLORIDE 0.2 MG: 0.2 INJECTION, SOLUTION INTRAMUSCULAR; INTRAVENOUS; SUBCUTANEOUS at 16:35

## 2023-08-14 RX ADMIN — HEPARIN SODIUM 4000 UNITS: 1000 INJECTION INTRAVENOUS; SUBCUTANEOUS at 11:44

## 2023-08-14 RX ADMIN — HYDROMORPHONE HYDROCHLORIDE 0.3 MG: 2 INJECTION, SOLUTION INTRAMUSCULAR; INTRAVENOUS; SUBCUTANEOUS at 12:35

## 2023-08-14 RX ADMIN — FENTANYL CITRATE 25 MCG: 50 INJECTION INTRAMUSCULAR; INTRAVENOUS at 13:59

## 2023-08-14 RX ADMIN — HEPARIN SODIUM 5000 UNITS: 5000 INJECTION INTRAVENOUS; SUBCUTANEOUS at 21:25

## 2023-08-14 RX ADMIN — PROTAMINE SULFATE 40 MG: 10 INJECTION, SOLUTION INTRAVENOUS at 12:01

## 2023-08-14 RX ADMIN — SODIUM CHLORIDE 10 MCG: 900 INJECTION INTRAVENOUS at 10:55

## 2023-08-14 RX ADMIN — MIDAZOLAM 2 MG: 1 INJECTION INTRAMUSCULAR; INTRAVENOUS at 09:41

## 2023-08-14 RX ADMIN — HYDROMORPHONE HYDROCHLORIDE 0.2 MG: 0.2 INJECTION, SOLUTION INTRAMUSCULAR; INTRAVENOUS; SUBCUTANEOUS at 14:19

## 2023-08-14 RX ADMIN — CEFAZOLIN SODIUM 2000 MG: 2 SOLUTION INTRAVENOUS at 10:25

## 2023-08-14 RX ADMIN — LIDOCAINE HYDROCHLORIDE 50 MG: 10 INJECTION, SOLUTION EPIDURAL; INFILTRATION; INTRACAUDAL; PERINEURAL at 09:50

## 2023-08-14 RX ADMIN — Medication 10 MG: at 13:13

## 2023-08-14 RX ADMIN — PROPOFOL 40 MG: 10 INJECTION, EMULSION INTRAVENOUS at 12:49

## 2023-08-14 RX ADMIN — SUGAMMADEX 200 MG: 100 INJECTION, SOLUTION INTRAVENOUS at 12:35

## 2023-08-14 RX ADMIN — HYDRALAZINE HYDROCHLORIDE 5 MG: 20 INJECTION, SOLUTION INTRAMUSCULAR; INTRAVENOUS at 13:37

## 2023-08-14 RX ADMIN — OXYCODONE HYDROCHLORIDE 10 MG: 10 TABLET ORAL at 20:19

## 2023-08-14 RX ADMIN — PROPOFOL 30 MG: 10 INJECTION, EMULSION INTRAVENOUS at 10:38

## 2023-08-14 RX ADMIN — FENTANYL CITRATE 25 MCG: 50 INJECTION INTRAMUSCULAR; INTRAVENOUS at 13:54

## 2023-08-14 RX ADMIN — HYDROMORPHONE HYDROCHLORIDE 0.2 MG: 0.2 INJECTION, SOLUTION INTRAMUSCULAR; INTRAVENOUS; SUBCUTANEOUS at 16:30

## 2023-08-14 RX ADMIN — ROCURONIUM BROMIDE 50 MG: 10 INJECTION, SOLUTION INTRAVENOUS at 09:51

## 2023-08-14 RX ADMIN — HYDROMORPHONE HYDROCHLORIDE 0.2 MG: 0.2 INJECTION, SOLUTION INTRAMUSCULAR; INTRAVENOUS; SUBCUTANEOUS at 16:25

## 2023-08-14 RX ADMIN — HYDROMORPHONE HYDROCHLORIDE 0.2 MG: 0.2 INJECTION, SOLUTION INTRAMUSCULAR; INTRAVENOUS; SUBCUTANEOUS at 16:40

## 2023-08-14 RX ADMIN — HEPARIN SODIUM 9000 UNITS: 1000 INJECTION INTRAVENOUS; SUBCUTANEOUS at 10:56

## 2023-08-14 RX ADMIN — SODIUM CHLORIDE, SODIUM LACTATE, POTASSIUM CHLORIDE, AND CALCIUM CHLORIDE: .6; .31; .03; .02 INJECTION, SOLUTION INTRAVENOUS at 09:41

## 2023-08-14 RX ADMIN — HYDROMORPHONE HYDROCHLORIDE 0.2 MG: 2 INJECTION, SOLUTION INTRAMUSCULAR; INTRAVENOUS; SUBCUTANEOUS at 12:52

## 2023-08-14 RX ADMIN — PROPOFOL 50 MG: 10 INJECTION, EMULSION INTRAVENOUS at 12:15

## 2023-08-14 RX ADMIN — FENTANYL CITRATE 25 MCG: 50 INJECTION INTRAMUSCULAR; INTRAVENOUS at 13:37

## 2023-08-14 NOTE — INTERVAL H&P NOTE
H&P reviewed. After examining the patient I find no changes in the patients condition since the H&P had been written. Vitals:    08/14/23 0800   BP: 148/91   Pulse: 71   Resp: 18   Temp: 97.9 °F (36.6 °C)   SpO2: 97%     We discussed his left common femoral endarterectomy with patch angioplasty. He understands that if there is good inflow to the distal external iliac/common femoral artery, we will not need to do a retrograde arteriogram.  If his inflow is sluggish, we will do the arteriogram with possible angioplasty and or stenting.       Debbie Bob MD

## 2023-08-14 NOTE — DISCHARGE INSTR - OTHER ORDERS
Incisional care:  Wash incision daily w/ soap and water. Pat dry thoroughly.    Place clean, dry gauze to cover groin incision to keep area clean and dry and to prevent skin-skin contact

## 2023-08-14 NOTE — OP NOTE
DATE OF PROCEDURE: 08/14/23    PERFORMING SERVICE: Vascular and Endovascular Surgery    ATTENDING SURGEON: Rosalinda Earl MD    PREOPERATIVE DIAGNOSIS: Left lower extremity critical limb threatening ischemia in the form of rest pain    POSTOPERATIVE DIAGNOSIS: Same    PROCEDURE NAME:   1. Left iliofemoral endarterectomy with patch angioplasty    ANESTHESIA: general    EBL: 50 cc    UOP: 65 cc    IVF: 1500 cc crystalloid    SPECIMENS: None    COMPLICATIONS: None    DRAINS: None    INDICATION: Mr. Phan Flores is a 59-year-old male with extensive surgical history for ureteral stone extractions to include retroperitoneal and transabdominal incisions. He presented to the clinic within the past month with complaints of left lower extremity rest pain, noninvasive arterial studies suggestive of inflow disease. He was agreeable to CTA after prehydration for his chronic kidney disease with elevated creatinine. The CTA evaluation demonstrated occlusion versus near occlusion of the left femoral system. Since that time he had cut back his smoking by one third and continues to attempt to improve. He is maximally medically managed with aspirin and high intensity statin therapy. He has not developed any wounds and in fact, had healed a small abrasion to the dorsum of his left toe upon our first interaction. We discussed his case in the office setting and he underwent informed consent for the above-mentioned procedure. INTRAOPERATIVE FINDINGS: Patency of the profunda femoris and extraction of fresh appearing hemorrhagic plaque of the left common femoral artery. ASSISTING SURGEON: Lele Prince MD    DESCRIPTION OF PROCEDURE:   Informed consent was obtained from the patient prior to proceeding to the operating room. The patient was then identified in the pre-anesthesia area, then taken to the operating room, placed in the supine position on the operating table, and induced under general endotracheal anesthesia. The patient was correctly positioned, padded at all pressure points. The patient was then prepared and draped in a sterile fashion. A pre-operative timeout was performed. Preoperative antibiotics were administered at this time. A vertical incision was made with a #15 blade over the left groin where the left femoral artery was identified using ultrasound guidance. Dissection was carried down through the skin and subcutaneous tissue. The inguinal ligament was identified and subcutaneous tissue was dissected from the inferior edge medially and laterally. The common femoral artery was identified after further dissection and the anterior aspect was cleared of subcutaneous tissue moving from proximal to distal.  The region of vessel caliber change was identified indicating the CFA bifurcation. Circumferential dissection of the distal EIA, CFA, SFA, and profunda down to second order branches was then performed. The vessels were then each encircled with silastic vessel loops. The patient was then systemically heparinized. ACTs were periodically checked and heparin was re-dosed to maintain ACT >250 throughout the case. Vessel loops on the proximal femoral vessels were pulled up and an atraumatic vascular clamp was placed proximally on the left distal external iliac for control. A #11 blade was used to create a vertical arteriotomy on the common femoral artery and this was extended proximally and distally to the level of the bifurcation with De Leon scissors. Endarterectomy of the distal EIA, CFA, proximal SFA, and profunda vessels down to second order branches was then performed with a Penfield elevator, DeBakey, and right angle clamp. The proximal endpoint was transected perpendicular to the vessel wall. The distal endarterectomy endpoints were feathered to a smooth taper with good backbleeding noted from the profunda. Minimal backbleeding was appreciated from the near occluded SFA.   Heparinized saline was used to irrigate the endarterectomy bed and careful attention was paid to remove all free-floating debris from the vessel wall. Several 6-0 Prolene tacking sutures were placed at the superficial femoral artery endpoint as well as the profunda femoris. The endarterectomy was closed with bovine pericardium patch angioplasty and 6-0 running Prolene suture. Prior to completion of the patch, all femoral vessels were back bled and the patch was de-aired. Bleeding from the patch was controlled with interrupted 6-0 prolene sutures. Once the endarterectomy patch was made hemostatic, we then turned our attention to completion of the bypass. After completion of the patch angioplasty, the patient was given protamine. Good hemostasis was achieved in the wound beds. The wounds were closed in multiple layers of 2-0 and 3-0 Monocryl suture, and the skin was closed with 4-0 Monocryl. The incisions were covered with dermabond and primapore dressings.     Dae Morales MD  08/14/23  12:07 PM

## 2023-08-14 NOTE — ANESTHESIA PREPROCEDURE EVALUATION
Procedure:  ENDARTERECTOMY ARTERIAL FEMORAL (Left: Leg Upper)  ARTERIOGRAM (Left: Abdomen)    Relevant Problems   CARDIO   (+) Hyperlipidemia   (+) Hypertension      ENDO   (+) Hypothyroid      /RENAL   (+) Benign hypertension with chronic kidney disease, stage III (HCC)   (+) Bilateral renal cysts   (+) Cystinuria (HCC)   (+) Left renal atrophy   (+) Nephrolithiasis   (+) Stage 3a chronic kidney disease (HCC)      NEURO/PSYCH   (+) Anxiety   (+) Chronic pain   (+) TIA (transient ischemic attack)      PULMONARY   (+) Obstructive sleep apnea        Physical Exam    Airway  Comment: Large sewell  Mallampati score: IV  TM Distance: >3 FB  Neck ROM: full     Dental       Cardiovascular  Cardiovascular exam normal    Pulmonary  Pulmonary exam normal     Other Findings        Anesthesia Plan  ASA Score- 3     Anesthesia Type- general with ASA Monitors. Additional Monitors: arterial line. Airway Plan: ETT. Plan Factors-Exercise tolerance (METS): >4 METS. Chart reviewed. EKG reviewed. Imaging results reviewed. Existing labs reviewed. Patient summary reviewed. Patient is not a current smoker. Patient did not smoke on day of surgery. Obstructive sleep apnea risk education given perioperatively. Induction- intravenous. Postoperative Plan- Plan for postoperative opioid use. Planned trial extubation    Informed Consent- Anesthetic plan and risks discussed with patient. I personally reviewed this patient with the CRNA. Discussed and agreed on the Anesthesia Plan with the CRNA. Lindsay Gamez

## 2023-08-14 NOTE — ANESTHESIA POSTPROCEDURE EVALUATION
Post-Op Assessment Note    CV Status:  Stable  Pain Score: 8    Pain management: inadequate  Multimodal analgesia used between 6 hours prior to anesthesia start to PACU discharge    Mental Status:  Alert and awake   Hydration Status:  Euvolemic   PONV Controlled:  Controlled   Airway Patency:  Patent      Post Op Vitals Reviewed: Yes      Staff: Anesthesiologist   Reason for prolonged intubation > 24 hours:  N/aReason for prolonged intubation > 48 hours:  N/a      No notable events documented.     BP (!) 187/99 (08/14/23 1253)    Temp 97.5 °F (36.4 °C) (08/14/23 1253)    Pulse 93 (08/14/23 1253)   Resp 16 (08/14/23 1253)    SpO2  88%, patient awake and talking clearly, applied non-rebreather mask 10L

## 2023-08-14 NOTE — ANESTHESIA PROCEDURE NOTES
Arterial Line Insertion    Performed by: Jovana Bojorquez MD  Authorized by: Jovana Bojorquez MD  Consent: Verbal consent obtained. Risks and benefits: risks, benefits and alternatives were discussed  Consent given by: patient  Preparation: Patient was prepped and draped in the usual sterile fashion.   Indications: hemodynamic monitoring  Orientation:  Right  Location: radial artery  Procedure Details:  Needle gauge: 20  Seldinger technique: Seldinger technique used  Number of attempts: 1    Post-procedure:  Post-procedure: dressing applied and chlorhexidine patch applied  Waveform: good waveform  Patient tolerance: Patient tolerated the procedure well with no immediate complications

## 2023-08-14 NOTE — DISCHARGE INSTR - AVS FIRST PAGE
DISCHARGE INSTRUCTIONS  LEG/BYPASS SURGERY    ACTIVITY:   Limit your physical activity to walking for the first week and then increase your activity as tolerated. If you become short of breath or tired, stop and rest.  You may require help with walking or feel more secure with something to lean on. Walking up steps and normal activities may be resumed as you feel ready. Most people tire easily for the first few weeks following leg surgery. This improves as conditioning returns. Avoid strenuous activity such as vigorous exercise. Avoid heavy lifting (do not lift more than 15 pounds) for the first four weeks after surgery. You should not drive a car for at least two weeks following discharge from the hospital and you are off all narcotic pain medication. You may ride in a car. DIET:  Resume your normal diet. Good nutrition is important for healing of your incision. If you are discharged on narcotics for pain control, continue taking your stool softeners until you are having regular bowel movements. INCISION:  You should shower daily. Wash incision daily with soap and water, but do not rub or scrub the incision; rinse thoroughly and pat dry. You may have stitches or staples to close your incision and it is okay for these to get wet. Do not bathe in a tub or swim for the first 4 week following surgery or if you have any open wounds. It is normal to have swelling or discoloration around the incision. If increasing redness or pain develops, call our office immediately. Numbness in the region of the incision may occur following the surgery. This normally improves over six to twelve months. You may have surgical glue over your incisions. There are stitches present under the skin which will absorb on their own. The glue is used to cover the access, assist in closure, and prevent contamination. This adhesive will darken and peel away on its own within one to two weeks. Do not pick at it.     If you have a groin wound/incision, place a clean dry piece of gauze to cover your groin incision to keep incision clean and dry and prevent your skin from sticking together. Change gauze daily. You may have staples or stitches at your incisions. These will be removed at your follow-up appointment or when they are ready to come out. If you have a dressing over your surgical site, remove this on the second day after surgery. If you have foot or leg wounds, please follow your podiatrist/wound care doctor's instructions for care. If any of your incisions are open and require dressing changes, you will be given instructions for your daily incision care. If you are not able to change the dressings, a visiting nurse will be arranged. DO NOT put any powders, creams, ointments, or lotions on your incision. LEG SWELLING: Most patients have noticeable leg swelling after leg surgery. This usually improves within a few weeks. If swelling is present, elevate the leg whenever possible. Avoid sitting with the leg hanging down for prolonged periods of time. Walking is beneficial.  An ACE bandage or support stocking may be helpful, but this should be discussed with your physician prior to use if you have a bypass. FOLLOW UP STUDIES:  Doppler ultrasound studies are very important for long-term management. Your surgeon will arrange this at your first postoperative visit. Repeat studies are then scheduled every three months for the first year and periodically after this. FOLLOW UP APPOINTMENTS:  Making and keeping follow up appointments and ultrasound tests are important to your recovery. If you have difficulty making it to or keeping your follow up appointments, call the office. If you have increased pain, fever >101.5, increased drainage, redness or a bad smell at your surgery site, new coldness/numbness of your arm or leg, please call us immediately and GO directly to the ER.     Alpesh cedeño/ CHRISTIE Vyas: 8/30/2023 at 11:30am, [facility] office      PLEASE CALL THE OFFICE IF Rajesh  [phone]  [phone]  [address] [facility], [place] ([place]), [address] [facility]  [address] [facility], [facility], [facility], [address]  [address], [facility], [address] [facility] [address]  [address] [facility], [address] [facility], [place], [address]  1001 [facility],Sixth Floor, 1st Floor, [facility] AN [facility]FFILIATE OF [facility], [address]  [address] [facility] [address], [address], [address], [facility], [address] [facility] [place]  [address], [address] [place] [address] [place] ([place]), [address] [facility]  15[address] [facility], 319 [facility], [address] [place] [address] [address] [place] [address] [facility] [place], [facility]  400 [facility], [address]

## 2023-08-15 ENCOUNTER — TRANSITIONAL CARE MANAGEMENT (OUTPATIENT)
Dept: FAMILY MEDICINE CLINIC | Facility: CLINIC | Age: 63
End: 2023-08-15

## 2023-08-15 ENCOUNTER — DOCUMENTATION (OUTPATIENT)
Dept: VASCULAR SURGERY | Facility: CLINIC | Age: 63
End: 2023-08-15

## 2023-08-15 VITALS
HEART RATE: 73 BPM | HEIGHT: 72 IN | BODY MASS INDEX: 26.41 KG/M2 | WEIGHT: 195 LBS | RESPIRATION RATE: 18 BRPM | DIASTOLIC BLOOD PRESSURE: 77 MMHG | TEMPERATURE: 98 F | SYSTOLIC BLOOD PRESSURE: 158 MMHG | OXYGEN SATURATION: 96 %

## 2023-08-15 LAB
ABO GROUP BLD BPU: NORMAL
ABO GROUP BLD BPU: NORMAL
ANION GAP SERPL CALCULATED.3IONS-SCNC: 7 MMOL/L
BPU ID: NORMAL
BPU ID: NORMAL
BUN SERPL-MCNC: 20 MG/DL (ref 5–25)
CALCIUM SERPL-MCNC: 8.6 MG/DL (ref 8.3–10.1)
CHLORIDE SERPL-SCNC: 107 MMOL/L (ref 96–108)
CO2 SERPL-SCNC: 24 MMOL/L (ref 21–32)
CREAT SERPL-MCNC: 1.24 MG/DL (ref 0.6–1.3)
CROSSMATCH: NORMAL
CROSSMATCH: NORMAL
ERYTHROCYTE [DISTWIDTH] IN BLOOD BY AUTOMATED COUNT: 12.2 % (ref 11.6–15.1)
GFR SERPL CREATININE-BSD FRML MDRD: 61 ML/MIN/1.73SQ M
GLUCOSE SERPL-MCNC: 106 MG/DL (ref 65–140)
HCT VFR BLD AUTO: 39.2 % (ref 36.5–49.3)
HGB BLD-MCNC: 13.2 G/DL (ref 12–17)
MCH RBC QN AUTO: 31 PG (ref 26.8–34.3)
MCHC RBC AUTO-ENTMCNC: 33.7 G/DL (ref 31.4–37.4)
MCV RBC AUTO: 92 FL (ref 82–98)
PLATELET # BLD AUTO: 342 THOUSANDS/UL (ref 149–390)
PMV BLD AUTO: 11.6 FL (ref 8.9–12.7)
POTASSIUM SERPL-SCNC: 4.2 MMOL/L (ref 3.5–5.3)
RBC # BLD AUTO: 4.26 MILLION/UL (ref 3.88–5.62)
SODIUM SERPL-SCNC: 138 MMOL/L (ref 135–147)
UNIT DISPENSE STATUS: NORMAL
UNIT DISPENSE STATUS: NORMAL
UNIT PRODUCT CODE: NORMAL
UNIT PRODUCT CODE: NORMAL
UNIT PRODUCT VOLUME: 350 ML
UNIT PRODUCT VOLUME: 350 ML
UNIT RH: NORMAL
UNIT RH: NORMAL
WBC # BLD AUTO: 17.22 THOUSAND/UL (ref 4.31–10.16)

## 2023-08-15 PROCEDURE — 99024 POSTOP FOLLOW-UP VISIT: CPT | Performed by: PHYSICIAN ASSISTANT

## 2023-08-15 PROCEDURE — 99024 POSTOP FOLLOW-UP VISIT: CPT | Performed by: SURGERY

## 2023-08-15 PROCEDURE — 85027 COMPLETE CBC AUTOMATED: CPT | Performed by: STUDENT IN AN ORGANIZED HEALTH CARE EDUCATION/TRAINING PROGRAM

## 2023-08-15 PROCEDURE — 80048 BASIC METABOLIC PNL TOTAL CA: CPT | Performed by: STUDENT IN AN ORGANIZED HEALTH CARE EDUCATION/TRAINING PROGRAM

## 2023-08-15 RX ORDER — ALPRAZOLAM 0.5 MG/1
0.5 TABLET ORAL
Status: DISCONTINUED | OUTPATIENT
Start: 2023-08-15 | End: 2023-08-15 | Stop reason: HOSPADM

## 2023-08-15 RX ORDER — OXYCODONE HYDROCHLORIDE 5 MG/1
5 TABLET ORAL EVERY 4 HOURS PRN
Qty: 10 TABLET | Refills: 0 | Status: SHIPPED | OUTPATIENT
Start: 2023-08-15 | End: 2023-08-18 | Stop reason: ALTCHOICE

## 2023-08-15 RX ADMIN — ASPIRIN 81 MG CHEWABLE TABLET 81 MG: 81 TABLET CHEWABLE at 08:25

## 2023-08-15 RX ADMIN — OXYCODONE HYDROCHLORIDE 10 MG: 10 TABLET ORAL at 06:22

## 2023-08-15 RX ADMIN — OXYCODONE HYDROCHLORIDE 10 MG: 10 TABLET ORAL at 11:53

## 2023-08-15 RX ADMIN — HEPARIN SODIUM 5000 UNITS: 5000 INJECTION INTRAVENOUS; SUBCUTANEOUS at 04:30

## 2023-08-15 RX ADMIN — SENNOSIDES 8.6 MG: 8.6 TABLET, FILM COATED ORAL at 08:25

## 2023-08-15 RX ADMIN — OXYCODONE HYDROCHLORIDE 10 MG: 10 TABLET ORAL at 02:16

## 2023-08-15 RX ADMIN — ALPRAZOLAM 0.5 MG: 0.5 TABLET ORAL at 01:03

## 2023-08-15 RX ADMIN — ACETAMINOPHEN 975 MG: 325 TABLET, FILM COATED ORAL at 05:23

## 2023-08-15 NOTE — DISCHARGE SUMMARY
[address] [facility] 1960, 58 y.o. male MRN: [id number]  Unit/Bed#: [facility] 521-01 Encounter: [id number]  Primary Care Provider: jN Kaba MD   Date and time admitted to hospital: 8/14/2023  6:51 AM    * PAD (peripheral artery disease) [facility])  Assessment & Plan  58 y.o. male with LLE rest pain and CTA showing left CFA stenosis with near occlusion/occlusion of SFA     s/p left common femoral endarterectomy 8/14    Plan:  Continue ASA, Statin  Outpatient follow-up in the Vascular Center        Secondary Diagnosis:  Past Medical History:   Diagnosis Date   • Acute intractable headache 11/03/2021   • Acute maxillary sinusitis     13 dec 2012   • ROSENDO (acute kidney injury) ([address]) 06/08/2018 2018. • Bone spur     toe   • BPPV (benign paroxysmal positional vertigo) 10/24/2021   • Hyperlipidemia    • Hypertension    • Kidney stone    • PVD (peripheral vascular disease) ([address])      Past Surgical History:   Procedure Laterality Date   • APPENDECTOMY     • KIDNEY STONE SURGERY      multiple surgery in the past   • RETINAL DETACHMENT SURGERY     • SEPTOPLASTY     • TONSILLECTOMY  2000    meg Vidal        Admitting Diagnosis: Atherosclerosis of native arteries of extremities with rest pain, left leg ([address]) [I70.222]    Attending: Dr. Sebastián Felix    Procedures Performed: s/p left common femoral endarterectomy 8/14    Discharge Medications:  See after visit summary for reconciled discharge medications provided to patient and family. Hospital Course: 59-year-old male admitted electively to [facility] for treatment of left lower extremity rest pain and known left CFA stenosis. He underwent left common femoral endarterectomy on 8/14/2023. He tolerated surgery well. Following surgery was transferred to postanesthesia care unit where he recovered briefly and subsequently to stepdown unit for monitoring overnight.   His postoperative course was uncomplicated. On postoperative day #1 he was hemodynamically stable and neurovascular exam was intact. He was able to tolerate a diet, ambulate and void without difficulty. His pain was well controlled. He was deemed appropriate for discharge home in the care of his family. He will continue aspirin and statin therapy. Outpatient follow-up in the vascular center was arranged prior to his discharge. Condition at Discharge: stable     Provisions for Follow-Up Care:  See after visit summary for information related to follow-up care and any pertinent home health orders. Disposition: Home    Discharge instructions/Information to patient and family:   See after visit summary for information provided to patient and family. Planned Readmission: No    Discharge Statement   I spent 30 minutes discharging the patient. This time was spent on the day of discharge. I had direct contact with the patient on the day of discharge. Additional documentation is required if more than 30 minutes were spent on discharge.

## 2023-08-15 NOTE — PROGRESS NOTES
Progress Note - Vascular Surgery   Antoni Witt 58 y.o. male MRN: [id number]  Unit/Bed#: [facility] 521-01 Encounter: [id number]    Assessment:  Antoni Witt is a 58 y.o. male with LLE rest pain and CTA showing left CFA stenosis with near occlusion/occlusion of SFA s/p left common femoral endarterectomy on 8/14. Plan:  • Patient doing well post-operatively, no issues currently, plan for discharge to home if continues progress well   • PRN pain medication and anti-emetics  • Encourage ambulation  • DVT ppx: heparin  • Cont ASA/statin   • Incentive spirometry 10 times/hour while awake  • Continue home medications as prescribed     Subjective/Objective    Subjective: No acute events overnight, vitals acceptable, pain well controlled, UO appropriate, tolerating diet, denies fevers, chills, CP, SOB, abd pain, paresthesias, or motor/sensory dysfunction. Objective:     Blood pressure 143/80, pulse 70, temperature 97.8 °F (36.6 °C), temperature source Oral, resp. rate 18, height 6' (1.829 m), weight 88.5 kg (195 lb), SpO2 95 %. ,Body mass index is 26.45 kg/m². Intake/Output Summary (Last 24 hours) at 8/15/2023 0703  Last data filed at 8/15/2023 0640  Gross per 24 hour   Intake 1740 ml   Output 2585 ml   Net -845 ml       Invasive Devices     Peripheral Intravenous Line  Duration           Peripheral IV 08/14/23 Left Hand <1 day          Arterial Line  Duration           Arterial Line 08/14/23 Right Radial <1 day                Physical Exam:   GEN: NAD  HEENT: NCAT  CV: RRR  Lung: Normal effort  Ab: Soft, NT/ND  Extrem: left groin soft, palp fem pulse, incision C/D/I, dopplerable biphasic DP/PT to LLE, NVI   Neuro: A+Ox3     Lab, Imaging and other studies:I have personally reviewed pertinent lab results.      VTE Pharmacologic Prophylaxis: Heparin  VTE Mechanical Prophylaxis: sequential compression device    Recent Results (from the past 36 hour(s))   Type and screen    Collection Time: 08/14/23  9:12 AM Result Value Ref Range    ABO Grouping B     Rh Factor Positive     Antibody Screen Negative     Specimen Expiration Date [id number]    [facility] Recheck - Contact Blood Bank Prior to Collection    Collection Time: 08/14/23  9:14 AM   Result Value Ref Range    ABO Grouping B     Rh Factor Positive    Prepare Leukoreduced RBC: 2 Units    Collection Time: 08/14/23 10:31 AM   Result Value Ref Range    Unit Product Code L2379N25     Unit Number [id number]     Unit ABO B     Unit RH POS     Crossmatch Compatible     Unit Dispense Status Crossmatched     Unit Product Volume 350 mL    Unit Product Code R2531J16     Unit Number [id number]     Unit ABO B     Unit RH POS     Crossmatch Compatible     Unit Dispense Status Crossmatched     Unit Product Volume 350 mL   POCT activated clotting time    Collection Time: 08/14/23 11:06 AM   Result Value Ref Range    Activated Clotting Time, i-STAT 279 (H) 89 - 137 sec    Specimen Type ARTERIAL    POCT activated clotting time    Collection Time: 08/14/23 11:40 AM   Result Value Ref Range    Activated Clotting Time, i-STAT 233 (H) 89 - 137 sec    Specimen Type ARTERIAL    Fingerstick Glucose (POCT)    Collection Time: 08/14/23  1:47 PM   Result Value Ref Range    POC Glucose 125 65 - 140 mg/dl   Basic Metabolic Panel    Collection Time: 08/15/23  4:33 AM   Result Value Ref Range    Sodium 138 135 - 147 mmol/L    Potassium 4.2 3.5 - 5.3 mmol/L    Chloride 107 96 - 108 mmol/L    CO2 24 21 - 32 mmol/L    ANION GAP 7 mmol/L    BUN 20 5 - 25 mg/dL    Creatinine 1.24 0.60 - 1.30 mg/dL    Glucose 106 65 - 140 mg/dL    Calcium 8.6 8.3 - 10.1 mg/dL    eGFR 61 ml/min/1.73sq m   CBC and Platelet    Collection Time: 08/15/23  4:33 AM   Result Value Ref Range    WBC 17.22 (H) 4.31 - 10.16 Thousand/uL    RBC 4.26 3.88 - 5.62 Million/uL    Hemoglobin 13.2 12.0 - 17.0 g/dL    Hematocrit 39.2 36.5 - 49.3 %    MCV 92 82 - 98 fL    MCH 31.0 26.8 - 34.3 pg    MCHC 33.7 31.4 - 37.4 g/dL    RDW 12.2 11.6 - 15.1 %    Platelets 833 890 - 050 Thousands/uL    MPV 11.6 8.9 - 12.7 fL

## 2023-08-15 NOTE — ASSESSMENT & PLAN NOTE
58 y.o. male with LLE rest pain and CTA showing left CFA stenosis with near occlusion/occlusion of SFA     s/p left common femoral endarterectomy 8/14    Plan:  Continue ASA, Statin  Outpatient follow-up in the Vascular Center

## 2023-08-15 NOTE — RESTORATIVE TECHNICIAN NOTE
Restorative Technician Note      Patient Name: Fanny Lilly     Note Type: Mobility  Patient Position Upon Consult: Supine  Activity Performed: Ambulated  Assistive Device: Cane  Patient Position at End of Consult: All needs within reach;  Supine

## 2023-08-15 NOTE — PROGRESS NOTES
Vascular Nurse Navigator Post Op Education    Met with patient at bedside to introduce myself as Vascular Nurse Navigator and explained my role. Patient is appropriate and accepting to education. Patient was educated with Review of written materials provided, Teachback, Explanation, Demonstration and Question & Answer on expectations of post op care and recovery on left common femoral endarterectomy. Patient is a smoker  (1/2 ppd x 50 yrs), as such Smoking effects on the lungs, tobacco triggers and Smoking cessation was reviewed. Education provided to patient on infection prevention, activity limitations, when to call the office, importance of follow up, and incisional care. Discharge instruction handout provided to patient to review. Provided patient with a pack of disposable washcloths, a pack of guaze, and a bottle of chlorhexidine for incision care upon discharge.

## 2023-08-15 NOTE — QUICK NOTE
Post Op Check Note - Surgery Resident  Fernando Boland 58 y.o. male MRN: [id number]  Unit/Bed#: [facility] 521-01 Encounter: [id number]    ASSESSMENT:  Fernando Boland is a 58 y.o. male who is status post Left distal EIA, CFA, proximal SFA and profunda endarterectomy. Subjective: Patient was seen and evaluated at bedside. Reported pain which was controlled with medication. Denies any progressing pain down the left leg. Physical Exam:  GEN: NAD  CV: RRR  Lung: Normal effort  Ab: Soft, NT/ND  Neuro: A+Ox3  Incisions: Clean, dry and intact. Groin is soft. Pulses: Lt; Dop Dp/PT Rt: Dop Dp/PT    Blood pressure 149/77, pulse 73, temperature 98 °F (36.7 °C), temperature source Oral, resp. rate 18, height 6' (1.829 m), weight 88.5 kg (195 lb), SpO2 96 %. ,Body mass index is 26.45 kg/m².       Intake/Output Summary (Last 24 hours) at 8/14/2023 2348  Last data filed at 8/14/2023 2301  Gross per 24 hour   Intake 1500 ml   Output 1685 ml   Net -185 ml       Invasive Devices     Peripheral Intravenous Line  Duration           Peripheral IV 08/14/23 Left Hand <1 day          Arterial Line  Duration           Arterial Line 08/14/23 Right Radial <1 day                VTE Pharmacologic Prophylaxis: Heparin

## 2023-08-15 NOTE — NURSING NOTE
Patient given discharge information. Patient was not fully interested in teaching. Patient packed all belongings.

## 2023-08-15 NOTE — UTILIZATION REVIEW
Initial Clinical Review    Elective Inpatient surgical procedure  Age/Sex: 58 y.o. male  Surgery Date: 08/14/23  Procedure:  Left iliofemoral endarterectomy with patch angioplasty  Anesthesia: general  Operative Findings: Patency of the profunda femoris and extraction of fresh appearing hemorrhagic plaque of the left common femoral artery. POD#1 Progress Note: Pt s/p left common femoral endarterectomy on 8/14. Doing well post op. Pain well controlled, UO appropriate, tolerating diet. Denies fevers, chills, CP, SOB, abd pain, paresthesias, or motor/sensory dysfunction. Plan: PRN pain medication and anti-emetics. Encourage ambulation. DVT ppx: heparin. Cont ASA/statin. Incentive spirometry 10 times/hour while awake. Continue home medications as prescribed. plan for discharge to home if continues progress well.     Admission Orders: Date/Time/Statement:   Admission Orders (From admission, onward)     Ordered        08/14/23 1251  Inpatient Admission  Once                      Orders Placed This Encounter   Procedures   • Inpatient Admission     Standing Status:   Standing     Number of Occurrences:   1     Order Specific Question:   Level of Care     Answer:   Level 1 Stepdown [13]     Order Specific Question:   Estimated length of stay     Answer:   Inpatient Only Surgery     Vital Signs: /80 (BP Location: Right arm)   Pulse 70   Temp 97.8 °F (36.6 °C) (Oral)   Resp 18   Ht 6' (1.829 m)   Wt 88.5 kg (195 lb)   SpO2 95%   BMI 26.45 kg/m²     Pertinent Labs/Diagnostic Test Results:   No orders to display         Results from last 7 days   Lab Units 08/15/23  0433   WBC Thousand/uL 17.22*   HEMOGLOBIN g/dL 13.2   HEMATOCRIT % 39.2   PLATELETS Thousands/uL 342         Results from last 7 days   Lab Units 08/15/23  0433   SODIUM mmol/L 138   POTASSIUM mmol/L 4.2   CHLORIDE mmol/L 107   CO2 mmol/L 24   ANION GAP mmol/L 7   BUN mg/dL 20   CREATININE mg/dL 1.24   EGFR ml/min/1.73sq m 61   CALCIUM mg/dL 8.6 Results from last 7 days   Lab Units 08/14/23  1347   POC GLUCOSE mg/dl 125     Results from last 7 days   Lab Units 08/15/23  0433   GLUCOSE RANDOM mg/dL 106           Results from last 7 days   Lab Units 08/14/23  1031   UNIT PRODUCT CODE  [id number]  [id number]   UNIT NUMBER  [id number]  [id number]   UNITABO  B  B   UNITRH  POS  POS   CROSSMATCH  Compatible  Compatible   UNIT DISPENSE STATUS  Crossmatched  Crossmatched   UNIT PRODUCT VOL mL 350  350         Diet: Regular House  Mobility: Up as tolerated  DVT Prophylaxis: SCD, Heparin SC    Medications/Pain Control:   Scheduled Medications:  acetaminophen, 975 mg, Oral, Q8H CHEL  aspirin, 81 mg, Oral, Daily  atorvastatin, 80 mg, Oral, Daily With Dinner  gabapentin, 300 mg, Oral, HS  heparin (porcine), 5,000 Units, Subcutaneous, Q8H [address]  melatonin, 9 mg, Oral, HS  senna, 1 tablet, Oral, Daily  traZODone, 100 mg, Oral, HS      Continuous IV Infusions:     PRN Meds:  ALPRAZolam, 0.5 mg, Oral, HS PRN 08/15 x 1  HYDROmorphone, 0.5 mg, Intravenous, Q4H PRN  lactated ringers, 500 mL, Intravenous, Once PRN   And  lactated ringers, 500 mL, Intravenous, Once PRN  ondansetron, 4 mg, Intravenous, Q6H PRN  oxyCODONE, 10 mg, Oral, Q4H PRN 08/14 x 1, 08/15 x 2  oxyCODONE, 5 mg, Oral, Q4H PRN  sodium chloride, 500 mL, Intravenous, Once PRN   And  sodium chloride, 500 mL, Intravenous, Once PRN        Network Utilization Review Department  ATTENTION: Please call with any questions or concerns to [phone] and carefully listen to the prompts so that you are directed to the right person. All voicemails are confidential.  Tori April all requests for admission clinical reviews, approved or denied determinations and any other requests to dedicated fax number below belonging to the campus where the patient is receiving treatment.  List of dedicated fax numbers for the Facilities:  FACILITY NAME UR FAX NUMBER   ADMISSION DENIALS (Administrative/Medical Necessity) [address] (Maternity/NICU/Pediatrics) [address] [facility] [phone] 1505 [facility] [address] [address] [facility] [address] [facility] [address] [facility] [address] [place] [address] [facility] [phone]

## 2023-08-16 NOTE — UTILIZATION REVIEW
NOTIFICATION OF ADMISSION DISCHARGE   This is a Notification of Discharge from [facility]. Please be advised that this patient has been discharge from our facility. Below you will find the admission and discharge date and time including the patient’s disposition. UTILIZATION REVIEW CONTACT:  Milan Vang  Utilization   Network Utilization Review Department  Phone: [phone] x carefully listen to the prompts. All voicemails are confidential.  Email: [email or web address]     ADMISSION INFORMATION  PRESENTATION DATE: 8/14/2023  6:51 AM  OBERVATION ADMISSION DATE:   INPATIENT ADMISSION DATE: 8/14/23 12:51 PM   DISCHARGE DATE: 8/15/2023 12:54 PM   DISPOSITION:Home/Self Care    IMPORTANT INFORMATION:  Send all requests for admission clinical reviews, approved or denied determinations and any other requests to dedicated fax number below belonging to the campus where the patient is receiving treatment.  List of dedicated fax numbers:  [place] DENIALS (Administrative/Medical Necessity) [phone] [address] [facility] (Maternity/NICU/Pediatrics) [phone]   [facility] [phone]   [facility] [phone] 16[address] [facility] [phone]   [address] [place] [address] [phone]   [facility] [phone]   [address] [place] [address] [phone] [address] [facility] [phone]   [address] [facility] [phone] 27[address] [facility] [address] [phone]

## 2023-08-17 ENCOUNTER — TELEPHONE (OUTPATIENT)
Dept: VASCULAR SURGERY | Facility: CLINIC | Age: 63
End: 2023-08-17

## 2023-08-17 NOTE — TELEPHONE ENCOUNTER
Vascular Nurse Navigator Post Op Call    Procedure: 1. Left iliofemoral endarterectomy with patch angioplasty    Date of Procedure: 8/14/23    Surgeon: MD Altagracia Landa MD    Discharge Date: 8/15/23    Discharge Disposition: Home    Leg Weakness?: No    Leg Swelling?: No    Leg Numbness?: No    Chest Pain?: No    Shortness of Breath?: No    Orthopnea?: No    Anticoagulation pt was discharged on post op?: Aspirin    Statin pt was discharged on post op?:   Rosuvastatin (Crestor)    Bleeding?: No    Uncontrolled Pain?: No    Incision Concerns?: No    Fever or Chills?: No      Reviewed discharge instructions and incision care with patient. NEXT OFFICE VISIT SCHEDULED:  8/31/23 at 4:30 pm with CHRISTIE Goins at [facility] Confirmed?: Yes      Any further questions/concerns? Patient stated that he is doing good since discharge. He stated that he is having some incisional pain and it taking pain medication with relief. He stated that he has some numbness in his foot, but this is improving. Reviewed incision care with him - wash daily with soap and water. Reviewed discharge medications - Aspirin. All questions answered. No concerns expressed at this time.

## 2023-08-18 ENCOUNTER — TELEPHONE (OUTPATIENT)
Dept: VASCULAR SURGERY | Facility: CLINIC | Age: 63
End: 2023-08-18

## 2023-08-18 DIAGNOSIS — I70.222 ATHEROSCLEROSIS OF NATIVE ARTERIES OF EXTREMITIES WITH REST PAIN, LEFT LEG (HCC): Primary | ICD-10-CM

## 2023-08-18 RX ORDER — OXYCODONE HYDROCHLORIDE 5 MG/1
5 TABLET ORAL EVERY 6 HOURS PRN
Qty: 4 TABLET | Refills: 0 | Status: SHIPPED | OUTPATIENT
Start: 2023-08-18

## 2023-08-18 NOTE — TELEPHONE ENCOUNTER
Will send additional short course of Oxycodone (#4 tabs) to requested pharmacy. PDMP reviewed. Recommend to use for severe pain or q HS only and otherwise should be utilizing Tylenol ES (#2 tabs) for pain.

## 2023-08-18 NOTE — TELEPHONE ENCOUNTER
Pt s/p ENDARTERECTOMY ARTERIAL FEMORAL WITH TISSUES PATCH AGIOPLASTY (Left: Leg Upper) by Dr. Marvell Dakins on 8/14/23. Received a call from pt. He is requesting a refill of oxycodone. Stated that at the present time he averages 3 tabs/day.  He is requesting the script [facility] to [facility] [address] [facility]. Routed to triage to review refill request.

## 2023-08-18 NOTE — TELEPHONE ENCOUNTER
Pt stated that he has been taking Tylenol 650 mg BID in addition to the oxycodone. Pt then stated, "Check with Dr. Rosalinda Paulson.  He knows I will need something stronger for my pain."

## 2023-08-31 ENCOUNTER — OFFICE VISIT (OUTPATIENT)
Dept: VASCULAR SURGERY | Facility: CLINIC | Age: 63
End: 2023-08-31

## 2023-08-31 VITALS
WEIGHT: 193 LBS | OXYGEN SATURATION: 97 % | SYSTOLIC BLOOD PRESSURE: 130 MMHG | HEART RATE: 71 BPM | HEIGHT: 72 IN | BODY MASS INDEX: 26.14 KG/M2 | DIASTOLIC BLOOD PRESSURE: 82 MMHG

## 2023-08-31 DIAGNOSIS — I70.222 ATHEROSCLEROSIS OF NATIVE ARTERIES OF EXTREMITIES WITH REST PAIN, LEFT LEG (HCC): Primary | ICD-10-CM

## 2023-08-31 DIAGNOSIS — I73.9 PAD (PERIPHERAL ARTERY DISEASE) (HCC): ICD-10-CM

## 2023-08-31 PROCEDURE — 99024 POSTOP FOLLOW-UP VISIT: CPT | Performed by: NURSE PRACTITIONER

## 2023-08-31 RX ORDER — OXYCODONE HYDROCHLORIDE 5 MG/1
5 TABLET ORAL EVERY 6 HOURS PRN
Qty: 4 TABLET | Refills: 0 | Status: SHIPPED | OUTPATIENT
Start: 2023-08-31

## 2023-08-31 NOTE — ASSESSMENT & PLAN NOTE
57yo male with PMHX ROSEMARIE, HTN, carotid stenosis, TIA, CKD stage 3, HLD. Pt returns to the office for post-op eval s/p left CFA 8/14/23 . Pt reports he continues to have intermittent L groin pain that he describes as burning that is not controlled by OTC pain medications. RX for pain medication provided today, informed pt that if he continues to have pain after this point pain management referral will be made to manage. Reports claudication symptoms in b/l feet with 20 feet, denies rest pain, no wounds/ tissue loss. Surgical incision at L groin site appears to be healing, well approximated, no redness, no swelling, no drainage, surgical glue remains in place. See clinical photo. Denies fever/ chills. Pt reports he washes the L groin incision site daily. Discussed with pt the importance of daily surgical incisional care by washing incision sites daily with soap and water, pat dry, cover with betadine and clean, dry dressing. Pt verbalized understanding. Recommendations  -Complete LEAD in 3 months and return to the office for review.  -Continue to take aspirin daily.  -Continue to take rosuvastatin daily as per PCP. -Continue to wash L groin surgical site daily with soap and water, pat dry. Do not remove surgical glue.  -Call the office with any increase in incisional pain, drainage, swelling or redness surrounding the site.   -Smoking cessation.   -Will d/w  if he would like sooner f/u OV.

## 2023-08-31 NOTE — PROGRESS NOTES
Assessment/Plan:    Atherosclerosis of native arteries of extremities with rest pain, left leg ([address])  57yo male with PMHX ROSEMARIE, HTN, carotid stenosis, TIA, CKD stage 3, HLD. Pt returns to the office for post-op eval s/p left CFA 8/14/23 . Pt reports he continues to have intermittent L groin pain that he describes as burning that is not controlled by OTC pain medications. RX for pain medication provided today, informed pt that if he continues to have pain after this point pain management referral will be made to manage. Reports claudication symptoms in b/l feet with 20 feet, denies rest pain, no wounds/ tissue loss. Surgical incision at L groin site appears to be healing, well approximated, no redness, no swelling, no drainage, surgical glue remains in place. See clinical photo. Denies fever/ chills. Pt reports he washes the L groin incision site daily. Discussed with pt the importance of daily surgical incisional care by washing incision sites daily with soap and water, pat dry, cover with betadine and clean, dry dressing. Pt verbalized understanding. Recommendations  -Complete LEAD in 3 months and return to the office for review.  -Continue to take aspirin daily.  -Continue to take rosuvastatin daily as per PCP. -Continue to wash L groin surgical site daily with soap and water, pat dry. Do not remove surgical glue.  -Call the office with any increase in incisional pain, drainage, swelling or redness surrounding the site.   -Smoking cessation.   -Will d/w  if he would like sooner f/u OV. Diagnoses and all orders for this visit:    Atherosclerosis of native arteries of extremities with rest pain, left leg (HCC)  -     VAS lower limb arterial duplex, complete bilateral; Future  -     oxyCODONE (Roxicodone) 5 immediate release tablet;  Take 1 tablet (5 mg total) by mouth every 6 (six) hours as needed for moderate pain or severe pain Max Daily Amount: 20 mg    PAD (peripheral artery disease) ([facility])  -     VAS lower limb arterial duplex, complete bilateral; Future          Subjective:      Patient ID: Yaneli Randle is a 58 y.o. male. Patient presents today as a post-op follow up. Patient is s/p L fem endarterectomy bypass done 8/14/2023. Left groin incision looks clean and dry. Patient c/o pain on the inner thigh of left leg and pain in left arm with some residual bruising. Patient denies fever, chills, numbness, any wounds. Patient takes ASA 81mg and Rosuvastatin. Patient is a current smoker. 57yo male with PMHX ROSEMARIE, HTN, carotid stenosis, TIA, CKD stage 3, HLD. Pt returns to the office for post-op eval s/p left CFA 8/14/23 . Reports that his rest pain has been alleviated however he reports he has burning in his feet and ankles after walking about 20 feet. Reports that the burning in his b/l legs continues after he stops walking and he must  his feet off the ground to alleviate his pain symptoms. He reports numbness and tingling at all times. Denies rest pain, denies wounds/ tissue loss.    -Pt presents with multiple concerns about his procedure post operatively. He states he had several IV sites post-operatively in his arms and now presents healing bruise at L forearm site. No erythremia at the site, no tenderness. He reports he had some bruising in his left groin/ testical after the procedure with pain and tenderness that has since healed. He also had some burning at the kirby cathter site and was concerned as to why the area was dry. He states he is increasing his endurance and walking. The following portions of the patient's history were reviewed and updated as appropriate: allergies, current medications, past family history, past medical history, past social history, past surgical history and problem list.    Review of Systems   Constitutional: Negative. HENT: Negative. Eyes: Negative. Respiratory: Negative.   Negative for shortness of breath. Cardiovascular: Negative. Negative for chest pain. Endocrine: Negative. Genitourinary: Negative. Musculoskeletal: Negative. Skin: Negative. Allergic/Immunologic: Negative. Neurological: Negative. Hematological: Bruises/bleeds easily. Psychiatric/Behavioral: Negative. Objective:      /82 (BP Location: Right arm, Patient Position: Sitting, Cuff Size: Adult)   Pulse 71   Ht 6' (1.829 m)   Wt 87.5 kg (193 lb)   SpO2 97%   BMI 26.18 kg/m²          Physical Exam  Vitals and nursing note reviewed. Constitutional:       Appearance: Normal appearance. HENT:      Head: Normocephalic and atraumatic. Cardiovascular:      Rate and Rhythm: Normal rate and regular rhythm. Pulses:           Dorsalis pedis pulses are 0 on the right side and 0 on the left side. Posterior tibial pulses are 0 on the right side and 0 on the left side. Pulmonary:      Effort: Pulmonary effort is normal.      Breath sounds: Normal breath sounds. Musculoskeletal:      Right lower leg: No edema. Left lower leg: No edema. Skin:     General: Skin is dry. Neurological:      Mental Status: He is alert. Sensory: Sensory deficit present. Psychiatric:         Mood and Affect: Mood normal.         Behavior: Behavior normal.           I have reviewed and made appropriate changes to the review of systems input by the medical assistant.         Vitals:    08/31/23 1553   BP: 130/82   BP Location: Right arm   Patient Position: Sitting   Cuff Size: Adult   Pulse: 71   SpO2: 97%   Weight: 87.5 kg (193 lb)   Height: 6' (1.829 m)       Patient Active Problem List   Diagnosis   • Anxiety   • Insomnia   • Abnormal electrocardiogram   • Allergic rhinitis   • Chronic pain   • Cystinuria [facility])   • Erectile dysfunction   • Hyperlipidemia   • Hypertension   • Leucocytosis   • Nephrolithiasis   • Obstructive sleep apnea   • Retinal detachment   • Tobacco abuse   • Hypothyroid   • Foreign body of left ear   • Dysfunction of both eustachian tubes   • S/P cataract surgery   • Abnormal computed tomography angiography (CTA)   • Cavernous angioma   • Carotid stenosis   • TIA (transient ischemic attack)   • Hearing loss   • Cataract   • Hyperglycemia   • Ankle pain   • PAD (peripheral artery disease) (HCC)   • Hip pain   • Cellulitis of toe of left foot   • Foot pain, left   • Stage 3a chronic kidney disease (HCC)   • Benign hypertension with chronic kidney disease, stage III (HCC)   • Left renal atrophy   • Bilateral renal cysts   • Atherosclerosis of native arteries of extremities with rest pain, left leg ([address])       Past Surgical History:   Procedure Laterality Date   • APPENDECTOMY     • KIDNEY STONE SURGERY      multiple surgery in the past   • [place] TEAEC W/WO PATCH GRAFT COMMON FEMORAL Left 8/14/2023    Procedure: ENDARTERECTOMY ARTERIAL FEMORAL WITH TISSUES PATCH AGIOPLASTY;  Surgeon: Marzena Kumar MD;  [facility]: [facility];  Service: Vascular   • RETINAL DETACHMENT SURGERY     • SEPTOPLASTY     • TONSILLECTOMY  2000    meg Vidal       Family History   Problem Relation Age of Onset   • Stroke Mother    • No Known Problems Father        Social History     Socioeconomic History   • Marital status: /Civil Union     Spouse name: Not on file   • Number of children: Not on file   • Years of education: Not on file   • Highest education level: Not on file   Occupational History   • Occupation: attendant for [employer]     Comment: bus maintenance (/)   Tobacco Use   • Smoking status: Every Day     Packs/day: 1.00     Years: 40.00     Total pack years: 40.00     Types: Cigarettes   • Smokeless tobacco: Never   • Tobacco comments:     exposure to 2nd hand smoke   Vaping Use   • Vaping Use: Never used   Substance and Sexual Activity   • Alcohol use:  Yes     Alcohol/week: 0.0 standard drinks of alcohol     Comment: social   • Drug use: No   • Sexual activity: Yes     Partners: Female   Other Topics Concern   • Not on file   Social History Narrative    Consumes 1 gallon of ice tea and 1-2 cups of coffee per day     Social Determinants of Health     Financial Resource Strain: Not on file   Food Insecurity: Not on file   Transportation Needs: Not on file   Physical Activity: Not on file   Stress: Not on file   Social Connections: Not on file   Intimate Partner Violence: Not on file   Housing Stability: Not on file       Allergies   Allergen Reactions   • Eszopiclone    • Zolpidem      Other reaction(s): sleepwalking. Current Outpatient Medications:   •  acetaminophen (TYLENOL) 500 mg tablet, Take 500 mg by mouth every 6 (six) hours as needed for mild pain, Disp: , Rfl:   •  ALPRAZolam (XANAX) 0.25 mg tablet, Take 1 tablet (0.25 mg total) by mouth daily at bedtime as needed for anxiety, Disp: 15 tablet, Rfl: 0  •  aspirin 81 mg chewable tablet, Chew 1 tablet (81 mg total) daily, Disp: , Rfl:   •  gabapentin (Neurontin) 300 mg capsule, Take 1 capsule (300 mg total) by mouth daily at bedtime, Disp: 90 capsule, Rfl: 0  •  hydrocortisone valerate (WEST-JAMIR) 0.2 % ointment, Apply topically 2 (two) times a day, Disp: 45 g, Rfl: 0  •  losartan (COZAAR) 100 MG tablet, TAKE 1 TABLET BY MOUTH EVERY DAY, Disp: 90 tablet, Rfl: 1  •  Melatonin 10 MG TABS, Take 10 mg by mouth Pt takes 10mg at bedtime. , Disp: , Rfl:   •  oxyCODONE (Roxicodone) 5 immediate release tablet, Take 1 tablet (5 mg total) by mouth every 6 (six) hours as needed for moderate pain or severe pain Max Daily Amount: 20 mg, Disp: 4 tablet, Rfl: 0  •  rosuvastatin (CRESTOR) 40 MG tablet, Take 1 tablet (40 mg total) by mouth daily, Disp: 90 tablet, Rfl: 3  •  traZODone (DESYREL) 50 mg tablet, Take 2 tablets (100 mg total) by mouth daily at bedtime, Disp: 180 tablet, Rfl: 1  I have spent a total time of 30 minutes on 08/31/23 in caring for this patient including Risks and benefits of tx options, Instructions for management, Patient and family education, Importance of tx compliance, Risk factor reductions, Documenting in the medical record, Reviewing / ordering tests, medicine, procedures   and Obtaining or reviewing history  .

## 2023-08-31 NOTE — PATIENT INSTRUCTIONS
-Complete LEAD in 3 months and return to the office for review.     -Continue to take Aspirin daily.    -Continue to take rosuvastatin daily as per PCP.

## 2023-09-11 ENCOUNTER — TELEPHONE (OUTPATIENT)
Dept: VASCULAR SURGERY | Facility: CLINIC | Age: 63
End: 2023-09-11

## 2023-09-11 ENCOUNTER — TELEPHONE (OUTPATIENT)
Dept: NEPHROLOGY | Facility: CLINIC | Age: 63
End: 2023-09-11

## 2023-09-11 DIAGNOSIS — F51.01 PRIMARY INSOMNIA: ICD-10-CM

## 2023-09-11 DIAGNOSIS — F11.90 CHRONIC, CONTINUOUS USE OF OPIOIDS: Primary | ICD-10-CM

## 2023-09-11 RX ORDER — NALOXONE HYDROCHLORIDE 4 MG/.1ML
SPRAY NASAL
Qty: 1 EACH | Refills: 1 | Status: SHIPPED | OUTPATIENT
Start: 2023-09-11 | End: 2024-09-10

## 2023-09-11 RX ORDER — ALPRAZOLAM 0.25 MG/1
0.25 TABLET ORAL
Qty: 15 TABLET | Refills: 0 | Status: SHIPPED | OUTPATIENT
Start: 2023-09-11

## 2023-09-11 NOTE — TELEPHONE ENCOUNTER
Patient had CTA done and would like Dr. Silvano Sam to look at it and let him know if he should be seen sooner than his 11/30 follow up appointment.

## 2023-09-11 NOTE — TELEPHONE ENCOUNTER
PDMP reviewed Xanax was reordered. Patient is on long-term oxycodone by specialist.  Please tell patient if he is using benzodiazepines and opioids naloxone is recommended to be in the house.   I did send this to this pharmacy

## 2023-09-11 NOTE — TELEPHONE ENCOUNTER
Please inform patient that I reviewed CTA result, it showed simple cyst involving both kidneys. Renal function stable at cr 1.2 mg/dl on last blood work done on 8/15/23  No need for sooner appointment.

## 2023-09-11 NOTE — TELEPHONE ENCOUNTER
Hi, I was calling for a prescription refill line name is Mayela Chapa Date of birth [birth year]. I was calling for a refill for Alprazolam 25, quantity 15 of phone number [phone]. Thank you.  Anni.

## 2023-09-11 NOTE — TELEPHONE ENCOUNTER
----- Message from Koki sent at 9/6/2023 10:19 AM EDT -----  Regarding: F/U office visit  Good morning,    Please arrange OV for f/u s/p left CFA 8/14/23 with  for about 1 month after surgery around  or shortly after 9/14/23 per .      Thank you,    Latonya Taylor

## 2023-09-12 NOTE — TELEPHONE ENCOUNTER
Patients wife answered the phone and took message on behalf of patient as he was asleep. Simple cyst seen on both kidneys. Creatinine is stable at 1.2 no need for sooner follow up.

## 2023-09-22 ENCOUNTER — OFFICE VISIT (OUTPATIENT)
Dept: VASCULAR SURGERY | Facility: CLINIC | Age: 63
End: 2023-09-22

## 2023-09-22 VITALS
HEART RATE: 62 BPM | DIASTOLIC BLOOD PRESSURE: 88 MMHG | HEIGHT: 72 IN | SYSTOLIC BLOOD PRESSURE: 140 MMHG | BODY MASS INDEX: 26.68 KG/M2 | WEIGHT: 197 LBS

## 2023-09-22 DIAGNOSIS — I73.9 PERIPHERAL VASCULAR DISEASE OF EXTREMITY WITH CLAUDICATION (HCC): Primary | ICD-10-CM

## 2023-09-22 PROCEDURE — 99024 POSTOP FOLLOW-UP VISIT: CPT | Performed by: SURGERY

## 2023-09-22 NOTE — PATIENT INSTRUCTIONS
1. Peripheral vascular disease of extremity with claudication [facility])  Assessment & Plan:  Mr. Judith Richards is now approximately 1 month out from left common femoral endarterectomy with patch angioplasty. He has had resolution of his rest pain. The coloration of his foot is much improved from his prior evaluations. He still complains of short distance claudication. He does have ongoing cigarette use however is interested in smoking cessation therapy. We are attempting again now for the third time in the office to acquire resources for assistance in smoking cessation. He is compliant with his medical therapy otherwise. He has not had development of any wounds. We have agreed upon 2 to 3-week follow-up during which time he will again attempt smoking cessation and attempt a monitored and measured exercise program walking around his block/on his street.

## 2023-09-22 NOTE — ASSESSMENT & PLAN NOTE
Mr. Jess Hernandez is now approximately 1 month out from left common femoral endarterectomy with patch angioplasty. He has had resolution of his rest pain. The coloration of his foot is much improved from his prior evaluations. He still complains of short distance claudication. He does have ongoing cigarette use however is interested in smoking cessation therapy. We are attempting again now for the third time in the office to acquire resources for assistance in smoking cessation. He is compliant with his medical therapy otherwise. He has not had development of any wounds. We have agreed upon 2 to 3-week follow-up during which time he will again attempt smoking cessation and attempt a monitored and measured exercise program walking around his block/on his street.

## 2023-09-22 NOTE — LETTER
September 22, 2023     Mary Mota, [address] [place] [id number]    Patient: Coby Soriano   YOB: 1960   Date of Visit: 9/22/2023       Dear Dr. Luke Koehler: Thank you for referring Jose A Rodrigues to me for evaluation. Below are my notes for this consultation. If you have questions, please do not hesitate to call me. I look forward to following your patient along with you. Sincerely,        Barry Montgomery MD        CC: Kenya Ruff. Imani Poole MD  9/22/2023  2:18 PM  Sign when Signing Visit  Assessment/Plan:    Peripheral vascular disease of extremity with claudication [facility])  Mr. Syeda Mccann is now approximately 1 month out from left common femoral endarterectomy with patch angioplasty. He has had resolution of his rest pain. The coloration of his foot is much improved from his prior evaluations. He still complains of short distance claudication. He does have ongoing cigarette use however is interested in smoking cessation therapy. We are attempting again now for the third time in the office to acquire resources for assistance in smoking cessation. He is compliant with his medical therapy otherwise. He has not had development of any wounds. We have agreed upon 2 to 3-week follow-up during which time he will again attempt smoking cessation and attempt a monitored and measured exercise program walking around his block/on his street. Subjective:     Patient ID: Coby Soriano is a 58 y.o. male. Patient presents s/p L CFA 8/14/23 with Dr. Juancho Jacinto. He denies fever/chills. He denies pain, redness or swelling in L groin. L groin incision is healing. Pt reports he still gets short distance claudication but is no longer experiencing rest pain. He reports L foot numbness and tingling. Pt is taking ASA81 and Rosuvastatin. He smokes 1/2ppd.      HPI    Mr. Syeda Mccann is a 25-year-old male who recently underwent his common femoral endarterectomy on the left with resolution of rest pain. His incision appears to be healing well today's complaints are in reference to short distance claudication only. His rest pain is resolved. He is continuing to smoke. He has tried to reach out to the smoking cessation team as have we on several occasions and we will attempt again today. He is willing to undergo a strict 2 to 3-week trial of walking exercise. We are seeing him back at that time. Either way to assess his ABIs. He has no new wound development since our last visit. Review of Systems   All other systems reviewed and are negative. Objective:      /88 (BP Location: Left arm, Patient Position: Sitting, Cuff Size: Standard)   Pulse 62   Ht 6' (1.829 m)   Wt 89.4 kg (197 lb)   BMI 26.72 kg/m²         Physical Exam  Constitutional:       Appearance: Normal appearance. HENT:      Head: Normocephalic and atraumatic. Nose: Nose normal. No rhinorrhea. Eyes:      Extraocular Movements: Extraocular movements intact. Pupils: Pupils are equal, round, and reactive to light. Cardiovascular:      Rate and Rhythm: Normal rate and regular rhythm. Pulses:           Femoral pulses are 1+ on the right side and 2+ on the left side. Dorsalis pedis pulses are detected w/ Doppler on the right side and detected w/ Doppler on the left side. Posterior tibial pulses are detected w/ Doppler on the right side and detected w/ Doppler on the left side. Comments: Well-healing groin incision with no drainage or erythema. Distal signal exam of the left has become multiphasic. He does have some numbness to the medial left thigh as well as the dorsum of his left foot. All other sensation and motor is intact. Pulmonary:      Effort: Pulmonary effort is normal.      Breath sounds: No stridor. Abdominal:      General: There is no distension. Tenderness: There is no abdominal tenderness.    Musculoskeletal: General: No tenderness. Normal range of motion. Cervical back: Normal range of motion and neck supple. Skin:     General: Skin is warm. Capillary Refill: Capillary refill takes less than 2 seconds. Coloration: Skin is not jaundiced. Neurological:      General: No focal deficit present. Mental Status: He is alert and oriented to person, place, and time.    Psychiatric:         Mood and Affect: Mood normal.         Behavior: Behavior normal.

## 2023-09-22 NOTE — PROGRESS NOTES
Assessment/Plan:    Peripheral vascular disease of extremity with claudication [facility])  Mr. Kamaljit Jasso is now approximately 1 month out from left common femoral endarterectomy with patch angioplasty. He has had resolution of his rest pain. The coloration of his foot is much improved from his prior evaluations. He still complains of short distance claudication. He does have ongoing cigarette use however is interested in smoking cessation therapy. We are attempting again now for the third time in the office to acquire resources for assistance in smoking cessation. He is compliant with his medical therapy otherwise. He has not had development of any wounds. We have agreed upon 2 to 3-week follow-up during which time he will again attempt smoking cessation and attempt a monitored and measured exercise program walking around his block/on his street. Subjective:      Patient ID: Marcianne Lesch is a 58 y.o. male. Patient presents s/p L CFA 8/14/23 with Dr. Len Estes. He denies fever/chills. He denies pain, redness or swelling in L groin. L groin incision is healing. Pt reports he still gets short distance claudication but is no longer experiencing rest pain. He reports L foot numbness and tingling. Pt is taking ASA81 and Rosuvastatin. He smokes 1/2ppd. HPI    Mr. Kamaljit Jasso is a 50-year-old male who recently underwent his common femoral endarterectomy on the left with resolution of rest pain. His incision appears to be healing well today's complaints are in reference to short distance claudication only. His rest pain is resolved. He is continuing to smoke. He has tried to reach out to the smoking cessation team as have we on several occasions and we will attempt again today. He is willing to undergo a strict 2 to 3-week trial of walking exercise. We are seeing him back at that time. Either way to assess his ABIs. He has no new wound development since our last visit.     Review of Systems   All other systems reviewed and are negative. Objective:      /88 (BP Location: Left arm, Patient Position: Sitting, Cuff Size: Standard)   Pulse 62   Ht 6' (1.829 m)   Wt 89.4 kg (197 lb)   BMI 26.72 kg/m²          Physical Exam  Constitutional:       Appearance: Normal appearance. HENT:      Head: Normocephalic and atraumatic. Nose: Nose normal. No rhinorrhea. Eyes:      Extraocular Movements: Extraocular movements intact. Pupils: Pupils are equal, round, and reactive to light. Cardiovascular:      Rate and Rhythm: Normal rate and regular rhythm. Pulses:           Femoral pulses are 1+ on the right side and 2+ on the left side. Dorsalis pedis pulses are detected w/ Doppler on the right side and detected w/ Doppler on the left side. Posterior tibial pulses are detected w/ Doppler on the right side and detected w/ Doppler on the left side. Comments: Well-healing groin incision with no drainage or erythema. Distal signal exam of the left has become multiphasic. He does have some numbness to the medial left thigh as well as the dorsum of his left foot. All other sensation and motor is intact. Pulmonary:      Effort: Pulmonary effort is normal.      Breath sounds: No stridor. Abdominal:      General: There is no distension. Tenderness: There is no abdominal tenderness. Musculoskeletal:         General: No tenderness. Normal range of motion. Cervical back: Normal range of motion and neck supple. Skin:     General: Skin is warm. Capillary Refill: Capillary refill takes less than 2 seconds. Coloration: Skin is not jaundiced. Neurological:      General: No focal deficit present. Mental Status: He is alert and oriented to person, place, and time.    Psychiatric:         Mood and Affect: Mood normal.         Behavior: Behavior normal.

## 2023-09-27 ENCOUNTER — PATIENT OUTREACH (OUTPATIENT)
Dept: OTHER | Facility: CLINIC | Age: 63
End: 2023-09-27

## 2023-10-18 ENCOUNTER — HOSPITAL ENCOUNTER (OUTPATIENT)
Dept: NON INVASIVE DIAGNOSTICS | Facility: CLINIC | Age: 63
Discharge: HOME | End: 2023-10-18
Payer: COMMERCIAL

## 2023-10-18 DIAGNOSIS — I70.222 ATHEROSCLEROSIS OF NATIVE ARTERIES OF EXTREMITIES WITH REST PAIN, LEFT LEG (HCC): ICD-10-CM

## 2023-10-18 DIAGNOSIS — I73.9 PAD (PERIPHERAL ARTERY DISEASE) (HCC): ICD-10-CM

## 2023-10-18 PROCEDURE — 93925 LOWER EXTREMITY STUDY: CPT

## 2023-10-18 PROCEDURE — 93923 UPR/LXTR ART STDY 3+ LVLS: CPT

## 2023-10-20 ENCOUNTER — PREP FOR PROCEDURE (OUTPATIENT)
Dept: VASCULAR SURGERY | Facility: CLINIC | Age: 63
End: 2023-10-20

## 2023-10-20 ENCOUNTER — TELEPHONE (OUTPATIENT)
Dept: VASCULAR SURGERY | Facility: CLINIC | Age: 63
End: 2023-10-20

## 2023-10-20 ENCOUNTER — OFFICE VISIT (OUTPATIENT)
Dept: VASCULAR SURGERY | Facility: CLINIC | Age: 63
End: 2023-10-20

## 2023-10-20 VITALS
HEIGHT: 72 IN | WEIGHT: 199 LBS | BODY MASS INDEX: 26.95 KG/M2 | SYSTOLIC BLOOD PRESSURE: 144 MMHG | HEART RATE: 65 BPM | RESPIRATION RATE: 20 BRPM | DIASTOLIC BLOOD PRESSURE: 84 MMHG | OXYGEN SATURATION: 96 %

## 2023-10-20 DIAGNOSIS — I70.223 REST PAIN OF BOTH LOWER EXTREMITIES DUE TO ATHEROSCLEROSIS (HCC): Primary | ICD-10-CM

## 2023-10-20 RX ORDER — CHLORHEXIDINE GLUCONATE ORAL RINSE 1.2 MG/ML
15 SOLUTION DENTAL ONCE
OUTPATIENT
Start: 2023-10-20 | End: 2023-10-20

## 2023-10-20 NOTE — H&P (VIEW-ONLY)
Assessment/Plan:    Rest pain of both lower extremities due to atherosclerosis [facility])  Davin Ambriz has improvement in his left lower extremity rest pain. He does have intermittent rest pain of the right lower extremity but mostly complains of bilateral lifestyle limiting claudication. He has near occlusive disease of bilateral common iliacs right worse than left. He also has a short segment occlusion of his proximal right superficial femoral artery. His left has been treated previously. I proposed right femoral endarterectomy with retrograde right iliac stent and possible up and over balloon angioplasty of the left iliac system with possible stent. Subjective:      Patient ID: Wagner Jain is a 58 y.o. male. Patient had a IRLANDA on 10/18/23. Pt states that he is still having claudication and intermittent rest pain with improvement in the left leg. Pt has pain in the Lt groin. Pt c/o numbness in his feet. Pt is s/p Lt CFE on 23 by Dr. Maribell Huang. Pt smokes 5-9 cigarette/ day. HPI  Davin Ambriz is a 69-year-old male with bilateral lower extremity short distance lifestyle limiting claudication and intermittent rest pain who previously underwent a left common femoral endarterectomy on . His left leg is improved however somewhat modestly. His activity is limited by pain in his right leg which is associated with scattered episodes of rest pain. We discussed his occlusive burden at length and touched on the possibility of arteriogram and stenting of the proximal common iliac systems which appear stenotic. His pulse in his right groin is decreased even from his last evaluation with me in the office. He has not developed any wounds. He is continuing to attempt to decrease his smoking. He is compliant with his aspirin and statin therapy. He is aware that he will likely have to be on Plavix therapy at the conclusion of this case if stents were placed. Review of Systems   Constitutional: Negative. HENT: Negative. Eyes: Negative. Respiratory: Negative. Cardiovascular: Negative. Gastrointestinal: Negative. Endocrine: Negative. Genitourinary: Negative. Musculoskeletal:  Negative for arthralgias and back pain. BLE pain when walking  BLE pain at night   Skin: Negative. Allergic/Immunologic: Negative. Neurological:  Positive for numbness (feet). Hematological: Negative. Psychiatric/Behavioral: Negative. Objective:      /84 (BP Location: Left arm, Patient Position: Sitting)   Pulse 65   Resp 20   Ht 6' (1.829 m)   Wt 90.3 kg (199 lb)   SpO2 96%   BMI 26.99 kg/m²          Physical Exam  Constitutional:       Appearance: Normal appearance. HENT:      Head: Normocephalic and atraumatic. Nose: Nose normal. No rhinorrhea. Eyes:      Extraocular Movements: Extraocular movements intact. Pupils: Pupils are equal, round, and reactive to light. Cardiovascular:      Rate and Rhythm: Normal rate and regular rhythm. Pulses:           Femoral pulses are 1+ on the right side and 2+ on the left side. Dorsalis pedis pulses are detected w/ Doppler on the right side and detected w/ Doppler on the left side. Posterior tibial pulses are detected w/ Doppler on the right side and detected w/ Doppler on the left side. Comments: No wounds. Pulmonary:      Effort: Pulmonary effort is normal.      Breath sounds: No stridor. Abdominal:      General: There is no distension. Tenderness: There is no abdominal tenderness. Musculoskeletal:         General: No tenderness. Normal range of motion. Cervical back: Normal range of motion and neck supple. Skin:     General: Skin is warm. Capillary Refill: Capillary refill takes less than 2 seconds. Coloration: Skin is not jaundiced. Neurological:      General: No focal deficit present. Mental Status: He is alert and oriented to person, place, and time.    Psychiatric: Mood and Affect: Mood normal.         Behavior: Behavior normal.         Operative Scheduling Information:    Hospital:  [facility]    Physician:  Me    Surgery: Right common femoral artery endarterectomy with retrograde arteriogram and possible right common iliac artery stent, possible left common iliac artery angioplasty and stent    Urgency:  Standard    Level:  Level 4: Outpatients to be scheduled for screening procedures and elective surgery that can be delayed for longer than one month without reasonable expectation of detriment to patient.     Case Length:  Normal    Post-op Bed:  Stepdown    OR Table:  Discovery    Equipment Needs:  Rep: Fred Chaney peripheral, Gerald Rivas and rad tech for endo portion    Medication Instructions:  Aspirin:   Continue (do not hold)    Hydration:  No

## 2023-10-20 NOTE — PROGRESS NOTES
Assessment/Plan:    Rest pain of both lower extremities due to atherosclerosis [facility])  Princess Witt has improvement in his left lower extremity rest pain. He does have intermittent rest pain of the right lower extremity but mostly complains of bilateral lifestyle limiting claudication. He has near occlusive disease of bilateral common iliacs right worse than left. He also has a short segment occlusion of his proximal right superficial femoral artery. His left has been treated previously. I proposed right femoral endarterectomy with retrograde right iliac stent and possible up and over balloon angioplasty of the left iliac system with possible stent. Subjective:      Patient ID: Valeria Collins is a 58 y.o. male. Patient had a IRLANDA on 10/18/23. Pt states that he is still having claudication and intermittent rest pain with improvement in the left leg. Pt has pain in the Lt groin. Pt c/o numbness in his feet. Pt is s/p Lt CFE on 8/14/23 by Dr. Delmis Wooten. Pt smokes 5-9 cigarette/ day. HPI  Princess Witt is a 56-year-old male with bilateral lower extremity short distance lifestyle limiting claudication and intermittent rest pain who previously underwent a left common femoral endarterectomy on August 14. His left leg is improved however somewhat modestly. His activity is limited by pain in his right leg which is associated with scattered episodes of rest pain. We discussed his occlusive burden at length and touched on the possibility of arteriogram and stenting of the proximal common iliac systems which appear stenotic. His pulse in his right groin is decreased even from his last evaluation with me in the office. He has not developed any wounds. He is continuing to attempt to decrease his smoking. He is compliant with his aspirin and statin therapy. He is aware that he will likely have to be on Plavix therapy at the conclusion of this case if stents were placed. Review of Systems   Constitutional: Negative. HENT: Negative. Eyes: Negative. Respiratory: Negative. Cardiovascular: Negative. Gastrointestinal: Negative. Endocrine: Negative. Genitourinary: Negative. Musculoskeletal:  Negative for arthralgias and back pain. BLE pain when walking  BLE pain at night   Skin: Negative. Allergic/Immunologic: Negative. Neurological:  Positive for numbness (feet). Hematological: Negative. Psychiatric/Behavioral: Negative. Objective:      /84 (BP Location: Left arm, Patient Position: Sitting)   Pulse 65   Resp 20   Ht 6' (1.829 m)   Wt 90.3 kg (199 lb)   SpO2 96%   BMI 26.99 kg/m²          Physical Exam  Constitutional:       Appearance: Normal appearance. HENT:      Head: Normocephalic and atraumatic. Nose: Nose normal. No rhinorrhea. Eyes:      Extraocular Movements: Extraocular movements intact. Pupils: Pupils are equal, round, and reactive to light. Cardiovascular:      Rate and Rhythm: Normal rate and regular rhythm. Pulses:           Femoral pulses are 1+ on the right side and 2+ on the left side. Dorsalis pedis pulses are detected w/ Doppler on the right side and detected w/ Doppler on the left side. Posterior tibial pulses are detected w/ Doppler on the right side and detected w/ Doppler on the left side. Comments: No wounds. Pulmonary:      Effort: Pulmonary effort is normal.      Breath sounds: No stridor. Abdominal:      General: There is no distension. Tenderness: There is no abdominal tenderness. Musculoskeletal:         General: No tenderness. Normal range of motion. Cervical back: Normal range of motion and neck supple. Skin:     General: Skin is warm. Capillary Refill: Capillary refill takes less than 2 seconds. Coloration: Skin is not jaundiced. Neurological:      General: No focal deficit present. Mental Status: He is alert and oriented to person, place, and time.    Psychiatric: Mood and Affect: Mood normal.         Behavior: Behavior normal.         Operative Scheduling Information:    Hospital:  [facility]    Physician:  Me    Surgery: Right common femoral artery endarterectomy with retrograde arteriogram and possible right common iliac artery stent, possible left common iliac artery angioplasty and stent    Urgency:  Standard    Level:  Level 4: Outpatients to be scheduled for screening procedures and elective surgery that can be delayed for longer than one month without reasonable expectation of detriment to patient.     Case Length:  Normal    Post-op Bed:  Stepdown    OR Table:  Discovery    Equipment Needs:  Rep: Luke polanco, Trev Angulo and rad edgar for endo portion    Medication Instructions:  Aspirin:   Continue (do not hold)    Hydration:  No

## 2023-10-20 NOTE — LETTER
October 20, 2023     Leon Crockett, [address] [id number]    Patient: Elliot Diaz   YOB: 1960   Date of Visit: 10/20/2023       Dear Dr. Cristel Xiong: Thank you for referring Jhonny Oshea to me for evaluation. Below are my notes for this consultation. If you have questions, please do not hesitate to call me. I look forward to following your patient along with you. Sincerely,        Fidelia Nation MD        CC: Barrera Piña. [id number] [facility] Servicesdavid Diaz MD  10/20/2023  3:17 PM  Sign when Signing Visit  Assessment/Plan:    Rest pain of both lower extremities due to atherosclerosis [facility])  Rain Andrade has improvement in his left lower extremity rest pain. He does have intermittent rest pain of the right lower extremity but mostly complains of bilateral lifestyle limiting claudication. He has near occlusive disease of bilateral common iliacs right worse than left. He also has a short segment occlusion of his proximal right superficial femoral artery. His left has been treated previously. I proposed right femoral endarterectomy with retrograde right iliac stent and possible up and over balloon angioplasty of the left iliac system with possible stent. Subjective:      Patient ID: Elliot Diaz is a 58 y.o. male. Patient had a IRLANDA on 10/18/23. Pt states that he is still having claudication and intermittent rest pain with improvement in the left leg. Pt has pain in the Lt groin. Pt c/o numbness in his feet. Pt is s/p Lt CFE on 8/14/23 by Dr. Jovanny Israel. Pt smokes 5-9 cigarette/ day. HPI  Rain Andrade is a 25-year-old male with bilateral lower extremity short distance lifestyle limiting claudication and intermittent rest pain who previously underwent a left common femoral endarterectomy on August 14. His left leg is improved however somewhat modestly.   His activity is limited by pain in his right leg which is associated with scattered episodes of rest pain. We discussed his occlusive burden at length and touched on the possibility of arteriogram and stenting of the proximal common iliac systems which appear stenotic. His pulse in his right groin is decreased even from his last evaluation with me in the office. He has not developed any wounds. He is continuing to attempt to decrease his smoking. He is compliant with his aspirin and statin therapy. He is aware that he will likely have to be on Plavix therapy at the conclusion of this case if stents were placed. Review of Systems   Constitutional: Negative. HENT: Negative. Eyes: Negative. Respiratory: Negative. Cardiovascular: Negative. Gastrointestinal: Negative. Endocrine: Negative. Genitourinary: Negative. Musculoskeletal:  Negative for arthralgias and back pain. BLE pain when walking  BLE pain at night   Skin: Negative. Allergic/Immunologic: Negative. Neurological:  Positive for numbness (feet). Hematological: Negative. Psychiatric/Behavioral: Negative. Objective:      /84 (BP Location: Left arm, Patient Position: Sitting)   Pulse 65   Resp 20   Ht 6' (1.829 m)   Wt 90.3 kg (199 lb)   SpO2 96%   BMI 26.99 kg/m²          Physical Exam  Constitutional:       Appearance: Normal appearance. HENT:      Head: Normocephalic and atraumatic. Nose: Nose normal. No rhinorrhea. Eyes:      Extraocular Movements: Extraocular movements intact. Pupils: Pupils are equal, round, and reactive to light. Cardiovascular:      Rate and Rhythm: Normal rate and regular rhythm. Pulses:           Femoral pulses are 1+ on the right side and 2+ on the left side. Dorsalis pedis pulses are detected w/ Doppler on the right side and detected w/ Doppler on the left side. Posterior tibial pulses are detected w/ Doppler on the right side and detected w/ Doppler on the left side.       Comments: No wounds. Pulmonary:      Effort: Pulmonary effort is normal.      Breath sounds: No stridor. Abdominal:      General: There is no distension. Tenderness: There is no abdominal tenderness. Musculoskeletal:         General: No tenderness. Normal range of motion. Cervical back: Normal range of motion and neck supple. Skin:     General: Skin is warm. Capillary Refill: Capillary refill takes less than 2 seconds. Coloration: Skin is not jaundiced. Neurological:      General: No focal deficit present. Mental Status: He is alert and oriented to person, place, and time. Psychiatric:         Mood and Affect: Mood normal.         Behavior: Behavior normal.         Operative Scheduling Information:    Hospital:  [facility]    Physician:  Me    Surgery: Right common femoral artery endarterectomy with retrograde arteriogram and possible right common iliac artery stent, possible left common iliac artery angioplasty and stent    Urgency:  Standard    Level:  Level 4: Outpatients to be scheduled for screening procedures and elective surgery that can be delayed for longer than one month without reasonable expectation of detriment to patient.     Case Length:  Normal    Post-op Bed:  Stepdown    OR Table:  Discovery    Equipment Needs:  Rep: Scharlene Drain peripheral, Miguel Pac and rad tech for endo portion    Medication Instructions:  Aspirin:   Continue (do not hold)    Hydration:  No

## 2023-10-20 NOTE — ASSESSMENT & PLAN NOTE
Lane Schafer has improvement in his left lower extremity rest pain. He does have intermittent rest pain of the right lower extremity but mostly complains of bilateral lifestyle limiting claudication. He has near occlusive disease of bilateral common iliacs right worse than left. He also has a short segment occlusion of his proximal right superficial femoral artery. His left has been treated previously. I proposed right femoral endarterectomy with retrograde right iliac stent and possible up and over balloon angioplasty of the left iliac system with possible stent.

## 2023-10-20 NOTE — PATIENT INSTRUCTIONS
1. Rest pain of both lower extremities due to atherosclerosis [facility])  Assessment & Plan:  Jaleesa Warner has improvement in his left lower extremity rest pain. He does have intermittent rest pain of the right lower extremity but mostly complains of bilateral lifestyle limiting claudication. He has near occlusive disease of bilateral common iliacs right worse than left. He also has a short segment occlusion of his proximal right superficial femoral artery. His left has been treated previously. I proposed right femoral endarterectomy with retrograde right iliac stent and possible up and over balloon angioplasty of the left iliac system with possible stent.     Orders:  -     Case request operating room: ENDARTERECTOMY ARTERIAL FEMORAL; Standing  -     Case request operating room: ENDARTERECTOMY ARTERIAL FEMORAL

## 2023-10-20 NOTE — TELEPHONE ENCOUNTER
Verified patient's insurance   CONFIRMED - Patient's insurance is [employer]  Is patient requesting a call when authorization has been obtained? Patient did not request a call. Surgery Date: 11/8/23  Primary Surgeon: ZE // Breonna Louis (NPI: [id number])  Assisting Surgeon: Not Applicable (N/A)  Facility: [facility] (Tax: [id number] / NPI: [id number])  Inpatient / Outpatient: Inpatient  Level: 3    Clearance Received: No clearance ordered. Consent Received: Yes, scanned into Epic on 10/20/23. Medication Hold / Last Dose: Not Applicable (N/A)  IR Notified: Not Applicable (N/A)  Rep. Notified: Yes, 10/20/23  Equipment Needs: Not Applicable (N/A)  Vas Lab Requested: Not Applicable (N/A)  Patient Contacted: 10/20/23     Diagnosis: I70.223  Procedure/ CPT Code(s): Common Femoral Endartectomy // CPT: 48951, Arteriogram 54002    For varicose vein related procedures:   Last LEVDR: Not Applicable (N/A)  CEAP Classification: Not Applicable (N/A)  VCSS: Not Applicable (N/A)    Post Operative Date/ Time: 11/24/23  Júnior with Breonna Louis (NPI: [id number])     *Please review medication hold(s), PATs, and check H&P with patient. *  PATIENT WAS MAILED SURGERY/SHOWERING/DISCHARGE/COVID INSTRUCTIONS AFTER REVIEWING WITH THEM VIA PHONE CALL. Patient aware after leaving his appt 10/20/23, Bobo Vogt spoke to patient.

## 2023-10-20 NOTE — TELEPHONE ENCOUNTER
REMINDER: Under Reason For Call, comments MUST be formatted as:   (Surgeon's Initials) / (Procedure)      Special Instructions / FYI:     Procedure:  Right Femoral Endarterectomy    Level: 3 - Route clearance(s) to [facility] Clearance Pool     Allergies: Eszopiclone and Zolpidem    Instructions Given: NO Bowel Prep General Instructions     Dialysis: Patient is not on dialysis. Return Visit Required Prior to Procedure: No.    Consent: I certify that patient has signed, printed, timed, and dated their surgery consent. I certify that the patient's LEGAL NAME and DATE OF BIRTH are written in the upper left corner on BOTH sides of the consent. I certify that BOTH sides of the completed surgery consent have been scanned into the patient's Epic chart by myself on 10/20/2023. Yes, I have LABELED the consent in Epic as Consent for Vascular Procedure. For Surgical Clearances     Levels   1-3   ROUTE this encounter to [facility] Clearance Pool (AND)   [facility] Surgery Coordinator Pool     Level   4   ROUTE this encounter to [facility] Surgery Coordinator Pool       HYDRATION CLEARANCES   ONLY ROUTE TO  [facility] Clearance Pool     Patient does not require any pre operative clearance. Yes, I have ROUTED this encounter to [facility] Surgery Coordinator and/or [facility] Clearance Pool.

## 2023-10-23 NOTE — TELEPHONE ENCOUNTER
Authorization requirements reviewed. Please refer to Franko Jacobs / Rj yMers number [id number] for case updates.

## 2023-10-26 ENCOUNTER — APPOINTMENT (OUTPATIENT)
Dept: LAB | Facility: CLINIC | Age: 63
End: 2023-10-26
Payer: COMMERCIAL

## 2023-10-26 ENCOUNTER — LAB REQUISITION (OUTPATIENT)
Dept: LAB | Facility: HOSPITAL | Age: 63
End: 2023-10-26
Payer: COMMERCIAL

## 2023-10-26 DIAGNOSIS — Z01.810 ENCOUNTER FOR PREPROCEDURAL CARDIOVASCULAR EXAMINATION: ICD-10-CM

## 2023-10-26 DIAGNOSIS — I70.223 REST PAIN OF BOTH LOWER EXTREMITIES DUE TO ATHEROSCLEROSIS (HCC): ICD-10-CM

## 2023-10-26 DIAGNOSIS — Z01.810 PRE-OPERATIVE CARDIOVASCULAR EXAMINATION, HIGH RISK SURGERY: ICD-10-CM

## 2023-10-26 LAB
ABO GROUP BLD: NORMAL
ANION GAP SERPL CALCULATED.3IONS-SCNC: 8 MMOL/L
BLD GP AB SCN SERPL QL: NEGATIVE
BUN SERPL-MCNC: 20 MG/DL (ref 5–25)
CALCIUM SERPL-MCNC: 9.8 MG/DL (ref 8.4–10.2)
CHLORIDE SERPL-SCNC: 101 MMOL/L (ref 96–108)
CO2 SERPL-SCNC: 28 MMOL/L (ref 21–32)
CREAT SERPL-MCNC: 1.37 MG/DL (ref 0.6–1.3)
ERYTHROCYTE [DISTWIDTH] IN BLOOD BY AUTOMATED COUNT: 12.8 % (ref 11.6–15.1)
GFR SERPL CREATININE-BSD FRML MDRD: 54 ML/MIN/1.73SQ M
GLUCOSE P FAST SERPL-MCNC: 95 MG/DL (ref 65–99)
HCT VFR BLD AUTO: 44.5 % (ref 36.5–49.3)
HGB BLD-MCNC: 15 G/DL (ref 12–17)
INR PPP: 0.98 (ref 0.84–1.19)
MCH RBC QN AUTO: 30.8 PG (ref 26.8–34.3)
MCHC RBC AUTO-ENTMCNC: 33.7 G/DL (ref 31.4–37.4)
MCV RBC AUTO: 91 FL (ref 82–98)
PLATELET # BLD AUTO: 299 THOUSANDS/UL (ref 149–390)
PMV BLD AUTO: 12.2 FL (ref 8.9–12.7)
POTASSIUM SERPL-SCNC: 4.7 MMOL/L (ref 3.5–5.3)
PROTHROMBIN TIME: 12.9 SECONDS (ref 11.6–14.5)
RBC # BLD AUTO: 4.87 MILLION/UL (ref 3.88–5.62)
RH BLD: POSITIVE
SODIUM SERPL-SCNC: 137 MMOL/L (ref 135–147)
SPECIMEN EXPIRATION DATE: NORMAL
WBC # BLD AUTO: 9.45 THOUSAND/UL (ref 4.31–10.16)

## 2023-10-26 PROCEDURE — 80048 BASIC METABOLIC PNL TOTAL CA: CPT

## 2023-10-26 PROCEDURE — 86900 BLOOD TYPING SEROLOGIC ABO: CPT | Performed by: SURGERY

## 2023-10-26 PROCEDURE — 86850 RBC ANTIBODY SCREEN: CPT | Performed by: SURGERY

## 2023-10-26 PROCEDURE — 36415 COLL VENOUS BLD VENIPUNCTURE: CPT

## 2023-10-26 PROCEDURE — 85027 COMPLETE CBC AUTOMATED: CPT

## 2023-10-26 PROCEDURE — 85610 PROTHROMBIN TIME: CPT

## 2023-10-26 PROCEDURE — 86901 BLOOD TYPING SEROLOGIC RH(D): CPT | Performed by: SURGERY

## 2023-10-31 NOTE — PRE-PROCEDURE INSTRUCTIONS
Pre-Surgery Instructions:   Medication Instructions    acetaminophen (TYLENOL) 500 mg tablet Uses PRN- OK to take day of surgery    ALPRAZolam (XANAX) 0.25 mg tablet Uses PRN- OK to take day of surgery    aspirin 81 mg chewable tablet Take day of surgery. gabapentin (Neurontin) 300 mg capsule Take day of surgery. losartan (COZAAR) 100 MG tablet Hold day of surgery. 11/7, 11/8    Melatonin 10 MG TABS Stop taking 7 days prior to surgery. rosuvastatin (CRESTOR) 40 MG tablet Take day of surgery. traZODone (DESYREL) 50 mg tablet Take night before surgery   Medication instructions for day surgery reviewed. Please use only a sip of water to take your instructed medications. Avoid all over the counter vitamins, supplements and NSAIDS for one week prior to surgery per anesthesia guidelines. Tylenol is ok to take as needed. You will receive a call one business day prior to surgery with an arrival time and hospital directions. If your surgery is scheduled on a Monday, the hospital will be calling you on the Friday prior to your surgery. If you have not heard from anyone by 8pm, please call the hospital supervisor through the hospital  at [phone]. Cande Moya [phone]). Do not eat or drink anything after midnight the night before your surgery, including candy, mints, lifesavers, or chewing gum. Do not drink alcohol 24hrs before your surgery. Try not to smoke at least 24hrs before your surgery. Follow the pre surgery showering instructions as listed in the [facility] Surgical Experience Booklet” or otherwise provided by your surgeon's office. Do not use a blade to shave the surgical area 1 week before surgery. It is okay to use a clean electric clippers up to 24 hours before surgery. Do not apply any lotions, creams, including makeup, cologne, deodorant, or perfumes after showering on the day of your surgery. Do not use dry shampoo, hair spray, hair gel, or any type of hair products.      No contact lenses, eye make-up, or artificial eyelashes. Remove nail polish, including gel polish, and any artificial, gel, or acrylic nails if possible. Remove all jewelry including rings and body piercing jewelry. Wear causal clothing that is easy to take on and off. Consider your type of surgery. Keep any valuables, jewelry, piercings at home. Please bring any specially ordered equipment (sling, braces) if indicated. Arrange for a responsible person to drive you to and from the hospital on the day of your surgery. Visitor Guidelines discussed. Call the surgeon's office with any new illnesses, exposures, or additional questions prior to surgery. Please reference your [facility] Surgical Experience Booklet” for additional information to prepare for your upcoming surgery.

## 2023-11-01 ENCOUNTER — ANESTHESIA EVENT (OUTPATIENT)
Dept: PERIOP | Facility: HOSPITAL | Age: 63
DRG: 272 | End: 2023-11-01
Payer: COMMERCIAL

## 2023-11-08 ENCOUNTER — ANESTHESIA (OUTPATIENT)
Dept: PERIOP | Facility: HOSPITAL | Age: 63
DRG: 272 | End: 2023-11-08
Payer: COMMERCIAL

## 2023-11-08 ENCOUNTER — APPOINTMENT (OUTPATIENT)
Dept: RADIOLOGY | Facility: HOSPITAL | Age: 63
DRG: 272 | End: 2023-11-08
Payer: COMMERCIAL

## 2023-11-08 ENCOUNTER — HOSPITAL ENCOUNTER (INPATIENT)
Facility: HOSPITAL | Age: 63
LOS: 1 days | Discharge: HOME | DRG: 272 | End: 2023-11-09
Attending: SURGERY | Admitting: SURGERY
Payer: COMMERCIAL

## 2023-11-08 DIAGNOSIS — I70.223 REST PAIN OF BOTH LOWER EXTREMITIES DUE TO ATHEROSCLEROSIS (HCC): Primary | ICD-10-CM

## 2023-11-08 LAB
GLUCOSE SERPL-MCNC: 123 MG/DL (ref 65–140)
KCT BLD-ACNC: 233 SEC (ref 89–137)
KCT BLD-ACNC: 245 SEC (ref 89–137)
KCT BLD-ACNC: 272 SEC (ref 89–137)
KCT BLD-ACNC: 313 SEC (ref 89–137)
PLATELET # BLD AUTO: 278 THOUSANDS/UL (ref 149–390)
PMV BLD AUTO: 11.3 FL (ref 8.9–12.7)
SPECIMEN SOURCE: ABNORMAL

## 2023-11-08 PROCEDURE — 04UK3KZ SUPPLEMENT RIGHT FEMORAL ARTERY WITH NONAUTOLOGOUS TISSUE SUBSTITUTE, PERCUTANEOUS APPROACH: ICD-10-PCS | Performed by: SURGERY

## 2023-11-08 PROCEDURE — 82948 REAGENT STRIP/BLOOD GLUCOSE: CPT

## 2023-11-08 PROCEDURE — 04CK3ZZ EXTIRPATION OF MATTER FROM RIGHT FEMORAL ARTERY, PERCUTANEOUS APPROACH: ICD-10-PCS | Performed by: SURGERY

## 2023-11-08 PROCEDURE — 35371 RECHANNELING OF ARTERY: CPT | Performed by: SURGERY

## 2023-11-08 PROCEDURE — 85347 COAGULATION TIME ACTIVATED: CPT

## 2023-11-08 PROCEDURE — C1769 GUIDE WIRE: HCPCS | Performed by: SURGERY

## 2023-11-08 PROCEDURE — 85049 AUTOMATED PLATELET COUNT: CPT | Performed by: STUDENT IN AN ORGANIZED HEALTH CARE EDUCATION/TRAINING PROGRAM

## 2023-11-08 PROCEDURE — C1894 INTRO/SHEATH, NON-LASER: HCPCS | Performed by: SURGERY

## 2023-11-08 PROCEDURE — C1725 CATH, TRANSLUMIN NON-LASER: HCPCS | Performed by: SURGERY

## 2023-11-08 PROCEDURE — 04FD3ZZ FRAGMENTATION OF LEFT COMMON ILIAC ARTERY, PERCUTANEOUS APPROACH: ICD-10-PCS | Performed by: SURGERY

## 2023-11-08 PROCEDURE — C1781 MESH (IMPLANTABLE): HCPCS | Performed by: SURGERY

## 2023-11-08 PROCEDURE — NC001 PR NO CHARGE: Performed by: SURGERY

## 2023-11-08 PROCEDURE — 37220 PR REVASCULARIZATION ILIAC ARTERY ANGIOP 1ST VSL: CPT | Performed by: SURGERY

## 2023-11-08 PROCEDURE — 75716 ARTERY X-RAYS ARMS/LEGS: CPT

## 2023-11-08 PROCEDURE — C1887 CATHETER, GUIDING: HCPCS | Performed by: SURGERY

## 2023-11-08 PROCEDURE — 04FH3ZZ FRAGMENTATION OF RIGHT EXTERNAL ILIAC ARTERY, PERCUTANEOUS APPROACH: ICD-10-PCS | Performed by: SURGERY

## 2023-11-08 PROCEDURE — 04FC3ZZ FRAGMENTATION OF RIGHT COMMON ILIAC ARTERY, PERCUTANEOUS APPROACH: ICD-10-PCS | Performed by: SURGERY

## 2023-11-08 DEVICE — XENOSURE BIOLOGIC PATCH, 0.8CM X 8CM, EIFU
Type: IMPLANTABLE DEVICE | Site: ARTERIAL | Status: FUNCTIONAL
Brand: XENOSURE BIOLOGIC PATCH

## 2023-11-08 RX ORDER — HEPARIN SODIUM 5000 [USP'U]/ML
5000 INJECTION, SOLUTION INTRAVENOUS; SUBCUTANEOUS EVERY 8 HOURS SCHEDULED
Status: DISCONTINUED | OUTPATIENT
Start: 2023-11-08 | End: 2023-11-09 | Stop reason: HOSPADM

## 2023-11-08 RX ORDER — SODIUM CHLORIDE, SODIUM LACTATE, POTASSIUM CHLORIDE, CALCIUM CHLORIDE 600; 310; 30; 20 MG/100ML; MG/100ML; MG/100ML; MG/100ML
INJECTION, SOLUTION INTRAVENOUS CONTINUOUS PRN
Status: DISCONTINUED | OUTPATIENT
Start: 2023-11-08 | End: 2023-11-08

## 2023-11-08 RX ORDER — SODIUM CHLORIDE 9 MG/ML
INJECTION, SOLUTION INTRAVENOUS CONTINUOUS PRN
Status: DISCONTINUED | OUTPATIENT
Start: 2023-11-08 | End: 2023-11-08

## 2023-11-08 RX ORDER — GABAPENTIN 300 MG/1
300 CAPSULE ORAL
Status: DISCONTINUED | OUTPATIENT
Start: 2023-11-08 | End: 2023-11-09 | Stop reason: HOSPADM

## 2023-11-08 RX ORDER — HYDROMORPHONE HCL/PF 1 MG/ML
0.5 SYRINGE (ML) INJECTION
Status: COMPLETED | OUTPATIENT
Start: 2023-11-08 | End: 2023-11-08

## 2023-11-08 RX ORDER — SENNOSIDES 8.6 MG
1 TABLET ORAL DAILY
Status: DISCONTINUED | OUTPATIENT
Start: 2023-11-08 | End: 2023-11-09 | Stop reason: HOSPADM

## 2023-11-08 RX ORDER — ALPRAZOLAM 0.25 MG/1
0.25 TABLET ORAL
Status: DISCONTINUED | OUTPATIENT
Start: 2023-11-08 | End: 2023-11-09 | Stop reason: HOSPADM

## 2023-11-08 RX ORDER — OXYCODONE HYDROCHLORIDE 5 MG/1
5 TABLET ORAL EVERY 4 HOURS PRN
Status: DISCONTINUED | OUTPATIENT
Start: 2023-11-08 | End: 2023-11-09 | Stop reason: HOSPADM

## 2023-11-08 RX ORDER — LIDOCAINE HYDROCHLORIDE 10 MG/ML
INJECTION, SOLUTION EPIDURAL; INFILTRATION; INTRACAUDAL; PERINEURAL AS NEEDED
Status: DISCONTINUED | OUTPATIENT
Start: 2023-11-08 | End: 2023-11-08

## 2023-11-08 RX ORDER — ONDANSETRON 2 MG/ML
INJECTION INTRAMUSCULAR; INTRAVENOUS AS NEEDED
Status: DISCONTINUED | OUTPATIENT
Start: 2023-11-08 | End: 2023-11-08

## 2023-11-08 RX ORDER — GABAPENTIN 300 MG/1
300 CAPSULE ORAL ONCE
Status: DISCONTINUED | OUTPATIENT
Start: 2023-11-08 | End: 2023-11-08 | Stop reason: HOSPADM

## 2023-11-08 RX ORDER — PROPOFOL 10 MG/ML
INJECTION, EMULSION INTRAVENOUS AS NEEDED
Status: DISCONTINUED | OUTPATIENT
Start: 2023-11-08 | End: 2023-11-08

## 2023-11-08 RX ORDER — KETAMINE HCL IN NACL, ISO-OSM 100MG/10ML
SYRINGE (ML) INJECTION AS NEEDED
Status: DISCONTINUED | OUTPATIENT
Start: 2023-11-08 | End: 2023-11-08

## 2023-11-08 RX ORDER — HEPARIN SODIUM 1000 [USP'U]/ML
INJECTION, SOLUTION INTRAVENOUS; SUBCUTANEOUS AS NEEDED
Status: DISCONTINUED | OUTPATIENT
Start: 2023-11-08 | End: 2023-11-08

## 2023-11-08 RX ORDER — ACETAMINOPHEN 325 MG/1
975 TABLET ORAL EVERY 8 HOURS SCHEDULED
Status: DISCONTINUED | OUTPATIENT
Start: 2023-11-08 | End: 2023-11-09 | Stop reason: HOSPADM

## 2023-11-08 RX ORDER — ROCURONIUM BROMIDE 10 MG/ML
INJECTION, SOLUTION INTRAVENOUS AS NEEDED
Status: DISCONTINUED | OUTPATIENT
Start: 2023-11-08 | End: 2023-11-08

## 2023-11-08 RX ORDER — PROTAMINE SULFATE 10 MG/ML
INJECTION, SOLUTION INTRAVENOUS AS NEEDED
Status: DISCONTINUED | OUTPATIENT
Start: 2023-11-08 | End: 2023-11-08

## 2023-11-08 RX ORDER — IODIXANOL 320 MG/ML
INJECTION, SOLUTION INTRAVASCULAR AS NEEDED
Status: DISCONTINUED | OUTPATIENT
Start: 2023-11-08 | End: 2023-11-08 | Stop reason: HOSPADM

## 2023-11-08 RX ORDER — CHLORHEXIDINE GLUCONATE ORAL RINSE 1.2 MG/ML
15 SOLUTION DENTAL ONCE
Status: COMPLETED | OUTPATIENT
Start: 2023-11-08 | End: 2023-11-08

## 2023-11-08 RX ORDER — LABETALOL HYDROCHLORIDE 5 MG/ML
5 INJECTION, SOLUTION INTRAVENOUS ONCE
Status: DISCONTINUED | OUTPATIENT
Start: 2023-11-08 | End: 2023-11-08

## 2023-11-08 RX ORDER — ONDANSETRON 2 MG/ML
4 INJECTION INTRAMUSCULAR; INTRAVENOUS EVERY 6 HOURS PRN
Status: DISCONTINUED | OUTPATIENT
Start: 2023-11-08 | End: 2023-11-09 | Stop reason: HOSPADM

## 2023-11-08 RX ORDER — MIDAZOLAM HYDROCHLORIDE 2 MG/2ML
INJECTION, SOLUTION INTRAMUSCULAR; INTRAVENOUS AS NEEDED
Status: DISCONTINUED | OUTPATIENT
Start: 2023-11-08 | End: 2023-11-08

## 2023-11-08 RX ORDER — TRAZODONE HYDROCHLORIDE 100 MG/1
100 TABLET ORAL
Status: DISCONTINUED | OUTPATIENT
Start: 2023-11-08 | End: 2023-11-09 | Stop reason: HOSPADM

## 2023-11-08 RX ORDER — ATORVASTATIN CALCIUM 80 MG/1
80 TABLET, FILM COATED ORAL
Status: DISCONTINUED | OUTPATIENT
Start: 2023-11-08 | End: 2023-11-09 | Stop reason: HOSPADM

## 2023-11-08 RX ORDER — SODIUM CHLORIDE, SODIUM LACTATE, POTASSIUM CHLORIDE, CALCIUM CHLORIDE 600; 310; 30; 20 MG/100ML; MG/100ML; MG/100ML; MG/100ML
75 INJECTION, SOLUTION INTRAVENOUS CONTINUOUS
Status: DISPENSED | OUTPATIENT
Start: 2023-11-08 | End: 2023-11-09

## 2023-11-08 RX ORDER — EPHEDRINE SULFATE 50 MG/ML
INJECTION INTRAVENOUS AS NEEDED
Status: DISCONTINUED | OUTPATIENT
Start: 2023-11-08 | End: 2023-11-08

## 2023-11-08 RX ORDER — HYDROMORPHONE HCL/PF 1 MG/ML
SYRINGE (ML) INJECTION AS NEEDED
Status: DISCONTINUED | OUTPATIENT
Start: 2023-11-08 | End: 2023-11-08

## 2023-11-08 RX ORDER — FENTANYL CITRATE 50 UG/ML
INJECTION, SOLUTION INTRAMUSCULAR; INTRAVENOUS AS NEEDED
Status: DISCONTINUED | OUTPATIENT
Start: 2023-11-08 | End: 2023-11-08

## 2023-11-08 RX ORDER — OXYCODONE HYDROCHLORIDE 10 MG/1
10 TABLET ORAL EVERY 4 HOURS PRN
Status: DISCONTINUED | OUTPATIENT
Start: 2023-11-08 | End: 2023-11-09 | Stop reason: HOSPADM

## 2023-11-08 RX ORDER — ASPIRIN 81 MG/1
81 TABLET, CHEWABLE ORAL DAILY
Status: DISCONTINUED | OUTPATIENT
Start: 2023-11-08 | End: 2023-11-09 | Stop reason: HOSPADM

## 2023-11-08 RX ORDER — PHENYLEPHRINE HCL IN 0.9% NACL 1 MG/10 ML
SYRINGE (ML) INTRAVENOUS AS NEEDED
Status: DISCONTINUED | OUTPATIENT
Start: 2023-11-08 | End: 2023-11-08

## 2023-11-08 RX ORDER — FENTANYL CITRATE/PF 50 MCG/ML
50 SYRINGE (ML) INJECTION
Status: COMPLETED | OUTPATIENT
Start: 2023-11-08 | End: 2023-11-08

## 2023-11-08 RX ORDER — LABETALOL HYDROCHLORIDE 5 MG/ML
5 INJECTION, SOLUTION INTRAVENOUS ONCE AS NEEDED
Status: COMPLETED | OUTPATIENT
Start: 2023-11-08 | End: 2023-11-08

## 2023-11-08 RX ORDER — ACETAMINOPHEN 10 MG/ML
1000 INJECTION, SOLUTION INTRAVENOUS ONCE
Status: DISCONTINUED | OUTPATIENT
Start: 2023-11-08 | End: 2023-11-08 | Stop reason: HOSPADM

## 2023-11-08 RX ORDER — DEXAMETHASONE SODIUM PHOSPHATE 10 MG/ML
INJECTION, SOLUTION INTRAMUSCULAR; INTRAVENOUS AS NEEDED
Status: DISCONTINUED | OUTPATIENT
Start: 2023-11-08 | End: 2023-11-08

## 2023-11-08 RX ORDER — CEFAZOLIN SODIUM 1 G/3ML
INJECTION, POWDER, FOR SOLUTION INTRAMUSCULAR; INTRAVENOUS AS NEEDED
Status: DISCONTINUED | OUTPATIENT
Start: 2023-11-08 | End: 2023-11-08

## 2023-11-08 RX ADMIN — ACETAMINOPHEN 975 MG: 325 TABLET, FILM COATED ORAL at 17:13

## 2023-11-08 RX ADMIN — HEPARIN SODIUM 9000 UNITS: 1000 INJECTION INTRAVENOUS; SUBCUTANEOUS at 09:51

## 2023-11-08 RX ADMIN — LIDOCAINE HYDROCHLORIDE 50 MG: 10 INJECTION, SOLUTION EPIDURAL; INFILTRATION; INTRACAUDAL; PERINEURAL at 08:26

## 2023-11-08 RX ADMIN — HYDROMORPHONE HYDROCHLORIDE 0.5 MG: 1 INJECTION, SOLUTION INTRAMUSCULAR; INTRAVENOUS; SUBCUTANEOUS at 15:33

## 2023-11-08 RX ADMIN — SUGAMMADEX 200 MG: 100 INJECTION, SOLUTION INTRAVENOUS at 12:57

## 2023-11-08 RX ADMIN — SODIUM CHLORIDE: 0.9 INJECTION, SOLUTION INTRAVENOUS at 08:30

## 2023-11-08 RX ADMIN — FENTANYL CITRATE 50 MCG: 50 INJECTION INTRAMUSCULAR; INTRAVENOUS at 14:53

## 2023-11-08 RX ADMIN — MIDAZOLAM 2 MG: 1 INJECTION INTRAMUSCULAR; INTRAVENOUS at 08:16

## 2023-11-08 RX ADMIN — Medication 30 MG: at 08:54

## 2023-11-08 RX ADMIN — SODIUM CHLORIDE, SODIUM LACTATE, POTASSIUM CHLORIDE, AND CALCIUM CHLORIDE: .6; .31; .03; .02 INJECTION, SOLUTION INTRAVENOUS at 10:37

## 2023-11-08 RX ADMIN — PROPOFOL 50 MG: 10 INJECTION, EMULSION INTRAVENOUS at 12:42

## 2023-11-08 RX ADMIN — CEFAZOLIN 2000 MG: 1 INJECTION, POWDER, FOR SOLUTION INTRAMUSCULAR; INTRAVENOUS at 12:46

## 2023-11-08 RX ADMIN — TRAZODONE HYDROCHLORIDE 100 MG: 100 TABLET ORAL at 21:20

## 2023-11-08 RX ADMIN — DEXAMETHASONE SODIUM PHOSPHATE 10 MG: 10 INJECTION, SOLUTION INTRAMUSCULAR; INTRAVENOUS at 08:26

## 2023-11-08 RX ADMIN — SODIUM CHLORIDE 0.3 MCG/KG/HR: 9 INJECTION, SOLUTION INTRAVENOUS at 08:41

## 2023-11-08 RX ADMIN — OXYCODONE HYDROCHLORIDE 10 MG: 10 TABLET ORAL at 21:25

## 2023-11-08 RX ADMIN — ROCURONIUM BROMIDE 50 MG: 10 INJECTION, SOLUTION INTRAVENOUS at 08:26

## 2023-11-08 RX ADMIN — CEFAZOLIN 2000 MG: 1 INJECTION, POWDER, FOR SOLUTION INTRAMUSCULAR; INTRAVENOUS at 08:45

## 2023-11-08 RX ADMIN — ROCURONIUM BROMIDE 20 MG: 10 INJECTION, SOLUTION INTRAVENOUS at 10:34

## 2023-11-08 RX ADMIN — HYDROMORPHONE HYDROCHLORIDE 0.5 MG: 1 INJECTION, SOLUTION INTRAMUSCULAR; INTRAVENOUS; SUBCUTANEOUS at 15:19

## 2023-11-08 RX ADMIN — FENTANYL CITRATE 50 MCG: 50 INJECTION INTRAMUSCULAR; INTRAVENOUS at 15:04

## 2023-11-08 RX ADMIN — CHLORHEXIDINE GLUCONATE 15 ML: 1.2 SOLUTION ORAL at 07:18

## 2023-11-08 RX ADMIN — PHENYLEPHRINE HYDROCHLORIDE 30 MCG/MIN: 10 INJECTION INTRAVENOUS at 09:03

## 2023-11-08 RX ADMIN — GABAPENTIN 300 MG: 300 CAPSULE ORAL at 21:20

## 2023-11-08 RX ADMIN — ONDANSETRON 4 MG: 2 INJECTION INTRAMUSCULAR; INTRAVENOUS at 12:12

## 2023-11-08 RX ADMIN — SODIUM CHLORIDE, SODIUM LACTATE, POTASSIUM CHLORIDE, AND CALCIUM CHLORIDE: .6; .31; .03; .02 INJECTION, SOLUTION INTRAVENOUS at 07:58

## 2023-11-08 RX ADMIN — SODIUM CHLORIDE, SODIUM LACTATE, POTASSIUM CHLORIDE, AND CALCIUM CHLORIDE: .6; .31; .03; .02 INJECTION, SOLUTION INTRAVENOUS at 13:03

## 2023-11-08 RX ADMIN — FENTANYL CITRATE 100 MCG: 50 INJECTION INTRAMUSCULAR; INTRAVENOUS at 08:24

## 2023-11-08 RX ADMIN — HEPARIN SODIUM 3000 UNITS: 1000 INJECTION INTRAVENOUS; SUBCUTANEOUS at 10:35

## 2023-11-08 RX ADMIN — Medication 100 MCG: at 09:05

## 2023-11-08 RX ADMIN — EPHEDRINE SULFATE 5 MG: 50 INJECTION INTRAVENOUS at 09:10

## 2023-11-08 RX ADMIN — HEPARIN SODIUM 2000 UNITS: 1000 INJECTION INTRAVENOUS; SUBCUTANEOUS at 10:52

## 2023-11-08 RX ADMIN — ROCURONIUM BROMIDE 20 MG: 10 INJECTION, SOLUTION INTRAVENOUS at 09:15

## 2023-11-08 RX ADMIN — HYDROMORPHONE HYDROCHLORIDE 0.5 MG: 1 INJECTION, SOLUTION INTRAMUSCULAR; INTRAVENOUS; SUBCUTANEOUS at 09:17

## 2023-11-08 RX ADMIN — ATORVASTATIN CALCIUM 80 MG: 80 TABLET, FILM COATED ORAL at 17:14

## 2023-11-08 RX ADMIN — PROTAMINE SULFATE 30 MG: 10 INJECTION, SOLUTION INTRAVENOUS at 12:35

## 2023-11-08 RX ADMIN — SENNOSIDES 8.6 MG: 8.6 TABLET, FILM COATED ORAL at 17:13

## 2023-11-08 RX ADMIN — HYDROMORPHONE HYDROCHLORIDE 0.5 MG: 1 INJECTION, SOLUTION INTRAMUSCULAR; INTRAVENOUS; SUBCUTANEOUS at 13:25

## 2023-11-08 RX ADMIN — ACETAMINOPHEN 975 MG: 325 TABLET, FILM COATED ORAL at 21:20

## 2023-11-08 RX ADMIN — Medication 20 MG: at 08:26

## 2023-11-08 RX ADMIN — HYDROMORPHONE HYDROCHLORIDE 0.5 MG: 1 INJECTION, SOLUTION INTRAMUSCULAR; INTRAVENOUS; SUBCUTANEOUS at 09:45

## 2023-11-08 RX ADMIN — Medication 5 MG: at 18:20

## 2023-11-08 RX ADMIN — Medication 100 MCG: at 09:55

## 2023-11-08 RX ADMIN — HEPARIN SODIUM 4000 UNITS: 1000 INJECTION INTRAVENOUS; SUBCUTANEOUS at 11:13

## 2023-11-08 RX ADMIN — ROCURONIUM BROMIDE 10 MG: 10 INJECTION, SOLUTION INTRAVENOUS at 10:09

## 2023-11-08 RX ADMIN — PROPOFOL 200 MG: 10 INJECTION, EMULSION INTRAVENOUS at 08:26

## 2023-11-08 RX ADMIN — ASPIRIN 81 MG CHEWABLE TABLET 81 MG: 81 TABLET CHEWABLE at 17:13

## 2023-11-08 RX ADMIN — HYDROMORPHONE HYDROCHLORIDE 0.5 MG: 1 INJECTION, SOLUTION INTRAMUSCULAR; INTRAVENOUS; SUBCUTANEOUS at 15:49

## 2023-11-08 RX ADMIN — ROCURONIUM BROMIDE 20 MG: 10 INJECTION, SOLUTION INTRAVENOUS at 09:35

## 2023-11-08 RX ADMIN — HEPARIN SODIUM 5000 UNITS: 5000 INJECTION INTRAVENOUS; SUBCUTANEOUS at 21:20

## 2023-11-08 RX ADMIN — PHENYLEPHRINE HYDROCHLORIDE 50 MCG/MIN: 10 INJECTION INTRAVENOUS at 08:42

## 2023-11-08 RX ADMIN — OXYCODONE HYDROCHLORIDE 10 MG: 10 TABLET ORAL at 17:13

## 2023-11-08 RX ADMIN — Medication 100 MCG: at 12:02

## 2023-11-08 NOTE — ANESTHESIA POSTPROCEDURE EVALUATION
Post-Op Assessment Note    CV Status:  Stable    Pain management: adequate     Mental Status:  Sleepy and arousable   Hydration Status:  Euvolemic   PONV Controlled:  Controlled   Airway Patency:  Patent      Post Op Vitals Reviewed: Yes      Staff: CRNA         No notable events documented.     /78 (11/08/23 1332)    Temp 99.1 °F (37.3 °C) (11/08/23 1332)    Pulse 71 (11/08/23 1332)   Resp 12 (11/08/23 1332)    SpO2 99 % (11/08/23 1332)

## 2023-11-08 NOTE — INTERVAL H&P NOTE
H&P reviewed. After examining the patient I find no changes in the patients condition since the H&P had been written.     Vitals:    11/08/23 0719   BP: 153/79   Pulse: 61   Resp: 20   Temp: 97.8 °F (36.6 °C)   SpO2: 96%

## 2023-11-08 NOTE — OP NOTE
DATE OF PROCEDURE: 11/08/23     PERFORMING SERVICE: Vascular and Endovascular Surgery    ATTENDING SURGEON: Humza Hope MD    PREOPERATIVE DIAGNOSIS: Lifestyle limiting claudication of bilateral lower extremities with intermittent rest pain on the right. POSTOPERATIVE DIAGNOSIS: Same    PROCEDURE NAME:   1. Right iliofemoral endarterectomy with bovine pericardial patch angioplasty  2. Retrograde right iliac arteriogram with interpretation  3. Intravascular lithotripsy to the right common iliac artery with 9 x 30 mm shockwave balloon  4. Intravascular lithotripsy to the right external iliac artery with 9 x 30 mm shockwave balloon  5. Aortogram with interpretation  6. Intravascular lithotripsy to the left common iliac artery with 9 x 30 mm shockwave balloon    ANESTHESIA: general    EBL: 50 cc    UOP:  300 cc    IVF:  2600 cc     SPECIMENS:  None     COMPLICATIONS: None    DRAINS: None    INDICATION: Cielo Roque is a 70-year-old male with bilateral lower extremity peripheral arterial occlusive disease. He had previously underwent a left femoral endarterectomy with patch angioplasty and had resolution of left-sided rest pain. He now developed short distance claudication of the bilateral lower extremities with intermittent rest pain of the right lower extremity. We assessed him in the office and he underwent informed consent for right femoral endarterectomy with arteriographic evaluation of the iliac system. All questions were answered. INTRAOPERATIVE FINDINGS: Flow-limiting lesions in bilateral common iliac arteries. Occluded distal common femoral and proximal superficial femoral arteries successfully treated with endarterectomy and angioplasty. ASSISTING SURGEON: Dr. Win Schwartz DO    DESCRIPTION OF PROCEDURE:   Informed consent was obtained from the patient prior to proceeding to the operating room.   The patient was then identified in the pre-anesthesia area, then taken to the operating room, placed in the supine position on the operating table, and induced under general endotracheal anesthesia. The patient was correctly positioned, padded at all pressure points. The operative field was then prepared and draped in a sterile fashion. A pre-operative timeout was performed. Preoperative antibiotics were administered at this time. A vertical incision was made with a #15 blade over the right groin where the right femoral artery was identified. Dissection was carried down through the skin and subcutaneous tissue. The inguinal ligament was identified and subcutaneous tissue was dissected from the inferior edge medially and laterally. The common femoral artery was identified after further dissection and the anterior aspect was cleared of subcutaneous tissue moving from proximal to distal.  The region of vessel caliber change was identified indicating the CFA bifurcation. Circumferential dissection of the distal EIA, CFA, SFA, and profunda down to second order branches was then performed. There was an additional profunda branch controlled separately. The vessels were then each encircled with silastic vessel loops. The patient was then systemically heparinized. ACTs were periodically checked and heparin was re-dosed to maintain ACT >250 throughout the case. Vessel loops on the proximal femoral vessels were pulled up and an atraumatic vascular clamp was placed proximally on the right distal external iliac for control. A #11 blade was used to create a vertical arteriotomy on the common femoral artery and this was extended proximally and distally to the level of the bifurcation with De Leon scissors. Endarterectomy of the distal EIA, CFA, proximal SFA, and profunda vessels down to second order branches was then performed with a Penfield elevator, DeBakey, and right angle clamp. The proximal endpoint was transected perpendicular to the vessel wall.   The distal endarterectomy endpoints were feathered to a smooth taper with good backbleeding noted from the profunda. Backbleeding was appreciated from the SFA. Heparinized saline was used to irrigate the endarterectomy bed and careful attention was paid to remove all free-floating debris from the vessel wall. Tacking sutures were placed at the distal endpoint of the endarterectomy on the superficial femoral artery. Tacking sutures were also placed at the profunda femoris origin. The tacking sutures were completed with 6-0 Prolene suture. The endarterectomy was closed with bovine pericardium patch angioplasty and 6-0 running Prolene suture. Prior to completion of the patch, all femoral vessels were back bled and the patch was de-aired. Bleeding from the patch was controlled with interrupted 6-0 prolene sutures. The right groin was accessed through the patch with a micropuncture kit was used to cannulate the common femoral artery artery. Fluoroscopy was used to confirm wire trajectory. A microsheath was used to introduce a glidewire. The microsheath was removed and replaced with a 7 Bulgarian sheath. A retrograde right iliac arteriogram was performed. There was a 6 cm area of stenosis that ranged from the mid common iliac to the external iliac artery. An Omni Flush catheter was used to exchange the Glidewire advantage for a V18 wire. A 9 mm x 30 mm shockwave intravascular lithotripsy balloon was placed across this area in 6 cycles of intravascular lithotripsy were performed at varying segments along this stenotic and flow-limiting portion. The balloon was removed and replaced with an Omni Flush catheter and the V18 wire was then used to seat the Omni Flush catheter into the left iliac system. A left iliac arteriogram was performed in antegrade fashion showing a focal stenosis of the distal common iliac artery.   The lesion was crossed with the V18 wire and the Omni Flush catheter was removed and replaced with the intravascular lithotripsy balloon. This was run for 2 cycles at the region of the left common iliac artery. Completion arteriogram of both iliac systems demonstrated improvement in the flow lumen with a potential nonflow limiting dissection of the common iliac artery on the left right at the region of the hypogastric artery. This did not appear to be flow-limiting nor mobile. We elected to observe this at this time. The wire and catheter systems were removed under fluoroscopy and the arteriotomy in the patch was closed with Prolene suture. The patient was administered protamine as an adequate hemostasis. After completion of the endarterectomy and endovascular procedure, the patient was given protamine. Good hemostasis was achieved in the wound beds. The wounds were closed in multiple layers of 2-0 and 3-0 monocryl suture, and the skin was closed with 4-0 Monocryl. The incisions were covered with dermabond and sterile dressings.      Tashi Almendarez MD  11/08/23   1:06 PM

## 2023-11-08 NOTE — ANESTHESIA PROCEDURE NOTES
Arterial Line Insertion    Performed by: Frederick Mayorga CRNA  Authorized by: Walter Barahona MD  Preparation: Patient was prepped and draped in the usual sterile fashion.   Indications: hemodynamic monitoring  Orientation:  Right  Location: radial artery  Procedure Details:  Needle gauge: 20  Seldinger technique: Seldinger technique used  Number of attempts: 2    Post-procedure:  Post-procedure: dressing applied  Waveform: good waveform and waveform confirmed  Patient tolerance: Patient tolerated the procedure well with no immediate complications

## 2023-11-08 NOTE — ANESTHESIA PREPROCEDURE EVALUATION
Procedure:  ENDARTERECTOMY ARTERIAL FEMORAL (Right: Leg Lower)  ARTERIOGRAM (Right: Abdomen)    Relevant Problems   CARDIO   (+) Hyperlipidemia   (+) Hypertension   (+) PAD (peripheral artery disease) (HCC)   (+) Peripheral vascular disease of extremity with claudication (HCC)      ENDO   (+) Hypothyroid      /RENAL   (+) Benign hypertension with chronic kidney disease, stage III (HCC)   (+) Bilateral renal cysts   (+) Cystinuria (HCC)   (+) Left renal atrophy   (+) Nephrolithiasis   (+) Stage 3a chronic kidney disease (HCC) (Cr 1.2-1.3)      NEURO/PSYCH   (+) Anxiety   (+) Chronic pain   (+) TIA (transient ischemic attack)      PULMONARY   (+) Obstructive sleep apnea      Other   (+) S/P cataract surgery   (+) Tobacco abuse     Hx of long acting opiate use < 5 years prior    EKG 8/10/23:  Normal sinus rhythm. HR is 68 bpm.   Left ventricular hypertrophy with repolarization abnormalities. Physical Exam    Airway    Mallampati score: IV  TM Distance: >3 FB  Neck ROM: full     Dental       Cardiovascular      Pulmonary      Other Findings        Anesthesia Plan  ASA Score- 3     Anesthesia Type- general with ASA Monitors. Additional Monitors: arterial line. Airway Plan: ETT.     Comment: Recent labs personally reviewed:  Lab Results       Component                Value               Date                       WBC                      9.45                10/26/2023                 HGB                      15.0                10/26/2023                 PLT                      299                 10/26/2023            Lab Results       Component                Value               Date                       NA                       139                 06/04/2015                 K                        4.7                 10/26/2023                 BUN                      20                  10/26/2023                 CREATININE               1.37 (H)            10/26/2023                 GLUCOSE 84                  06/04/2015            Lab Results       Component                Value               Date                       PTT                      34                  10/23/2021             Lab Results       Component                Value               Date                       INR                      0.98                10/26/2023              Blood type B      I, Moises Morgan MD, have personally seen and evaluated the patient prior to anesthetic care. I have reviewed the pre-anesthetic record, medical history, allergies, medications and any other medical records if appropriate to the anesthetic care. If a CRNA is involved in the case, I have reviewed the CRNA assessment, if present, and agree. I consented the patient for general anesthesia with appropriate airway support as indicated. We reviewed the risks associated including PONV, sore throat, allergic reaction to anesthetics and management plan to address these issues. We discussed the indication and risks associated with any invasive monitors that would be placed. We discussed post op pain control and expectations. We discussed rare complications including hypoxia, perioperative cardiac and neurologic events, and death based on the patient's baseline risk. All questions and concerns were addressed. .       Plan Factors-Exercise tolerance (METS): >4 METS. Chart reviewed. EKG reviewed. Imaging results reviewed. Existing labs reviewed. Patient summary reviewed. Patient is a current smoker. Patient instructed to abstain from smoking on day of procedure. Patient did not smoke on day of surgery. Obstructive sleep apnea risk education given perioperatively. Induction- intravenous. Postoperative Plan-     Informed Consent- Anesthetic plan and risks discussed with patient. I personally reviewed this patient with the CRNA. Discussed and agreed on the Anesthesia Plan with the CRNA. Kendrick Lennox

## 2023-11-08 NOTE — INTERIM OP NOTE
RIGHT FEMORAL ENDARTERECTOMY WITH PATCH ANGIOPLASTY, RIGHT COMMON ILIAC INTRAVASCULAR LITHOTRIPSY, RIGHT EXTERNAL ILIAC INTRAVASCULAR LITHOTRIPSY, AORTAGRAM, LEFT COMMON ILIAC INTRAVASCULAR LITHOTRIPSY, ARTERIOGRAM  Postoperative Note  PATIENT NAME: Crow Melendez  : 1960  MRN: [id number]  [facility] OR ROOM 02    Surgery Date: 2023    Preop Diagnosis:  Rest pain of both lower extremities due to atherosclerosis (HCC) [I70.223]    Post-Op Diagnosis Codes:     * Rest pain of both lower extremities due to atherosclerosis (HCC) [I70.223]    Procedure(s) (LRB):  1. Right iliofemoral endarterectomy with bovine pericardial patch angioplasty  2. Retrograde right iliac arteriogram with interpretation  3. Intravascular lithotripsy to the right common iliac artery with 9 x 30 mm shockwave balloon  4. Intravascular lithotripsy to the right external iliac artery with 9 x 30 mm shockwave balloon  5. Aortogram with interpretation  6. Intravascular lithotripsy to the left common iliac artery with 9 x 30 mm shockwave balloon    Surgeon(s) and Role:     * Seth Rivera MD - Primary     * Zeferino Underwood MD - Assisting     * [place] [address] [facility] - Observing     * Tracie Lua DO - Fellow    Specimens:  * No specimens in log *    Estimated Blood Loss:   300 mL    Anesthesia Type:   General     Findings:     Flow-limiting lesions in bilateral common iliac arteries. Occluded distal common femoral and proximal superficial femoral arteries successfully treated with endarterectomy and angioplasty. Complications:   None        Vascular Quality Initiative - Peripheral Vascular Intervention     Urgency: Elective    Functional Status:  Fully active; able to carry on all predisease activities without restriction.    Ambulation: Amb = independently ambulatory    Leg Symptoms    Right: Ischemic Rest Pain  Left: Moderate Claudication:  ischemic limb muscle pain that limits walking 1-2 blocks (300-600 feet, or 1-2 football fields)    COVID Information  COVID Symptoms Pre-Procedure: Asymptomatic    Treatment Delayed by Pandemic: None    Access   Number of Sites: 1     Access Site 1:     Side 1: Right    Site 1: Femoral Retrograde    Access Guidance 1:Concomitant Endarterectomy  Yes, Patch Closure    Largest Sheath Size 1: 7 Fr.     Closure Device 1: None   None    Procedure  Fluoro Time: 8.2 minutes  Contrast Volume: Visipaque 23 ml  DAP: 98 Gy.cm2  CO2: no  Anticoagulant: Heparin  Protamine: Yes  If Creatinine is > 1.2 or missing, GAUTAM Prophylaxis none     Treatment Details  Indication: Occlusive Disease,    Completion Assessment  Artery 1 treated: Common Iliac  Right               Outflow: SFA, PROF, POP: 3                  Was this Site previously treated?: No          TASC Grade: A          Total Treated Length: 4 cm          Total Occluded Length: 4 cm          Calcification: Severe (calcification on both sides of artery > half length of lesion)          Number of Treatment types (Devices):   1           Device 1          Treatment Type: Special Balloon,  Lithoplasty Balloon                Diameter: 8 mm          Length: 4 mm          Concomitant: None          Technical result: Successful (stenosis <=30%)      Artery 2 treated: Common Iliac  Left               Outflow: SFA, PROF, POP: 3                  Was this Site previously treated?: No          TASC Grade: A          Total Treated Length: 4 cm           Total Occluded Length: 4 cm          Calcification: Moderate (calcification on both sides of artery < half length of lesion)          Number of Treatment types (Devices):   1           Device 1          Treatment Type: Special Balloon,  Lithoplasty Balloon                Diameter: 8 mm          Length: 4 mm          Concomitant: None          Technical result: Successful (stenosis <=30%)       None     Post Procedure  Patient currently taking: Statin, Yes      Antiplatelet Medication, Yes    Procedure Complications: No        SIGNATURE: Marlyn Bob DO   DATE: November 8, 2023   TIME: 1:27 PM

## 2023-11-09 ENCOUNTER — DOCUMENTATION (OUTPATIENT)
Dept: VASCULAR SURGERY | Facility: CLINIC | Age: 63
End: 2023-11-09

## 2023-11-09 VITALS
OXYGEN SATURATION: 93 % | SYSTOLIC BLOOD PRESSURE: 123 MMHG | WEIGHT: 199 LBS | RESPIRATION RATE: 17 BRPM | BODY MASS INDEX: 26.95 KG/M2 | HEIGHT: 72 IN | TEMPERATURE: 97.9 F | HEART RATE: 61 BPM | DIASTOLIC BLOOD PRESSURE: 64 MMHG

## 2023-11-09 DIAGNOSIS — I10 PRIMARY HYPERTENSION: ICD-10-CM

## 2023-11-09 LAB
ANION GAP SERPL CALCULATED.3IONS-SCNC: 6 MMOL/L
BUN SERPL-MCNC: 20 MG/DL (ref 5–25)
CALCIUM SERPL-MCNC: 8.7 MG/DL (ref 8.4–10.2)
CHLORIDE SERPL-SCNC: 103 MMOL/L (ref 96–108)
CO2 SERPL-SCNC: 27 MMOL/L (ref 21–32)
CREAT SERPL-MCNC: 1.29 MG/DL (ref 0.6–1.3)
ERYTHROCYTE [DISTWIDTH] IN BLOOD BY AUTOMATED COUNT: 12.6 % (ref 11.6–15.1)
GFR SERPL CREATININE-BSD FRML MDRD: 58 ML/MIN/1.73SQ M
GLUCOSE SERPL-MCNC: 120 MG/DL (ref 65–140)
HCT VFR BLD AUTO: 38.6 % (ref 36.5–49.3)
HGB BLD-MCNC: 12.9 G/DL (ref 12–17)
MCH RBC QN AUTO: 31.5 PG (ref 26.8–34.3)
MCHC RBC AUTO-ENTMCNC: 33.4 G/DL (ref 31.4–37.4)
MCV RBC AUTO: 94 FL (ref 82–98)
PLATELET # BLD AUTO: 262 THOUSANDS/UL (ref 149–390)
PMV BLD AUTO: 11.6 FL (ref 8.9–12.7)
POTASSIUM SERPL-SCNC: 4.2 MMOL/L (ref 3.5–5.3)
RBC # BLD AUTO: 4.09 MILLION/UL (ref 3.88–5.62)
SODIUM SERPL-SCNC: 136 MMOL/L (ref 135–147)
WBC # BLD AUTO: 14.97 THOUSAND/UL (ref 4.31–10.16)

## 2023-11-09 PROCEDURE — NC001 PR NO CHARGE: Performed by: SURGERY

## 2023-11-09 PROCEDURE — 99024 POSTOP FOLLOW-UP VISIT: CPT | Performed by: SURGERY

## 2023-11-09 PROCEDURE — 80048 BASIC METABOLIC PNL TOTAL CA: CPT | Performed by: STUDENT IN AN ORGANIZED HEALTH CARE EDUCATION/TRAINING PROGRAM

## 2023-11-09 PROCEDURE — 85027 COMPLETE CBC AUTOMATED: CPT | Performed by: STUDENT IN AN ORGANIZED HEALTH CARE EDUCATION/TRAINING PROGRAM

## 2023-11-09 RX ORDER — OXYCODONE HYDROCHLORIDE 5 MG/1
5 TABLET ORAL EVERY 6 HOURS PRN
Qty: 15 TABLET | Refills: 0 | Status: SHIPPED | OUTPATIENT
Start: 2023-11-09 | End: 2023-11-19

## 2023-11-09 RX ORDER — LOSARTAN POTASSIUM 100 MG/1
TABLET ORAL
Qty: 90 TABLET | Refills: 1 | Status: SHIPPED | OUTPATIENT
Start: 2023-11-09

## 2023-11-09 RX ADMIN — ASPIRIN 81 MG CHEWABLE TABLET 81 MG: 81 TABLET CHEWABLE at 09:00

## 2023-11-09 RX ADMIN — OXYCODONE HYDROCHLORIDE 10 MG: 10 TABLET ORAL at 01:31

## 2023-11-09 RX ADMIN — SENNOSIDES 8.6 MG: 8.6 TABLET, FILM COATED ORAL at 09:00

## 2023-11-09 RX ADMIN — ACETAMINOPHEN 975 MG: 325 TABLET, FILM COATED ORAL at 05:24

## 2023-11-09 RX ADMIN — OXYCODONE HYDROCHLORIDE 10 MG: 10 TABLET ORAL at 05:57

## 2023-11-09 RX ADMIN — HEPARIN SODIUM 5000 UNITS: 5000 INJECTION INTRAVENOUS; SUBCUTANEOUS at 05:24

## 2023-11-09 RX ADMIN — OXYCODONE HYDROCHLORIDE 10 MG: 10 TABLET ORAL at 11:35

## 2023-11-09 NOTE — UTILIZATION REVIEW
Initial Clinical Review    Elective Inpatient surgical procedure  Age/Sex: 58 y.o. male  Surgery Date: 11/08/23    Procedure:   1. Right iliofemoral endarterectomy with bovine pericardial patch angioplasty  2. Retrograde right iliac arteriogram with interpretation  3. Intravascular lithotripsy to the right common iliac artery with 9 x 30 mm shockwave balloon  4. Intravascular lithotripsy to the right external iliac artery with 9 x 30 mm shockwave balloon  5. Aortogram with interpretation  6. Intravascular lithotripsy to the left common iliac artery with 9 x 30 mm shockwave balloon   Anesthesia:  general   Operative Findings:   Flow-limiting lesions in bilateral common iliac arteries. Occluded distal common femoral and proximal superficial femoral arteries successfully treated with endarterectomy and angioplasty. POD#1 Progress Note: Pt doing well this am. Reports improvement of symptoms in both the RLE and LLE, denies any significant pain, tolerating diet without n/v, voiding, using IS, not yet ambulating. Plan: diet as tolerated. ASA81/statin. SQH. pain control. encourage ambulation. incentive spirometry.  dispo planning, possible discharge today     Admission Orders: Date/Time/Statement:   Admission Orders (From admission, onward)       Ordered        11/08/23 1311  Inpatient Admission  Once                          Orders Placed This Encounter   Procedures    Inpatient Admission     Standing Status:   Standing     Number of Occurrences:   1     Order Specific Question:   Level of Care     Answer:   Level 1 Stepdown [13]     Order Specific Question:   Estimated length of stay     Answer:   Inpatient Only Surgery     Vital Signs: /64   Pulse 61   Temp 97.9 °F (36.6 °C)   Resp 17   Ht 6' (1.829 m)   Wt 90.3 kg (199 lb)   SpO2 93%   BMI 26.99 kg/m²     Pertinent Labs/Diagnostic Test Results:   IR lower extremity angiogram    (Results Pending)         Results from last 7 days   Lab Units 11/09/23 0523 11/08/23 2010   WBC Thousand/uL 14.97*  --    HEMOGLOBIN g/dL 12.9  --    HEMATOCRIT % 38.6  --    PLATELETS Thousands/uL 262 278         Results from last 7 days   Lab Units 11/09/23 0523   SODIUM mmol/L 136   POTASSIUM mmol/L 4.2   CHLORIDE mmol/L 103   CO2 mmol/L 27   ANION GAP mmol/L 6   BUN mg/dL 20   CREATININE mg/dL 1.29   EGFR ml/min/1.73sq m 58   CALCIUM mg/dL 8.7         Results from last 7 days   Lab Units 11/08/23  1335   POC GLUCOSE mg/dl 123     Results from last 7 days   Lab Units 11/09/23 0523   GLUCOSE RANDOM mg/dL 120         Diet: Regular House  Mobility: Ambulate  DVT Prophylaxis: SCD, Heparin SC    Medications/Pain Control:   Scheduled Medications:  acetaminophen, 975 mg, Oral, Q8H [address]  aspirin, 81 mg, Oral, Daily  atorvastatin, 80 mg, Oral, Daily With Dinner  gabapentin, 300 mg, Oral, HS  heparin (porcine), 5,000 Units, Subcutaneous, Q8H CHEL  senna, 1 tablet, Oral, Daily  traZODone, 100 mg, Oral, HS      Continuous IV Infusions:  lactated ringers infusion  Rate: 75 mL/hr Dose: 75 mL/hr  Freq: Continuous Route: IV  Indications of Use: IV Hydration  Last Dose: Stopped (11/09/23 0158)  Start: 11/08/23 1315 End: 11/09/23 0127      PRN Meds:  ALPRAZolam, 0.25 mg, Oral, HS PRN  lactated ringers, 500 mL, Intravenous, Once PRN   And  lactated ringers, 500 mL, Intravenous, Once PRN  ondansetron, 4 mg, Intravenous, Q6H PRN  oxyCODONE, 10 mg, Oral, Q4H PRN 11/08 x 2, 11/09 x 2  oxyCODONE, 5 mg, Oral, Q4H PRN  sodium chloride, 500 mL, Intravenous, Once PRN   And  sodium chloride, 500 mL, Intravenous, Once PRN        Network Utilization Review Department  ATTENTION: Please call with any questions or concerns to [phone] and carefully listen to the prompts so that you are directed to the right person. All voicemails are confidential.   For Discharge needs, contact Care Management DC Support Team at [phone] opt.  2  Send all requests for admission clinical reviews, approved or denied determinations and any other requests to dedicated fax number below belonging to the campus where the patient is receiving treatment.  List of dedicated fax numbers for the Facilities:  [place] DENIALS (Administrative/Medical Necessity) [phone]   DISCHARGE SUPPORT TEAM (NETWORK) [id number] (Maternity/NICU/Pediatrics) [phone]   [address] [employer] [address] [place] [address] 1000 [facility] [phone] [address] 5220 [facility] [address] [address] [facility] [address] [phone]

## 2023-11-09 NOTE — DISCHARGE INSTR - AVS FIRST PAGE
DISCHARGE INSTRUCTIONS  LEG/BYPASS SURGERY    ACTIVITY:   Limit your physical activity to walking for the first week and then increase your activity as tolerated. If you become short of breath or tired, stop and rest.  You may require help with walking or feel more secure with something to lean on. Walking up steps and normal activities may be resumed as you feel ready. Most people tire easily for the first few weeks following leg surgery. This improves as conditioning returns. Avoid strenuous activity such as vigorous exercise. Avoid heavy lifting (do not lift more than 15 pounds) for the first four weeks after surgery. You should not drive a car for at least two weeks following discharge from the hospital and you are off all narcotic pain medication. You may ride in a car. DIET:  Resume your normal diet. Good nutrition is important for healing of your incision. If you are discharged on narcotics for pain control, continue taking your stool softeners until you are having regular bowel movements. INCISION:  You should shower daily. Wash incision daily with soap and water, but do not rub or scrub the incision; rinse thoroughly and pat dry. You may have stitches or staples to close your incision and it is okay for these to get wet. Do not bathe in a tub or swim for the first 4 week following surgery or if you have any open wounds. It is normal to have swelling or discoloration around the incision. If increasing redness or pain develops, call our office immediately. Numbness in the region of the incision may occur following the surgery. This normally improves over six to twelve months. You may have surgical glue over your incisions. There are stitches present under the skin which will absorb on their own. The glue is used to cover the access, assist in closure, and prevent contamination. This adhesive will darken and peel away on its own within one to two weeks. Do not pick at it.     If you have a groin wound/incision, place a clean dry piece of gauze to cover your groin incision to keep incision clean and dry and prevent your skin from sticking together. Change gauze daily. You may have staples or stitches at your incisions. These will be removed at your follow-up appointment or when they are ready to come out. If you have a dressing over your surgical site, remove this on the second day after surgery. If you have foot or leg wounds, please follow your podiatrist/wound care doctor's instructions for care. If any of your incisions are open and require dressing changes, you will be given instructions for your daily incision care. If you are not able to change the dressings, a visiting nurse will be arranged. DO NOT put any powders, creams, ointments, or lotions on your incision. LEG SWELLING: Most patients have noticeable leg swelling after leg surgery. This usually improves within a few weeks. If swelling is present, elevate the leg whenever possible. Avoid sitting with the leg hanging down for prolonged periods of time. Walking is beneficial.  An ACE bandage or support stocking may be helpful, but this should be discussed with your physician prior to use if you have a bypass. FOLLOW UP STUDIES:  Doppler ultrasound studies are very important for long-term management. Your surgeon will arrange this at your first postoperative visit. Repeat studies are then scheduled every three months for the first year and periodically after this. FOLLOW UP APPOINTMENTS:  Making and keeping follow up appointments and ultrasound tests are important to your recovery. If you have difficulty making it to or keeping your follow up appointments, call the office. If you have increased pain, fever >101.5, increased drainage, redness or a bad smell at your surgery site, new coldness/numbness of your arm or leg, please call us immediately and GO directly to the ER.     PLEASE CALL THE OFFICE IF YOU HAVE ANY QUESTIONS  [phone]  [phone]  996 [facility], [place] ([place]), 26[address] [facility]  [address], [facility], Jimena Swan, [address]  [address] [place], 630 [facility]  [address], [address], [place], [address]  1001 [facility],Sixth Floor, 1st Floor, [place], [address]  [address], [address], [address], [facility], [address]  [address], 4800 [facility] [place] ([place]), 1200 [facility]  1501 [facility], [address] [facility], 161 [facility], [place] Juan Miguel, [address]  [address] , Arianna Trujillo  400 [facility], [address]

## 2023-11-09 NOTE — QUICK NOTE
Post-Op Check - Vascular Surgery   Kiko Page 58 y.o. male MRN: [id number]  Unit/Bed#: [facility] 505-01 Encounter: [id number]    ASSESMENT:  59yoM s/p R femoral endarterectomy with bovine patch angioplasty, R SYMONE/EIA and L SYMONE lithotripsy. PLAN:  - Incentive spirometry  - Diet as tolerated  - PRN analgesia  - I/Os  - SQ Heparin VTE Prophylaxis  - q4hr neurovascular checks    Subjective: Patient endorses some expected surgical site pain. Otherwise states he feels sensation to R foot improved post-op, denies paresthesias, motor weakness. Tolerating diet well. Vitals:  /93 (BP Location: Left arm)   Pulse 77   Temp (!) 97.4 °F (36.3 °C) (Oral)   Resp 13   Ht 6' (1.829 m)   Wt 90.3 kg (199 lb)   SpO2 95%   BMI 26.99 kg/m²     Physical Exam:  General appearance: alert and oriented, in no acute distress  Neurologic: Bilateral LE M/S intact. Neck: supple, symmetrical, trachea midline  Lungs:  Normal work of breathing; no accessory muscle use  Heart:  regular rate and rhythm  Abdomen:  soft, rounded, non-tender abdomen. R groin with clean and dry mepilex in place without strikethrough, soft, without noted hematoma, non-tender to palpation  L groin with well-healed incision site from previous femoral endarterectomy  Extremities:  Bilateral LE warm and dry without c/c/e. Pulse exam:  AT: Right: doppler signal Left: doppler signal  PT: Right: doppler signal Left: doppler signal    I/Os:  I/O last 3 completed shifts: In: 8473 [P.O.:240; I.V.:2600]  Out: 1175 [Urine:875; Blood:300]  No intake/output data recorded.     Invasive Lines/Tubes:  Invasive Devices       Peripheral Intravenous Line  Duration             Peripheral IV 11/08/23 Left Forearm <1 day    Peripheral IV 11/08/23 Right Hand <1 day              Arterial Line  Duration             Arterial Line 11/08/23 Right Radial <1 day                    VTE Prophylaxis: Sequential compression device (Venodyne)  and Heparin    Cathlean April FLORY  11/8/2023

## 2023-11-09 NOTE — PROGRESS NOTES
Progress Note - Vascular Surgery   Quinton Page 58 y.o. male MRN: [id number]  Unit/Bed#: [facility] 505-01 Encounter: [id number]    Assessment:  59yoM w b/l LE claudication and RLE rest pain, hx of L CFEA on 8/14/23, now s/p R CFEA and b/l iliac shockwave lithotripsy on 11/8/23    Vitals stable, afebrile, RA  WBC 14.97  Hb 12.9  Cr 1.29    UOP 2175    Plan:  -diet as tolerated  -ASA81/statin  -SQH  -pain control  -encourage ambulation  -incentive spirometry  -dispo planning, possible discharge today    Subjective/Objective   Subjective:   Doing well this morning, reports improvement of symptoms in both the RLE and LLE, denies any significant pain, tolerating diet without n/v, voiding, using IS, not yet ambulating. Objective:     Blood pressure 113/62, pulse 61, temperature 98.5 °F (36.9 °C), temperature source Oral, resp. rate 17, height 6' (1.829 m), weight 90.3 kg (199 lb), SpO2 95 %. ,Body mass index is 26.99 kg/m². Intake/Output Summary (Last 24 hours) at 11/9/2023 9548  Last data filed at 11/9/2023 0600  Gross per 24 hour   Intake 3777.5 ml   Output 2475 ml   Net 1302.5 ml       Invasive Devices       Peripheral Intravenous Line  Duration             Peripheral IV 11/08/23 Left Forearm <1 day    Peripheral IV 11/08/23 Right Hand <1 day                    Physical Exam:   General: NAD  Skin: Warm, dry, anicteric, R groin incision w dressing, c/d/i  HEENT: Normocephalic, atraumatic  CV: RRR, no m/r/g, R AT and PT signals detected w doppler  Pulm: CTA b/l, no inc WOB  Abd: Soft, ND/NT  MSK: Symmetric, no edema, no tenderness, no deformity  Neuro: AOx3, GCS 15     Lab, Imaging and other studies:I have personally reviewed pertinent lab results.   , CBC:   Lab Results   Component Value Date    WBC 14.97 (H) 11/09/2023    HGB 12.9 11/09/2023    HCT 38.6 11/09/2023    MCV 94 11/09/2023     11/09/2023    RBC 4.09 11/09/2023    MCH 31.5 11/09/2023    MCHC 33.4 11/09/2023    RDW 12.6 11/09/2023    MPV 11.6 11/09/2023   , CMP:   Lab Results   Component Value Date    SODIUM 136 11/09/2023    K 4.2 11/09/2023     11/09/2023    CO2 27 11/09/2023    BUN 20 11/09/2023    CREATININE 1.29 11/09/2023    CALCIUM 8.7 11/09/2023    EGFR 58 11/09/2023   , Coagulation: No results found for: "PT", "INR", "APTT"  VTE Pharmacologic Prophylaxis: Heparin  VTE Mechanical Prophylaxis: sequential compression device

## 2023-11-09 NOTE — DISCHARGE SUMMARY
Discharge Summary    Terrence Ramesh 58 y.o. male MRN: [id number]    Unit/Bed#: [facility] 505-01 Encounter: [id number]    Admission Date: 11/8/2023     Discharge Date: 11/9/2023    Attending: Dr. Gisele Choe    Admitting Diagnosis: Rest pain of both lower extremities due to atherosclerosis [facility]) [I70.223]    Principle Diagnosis: Rest pain of both lower extremities due to atherosclerosis [facility]) [I70.223]    Secondary Diagnosis:  Past Medical History:   Diagnosis Date    Acute intractable headache 11/03/2021    Acute maxillary sinusitis     13 dec 2012    ROSENDO (acute kidney injury) ([address]) 06/08/2018 2018. Bone spur     toe    BPPV (benign paroxysmal positional vertigo) 10/24/2021    History of transfusion     Hyperlipidemia     Hypertension     Kidney stone     PVD (peripheral vascular disease) ([address])     Sleep apnea     does not use cpap     Past Surgical History:   Procedure Laterality Date    APPENDECTOMY      CATARACT EXTRACTION Bilateral     KIDNEY STONE SURGERY      multiple surgery in the past    SD TEAEC W/WO PATCH GRAFT COMMON FEMORAL Left 08/14/2023    Procedure: ENDARTERECTOMY ARTERIAL FEMORAL WITH TISSUES PATCH AGIOPLASTY;  Surgeon: Linda Beyer MD;  Location: [facility];  Service: Vascular    RETINAL DETACHMENT SURGERY Bilateral     SEPTOPLASTY      [facility]  [facility]        Procedures Performed: s/p R CFEA and b/l iliac shockwave lithotripsy on 11/8/23     Discharge Medications:  See after visit summary for reconciled discharge medications provided to patient and family. Hospital Course: 19-year-old gentleman admitted electively to [facility] for treatment of aortoiliac occlusive disease and PAD with claudication. He underwent right common femoral endarterectomy and bilateral iliac shockwave lithotripsy on 11/8/2023. He tolerated surgery well.   Following surgery was transferred postanesthesia care unit where he recovered briefly and subsequent stepdown unit for monitoring overnight. His postoperative course was uncomplicated. On postoperative day #1 he was hemodynamically stable and neurovascular exam was intact. He was able to tolerate a diet, ambulate and void without difficulty. He will continue with aspirin and statin therapy. He was cleared for discharge home in the care of his family. Outpatient follow-up in the vascular center was arranged prior to his discharge. Condition at Discharge: stable     Provisions for Follow-Up Care:  See after visit summary for information related to follow-up care and any pertinent home health orders. Disposition: Home    Discharge instructions/Information to patient and family:   See after visit summary for information provided to patient and family. Planned Readmission: No    Discharge Statement   I spent 30 minutes discharging the patient. This time was spent on the day of discharge. I had direct contact with the patient on the day of discharge. Additional documentation is required if more than 30 minutes were spent on discharge.

## 2023-11-10 ENCOUNTER — TRANSITIONAL CARE MANAGEMENT (OUTPATIENT)
Dept: FAMILY MEDICINE CLINIC | Facility: CLINIC | Age: 63
End: 2023-11-10

## 2023-11-10 NOTE — UTILIZATION REVIEW
NOTIFICATION OF ADMISSION DISCHARGE   This is a Notification of Discharge from [address] [facility]. Please be advised that this patient has been discharge from our facility. Below you will find the admission and discharge date and time including the patient’s disposition. UTILIZATION REVIEW CONTACT:  Niesha Guzman  Utilization   Network Utilization Review Department  Phone: [phone] x carefully listen to the prompts. All voicemails are confidential.  Email: [email or web address]     ADMISSION INFORMATION  PRESENTATION DATE: 11/8/2023  6:47 AM  OBERVATION ADMISSION DATE:   INPATIENT ADMISSION DATE: 11/8/23  1:11 PM   DISCHARGE DATE: 11/9/2023  1:32 PM   DISPOSITION:Home/Self Care    Network Utilization Review Department  ATTENTION: Please call with any questions or concerns to [phone] and carefully listen to the prompts so that you are directed to the right person. All voicemails are confidential.   For Discharge needs, contact Care Management DC Support Team at [phone] opt. 2  Send all requests for admission clinical reviews, approved or denied determinations and any other requests to dedicated fax number below belonging to the campus where the patient is receiving treatment.  List of dedicated fax numbers for the Facilities:  [place] DENIALS (Administrative/Medical Necessity) [phone]   DISCHARGE SUPPORT TEAM (Network) [phone] 2303 [facility] (Maternity/NICU/Pediatrics) [phone]   [address] [facility] [address] [place] [address] [address] [place] [address] [phone]   [facility] Salomon Dodd [phone]   [address] [place] [address]  [facility] [phone]

## 2023-11-13 ENCOUNTER — TELEPHONE (OUTPATIENT)
Dept: VASCULAR SURGERY | Facility: CLINIC | Age: 63
End: 2023-11-13

## 2023-11-13 NOTE — TELEPHONE ENCOUNTER
Vascular Nurse Navigator Post Op Call    Procedure: 1. Right iliofemoral endarterectomy with bovine pericardial patch angioplasty  2. Retrograde right iliac arteriogram with interpretation  3. Intravascular lithotripsy to the right common iliac artery with 9 x 30 mm shockwave balloon  4. Intravascular lithotripsy to the right external iliac artery with 9 x 30 mm shockwave balloon  5. Aortogram with interpretation  6. Intravascular lithotripsy to the left common iliac artery with 9 x 30 mm shockwave balloon    Date of Procedure: 11/8/23    Surgeon: MD Dr. Rachael Garcia, DO     Discharge Date: 11/9/23    Discharge Disposition: Home    Leg Weakness?: No    Leg Swelling?: No    Leg Numbness?: No    Chest Pain?: No    Shortness of Breath?: No    Orthopnea?: No    Anticoagulation pt was discharged on post op?: Aspirin    Statin pt was discharged on post op?:  Rosuvastatin (Crestor)    Bleeding?: No    Uncontrolled Pain?: No    Incision Concerns?: No    Fever or Chills?: No      Reviewed discharge instructions and incision care with patient. NEXT OFFICE VISIT SCHEDULED:  11/24/23 at 3:30 pm with Dr. Bucky Waters at [facility] [address] [place] [address]    Transportation Confirmed?: Yes      Any further questions/concerns? Spoke with patient's wife as she stated that patient was still sleeping. She stated that patient is doing good since discharge. She stated that he has some discomfort and is taking the Oxycodone and Tylenol with relief. Reviewed incision care with her - wash daily with soap and water. All questions answered. No concerns expressed at this time. Cosentyx Counseling:  I discussed with the patient the risks of Cosentyx including but not limited to worsening of Crohn's disease, immunosuppression, allergic reactions and infections.  The patient understands that monitoring is required including a PPD at baseline and must alert us or the primary physician if symptoms of infection or other concerning signs are noted.

## 2023-11-24 ENCOUNTER — OFFICE VISIT (OUTPATIENT)
Dept: VASCULAR SURGERY | Facility: CLINIC | Age: 63
End: 2023-11-24
Payer: COMMERCIAL

## 2023-11-24 VITALS
WEIGHT: 203 LBS | HEIGHT: 72 IN | SYSTOLIC BLOOD PRESSURE: 140 MMHG | HEART RATE: 64 BPM | BODY MASS INDEX: 27.5 KG/M2 | DIASTOLIC BLOOD PRESSURE: 86 MMHG

## 2023-11-24 DIAGNOSIS — I73.9 PAD (PERIPHERAL ARTERY DISEASE) (HCC): Primary | ICD-10-CM

## 2023-11-24 DIAGNOSIS — I70.223 REST PAIN OF BOTH LOWER EXTREMITIES DUE TO ATHEROSCLEROSIS (HCC): ICD-10-CM

## 2023-11-24 PROCEDURE — 99214 OFFICE O/P EST MOD 30 MIN: CPT | Performed by: SURGERY

## 2023-11-24 RX ORDER — OXYCODONE HYDROCHLORIDE 5 MG/1
5 TABLET ORAL 3 TIMES DAILY PRN
Qty: 10 TABLET | Refills: 0 | Status: SHIPPED | OUTPATIENT
Start: 2023-11-24

## 2023-11-24 RX ORDER — GABAPENTIN 300 MG/1
300 CAPSULE ORAL
Qty: 90 CAPSULE | Refills: 2 | Status: SHIPPED | OUTPATIENT
Start: 2023-11-24

## 2023-11-24 NOTE — ASSESSMENT & PLAN NOTE
Mr. Syeda Mccann now presents approximately 2 weeks after right common femoral and superficial femoral endarterectomy with patch angioplasty and retrograde bilateral iliac artery angioplasty and intravascular lithotripsy. He presents today with lingering neuropathic discomfort. I have refilled his gabapentin prescriptions. He does have a well-healing right groin with some tenseness underneath the incision that is nonpulsatile and could likely just be inflammation or scarring in the acute phase after his closure. We will plan for a 1 month duplex ultrasound with ABIs to assess improvement in his flow. He hit his left foot on a bedpost several days ago however it appears to be healing at this time. He is strictly instructed to monitor this closely and report back with any nonhealing wound. He does continue to have incisional discomfort and additional 10 oxycodone were prescribed for acute pain control. Beyond that a referral will be made back to the pain medicine team which she followed with previously.

## 2023-11-24 NOTE — PATIENT INSTRUCTIONS
1. PAD (peripheral artery disease) ([facility])  -     VAS lower limb arterial duplex, complete bilateral; Future; Expected date: 12/24/2023  -     gabapentin (Neurontin) 300 mg capsule; Take 1 capsule (300 mg total) by mouth daily at bedtime  -     oxyCODONE (Roxicodone) 5 immediate release tablet; Take 1 tablet (5 mg total) by mouth 3 (three) times a day as needed for severe pain for up to 10 doses Max Daily Amount: 15 mg    2. Rest pain of both lower extremities due to atherosclerosis [facility])  Assessment & Plan:  Mr. Ami Mccurdy now presents approximately 2 weeks after right common femoral and superficial femoral endarterectomy with patch angioplasty and retrograde bilateral iliac artery angioplasty and intravascular lithotripsy. He presents today with lingering neuropathic discomfort. I have refilled his gabapentin prescriptions. He does have a well-healing right groin with some tenseness underneath the incision that is nonpulsatile and could likely just be inflammation or scarring in the acute phase after his closure. We will plan for a 1 month duplex ultrasound with ABIs to assess improvement in his flow. He hit his left foot on a bedpost several days ago however it appears to be healing at this time. He is strictly instructed to monitor this closely and report back with any nonhealing wound. He does continue to have incisional discomfort and additional 10 oxycodone were prescribed for acute pain control. Beyond that a referral will be made back to the pain medicine team which she followed with previously.

## 2023-11-24 NOTE — LETTER
November 24, 2023     Zenon Cobb, [address] [address]    Patient: Crow Melendez   YOB: 1960   Date of Visit: 11/24/2023       Dear Dr. Echo Garrett: Thank you for referring Brittany Bush to me for evaluation. Below are my notes for this consultation. If you have questions, please do not hesitate to call me. I look forward to following your patient along with you. Sincerely,        Seth Rivera MD        CC: Danilo Lundy. Lisbeth Garcias MD  11/24/2023  1:44 PM  Incomplete  Assessment/Plan:    Rest pain of both lower extremities due to atherosclerosis [facility])  Mr. Gillian Weiner now presents approximately 2 weeks after right common femoral and superficial femoral endarterectomy with patch angioplasty and retrograde bilateral iliac artery angioplasty and intravascular lithotripsy. He presents today with lingering neuropathic discomfort. I have refilled his gabapentin prescriptions. He does have a well-healing right groin with some tenseness underneath the incision that is nonpulsatile and could likely just be inflammation or scarring in the acute phase after his closure. We will plan for a 1 month duplex ultrasound with ABIs to assess improvement in his flow. He hit his left foot on a bedpost several days ago however it appears to be healing at this time. He is strictly instructed to monitor this closely and report back with any nonhealing wound. He does continue to have incisional discomfort and additional 10 oxycodone were prescribed for acute pain control. Beyond that a referral will be made back to the pain medicine team which she followed with previously. Subjective:      Patient ID: Crow Melendez is a 58 y.o. male. Patient presents s/p R femoral endarterectomy performed by Dr. Carlos Rashid 11/8/23. Pt states BLE pain has improved since surgery. R groin access is healing and pt denies drainage.  He is taking ASA81 and Rosuvastatin and smokes 1/2ppd. ISAK Hicks is a 59-year-old male with previous diagnosis of bilateral lower extremity rest pain. He is now status post 2 operations, endarterectomies of both groin arterial systems as well as intravascular lithotripsy of bilateral iliac systems with shockwave angioplasty balloons. He is complaining of chronic neuropathic seeming tingling pain of the dorsum of his feet bilaterally. His signal is now multiphasic and much improved. He did hit his left foot on a bedpost and has a healing medial left midfoot scratch. He has not advanced his ambulation to the point he was prior to his November sixths surgery. I will be interested to see how he progresses. He has known bilateral popliteal artery occlusive disease however does not have an indication for distal bypass at this time. He has had 2 inflow procedures since the summer 1 in each leg. He continues to smoke 1/2 pack/day sometimes less. He is compliant with his aspirin and statin therapy. Gabapentin seems to be helpful for his neuropathic discomfort and was refilled. Review of Systems   Musculoskeletal:         BLE pain @ rest and with ambulation   Skin:         R groin access s/p R femoral endarterectomy. All other systems reviewed and are negative. Objective:      /86 (BP Location: Right arm, Patient Position: Sitting, Cuff Size: Standard)   Pulse 64   Ht 6' (1.829 m)   Wt 92.1 kg (203 lb)   BMI 27.53 kg/m²          Physical Exam  Constitutional:       Appearance: Normal appearance. HENT:      Head: Normocephalic and atraumatic. Nose: Nose normal. No rhinorrhea. Eyes:      Extraocular Movements: Extraocular movements intact. Pupils: Pupils are equal, round, and reactive to light. Cardiovascular:      Rate and Rhythm: Normal rate and regular rhythm.       Pulses:           Dorsalis pedis pulses are detected w/ Doppler on the right side and detected w/ Doppler on the left side. Posterior tibial pulses are detected w/ Doppler on the right side and detected w/ Doppler on the left side. Pulmonary:      Effort: Pulmonary effort is normal.      Breath sounds: No stridor. Abdominal:      General: There is no distension. Tenderness: There is no abdominal tenderness. Musculoskeletal:         General: No tenderness. Normal range of motion. Cervical back: Normal range of motion and neck supple. Skin:     General: Skin is warm. Capillary Refill: Capillary refill takes less than 2 seconds. Coloration: Skin is not jaundiced. Neurological:      General: No focal deficit present. Mental Status: He is alert and oriented to person, place, and time.    Psychiatric:         Mood and Affect: Mood normal.         Behavior: Behavior normal.

## 2023-11-24 NOTE — PROGRESS NOTES
Assessment/Plan:    Rest pain of both lower extremities due to atherosclerosis [facility])  Mr. Carmina Tanner now presents approximately 2 weeks after right common femoral and superficial femoral endarterectomy with patch angioplasty and retrograde bilateral iliac artery angioplasty and intravascular lithotripsy. He presents today with lingering neuropathic discomfort. I have refilled his gabapentin prescriptions. He does have a well-healing right groin with some tenseness underneath the incision that is nonpulsatile and could likely just be inflammation or scarring in the acute phase after his closure. We will plan for a 1 month duplex ultrasound with ABIs to assess improvement in his flow. He hit his left foot on a bedpost several days ago however it appears to be healing at this time. He is strictly instructed to monitor this closely and report back with any nonhealing wound. He does continue to have incisional discomfort and additional 10 oxycodone were prescribed for acute pain control. Beyond that a referral will be made back to the pain medicine team which she followed with previously. Subjective:      Patient ID: Arie Garcia is a 58 y.o. male. Patient presents s/p R femoral endarterectomy performed by Dr. Dina Yost 11/8/23. Pt states BLE pain has improved since surgery. R groin access is healing and pt denies drainage. He is taking ASA81 and Rosuvastatin and smokes 1/2ppd. ISAK Bocanegra is a 65-year-old male with previous diagnosis of bilateral lower extremity rest pain. He is now status post 2 operations, endarterectomies of both groin arterial systems as well as intravascular lithotripsy of bilateral iliac systems with shockwave angioplasty balloons. He is complaining of chronic neuropathic seeming tingling pain of the dorsum of his feet bilaterally. His signal is now multiphasic and much improved. He did hit his left foot on a bedpost and has a healing medial left midfoot scratch.   He has not advanced his ambulation to the point he was prior to his November sixths surgery. I will be interested to see how he progresses. He has known bilateral popliteal artery occlusive disease however does not have an indication for distal bypass at this time. He has had 2 inflow procedures since the summer 1 in each leg. He continues to smoke 1/2 pack/day sometimes less. He is compliant with his aspirin and statin therapy. Gabapentin seems to be helpful for his neuropathic discomfort and was refilled. Review of Systems   Musculoskeletal:         BLE pain @ rest and with ambulation   Skin:         R groin access s/p R femoral endarterectomy. All other systems reviewed and are negative. Objective:      /86 (BP Location: Right arm, Patient Position: Sitting, Cuff Size: Standard)   Pulse 64   Ht 6' (1.829 m)   Wt 92.1 kg (203 lb)   BMI 27.53 kg/m²          Physical Exam  Constitutional:       Appearance: Normal appearance. HENT:      Head: Normocephalic and atraumatic. Nose: Nose normal. No rhinorrhea. Eyes:      Extraocular Movements: Extraocular movements intact. Pupils: Pupils are equal, round, and reactive to light. Cardiovascular:      Rate and Rhythm: Normal rate and regular rhythm. Pulses:           Dorsalis pedis pulses are detected w/ Doppler on the right side and detected w/ Doppler on the left side. Posterior tibial pulses are detected w/ Doppler on the right side and detected w/ Doppler on the left side. Pulmonary:      Effort: Pulmonary effort is normal.      Breath sounds: No stridor. Abdominal:      General: There is no distension. Tenderness: There is no abdominal tenderness. Musculoskeletal:         General: No tenderness. Normal range of motion. Cervical back: Normal range of motion and neck supple. Skin:     General: Skin is warm. Capillary Refill: Capillary refill takes less than 2 seconds.       Coloration: Skin is not jaundiced. Neurological:      General: No focal deficit present. Mental Status: He is alert and oriented to person, place, and time.    Psychiatric:         Mood and Affect: Mood normal.         Behavior: Behavior normal.

## 2023-11-30 DIAGNOSIS — F51.01 PRIMARY INSOMNIA: ICD-10-CM

## 2023-11-30 RX ORDER — ALPRAZOLAM 0.25 MG/1
0.25 TABLET ORAL
Qty: 15 TABLET | Refills: 0 | Status: SHIPPED | OUTPATIENT
Start: 2023-11-30

## 2023-11-30 NOTE — TELEPHONE ENCOUNTER
Louie, this is Brian Blum, Date of birth [birth year] Calling for a refill for Alprazolam .25, Quantity 15. ANETA Pereira Caro. My number, [phone]. Thank you.  Anni Hutton.

## 2023-12-20 ENCOUNTER — HOSPITAL ENCOUNTER (OUTPATIENT)
Dept: NON INVASIVE DIAGNOSTICS | Facility: CLINIC | Age: 63
Discharge: HOME | End: 2023-12-20
Payer: COMMERCIAL

## 2023-12-20 DIAGNOSIS — I73.9 PAD (PERIPHERAL ARTERY DISEASE) (HCC): ICD-10-CM

## 2023-12-20 PROCEDURE — 93925 LOWER EXTREMITY STUDY: CPT

## 2023-12-20 PROCEDURE — 93923 UPR/LXTR ART STDY 3+ LVLS: CPT

## 2023-12-21 PROCEDURE — 93925 LOWER EXTREMITY STUDY: CPT | Performed by: SURGERY

## 2023-12-21 PROCEDURE — 93922 UPR/L XTREMITY ART 2 LEVELS: CPT | Performed by: SURGERY

## 2023-12-28 ENCOUNTER — VBI (OUTPATIENT)
Dept: ADMINISTRATIVE | Facility: OTHER | Age: 63
End: 2023-12-28

## 2024-01-08 ENCOUNTER — OFFICE VISIT (OUTPATIENT)
Dept: FAMILY MEDICINE CLINIC | Facility: CLINIC | Age: 64
End: 2024-01-08
Payer: COMMERCIAL

## 2024-01-08 VITALS
DIASTOLIC BLOOD PRESSURE: 80 MMHG | HEART RATE: 64 BPM | BODY MASS INDEX: 27.09 KG/M2 | SYSTOLIC BLOOD PRESSURE: 140 MMHG | HEIGHT: 72 IN | WEIGHT: 200 LBS

## 2024-01-08 DIAGNOSIS — Z29.11 NEED FOR RSV VACCINATION: ICD-10-CM

## 2024-01-08 DIAGNOSIS — D72.829 LEUKOCYTOSIS, UNSPECIFIED TYPE: ICD-10-CM

## 2024-01-08 DIAGNOSIS — E72.01 CYSTINURIA (HCC): ICD-10-CM

## 2024-01-08 DIAGNOSIS — N18.31 STAGE 3A CHRONIC KIDNEY DISEASE (HCC): ICD-10-CM

## 2024-01-08 DIAGNOSIS — E03.9 ACQUIRED HYPOTHYROIDISM: ICD-10-CM

## 2024-01-08 DIAGNOSIS — Z23 NEED FOR INFLUENZA VACCINATION: ICD-10-CM

## 2024-01-08 DIAGNOSIS — R73.9 HYPERGLYCEMIA: ICD-10-CM

## 2024-01-08 DIAGNOSIS — E78.2 MIXED HYPERLIPIDEMIA: ICD-10-CM

## 2024-01-08 DIAGNOSIS — Z23 NEED FOR COVID-19 VACCINE: ICD-10-CM

## 2024-01-08 DIAGNOSIS — Z12.2 SCREENING FOR LUNG CANCER: ICD-10-CM

## 2024-01-08 DIAGNOSIS — Z00.00 ENCOUNTER FOR ANNUAL WELLNESS VISIT (AWV) IN MEDICARE PATIENT: ICD-10-CM

## 2024-01-08 DIAGNOSIS — F51.01 PRIMARY INSOMNIA: ICD-10-CM

## 2024-01-08 DIAGNOSIS — I73.9 PAD (PERIPHERAL ARTERY DISEASE) (HCC): ICD-10-CM

## 2024-01-08 DIAGNOSIS — Z87.891 PERSONAL HISTORY OF NICOTINE DEPENDENCE: ICD-10-CM

## 2024-01-08 DIAGNOSIS — Z23 NEED FOR VACCINATION FOR STREP PNEUMONIAE: ICD-10-CM

## 2024-01-08 DIAGNOSIS — Z12.5 PROSTATE CANCER SCREENING: ICD-10-CM

## 2024-01-08 DIAGNOSIS — Z12.11 COLON CANCER SCREENING: ICD-10-CM

## 2024-01-08 DIAGNOSIS — Z23 NEED FOR ZOSTER VACCINE: ICD-10-CM

## 2024-01-08 DIAGNOSIS — I10 PRIMARY HYPERTENSION: Primary | ICD-10-CM

## 2024-01-08 DIAGNOSIS — Z72.0 TOBACCO ABUSE: ICD-10-CM

## 2024-01-08 DIAGNOSIS — Z23 ENCOUNTER FOR IMMUNIZATION: ICD-10-CM

## 2024-01-08 PROBLEM — I12.9 BENIGN HYPERTENSION WITH CHRONIC KIDNEY DISEASE, STAGE III (HCC): Status: RESOLVED | Noted: 2023-07-31 | Resolved: 2024-01-08

## 2024-01-08 PROBLEM — N18.30 BENIGN HYPERTENSION WITH CHRONIC KIDNEY DISEASE, STAGE III (HCC): Status: RESOLVED | Noted: 2023-07-31 | Resolved: 2024-01-08

## 2024-01-08 PROBLEM — N18.9 CKD (CHRONIC KIDNEY DISEASE): Status: ACTIVE | Noted: 2023-07-31

## 2024-01-08 PROCEDURE — 90480 ADMN SARSCOV2 VAC 1/ONLY CMP: CPT | Performed by: FAMILY MEDICINE

## 2024-01-08 PROCEDURE — 91320 SARSCV2 VAC 30MCG TRS-SUC IM: CPT | Performed by: FAMILY MEDICINE

## 2024-01-08 PROCEDURE — G0008 ADMIN INFLUENZA VIRUS VAC: HCPCS | Performed by: FAMILY MEDICINE

## 2024-01-08 PROCEDURE — G0402 INITIAL PREVENTIVE EXAM: HCPCS | Performed by: FAMILY MEDICINE

## 2024-01-08 PROCEDURE — 90686 IIV4 VACC NO PRSV 0.5 ML IM: CPT | Performed by: FAMILY MEDICINE

## 2024-01-08 PROCEDURE — 99214 OFFICE O/P EST MOD 30 MIN: CPT | Performed by: FAMILY MEDICINE

## 2024-01-08 RX ORDER — ALPRAZOLAM 0.25 MG/1
0.25 TABLET ORAL
Qty: 15 TABLET | Refills: 0 | Status: SHIPPED | OUTPATIENT
Start: 2024-01-08

## 2024-01-08 NOTE — ASSESSMENT & PLAN NOTE
Patient was placed on Crestor from vascular.  Check labs in 6 months for lipid panel, as well as LFTs with comprehensive metabolic panel file.

## 2024-01-08 NOTE — PATIENT INSTRUCTIONS
1. Primary hypertension  Assessment & Plan:  Blood pressure is somewhat elevated today.  Follow with nephrology.  Currently on losartan 100.    Orders:  -     Comprehensive metabolic panel; Future; Expected date: 07/08/2024    2. Acquired hypothyroidism  Assessment & Plan:  Stable at the moment.  Check TSH in the future.  Not currently on medications.      3. Mixed hyperlipidemia  Assessment & Plan:  Patient was placed on Crestor from vascular.  Check labs in 6 months for lipid panel, as well as LFTs with comprehensive metabolic panel file.    Orders:  -     Comprehensive metabolic panel; Future; Expected date: 07/08/2024  -     Lipid panel; Future; Expected date: 07/08/2024    4. Hyperglycemia  Assessment & Plan:  Sugar is elevated on recent BMP.  Limit carbs, and follow with next labs.      Orders:  -     Comprehensive metabolic panel; Future; Expected date: 07/08/2024    5. Cystinuria (HCC)  Assessment & Plan:  Patient follows with urology PRN and nephrology.  No specific change.      6. Stage 3a chronic kidney disease (HCC)  Assessment & Plan:  Lab Results   Component Value Date    EGFR 58 11/09/2023    EGFR 54 10/26/2023    EGFR 61 08/15/2023    CREATININE 1.29 11/09/2023    CREATININE 1.37 (H) 10/26/2023    CREATININE 1.24 08/15/2023   GFR is, while below 60, slightly higher than what it was previously.  Encourage hydration and blood pressure control.  Follow-up with nephrology as scheduled.    Orders:  -     Comprehensive metabolic panel; Future; Expected date: 07/08/2024  -     CBC and differential; Future; Expected date: 07/08/2024  -     CBC and differential; Future    7. Leukocytosis, unspecified type  Assessment & Plan:  Recommend repeat CBC. Was quite high before.      Orders:  -     CBC and differential; Future; Expected date: 07/08/2024  -     CBC and differential; Future    8. PAD (peripheral artery disease) (HCC)  Assessment & Plan:  Following with vascular.  No specific changes.  Currently on  Crestor for secondary prevention.  At 40 mg.  Of note, the patient reports that he has not necessarily noted improvement in his leg symptoms after surgery.      9. Tobacco abuse  Assessment & Plan:  Recommend quit smoking.  Patient understands risks.      Orders:  -     CT lung screening program; Future; Expected date: 01/08/2024    10. Primary insomnia  Assessment & Plan:  Continues to use alprazolam as needed.  Finds that he does not need as much as he used to.  No change based on that.  Continue melatonin and trazodone as well.    Orders:  -     ALPRAZolam (XANAX) 0.25 mg tablet; Take 1 tablet (0.25 mg total) by mouth daily at bedtime as needed for anxiety    11. Encounter for annual wellness visit (AWV) in Medicare patient  Comments:  Health maintenance issues reviewed, as well as advanced directives.  Recommend that he obtain advanced directives.    12. Colon cancer screening  Comments:  Recommend Cologuard or colonoscopy.  Patient declines.  Understands risks of colon cancer.    13. Need for COVID-19 vaccine  Comments:  Patient requesting COVID booster, 2023 updated.    14. Need for influenza vaccination  Comments:  Patient requesting flu vaccine today.    15. Need for RSV vaccination  Comments:  Reviewed about RSV vaccine.  Can obtain at pharmacy.    16. Need for vaccination for Strep pneumoniae  Comments:  Recommend Prevnar 20, but he will need to wait until his 64th birthday.    17. Need for zoster vaccine  Comments:  Recommend Shingrix.  Patient will consider at local pharmacy.    18. Prostate cancer screening  -     PSA, Total Screen; Future; Expected date: 01/08/2024    19. Screening for lung cancer  -     CT lung screening program; Future; Expected date: 01/08/2024    20. Personal history of nicotine dependence  -     CT lung screening program; Future; Expected date: 01/08/2024        COVID 19 Instructions    Ryan Montoya was advised to limit contact with others to essential tasks such as getting  food, medications, and medical care.    Proper handwashing reviewed, and Hand sanitzer when washing is not available.    If the patient develops symptoms of COVID 19, the patient should call the office as soon as possible.    It is strongly recommended that Flu Vaccinations be obtained.      Virtual Visits:  [place]: This works on smart phones (any phone with Internet browsing capability).  You should get a text message when the provider is ready to see you.  Click on the link in the text message, and the call should start.  You will need to type in your name, and allow camera and microphone access.  This is HIPPA compliant, and secure.      If you have not already done so, get immunized to COVID 19.      We are committed to getting you vaccinated as soon as possible and will be closely following CDC and [facility] guidelines as they are released and revised.  Please refer to our COVID-19 vaccine webpage for the most up to date information on the vaccine and our distribution efforts.    This site will also have the most up to date recommendations for COVID booster vaccine.    [email or web address]/[email or web address]/[email or web address]-vaccine    Call 2-332-BNVXYKY ([phone]), option 7    You can also visit https://www.vaccines.gov/ to find vaccines in your area.    OUR LOCATION:    [facility]  [address] [place] [address], [address]  [place], [place], [address] [phone]  Fax: [phone]    Lab services, Rheumatology, and OB/GYN are at this location as well.

## 2024-01-08 NOTE — ASSESSMENT & PLAN NOTE
Following with vascular.  No specific changes.  Currently on Crestor for secondary prevention.  At 40 mg.  Of note, the patient reports that he has not necessarily noted improvement in his leg symptoms after surgery.

## 2024-01-08 NOTE — ASSESSMENT & PLAN NOTE
Continues to use alprazolam as needed.  Finds that he does not need as much as he used to.  No change based on that.  Continue melatonin and trazodone as well.

## 2024-01-08 NOTE — PROGRESS NOTES
Assessment and Plan:     Problem List Items Addressed This Visit     CKD (chronic kidney disease)     Lab Results   Component Value Date    EGFR 58 11/09/2023    EGFR 54 10/26/2023    EGFR 61 08/15/2023    CREATININE 1.29 11/09/2023    CREATININE 1.37 (H) 10/26/2023    CREATININE 1.24 08/15/2023   GFR is, while below 60, slightly higher than what it was previously.  Encourage hydration and blood pressure control.  Follow-up with nephrology as scheduled.         Relevant Orders    Comprehensive metabolic panel    CBC and differential    CBC and differential    Cystinuria (HCC)     Patient follows with urology PRN and nephrology.  No specific change.         Hyperglycemia     Sugar is elevated on recent BMP.  Limit carbs, and follow with next labs.           Relevant Orders    Comprehensive metabolic panel    Hyperlipidemia     Patient was placed on Crestor from vascular.  Check labs in 6 months for lipid panel, as well as LFTs with comprehensive metabolic panel file.         Relevant Orders    Comprehensive metabolic panel    Lipid panel    Hypertension - Primary     Blood pressure is somewhat elevated today.  Follow with nephrology.  Currently on losartan 100.         Relevant Orders    Comprehensive metabolic panel    Hypothyroid     Stable at the moment.  Check TSH in the future.  Not currently on medications.         Insomnia     Continues to use alprazolam as needed.  Finds that he does not need as much as he used to.  No change based on that.  Continue melatonin and trazodone as well.         Relevant Medications    ALPRAZolam (XANAX) 0.25 mg tablet    Leucocytosis     Recommend repeat CBC. Was quite high before.           Relevant Orders    CBC and differential    CBC and differential    PAD (peripheral artery disease) (HCC)     Following with vascular.  No specific changes.  Currently on Crestor for secondary prevention.  At 40 mg.  Of note, the patient reports that he has not necessarily noted improvement  in his leg symptoms after surgery.         Tobacco abuse     Recommend quit smoking.  Patient understands risks.           Relevant Orders    CT lung screening program   Other Visit Diagnoses     Encounter for annual wellness visit (AWV) in Medicare patient        Health maintenance issues reviewed, as well as advanced directives.  Recommend that he obtain advanced directives.    Colon cancer screening        Recommend Cologuard or colonoscopy.  Patient declines.  Understands risks of colon cancer.    Need for COVID-19 vaccine        Patient requesting COVID booster, 2023 updated.    Relevant Orders    COVID-19 Pfizer mRNA vaccine 12 yr and older (GRAY cap vial or pre-filled syringe) (Completed)    Need for influenza vaccination        Patient requesting flu vaccine today.    Need for RSV vaccination        Reviewed about RSV vaccine.  Can obtain at pharmacy.    Need for vaccination for Strep pneumoniae        Recommend Prevnar 20, but he will need to wait until his 64th birthday.    Need for zoster vaccine        Recommend Shingrix.  Patient will consider at local pharmacy.    Prostate cancer screening        Relevant Orders    PSA, Total Screen    Screening for lung cancer        Relevant Orders    CT lung screening program    Personal history of nicotine dependence        Relevant Orders    CT lung screening program    Encounter for immunization        Relevant Orders    influenza vaccine, quadrivalent, 0.5 mL, preservative-free, for adult and pediatric patients 6 mos+ (AFLURIA, FLUARIX, FLULAVAL, FLUZONE) (Completed)           Preventive health issues were discussed with patient, and age appropriate screening tests were ordered as noted in patient's After Visit Summary.  Personalized health advice and appropriate referrals for health education or preventive services given if needed, as noted in patient's After Visit Summary.     History of Present Illness:     Patient presents for a Medicare Wellness  Visit    Patient is here to follow-up on several issues, as well as welcome to Medicare.  This is the first month of eligibility that he has had.    CKD: Patient is followed with nephrology.  They felt that this was related to his left kidney, as well as chronic urologic procedures.  Nothing specific at that point, other than maintaining hydration, blood pressure control.    Hyperlipidemia: Currently on Crestor 40.  Was ordered initially by vascular.    Hypertension: On losartan 100 mg.  No specific issues.    Patient did receive a prescription previously for Narcan, as well as oxycodone.  This was for postop from vascular surgery.  Not currently using oxycodone, has no need for the Narcan.           Patient Care Team:  Olvin Brewster MD as PCP - General (Family Medicine)  Flakito Mobley MD (Nephrology)     Review of Systems:     Review of Systems   Constitutional: Negative.    HENT: Negative.     Eyes: Negative.    Respiratory: Negative.     Cardiovascular: Negative.    Gastrointestinal: Negative.    Endocrine: Negative.    Genitourinary: Negative.    Musculoskeletal:  Positive for arthralgias.   Skin: Negative.    Allergic/Immunologic: Negative.    Neurological: Negative.    Hematological: Negative.    Psychiatric/Behavioral: Negative.        Problem List:     Patient Active Problem List   Diagnosis   • Anxiety   • Insomnia   • Abnormal electrocardiogram   • Allergic rhinitis   • Chronic pain   • Cystinuria (HCC)   • Erectile dysfunction   • Hyperlipidemia   • Hypertension   • Leucocytosis   • Nephrolithiasis   • Obstructive sleep apnea   • Retinal detachment   • Tobacco abuse   • Hypothyroid   • Foreign body of left ear   • Dysfunction of both eustachian tubes   • S/P cataract surgery   • Abnormal computed tomography angiography (CTA)   • Cavernous angioma   • Carotid stenosis   • TIA (transient ischemic attack)   • Hearing loss   • Cataract   • Hyperglycemia   • Ankle pain   • PAD (peripheral artery  disease) ([facility])   • Hip pain   • Cellulitis of toe of left foot   • Foot pain, left   • CKD (chronic kidney disease)   • Left renal atrophy   • Bilateral renal cysts   • Peripheral vascular disease of extremity with claudication ([facility])   • Rest pain of both lower extremities due to atherosclerosis ([facility])      Past Medical and Surgical History:     Past Medical History:   Diagnosis Date   • Acute intractable headache 11/03/2021   • Acute maxillary sinusitis     13 dec 2012   • ROSENDO (acute kidney injury) ([facility]) 06/08/2018 2018.    • Bone spur     toe   • BPPV (benign paroxysmal positional vertigo) 10/24/2021   • History of transfusion    • Hyperlipidemia    • Hypertension    • Kidney stone    • PVD (peripheral vascular disease) ([facility])    • Sleep apnea     does not use cpap     Past Surgical History:   Procedure Laterality Date   • APPENDECTOMY     • CATARACT EXTRACTION Bilateral    • IR LOWER EXTREMITY ANGIOGRAM  11/8/2023   • KIDNEY STONE SURGERY      multiple surgery in the past   • MD SLCTV CATHJ 3RD+ ORD SLCTV ABDL PEL/LXTR BRNCH Right 11/8/2023    Procedure: ARTERIOGRAM;  Surgeon: Lucho Reynoso MD;  Location: [facility];  Service: Vascular   • MD TEAEC W/WO PATCH GRAFT COMMON FEMORAL Left 08/14/2023    Procedure: ENDARTERECTOMY ARTERIAL FEMORAL WITH TISSUES PATCH AGIOPLASTY;  Surgeon: Lucho Reynoso MD;  Location: [facility];  Service: Vascular   • MD TEAEC W/WO PATCH GRAFT COMMON FEMORAL Right 11/8/2023    Procedure: RIGHT FEMORAL ENDARTERECTOMY WITH PATCH ANGIOPLASTY, RIGHT COMMON ILIAC INTRAVASCULAR LITHOTRIPSY, RIGHT EXTERNAL ILIAC INTRAVASCULAR LITHOTRIPSY, AORTAGRAM, LEFT COMMON ILIAC INTRAVASCULAR LITHOTRIPSY;  Surgeon: Lucho Reynoso MD;  Location: [facility];  Service: Vascular   • RETINAL DETACHMENT SURGERY Bilateral    • SEPTOPLASTY     • TONSILLECTOMY  2000    meg Vidal      Family History:     Family History   Problem Relation Age of Onset   • Stroke Mother     • No Known Problems Father       Social History:     Social History     Socioeconomic History   • Marital status: /Civil Union     Spouse name: None   • Number of children: None   • Years of education: None   • Highest education level: None   Occupational History   • Occupation: attendant for [employer]     Comment: bus maintenance (/)   Tobacco Use   • Smoking status: Every Day     Current packs/day: 0.50     Average packs/day: 0.5 packs/day for 40.0 years (20.0 ttl pk-yrs)     Types: Cigarettes   • Smokeless tobacco: Never   • Tobacco comments:     exposure to 2nd hand smoke  advised not to smoke prior to procedure   Vaping Use   • Vaping status: Never Used   Substance and Sexual Activity   • Alcohol use: Yes     Alcohol/week: 0.0 standard drinks of alcohol     Comment: social   • Drug use: Yes     Types: Marijuana     Comment: once a week advised not to smoke prior to procedure   • Sexual activity: Yes     Partners: Female   Other Topics Concern   • None   Social History Narrative    Consumes 1 gallon of ice tea and 1-2 cups of coffee per day     Social Determinants of Health     Financial Resource Strain: Low Risk  (1/8/2024)    Overall Financial Resource Strain (CARDIA)    • Difficulty of Paying Living Expenses: Not hard at all   Food Insecurity: Not on file   Transportation Needs: No Transportation Needs (1/8/2024)    PRAPARE - Transportation    • Lack of Transportation (Medical): No    • Lack of Transportation (Non-Medical): No   Physical Activity: Not on file   Stress: Not on file   Social Connections: Not on file   Intimate Partner Violence: Not on file   Housing Stability: Not on file      Medications and Allergies:     Current Outpatient Medications   Medication Sig Dispense Refill   • acetaminophen (TYLENOL) 500 mg tablet Take 500 mg by mouth every 6 (six) hours as needed for mild pain     • ALPRAZolam (XANAX) 0.25 mg tablet Take 1 tablet (0.25 mg total) by mouth daily at  bedtime as needed for anxiety 15 tablet 0   • aspirin 81 mg chewable tablet Chew 1 tablet (81 mg total) daily     • gabapentin (Neurontin) 300 mg capsule Take 1 capsule (300 mg total) by mouth daily at bedtime 90 capsule 2   • losartan (COZAAR) 100 MG tablet TAKE 1 TABLET BY MOUTH EVERY DAY 90 tablet 1   • Melatonin 10 MG TABS Take 10 mg by mouth Pt takes 10mg at bedtime.     • naloxone (NARCAN) 4 mg/0.1 mL nasal spray Administer 1 spray into a nostril. If no response after 2-3 minutes, give another dose in the other nostril using a new spray. 1 each 1   • rosuvastatin (CRESTOR) 40 MG tablet Take 1 tablet (40 mg total) by mouth daily 90 tablet 3   • traZODone (DESYREL) 50 mg tablet Take 2 tablets (100 mg total) by mouth daily at bedtime 180 tablet 1     No current facility-administered medications for this visit.     Allergies   Allergen Reactions   • Eszopiclone Other (See Comments)     Sleep walking   • Zolpidem Other (See Comments)     Other reaction(s): sleepwalking.      Immunizations:     Immunization History   Administered Date(s) Administered   • COVID-19 MODERNA VACC 0.5 ML IM 03/29/2021, 04/29/2021, 12/09/2021, 05/09/2022   • COVID-19 Moderna Vac BIVALENT 12 Yr+ IM 0.5 ML 10/21/2022   • COVID-19 Pfizer mRNA vacc PF chelle-sucrose 12 yr and older (Comirnaty) 01/08/2024   • INFLUENZA 10/01/2013, 11/01/2017   • Influenza, injectable, quadrivalent, preservative free 0.5 mL 01/08/2024   • Influenza, recombinant, quadrivalent,injectable, preservative free 10/05/2021, 11/14/2022   • Influenza, seasonal, injectable 10/01/2013, 10/01/2015, 10/19/2016   • Influenza, seasonal, injectable, preservative free 10/08/2018   • Tdap 01/19/2008, 09/10/2021      Health Maintenance:         Topic Date Due   • Colorectal Cancer Screening  01/08/2025 (Originally 2/1/2004)   • HIV Screening  Completed   • Hepatitis C Screening  Completed   • Lung Cancer Screening  Discontinued         Topic Date Due   • Pneumococcal Vaccine:  Pediatrics (0 to 5 Years) and At-Risk Patients (6 to 64 Years) (1 - PCV) Never done      Medicare Screening Tests and Risk Assessments:     Ryan is here for his Welcome to Medicare visit.     Health Risk Assessment:   Patient rates overall health as good. Patient feels that their physical health rating is same. Patient is satisfied with their life. Eyesight was rated as same. Hearing was rated as slightly worse. Patient feels that their emotional and mental health rating is same. Patients states they are never, rarely angry. Patient states they are never, rarely unusually tired/fatigued. Pain experienced in the last 7 days has been some. Patient's pain rating has been 10/10. Patient states that he has experienced no weight loss or gain in last 6 months. Pain is renal pain and kidney stone like, and DJD.    Depression Screening:   PHQ-2 Score: 0      Fall Risk Screening:   In the past year, patient has experienced: no history of falling in past year      Home Safety:  Patient does not have trouble with stairs inside or outside of their home. Patient has working smoke alarms and has no working carbon monoxide detector. Home safety hazards include: none.     Nutrition:   Current diet is Regular.     Medications:   Patient is not currently taking any over-the-counter supplements. Patient is able to manage medications.     Activities of Daily Living (ADLs)/Instrumental Activities of Daily Living (IADLs):   Walk and transfer into and out of bed and chair?: Yes  Dress and groom yourself?: Yes    Bathe or shower yourself?: Yes    Feed yourself? Yes  Do your laundry/housekeeping?: Yes  Manage your money, pay your bills and track your expenses?: No  Make your own meals?: Yes    Do your own shopping?: Yes    Previous Hospitalizations:   Any hospitalizations or ED visits within the last 12 months?: Yes    How many hospitalizations have you had in the last year?: 1-2    Advance Care Planning:   Living will: No    Durable POA  for healthcare: No    Advanced directive: No    Advanced directive counseling given: Yes    ACP document given: Yes      Cognitive Screening:   Provider or family/friend/caregiver concerned regarding cognition?: No    PREVENTIVE SCREENINGS      Cardiovascular Screening:    General: Screening Not Indicated and History Lipid Disorder      Diabetes Screening:     General: Screening Current      Colorectal Cancer Screening:     General: Risks and Benefits Discussed    Due for: Colonoscopy - Low Risk      Prostate Cancer Screening:    General: Risks and Benefits Discussed    Due for: PSA      Osteoporosis Screening:    General: Screening Not Indicated      Abdominal Aortic Aneurysm (AAA) Screening:    Risk factors include: tobacco use        General: Screening Not Indicated      Lung Cancer Screening:     General: Risks and Benefits Discussed    Due for: Low Dose CT (LDCT)      Hepatitis C Screening:    General: Screening Current      Preventive Screening Comments: Patient not interested in colonoscopy or Cologuard.  Understands risks of undiagnosed colon cancer.    CTA 2023, therefore does not need screening for AAA.    Reviewed about lung screening for cancer.    Screening, Brief Intervention, and Referral to Treatment (SBIRT)    Screening  Typical number of drinks in a day: 0  Typical number of drinks in a week: 2  Interpretation: Low risk drinking behavior.    AUDIT-C Screenin) How often did you have a drink containing alcohol in the past year? monthly or less  2) How many drinks did you have on a typical day when you were drinking in the past year? 1 to 2  3) How often did you have 6 or more drinks on one occasion in the past year? never    AUDIT-C Score: 1  Interpretation: Score 0-3 (male): Negative screen for alcohol misuse    Single Item Drug Screening:  How often have you used an illegal drug (including marijuana) or a prescription medication for non-medical reasons in the past year? less than  "monthly    Single Item Drug Screen Score: 1  Interpretation: POSITIVE screen for possible drug use disorder    Drug Abuse Screening Test (DAST-10):  1) Have you used drugs other than those required for medical reasons? Yes  2) Do you abuse more than one drug at a time? No  3) Are you always able to stop using drugs when you want to? Yes  4) Have you had \"blackouts\" or \"flashbacks\" as a result of drug use? No  5) Do you ever feel bad or guilty about your drug use? No  6) Does your spouse (or parents) ever complain about your involvement with drugs? No  7) Have you neglected your family because of your use of drugs? No  8) Have you engaged in illegal activities in order to obtain drugs? No  9) Have you ever experienced withdrawal symptoms (felt sick) when you stopped taking drugs? No  10) Have you had medical problems as a result of your drug use (e.g., memory loss, hepatitis, convulsions, bleeding, etc.)? No    DAST-10 Score: 1  Interpretation: Low level problems related to drug abuse    Vision Screening    Right eye Left eye Both eyes   Without correction 20/100 20/25 20/30   With correction           Physical Exam:     /80   Pulse 64   Ht 6' (1.829 m)   Wt 90.7 kg (200 lb)   BMI 27.12 kg/m²     Physical Exam  Vitals and nursing note reviewed.   Constitutional:       General: He is not in acute distress.     Appearance: Normal appearance.   Neck:      Vascular: No carotid bruit.   Cardiovascular:      Rate and Rhythm: Normal rate and regular rhythm.      Pulses: Normal pulses.           Carotid pulses are 2+ on the right side and 2+ on the left side.     Heart sounds: Normal heart sounds. No murmur heard.     No gallop.   Pulmonary:      Effort: Pulmonary effort is normal. No respiratory distress.      Breath sounds: Normal breath sounds. No stridor. No wheezing, rhonchi or rales.   Chest:      Chest wall: No tenderness.   Neurological:      Mental Status: He is alert.          I discussed with him that he " is a candidate for lung cancer CT screening.     The following Shared Decision-Making points were covered:  Benefits of screening were discussed, including the rates of reduction in death from lung cancer and other causes.  Harms of screening were reviewed, including false positive tests, radiation exposure levels, risks of invasive procedures, risks of complications of screening, and risk of overdiagnosis.  I counseled on the importance of adherence to annual lung cancer LDCT screening, impact of co-morbidities, and ability or willingness to undergo diagnosis and treatment.  I counseled on the importance of maintaining abstinence as a former smoker or was counseled on the importance of smoking cessation if a current smoker    Review of Eligibility Criteria: He meets all of the criteria for Lung Cancer Screening.   He is 63 y.o.   He has 20 pack year tobacco history and is a current smoker or has quit within the past 15 years  He presents no signs or symptoms of lung cancer    After discussion, the patient decided to elect lung cancer screening.    Olvin Brewster MD

## 2024-01-08 NOTE — ASSESSMENT & PLAN NOTE
Lab Results   Component Value Date    EGFR 58 11/09/2023    EGFR 54 10/26/2023    EGFR 61 08/15/2023    CREATININE 1.29 11/09/2023    CREATININE 1.37 (H) 10/26/2023    CREATININE 1.24 08/15/2023   GFR is, while below 60, slightly higher than what it was previously.  Encourage hydration and blood pressure control.  Follow-up with nephrology as scheduled.

## 2024-01-16 DIAGNOSIS — F51.01 PRIMARY INSOMNIA: ICD-10-CM

## 2024-01-16 RX ORDER — TRAZODONE HYDROCHLORIDE 50 MG/1
100 TABLET ORAL
Qty: 180 TABLET | Refills: 1 | Status: SHIPPED | OUTPATIENT
Start: 2024-01-16

## 2024-01-16 NOTE — TELEPHONE ENCOUNTER
Reason for call:   [] Refill   [] Prior Auth  [x] Other:     Pt called for the refill on his trazodone, already requested by pharm. [facility], pt state he is running low.    While in chart, med sent to pharm.

## 2024-02-28 DIAGNOSIS — F51.01 PRIMARY INSOMNIA: ICD-10-CM

## 2024-02-28 RX ORDER — ALPRAZOLAM 0.25 MG/1
0.25 TABLET ORAL
Qty: 15 TABLET | Refills: 0 | Status: SHIPPED | OUTPATIENT
Start: 2024-02-28

## 2024-02-28 NOTE — TELEPHONE ENCOUNTER
Reason for call:   [x] Refill   [] Prior Auth  [] Other:     Office:   [x] PCP/Provider -   [] Specialty/Provider -     Medication:   Alprazolam 0.25mg- take 1 tablet by mouth daily at bedtime as needed for anxiety      Pharmacy: [facility]    Does the patient have enough for 3 days?   [] Yes   [x] No - Send as HP to POD

## 2024-02-29 ENCOUNTER — OFFICE VISIT (OUTPATIENT)
Dept: VASCULAR SURGERY | Facility: CLINIC | Age: 64
End: 2024-02-29
Payer: COMMERCIAL

## 2024-02-29 VITALS
HEART RATE: 69 BPM | SYSTOLIC BLOOD PRESSURE: 142 MMHG | WEIGHT: 198 LBS | BODY MASS INDEX: 26.82 KG/M2 | HEIGHT: 72 IN | DIASTOLIC BLOOD PRESSURE: 74 MMHG

## 2024-02-29 DIAGNOSIS — I73.9 PAD (PERIPHERAL ARTERY DISEASE) (HCC): Primary | ICD-10-CM

## 2024-02-29 PROCEDURE — 99214 OFFICE O/P EST MOD 30 MIN: CPT | Performed by: SURGERY

## 2024-02-29 NOTE — PROGRESS NOTES
Assessment/Plan:    PAD (peripheral artery disease) (HCC)  Bilateral lower extremity peripheral arterial occlusive disease.  Status post inflow procedures bilaterally.  Ongoing short distance right worse than left claudication.  No rest pain, no tissue loss.  Unfortunately still smoking, approximately 1/2 pack/day.  At this time not interested in pharmaceutical assistance with smoking cessation due to side effect profiles.  He will continue to cut back.  6-month duplex ordered.  1 year office follow-up or sooner as needed.      Subjective:      Patient ID: Ryan Montoya is a 64 y.o. male.    Patient presents to review IRLANDA done 12/20/23 s/p R CFE 11/2023. Patient reports no improvement in RLE claudication symptoms. Denies tissue loss or rest pain. Patient is a current smoker.     ISAK Davis is a pleasant 64-year-old male who presents in follow-up after his bilateral femoral endarterectomies and right iliac artery inflow procedures.  He has been walking and had some improvement initially in his symptoms.  He no longer suffers from rest pain in either lower extremity.  He has no tissue loss.  He does complain of ongoing right lower extremity claudication.  He was cutting back to about 4 cigarettes/day however has increased this again to about 1/2 pack/day.  He unfortunately lost his sister earlier this month suddenly.  This is caused increases in stress.  We discussed the addition of Chantix or Wellbutrin however he is uninterested in these due to the side effect profiles.  We also discussed nicotine replacement therapy as a separate adjunct.  He will continue to try to cut back on his own and continue walking.  I discussed for him the residual right popliteal artery occlusion.  We discussed that though this could be intervened upon it is unlikely to have a durable result at this time.  Bypass in the setting of ongoing tobacco abuse would also be difficult to maintain.  Also I would not offer bypass without rest  pain or tissue loss.    Review of Systems   Constitutional: Negative.    HENT: Negative.     Eyes: Negative.    Respiratory: Negative.     Cardiovascular: Negative.    Gastrointestinal: Negative.    Endocrine: Negative.    Genitourinary: Negative.    Musculoskeletal: Negative.    Skin: Negative.    Allergic/Immunologic: Negative.    Neurological: Negative.    Hematological: Negative.    Psychiatric/Behavioral: Negative.           Objective:      /74 (BP Location: Right arm, Patient Position: Sitting, Cuff Size: Standard)   Pulse 69   Ht 6' (1.829 m)   Wt 89.8 kg (198 lb)   BMI 26.85 kg/m²          Physical Exam  Constitutional:       Appearance: Normal appearance.   HENT:      Head: Normocephalic and atraumatic.      Nose: Nose normal. No rhinorrhea.   Eyes:      Extraocular Movements: Extraocular movements intact.      Pupils: Pupils are equal, round, and reactive to light.   Cardiovascular:      Rate and Rhythm: Normal rate and regular rhythm.      Pulses:           Dorsalis pedis pulses are detected w/ Doppler on the right side and detected w/ Doppler on the left side.        Posterior tibial pulses are detected w/ Doppler on the right side and detected w/ Doppler on the left side.      Comments: Right-sided monophasic, left-sided signals multiphasic  Pulmonary:      Effort: Pulmonary effort is normal.      Breath sounds: No stridor.   Abdominal:      General: There is no distension.      Tenderness: There is no abdominal tenderness.   Musculoskeletal:         General: No tenderness. Normal range of motion.      Cervical back: Normal range of motion and neck supple.   Skin:     General: Skin is warm.      Capillary Refill: Capillary refill takes less than 2 seconds.      Coloration: Skin is not jaundiced.   Neurological:      General: No focal deficit present.      Mental Status: He is alert and oriented to person, place, and time.   Psychiatric:         Mood and Affect: Mood normal.         Behavior:  Behavior normal.         Tobacco use is a significant patient-modifiable risk factor for this patient’s vascular disease with multiple vascular comorbidities, and a significant risk factor for failure of and complications from any endovascular or surgical interventions.    I explained to the patient the effects of smoking including peripheral artery disease, coronary artery disease, cerebrovascular disease as well as cancer and chronic obstructive pulmonary disease. I asked the patient to stop smoking immediately. It is never too late to quit, and many studies show significant health benefits as well as economical savings after smoking cessation. I offered to the patient nicotine replacement therapy as well as referral to the smoking cessation program and access to the quit line 2-762-MCRIDOD or ambulatory referral to our network smoking cessation program.    Based on our conversation, this patient expresses a passive interest in quitting but does not appear very motivated to quit  And declined my offer of nicotine replacement or tobacco cessation medications    The patient did not set a quit date. I will continue to  follow up on this issue at our next scheduled visit.     I spent approximately 10 minutes on tobacco cessation counseling with this patient.

## 2024-02-29 NOTE — ASSESSMENT & PLAN NOTE
Bilateral lower extremity peripheral arterial occlusive disease.  Status post inflow procedures bilaterally.  Ongoing short distance right worse than left claudication.  No rest pain, no tissue loss.  Unfortunately still smoking, approximately 1/2 pack/day.  At this time not interested in pharmaceutical assistance with smoking cessation due to side effect profiles.  He will continue to cut back.  6-month duplex ordered.  1 year office follow-up or sooner as needed.

## 2024-02-29 NOTE — LETTER
February 29, 2024     Olvin Brewster MD  [address]  [address]  [place] [address]    Patient: Ryan Montoya   YOB: 1960   Date of Visit: 2/29/2024       Dear Dr. Brewster:    Thank you for referring Ryan Montoya to me for evaluation. Below are my notes for this consultation.    If you have questions, please do not hesitate to call me. I look forward to following your patient along with you.         Sincerely,        Lucho Reynoso MD        CC: Ryan Montoya    Lucho Reynoso MD  2/29/2024 10:02 AM  Sign when Signing Visit  Assessment/Plan:    PAD (peripheral artery disease) (HCC)  Bilateral lower extremity peripheral arterial occlusive disease.  Status post inflow procedures bilaterally.  Ongoing short distance right worse than left claudication.  No rest pain, no tissue loss.  Unfortunately still smoking, approximately 1/2 pack/day.  At this time not interested in pharmaceutical assistance with smoking cessation due to side effect profiles.  He will continue to cut back.  6-month duplex ordered.  1 year office follow-up or sooner as needed.      Subjective:      Patient ID: Ryan Montoya is a 64 y.o. male.    Patient presents to review IRLANDA done 12/20/23 s/p R CFE 11/2023. Patient reports no improvement in RLE claudication symptoms. Denies tissue loss or rest pain. Patient is a current smoker.     ISAK Davis is a pleasant 64-year-old male who presents in follow-up after his bilateral femoral endarterectomies and right iliac artery inflow procedures.  He has been walking and had some improvement initially in his symptoms.  He no longer suffers from rest pain in either lower extremity.  He has no tissue loss.  He does complain of ongoing right lower extremity claudication.  He was cutting back to about 4 cigarettes/day however has increased this again to about 1/2 pack/day.  He unfortunately lost his sister earlier this month suddenly.  This is  caused increases in stress.  We discussed the addition of Chantix or Wellbutrin however he is uninterested in these due to the side effect profiles.  We also discussed nicotine replacement therapy as a separate adjunct.  He will continue to try to cut back on his own and continue walking.  I discussed for him the residual right popliteal artery occlusion.  We discussed that though this could be intervened upon it is unlikely to have a durable result at this time.  Bypass in the setting of ongoing tobacco abuse would also be difficult to maintain.  Also I would not offer bypass without rest pain or tissue loss.    Review of Systems   Constitutional: Negative.    HENT: Negative.     Eyes: Negative.    Respiratory: Negative.     Cardiovascular: Negative.    Gastrointestinal: Negative.    Endocrine: Negative.    Genitourinary: Negative.    Musculoskeletal: Negative.    Skin: Negative.    Allergic/Immunologic: Negative.    Neurological: Negative.    Hematological: Negative.    Psychiatric/Behavioral: Negative.           Objective:      /74 (BP Location: Right arm, Patient Position: Sitting, Cuff Size: Standard)   Pulse 69   Ht 6' (1.829 m)   Wt 89.8 kg (198 lb)   BMI 26.85 kg/m²          Physical Exam  Constitutional:       Appearance: Normal appearance.   HENT:      Head: Normocephalic and atraumatic.      Nose: Nose normal. No rhinorrhea.   Eyes:      Extraocular Movements: Extraocular movements intact.      Pupils: Pupils are equal, round, and reactive to light.   Cardiovascular:      Rate and Rhythm: Normal rate and regular rhythm.      Pulses:           Dorsalis pedis pulses are detected w/ Doppler on the right side and detected w/ Doppler on the left side.        Posterior tibial pulses are detected w/ Doppler on the right side and detected w/ Doppler on the left side.      Comments: Right-sided monophasic, left-sided signals multiphasic  Pulmonary:      Effort: Pulmonary effort is normal.      Breath  sounds: No stridor.   Abdominal:      General: There is no distension.      Tenderness: There is no abdominal tenderness.   Musculoskeletal:         General: No tenderness. Normal range of motion.      Cervical back: Normal range of motion and neck supple.   Skin:     General: Skin is warm.      Capillary Refill: Capillary refill takes less than 2 seconds.      Coloration: Skin is not jaundiced.   Neurological:      General: No focal deficit present.      Mental Status: He is alert and oriented to person, place, and time.   Psychiatric:         Mood and Affect: Mood normal.         Behavior: Behavior normal.         Tobacco use is a significant patient-modifiable risk factor for this patient’s vascular disease with multiple vascular comorbidities, and a significant risk factor for failure of and complications from any endovascular or surgical interventions.    I explained to the patient the effects of smoking including peripheral artery disease, coronary artery disease, cerebrovascular disease as well as cancer and chronic obstructive pulmonary disease. I asked the patient to stop smoking immediately. It is never too late to quit, and many studies show significant health benefits as well as economical savings after smoking cessation. I offered to the patient nicotine replacement therapy as well as referral to the smoking cessation program and access to the quit line 6-230-ELQJOYA or ambulatory referral to our network smoking cessation program.    Based on our conversation, this patient expresses a passive interest in quitting but does not appear very motivated to quit  And declined my offer of nicotine replacement or tobacco cessation medications    The patient did not set a quit date. I will continue to  follow up on this issue at our next scheduled visit.     I spent approximately 10 minutes on tobacco cessation counseling with this patient.

## 2024-02-29 NOTE — PATIENT INSTRUCTIONS
1. PAD (peripheral artery disease) ([facility])  Assessment & Plan:  Bilateral lower extremity peripheral arterial occlusive disease.  Status post inflow procedures bilaterally.  Ongoing short distance right worse than left claudication.  No rest pain, no tissue loss.  Unfortunately still smoking, approximately 1/2 pack/day.  At this time not interested in pharmaceutical assistance with smoking cessation due to side effect profiles.  He will continue to cut back.  6-month duplex ordered.  1 year office follow-up or sooner as needed.      Orders:  -     VAS ARTERIAL DUPLEX- LOWER LIMB BILATERAL; Future; Expected date: 08/29/2024

## 2024-04-05 DIAGNOSIS — F51.01 PRIMARY INSOMNIA: ICD-10-CM

## 2024-04-05 RX ORDER — ALPRAZOLAM 0.25 MG/1
0.25 TABLET ORAL
Qty: 15 TABLET | Refills: 0 | Status: SHIPPED | OUTPATIENT
Start: 2024-04-05

## 2024-04-05 NOTE — TELEPHONE ENCOUNTER
Reason for call:   [x] Refill   [] Prior Auth  [] Other:     Office:   [x] PCP/Provider - [facility]  [] Specialty/Provider -     Medication: ALPRAZolam (XANAX) 0.25 mg tablet      Dose/Frequency: Take 1 tablet (0.25 mg total) by mouth daily at bedtime as needed for anxiety     Quantity: 15    Pharmacy: [facility]/pharmacy #[id number] - [place], [address]      Does the patient have enough for 3 days?   [] Yes   [x] No - Send as HP to POD

## 2024-04-18 DIAGNOSIS — F51.01 PRIMARY INSOMNIA: ICD-10-CM

## 2024-04-19 RX ORDER — TRAZODONE HYDROCHLORIDE 50 MG/1
100 TABLET ORAL
Qty: 180 TABLET | Refills: 1 | Status: SHIPPED | OUTPATIENT
Start: 2024-04-19

## 2024-05-08 DIAGNOSIS — F51.01 PRIMARY INSOMNIA: ICD-10-CM

## 2024-05-09 RX ORDER — ALPRAZOLAM 0.25 MG/1
0.25 TABLET ORAL
Qty: 15 TABLET | Refills: 0 | Status: SHIPPED | OUTPATIENT
Start: 2024-05-09

## 2024-05-11 DIAGNOSIS — I10 PRIMARY HYPERTENSION: ICD-10-CM

## 2024-05-13 RX ORDER — LOSARTAN POTASSIUM 100 MG/1
TABLET ORAL
Qty: 90 TABLET | Refills: 1 | Status: SHIPPED | OUTPATIENT
Start: 2024-05-13

## 2024-06-05 DIAGNOSIS — F51.01 PRIMARY INSOMNIA: ICD-10-CM

## 2024-06-06 RX ORDER — ALPRAZOLAM 0.25 MG/1
0.25 TABLET ORAL
Qty: 15 TABLET | Refills: 0 | Status: SHIPPED | OUTPATIENT
Start: 2024-06-06

## 2024-07-09 ENCOUNTER — APPOINTMENT (OUTPATIENT)
Dept: LAB | Facility: CLINIC | Age: 64
End: 2024-07-09
Payer: COMMERCIAL

## 2024-07-09 DIAGNOSIS — R73.9 HYPERGLYCEMIA: ICD-10-CM

## 2024-07-09 DIAGNOSIS — E78.2 MIXED HYPERLIPIDEMIA: ICD-10-CM

## 2024-07-09 DIAGNOSIS — D72.829 LEUKOCYTOSIS, UNSPECIFIED TYPE: ICD-10-CM

## 2024-07-09 DIAGNOSIS — I10 PRIMARY HYPERTENSION: ICD-10-CM

## 2024-07-09 DIAGNOSIS — N18.31 STAGE 3A CHRONIC KIDNEY DISEASE (HCC): ICD-10-CM

## 2024-07-09 LAB
ALBUMIN SERPL BCG-MCNC: 4.1 G/DL (ref 3.5–5)
ALP SERPL-CCNC: 61 U/L (ref 34–104)
ALT SERPL W P-5'-P-CCNC: 19 U/L (ref 7–52)
ANION GAP SERPL CALCULATED.3IONS-SCNC: 11 MMOL/L (ref 4–13)
AST SERPL W P-5'-P-CCNC: 14 U/L (ref 13–39)
BASOPHILS # BLD AUTO: 0.1 THOUSANDS/ÂΜL (ref 0–0.1)
BASOPHILS NFR BLD AUTO: 1 % (ref 0–1)
BILIRUB SERPL-MCNC: 0.3 MG/DL (ref 0.2–1)
BUN SERPL-MCNC: 22 MG/DL (ref 5–25)
CALCIUM SERPL-MCNC: 9.6 MG/DL (ref 8.4–10.2)
CHLORIDE SERPL-SCNC: 101 MMOL/L (ref 96–108)
CHOLEST SERPL-MCNC: 178 MG/DL
CO2 SERPL-SCNC: 23 MMOL/L (ref 21–32)
CREAT SERPL-MCNC: 1.46 MG/DL (ref 0.6–1.3)
EOSINOPHIL # BLD AUTO: 0.43 THOUSAND/ÂΜL (ref 0–0.61)
EOSINOPHIL NFR BLD AUTO: 5 % (ref 0–6)
ERYTHROCYTE [DISTWIDTH] IN BLOOD BY AUTOMATED COUNT: 12.4 % (ref 11.6–15.1)
GFR SERPL CREATININE-BSD FRML MDRD: 49 ML/MIN/1.73SQ M
GLUCOSE P FAST SERPL-MCNC: 125 MG/DL (ref 65–99)
HCT VFR BLD AUTO: 43.9 % (ref 36.5–49.3)
HDLC SERPL-MCNC: 46 MG/DL
HGB BLD-MCNC: 14.5 G/DL (ref 12–17)
IMM GRANULOCYTES # BLD AUTO: 0.03 THOUSAND/UL (ref 0–0.2)
IMM GRANULOCYTES NFR BLD AUTO: 0 % (ref 0–2)
LDLC SERPL CALC-MCNC: 86 MG/DL (ref 0–100)
LYMPHOCYTES # BLD AUTO: 2.17 THOUSANDS/ÂΜL (ref 0.6–4.47)
LYMPHOCYTES NFR BLD AUTO: 25 % (ref 14–44)
MCH RBC QN AUTO: 31 PG (ref 26.8–34.3)
MCHC RBC AUTO-ENTMCNC: 33 G/DL (ref 31.4–37.4)
MCV RBC AUTO: 94 FL (ref 82–98)
MONOCYTES # BLD AUTO: 0.74 THOUSAND/ÂΜL (ref 0.17–1.22)
MONOCYTES NFR BLD AUTO: 9 % (ref 4–12)
NEUTROPHILS # BLD AUTO: 5.23 THOUSANDS/ÂΜL (ref 1.85–7.62)
NEUTS SEG NFR BLD AUTO: 60 % (ref 43–75)
NONHDLC SERPL-MCNC: 132 MG/DL
NRBC BLD AUTO-RTO: 0 /100 WBCS
PLATELET # BLD AUTO: 258 THOUSANDS/UL (ref 149–390)
PMV BLD AUTO: 12.3 FL (ref 8.9–12.7)
POTASSIUM SERPL-SCNC: 4.2 MMOL/L (ref 3.5–5.3)
PROT SERPL-MCNC: 7.1 G/DL (ref 6.4–8.4)
RBC # BLD AUTO: 4.67 MILLION/UL (ref 3.88–5.62)
SODIUM SERPL-SCNC: 135 MMOL/L (ref 135–147)
TRIGL SERPL-MCNC: 232 MG/DL
WBC # BLD AUTO: 8.7 THOUSAND/UL (ref 4.31–10.16)

## 2024-07-09 PROCEDURE — 36415 COLL VENOUS BLD VENIPUNCTURE: CPT

## 2024-07-09 PROCEDURE — 80053 COMPREHEN METABOLIC PANEL: CPT

## 2024-07-09 PROCEDURE — 85025 COMPLETE CBC W/AUTO DIFF WBC: CPT

## 2024-07-09 PROCEDURE — 80061 LIPID PANEL: CPT

## 2024-07-11 ENCOUNTER — OFFICE VISIT (OUTPATIENT)
Dept: FAMILY MEDICINE CLINIC | Facility: CLINIC | Age: 64
End: 2024-07-11
Payer: COMMERCIAL

## 2024-07-11 VITALS
DIASTOLIC BLOOD PRESSURE: 80 MMHG | SYSTOLIC BLOOD PRESSURE: 130 MMHG | HEIGHT: 72 IN | OXYGEN SATURATION: 95 % | HEART RATE: 75 BPM | WEIGHT: 197 LBS | BODY MASS INDEX: 26.68 KG/M2

## 2024-07-11 DIAGNOSIS — N18.31 STAGE 3A CHRONIC KIDNEY DISEASE (HCC): ICD-10-CM

## 2024-07-11 DIAGNOSIS — L57.0 SOLAR KERATOSIS: ICD-10-CM

## 2024-07-11 DIAGNOSIS — I70.223 REST PAIN OF BOTH LOWER EXTREMITIES DUE TO ATHEROSCLEROSIS (HCC): ICD-10-CM

## 2024-07-11 DIAGNOSIS — M79.672 LEFT FOOT PAIN: ICD-10-CM

## 2024-07-11 DIAGNOSIS — E78.2 MIXED HYPERLIPIDEMIA: ICD-10-CM

## 2024-07-11 DIAGNOSIS — E72.01 CYSTINURIA (HCC): Primary | ICD-10-CM

## 2024-07-11 DIAGNOSIS — I73.9 PERIPHERAL VASCULAR DISEASE OF EXTREMITY WITH CLAUDICATION (HCC): ICD-10-CM

## 2024-07-11 DIAGNOSIS — F51.01 PRIMARY INSOMNIA: ICD-10-CM

## 2024-07-11 DIAGNOSIS — R73.9 HYPERGLYCEMIA: ICD-10-CM

## 2024-07-11 DIAGNOSIS — L72.3 SEBACEOUS CYST: ICD-10-CM

## 2024-07-11 DIAGNOSIS — I10 PRIMARY HYPERTENSION: ICD-10-CM

## 2024-07-11 DIAGNOSIS — Z72.0 TOBACCO ABUSE: ICD-10-CM

## 2024-07-11 PROCEDURE — G2211 COMPLEX E/M VISIT ADD ON: HCPCS | Performed by: FAMILY MEDICINE

## 2024-07-11 PROCEDURE — 99214 OFFICE O/P EST MOD 30 MIN: CPT | Performed by: FAMILY MEDICINE

## 2024-07-11 NOTE — PATIENT INSTRUCTIONS
1. Cystinuria (HCC)  Assessment & Plan:  Patient to follow-up with urology and nephrology at some point.  2. Stage 3a chronic kidney disease (HCC)  Assessment & Plan:  Lab Results   Component Value Date    EGFR 49 07/09/2024    EGFR 58 11/09/2023    EGFR 54 10/26/2023    CREATININE 1.46 (H) 07/09/2024    CREATININE 1.29 11/09/2023    CREATININE 1.37 (H) 10/26/2023     Recommend follow with renal.  Orders:  -     Ambulatory Referral to Nephrology; Future  -     Comprehensive metabolic panel; Future; Expected date: 01/11/2025  3. Primary hypertension  Assessment & Plan:  Blood pressure today is quite reasonable.  No change.  Orders:  -     Ambulatory Referral to Nephrology; Future  -     Comprehensive metabolic panel; Future; Expected date: 01/11/2025  4. Mixed hyperlipidemia  Assessment & Plan:  Check in the future.  No change.  Continues on Crestor.  Of note, the labs recently were nonfasting.  Orders:  -     Cholesterol, total; Future; Expected date: 01/11/2025  -     Comprehensive metabolic panel; Future; Expected date: 01/11/2025  -     HDL cholesterol; Future; Expected date: 01/11/2025  -     LDL cholesterol, direct; Future; Expected date: 01/11/2025  5. Peripheral vascular disease of extremity with claudication ([facility])  Assessment & Plan:  Patient does have a history of peripheral vascular disease.  Continues to have some claudication symptoms based on the discussion from vascular recently.  Follow-up per the discussion with vascular.  Of note, he does appear to have a small area of varicosity anterior on the left ankle.  Again, follow-up with vascular.  6. Rest pain of both lower extremities due to atherosclerosis ([facility])  7. Hyperglycemia  Assessment & Plan:  Patient's labs were nonfasting.  Orders:  -     Comprehensive metabolic panel; Future; Expected date: 01/11/2025  8. Sebaceous cyst  Comments:  Left chest. No need to remove. Benighn. Can excise if he wishes.  9. Solar keratosis  Assessment & Plan:  Patient  does have what appears to be solar keratoses on the forearms.  Would recommend dermatology for evaluation.  Orders:  -     Ambulatory Referral to Dermatology; Future  10. Tobacco abuse  Assessment & Plan:  Recommend quit smoking.  Patient understands risks.  He is not interested in quitting at this time.  In the future if he decides to quit, we can offer him some medications that may help.    11. Left foot pain  Comments:  Irritation at the base of the left first toe.  Consider follow-up with podiatry.  May be multiple warts.  Orders:  -     Ambulatory Referral to Podiatry; Future      COVID 19 Instructions    Ryan DICKERSON Harryleigha was advised to limit contact with others to essential tasks such as getting food, medications, and medical care.    Proper handwashing reviewed, and Hand sanitzer when washing is not available.    If the patient develops symptoms of COVID 19, the patient should call the office as soon as possible.    It is strongly recommended that Flu Vaccinations be obtained.      Virtual Visits:  AmWell: This works on smart phones (any phone with Internet browsing capability).  You should get a text message when the provider is ready to see you.  Click on the link in the text message, and the call should start.  You will need to type in your name, and allow camera and microphone access.  This is HIPPA compliant, and secure.      If you have not already done so, get immunized to COVID 19.      We are committed to getting you vaccinated as soon as possible and will be closely following CDC and [facility] guidelines as they are released and revised.  Please refer to our COVID-19 vaccine webpage for the most up to date information on the vaccine and our distribution efforts.    This site will also have the most up to date recommendations for COVID booster vaccine.    [email or web address]-[email or web address]    Call 4-644-RCGVVYO ([phone]), option 7    You can also visit  https://www.vaccines.gov/ to find vaccines in your area.    OUR LOCATION:    [address], [address]  [place], [address] [phone]  Fax: [phone]    Lab services, Rheumatology, and OB/GYN are at this location as well.

## 2024-07-11 NOTE — ASSESSMENT & PLAN NOTE
Patient does have what appears to be solar keratoses on the forearms.  Would recommend dermatology for evaluation.

## 2024-07-11 NOTE — ASSESSMENT & PLAN NOTE
Recommend quit smoking.  Patient understands risks.  He is not interested in quitting at this time.  In the future if he decides to quit, we can offer him some medications that may help.

## 2024-07-11 NOTE — ASSESSMENT & PLAN NOTE
Lab Results   Component Value Date    EGFR 49 07/09/2024    EGFR 58 11/09/2023    EGFR 54 10/26/2023    CREATININE 1.46 (H) 07/09/2024    CREATININE 1.29 11/09/2023    CREATININE 1.37 (H) 10/26/2023     Recommend follow with renal.

## 2024-07-11 NOTE — ASSESSMENT & PLAN NOTE
Check in the future.  No change.  Continues on Crestor.  Of note, the labs recently were nonfasting.

## 2024-07-11 NOTE — PROGRESS NOTES
Ambulatory Visit  Name: Ryan Montoya      : 1960      MRN: [id number]  Encounter Provider: Olvin Brewster MD  Encounter Date: 2024   Encounter department: [facility]    Assessment & Plan   1. Cystinuria (HCC)  Assessment & Plan:  Patient to follow-up with urology and nephrology at some point.  2. Stage 3a chronic kidney disease (HCC)  Assessment & Plan:  Lab Results   Component Value Date    EGFR 49 2024    EGFR 58 2023    EGFR 54 10/26/2023    CREATININE 1.46 (H) 2024    CREATININE 1.29 2023    CREATININE 1.37 (H) 10/26/2023     Recommend follow with renal.  Orders:  -     Ambulatory Referral to Nephrology; Future  -     Comprehensive metabolic panel; Future; Expected date: 2025  3. Primary hypertension  Assessment & Plan:  Blood pressure today is quite reasonable.  No change.  Orders:  -     Ambulatory Referral to Nephrology; Future  -     Comprehensive metabolic panel; Future; Expected date: 2025  4. Mixed hyperlipidemia  Assessment & Plan:  Check in the future.  No change.  Continues on Crestor.  Of note, the labs recently were nonfasting.  Orders:  -     Cholesterol, total; Future; Expected date: 2025  -     Comprehensive metabolic panel; Future; Expected date: 2025  -     HDL cholesterol; Future; Expected date: 2025  -     LDL cholesterol, direct; Future; Expected date: 2025  5. Peripheral vascular disease of extremity with claudication (HCC)  Assessment & Plan:  Patient does have a history of peripheral vascular disease.  Continues to have some claudication symptoms based on the discussion from vascular recently.  Follow-up per the discussion with vascular.  Of note, he does appear to have a small area of varicosity anterior on the left ankle.  Again, follow-up with vascular.  6. Rest pain of both lower extremities due to atherosclerosis (HCC)  7. Hyperglycemia  Assessment & Plan:  Patient's labs were  "nonfasting.  Orders:  -     Comprehensive metabolic panel; Future; Expected date: 01/11/2025  8. Sebaceous cyst  Comments:  Left chest. No need to remove. Benighn. Can excise if he wishes.  9. Solar keratosis  Assessment & Plan:  Patient does have what appears to be solar keratoses on the forearms.  Would recommend dermatology for evaluation.  Orders:  -     Ambulatory Referral to Dermatology; Future  10. Tobacco abuse  Assessment & Plan:  Recommend quit smoking.  Patient understands risks.  He is not interested in quitting at this time.  In the future if he decides to quit, we can offer him some medications that may help.    11. Left foot pain  Comments:  Irritation at the base of the left first toe.  Consider follow-up with podiatry.  May be multiple warts.  Orders:  -     Ambulatory Referral to Podiatry; Future     Chief Complaint   Patient presents with   • Follow-up     6 months follow up       History of Present Illness     Left leg pain: Gabapentin and tylenol. Wondered about other options.    Has a \"bubble\" on left leg near ankle.    Nodule under left arm on chest.  Not tender. Mobile. No discharge.    Rash on arms. Bilateral.  No treatments.    Cholesterol:    PAD: Recently saw vascular.  They will be following up on this.    Hyperglycemia: Labs were nonfasting.    CKD: No specific issue.  Has been declining over time.  Follows with nephrology.    Nephrolithiasis:   Has not been to uro in about 10 years now.    Insomnia: Melatonin, Trazodone, and Xanax..    Tobacco: 1/2 PPD.    Colon cancer screen: Declined.            Review of Systems    Objective     /80 (BP Location: Right arm, Patient Position: Sitting, Cuff Size: Standard)   Pulse 75   Ht 6' (1.829 m)   Wt 89.4 kg (197 lb)   SpO2 95%   BMI 26.72 kg/m²     Physical Exam  Vitals and nursing note reviewed.   Constitutional:       Appearance: Normal appearance. He is well-developed.   HENT:      Head: Normocephalic and atraumatic. "   Cardiovascular:      Rate and Rhythm: Normal rate and regular rhythm.      Pulses:           Carotid pulses are 2+ on the right side and 2+ on the left side.     Heart sounds: Normal heart sounds. No murmur heard.     No friction rub. No gallop.   Pulmonary:      Effort: Pulmonary effort is normal. No respiratory distress.      Breath sounds: Normal breath sounds. No wheezing or rales.   Musculoskeletal:      Cervical back: Normal range of motion and neck supple.   Skin:         Neurological:      Mental Status: He is alert.       Administrative Statements

## 2024-07-11 NOTE — ASSESSMENT & PLAN NOTE
Patient does have a history of peripheral vascular disease.  Continues to have some claudication symptoms based on the discussion from vascular recently.  Follow-up per the discussion with vascular.  Of note, he does appear to have a small area of varicosity anterior on the left ankle.  Again, follow-up with vascular.

## 2024-07-11 NOTE — TELEPHONE ENCOUNTER
Reason for call:   [x] Refill   [] Prior Auth  [] Other:     Office:   [x] PCP/Provider - Olvin Brewster MD   [] Specialty/Provider -     Medication: ALPRAZolam (XANAX) 0.25 mg tablet     Dose/Frequency:     0.25 mg, Oral, Daily at bedtime PRN       Quantity: 15    Pharmacy: [facility]/pharmacy #[id number] [address]     Does the patient have enough for 3 days?   [x] Yes   [] No - Send as HP to POD

## 2024-07-12 RX ORDER — ALPRAZOLAM 0.25 MG/1
0.25 TABLET ORAL
Qty: 15 TABLET | Refills: 0 | Status: SHIPPED | OUTPATIENT
Start: 2024-07-12

## 2024-07-15 ENCOUNTER — CONSULT (OUTPATIENT)
Dept: DERMATOLOGY | Facility: CLINIC | Age: 64
End: 2024-07-15

## 2024-07-15 VITALS — HEIGHT: 72 IN | WEIGHT: 197 LBS | BODY MASS INDEX: 26.68 KG/M2

## 2024-07-15 DIAGNOSIS — L57.0 SOLAR KERATOSIS: ICD-10-CM

## 2024-07-15 DIAGNOSIS — D48.5 NEOPLASM OF UNCERTAIN BEHAVIOR OF SKIN: ICD-10-CM

## 2024-07-15 DIAGNOSIS — L57.0 ACTINIC KERATOSIS: Primary | ICD-10-CM

## 2024-07-15 DIAGNOSIS — L82.1 SEBORRHEIC KERATOSIS: ICD-10-CM

## 2024-07-15 DIAGNOSIS — L72.0 EIC (EPIDERMAL INCLUSION CYST): ICD-10-CM

## 2024-07-15 PROCEDURE — 88342 IMHCHEM/IMCYTCHM 1ST ANTB: CPT | Performed by: DERMATOLOGY

## 2024-07-15 PROCEDURE — 88305 TISSUE EXAM BY PATHOLOGIST: CPT | Performed by: DERMATOLOGY

## 2024-07-15 RX ORDER — FLUOROURACIL 50 MG/G
CREAM TOPICAL
Qty: 40 G | Refills: 1 | Status: SHIPPED | OUTPATIENT
Start: 2024-07-15

## 2024-07-15 NOTE — PROGRESS NOTES
"[facility] Dermatology Clinic Note     Patient Name: Ryan Montoya  Encounter Date: 7/15/24     Have you been cared for by a [facility] Dermatologist in the last 3 years and, if so, which description applies to you?    NO.   I am considered a \"new\" patient and must complete all patient intake questions. I am MALE/not capable of bearing children.    REVIEW OF SYSTEMS:  Have you recently had or currently have any of the following? Recent fever or chills? No  Any non-healing wound? No   PAST MEDICAL HISTORY:  Have you personally ever had or currently have any of the following?  If \"YES,\" then please provide more detail. Skin cancer (such as Melanoma, Basal Cell Carcinoma, Squamous Cell Carcinoma?  No  Tuberculosis, HIV/AIDS, Hepatitis B or C: No  Radiation Treatment No   HISTORY OF IMMUNOSUPPRESSION:   Do you have a history of any of the following:  Systemic Immunosuppression such as Diabetes, Biologic or Immunotherapy, Chemotherapy, Organ Transplantation, Bone Marrow Transplantation?  No     Answering \"YES\" requires the addition of the dotphrase \"IMMUNOSUPPRESSED\" as the first diagnosis of the patient's visit.   FAMILY HISTORY:  Any \"first degree relatives\" (parent, brother, sister, or child) with the following?    Skin Cancer, Pancreatic or Other Cancer? No   PATIENT EXPERIENCE:    Do you want the Dermatologist to perform a COMPLETE skin exam today including a clinical examination under the \"bra and underwear\" areas?  NO  If necessary, do we have your permission to call and leave a detailed message on your Preferred Phone number that includes your specific medical information?  Yes      Allergies   Allergen Reactions    Eszopiclone Other (See Comments)     Sleep walking    Zolpidem Other (See Comments)     Other reaction(s): sleepwalking.      Current Outpatient Medications:     acetaminophen (TYLENOL) 500 mg tablet, Take 500 mg by mouth every 6 (six) hours as needed for mild pain, Disp: , Rfl:     ALPRAZolam " "(XANAX) 0.25 mg tablet, Take 1 tablet (0.25 mg total) by mouth daily at bedtime as needed for anxiety, Disp: 15 tablet, Rfl: 0    aspirin 81 mg chewable tablet, Chew 1 tablet (81 mg total) daily, Disp: , Rfl:     gabapentin (Neurontin) 300 mg capsule, Take 1 capsule (300 mg total) by mouth daily at bedtime, Disp: 90 capsule, Rfl: 2    losartan (COZAAR) 100 MG tablet, TAKE 1 TABLET BY MOUTH EVERY DAY, Disp: 90 tablet, Rfl: 1    Melatonin 10 MG TABS, Take 10 mg by mouth Pt takes 10mg at bedtime., Disp: , Rfl:     naloxone (NARCAN) 4 mg/0.1 mL nasal spray, Administer 1 spray into a nostril. If no response after 2-3 minutes, give another dose in the other nostril using a new spray., Disp: 1 each, Rfl: 1    rosuvastatin (CRESTOR) 40 MG tablet, Take 1 tablet (40 mg total) by mouth daily, Disp: 90 tablet, Rfl: 3    traZODone (DESYREL) 50 mg tablet, TAKE 2 TABLETS (100 MG TOTAL) BY MOUTH DAILY AT BEDTIME, Disp: 180 tablet, Rfl: 1          Whom besides the patient is providing clinical information about today's encounter?   NO ADDITIONAL HISTORIAN (patient alone provided history)    Physical Exam and Assessment/Plan by Diagnosis:    NEOPLASM OF UNCERTAIN BEHAVIOR OF SKIN    Physical Exam:  (Anatomic Location); (Size and Morphological Description); (Differential Diagnosis):  Specimen A; skin; left forearm 4 mm pink scaly papule. Differential diagnosis: Basal cell carcinoma vs squamous cell carcinoma vs actinic keratosis   Specimen B; Left dorsal hand 4 mm mm pink scaly papule. Differential diagnosis: Basal cell carcinoma vs squamous cell carcinoma vs actinic keratosis       Additional History of Present Condition:  present on exam    Assessment and Plan:  I have discussed with the patient that a sample of skin via a \"skin biopsy” would be potentially helpful to further make a specific diagnosis under the microscope.  Based on a thorough discussion of this condition and the management approach to it (including a comprehensive " discussion of the known risks, side effects and potential benefits of treatment), the patient (family) agrees to implement the following specific plan:    Procedure:  Skin Biopsy.  After a thorough discussion of treatment options and risk/benefits/alternatives (including but not limited to local pain, scarring, dyspigmentation, blistering, possible superinfection, and inability to confirm a diagnosis via histopathology), verbal and written consent were obtained and portion of the rash was biopsied for tissue sample.  See below for consent that was obtained from patient and subsequent Procedure Note.  PROCEDURE TANGENTIAL (SHAVE) BIOPSY NOTE:    Performing Physician:   Anatomic Location; Clinical Description with size (cm); Pre-Op Diagnosis:   Specimen A; skin; left forearm 4 mm  mm pink scaly papule. Differential diagnosis: Basal cell carcinoma vs squamous cell carcinoma vs actinic keratosis   Specimen B; Left dorsal hand 4 mm mm pink scaly papule. Differential diagnosis: Basal cell carcinoma vs squamous cell carcinoma vs actinic keratosis   Post-op diagnosis: Same     Local anesthesia: 1% xylocaine with epi      Topical anesthesia: None    Hemostasis: Aluminum chloride       After obtaining informed consent  at which time there was a discussion about the purpose of biopsy  and low risks of infection and bleeding.  The area was prepped and draped in the usual fashion. Anesthesia was obtained with 1% lidocaine with epinephrine. A shave biopsy to an appropriate sampling depth was obtained by Shave (Dermablade or 15 blade) The resulting wound was covered with surgical ointment and bandaged appropriately.     The patient tolerated the procedure well without complications and was without signs of functional compromise.      Specimen has been sent for review by Dermatopathology.    Standard post-procedure care has been explained and has been included in written form within the patient's copy of Informed  "Consent.    INFORMED CONSENT DISCUSSION AND POST-OPERATIVE INSTRUCTIONS FOR PATIENT    I.  RATIONALE FOR PROCEDURE  I understand that a skin biopsy allows the Dermatologist to test a lesion or rash under the microscope to obtain a diagnosis.  It usually involves numbing the area with numbing medication and removing a small piece of skin; sometimes the area will be closed with sutures. In this specific procedure, sutures are not usually needed.  If any sutures are placed, then they are usually need to be removed in 2 weeks or less.    I understand that my Dermatologist recommends that a skin \"shave\" biopsy be performed today.  A local anesthetic, similar to the kind that a dentist uses when filling a cavity, will be injected with a very small needle into the skin area to be sampled.  The injected skin and tissue underneath \"will go to sleep” and become numb so no pain should be felt afterwards.  An instrument shaped like a tiny \"razor blade\" (shave biopsy instrument) will be used to cut a small piece of tissue and skin from the area so that a sample of tissue can be taken and examined more closely under the microscope.  A slight amount of bleeding will occur, but it will be stopped with direct pressure and a pressure bandage and any other appropriate methods.  I understands that a scar will form where the wound was created.  Surgical ointment will be applied to help protect the wound.  Sutures are not usually needed.    II.  RISKS AND POTENTIAL COMPLICATIONS   I understand the risks and potential complications of a skin biopsy include but are not limited to the following:  Bleeding  Infection  Pain  Scar/keloid  Skin discoloration  Incomplete Removal  Recurrence  Nerve Damage/Numbness/Loss of Function  Allergic Reaction to Anesthesia  Biopsies are diagnostic procedures and based on findings additional treatment or evaluation may be required  Loss or destruction of specimen resulting in no additional findings    My " "Dermatologist has explained to me the nature of the condition, the nature of the procedure, and the benefits to be reasonably expected compared with alternative approaches.  My Dermatologist has discussed the likelihood of major risks or complications of this procedure including the specific risks listed above, such as bleeding, infection, and scarring/keloid.  I understand that a scar is expected after this procedure.  I understand that my physician cannot predict if the scar will form a \"keloid,\" which extends beyond the borders of the wound that is created.  A keloid is a thick, painful, and bumpy scar.  A keloid can be difficult to treat, as it does not always respond well to therapy, which includes injecting cortisone directly into the keloid every few weeks.  While this usually reduces the pain and size of the scar, it does not eliminate it.      I understand that photographs may be taken before and after the procedure.  These will be maintained as part of the medical providers confidential records and may not be made available to me.  I further authorize the medical provider to use the photographs for teaching purposes or to illustrate scientific papers, books, or lectures if in his/her judgment, medical research, education, or science may benefit from its use.    I have had an opportunity to fully inquire about the risks and benefits of this procedure and its alternatives.   I have been given ample time and opportunity to ask questions and to seek a second opinion if I wished to do so.  I acknowledge that there have specifically been no guarantees as to the cosmetic results from the procedure.  I am aware that with any procedure there is always the possibility of an unexpected complication.    III. POST-PROCEDURAL CARE (WHAT YOU WILL NEED TO DO \"AFTER THE BIOPSY\" TO OPTIMIZE HEALING)    Keep the area clean and dry.  Try NOT to remove the bandage or get it wet for the first 24 hours.    Gently clean the area " "and apply surgical ointment (such as Vaseline petrolatum ointment, which is available \"over the counter\" and not a prescription) to the biopsy site for up to 2 weeks straight.  This acts to protect the wound from the outside world.      Sutures are not usually placed in this procedure.  If any sutures were placed, return for suture removal as instructed (generally 1 week for the face, 2 weeks for the body).      Take Acetaminophen (Tylenol) for discomfort, if no contraindications.  Ibuprofen or aspirin could make bleeding worse.    Call our office immediately for signs of infection: fever, chills, increased redness, warmth, tenderness, discomfort/pain, or pus or foul smell coming from the wound.    WHAT TO DO IF THERE IS ANY BLEEDING?  If a small amount of bleeding is noticed, place a clean cloth over the area and apply firm pressure for ten minutes.  Check the wound after 10 minutes of direct pressure.  If bleeding persists, try one more time for an additional 10 minutes of direct pressure on the area.  If the bleeding becomes heavier or does not stop after the second attempt, or if you have any other questions about this procedure, then please call your [facility]'s Dermatologist by calling [phone] (SKIN).     I hereby acknowledge that I have reviewed and verified the site with my Dermatologist and have requested and authorized my Dermatologist to proceed with the procedure.         SEBORRHEIC KERATOSES  - Relevant exam: Scattered over the trunk/extremities are waxy brown to black plaques and papules with stuck on appearance and consistent dermoscopy  - Exam and clinical history consistent with seborrheic keratoses  - Counseled that these are benign growths that do not require treatment      ACTINIC KERATOSES  - Relevant exam: On the bilateral hands and arms, nasal tip, forehead are scaly pink macules without palpable dermal component    - Exam and clinical history consistent with actinic keratoses  - Discussed " that these lesions are considered premalignant with the potential to evolve into squamous cell carcinoma.   - Discussed treatment options, which may inclue liquid nitrogen destruction, topical immunotherapy including risks, benefits  - Patient counseled to return to the office in 4-6 weeks after completion of treatment for recheck if not resolved at which time retreatment or biopsy to rule out SCC will be determined based on clinic findings    - The patient agreed to treatment with topical efudex 5% for 14 days BID to the forehead and nose; 14-21 days BID to the arms and dorsal hands  - Common side effects for this treatment were discussed to include erythema, crusting, itching, etc. Advised patient to avoid the sun while using and healing        EPIDERMAL INCLUSION CYST    Physical Exam:  Anatomic Location Affected:  left axilla  Morphological Description:  1 cm mobile subcutaneous cyst with overlying punctum      Additional History of Present Condition:  Present for a few months    Assessment and Plan:  Based on a thorough discussion of this condition and the management approach to it (including a comprehensive discussion of the known risks, side effects and potential benefits of treatment), the patient (family) agrees to implement the following specific plan:    Clinical findings are consistent with an epidermal inclusion cyst.   The benign nature of this lesion was discussed at length.   Observation vs removal was discussed.   The patient was counseled that these lesions can be excised if desired, and that excision is necessary for adequate removal given the presence of a cyst capsule.   Long term risks include the development of infection or inflammation at the site if observation is elected. For any rapid growth or change, a recheck is advised.  The patient elected observation        Scribe Attestation      I,:  Rosa Pichardo MA am acting as a scribe while in the presence of the attending physician.:        I,:  Fauzia Flores MD personally performed the services described in this documentation    as scribed in my presence.:

## 2024-07-15 NOTE — PATIENT INSTRUCTIONS
"INFORMED CONSENT DISCUSSION AND POST-OPERATIVE INSTRUCTIONS FOR PATIENT    I.  RATIONALE FOR PROCEDURE  I understand that a skin biopsy allows the Dermatologist to test a lesion or rash under the microscope to obtain a diagnosis.  It usually involves numbing the area with numbing medication and removing a small piece of skin; sometimes the area will be closed with sutures. In this specific procedure, sutures are not usually needed.  If any sutures are placed, then they are usually need to be removed in 2 weeks or less.    I understand that my Dermatologist recommends that a skin \"shave\" biopsy be performed today.  A local anesthetic, similar to the kind that a dentist uses when filling a cavity, will be injected with a very small needle into the skin area to be sampled.  The injected skin and tissue underneath \"will go to sleep” and become numb so no pain should be felt afterwards.  An instrument shaped like a tiny \"razor blade\" (shave biopsy instrument) will be used to cut a small piece of tissue and skin from the area so that a sample of tissue can be taken and examined more closely under the microscope.  A slight amount of bleeding will occur, but it will be stopped with direct pressure and a pressure bandage and any other appropriate methods.  I understands that a scar will form where the wound was created.  Surgical ointment will be applied to help protect the wound.  Sutures are not usually needed.    II.  RISKS AND POTENTIAL COMPLICATIONS   I understand the risks and potential complications of a skin biopsy include but are not limited to the following:  Bleeding  Infection  Pain  Scar/keloid  Skin discoloration  Incomplete Removal  Recurrence  Nerve Damage/Numbness/Loss of Function  Allergic Reaction to Anesthesia  Biopsies are diagnostic procedures and based on findings additional treatment or evaluation may be required  Loss or destruction of specimen resulting in no additional findings    My Dermatologist " "has explained to me the nature of the condition, the nature of the procedure, and the benefits to be reasonably expected compared with alternative approaches.  My Dermatologist has discussed the likelihood of major risks or complications of this procedure including the specific risks listed above, such as bleeding, infection, and scarring/keloid.  I understand that a scar is expected after this procedure.  I understand that my physician cannot predict if the scar will form a \"keloid,\" which extends beyond the borders of the wound that is created.  A keloid is a thick, painful, and bumpy scar.  A keloid can be difficult to treat, as it does not always respond well to therapy, which includes injecting cortisone directly into the keloid every few weeks.  While this usually reduces the pain and size of the scar, it does not eliminate it.      I understand that photographs may be taken before and after the procedure.  These will be maintained as part of the medical providers confidential records and may not be made available to me.  I further authorize the medical provider to use the photographs for teaching purposes or to illustrate scientific papers, books, or lectures if in his/her judgment, medical research, education, or science may benefit from its use.    I have had an opportunity to fully inquire about the risks and benefits of this procedure and its alternatives.   I have been given ample time and opportunity to ask questions and to seek a second opinion if I wished to do so.  I acknowledge that there have specifically been no guarantees as to the cosmetic results from the procedure.  I am aware that with any procedure there is always the possibility of an unexpected complication.    III. POST-PROCEDURAL CARE (WHAT YOU WILL NEED TO DO \"AFTER THE BIOPSY\" TO OPTIMIZE HEALING)    Keep the area clean and dry.  Try NOT to remove the bandage or get it wet for the first 24 hours.    Gently clean the area and apply " "surgical ointment (such as Vaseline petrolatum ointment, which is available \"over the counter\" and not a prescription) to the biopsy site for up to 2 weeks straight.  This acts to protect the wound from the outside world.      Sutures are not usually placed in this procedure.  If any sutures were placed, return for suture removal as instructed (generally 1 week for the face, 2 weeks for the body).      Take Acetaminophen (Tylenol) for discomfort, if no contraindications.  Ibuprofen or aspirin could make bleeding worse.    Call our office immediately for signs of infection: fever, chills, increased redness, warmth, tenderness, discomfort/pain, or pus or foul smell coming from the wound.    WHAT TO DO IF THERE IS ANY BLEEDING?  If a small amount of bleeding is noticed, place a clean cloth over the area and apply firm pressure for ten minutes.  Check the wound after 10 minutes of direct pressure.  If bleeding persists, try one more time for an additional 10 minutes of direct pressure on the area.  If the bleeding becomes heavier or does not stop after the second attempt, or if you have any other questions about this procedure, then please call your [facility] Dermatologist by calling [phone] (SKIN).     I hereby acknowledge that I have reviewed and verified the site with my Dermatologist and have requested and authorized my Dermatologist to proceed with the procedure.         "

## 2024-07-24 PROCEDURE — 88305 TISSUE EXAM BY PATHOLOGIST: CPT | Performed by: DERMATOLOGY

## 2024-07-24 PROCEDURE — 88342 IMHCHEM/IMCYTCHM 1ST ANTB: CPT | Performed by: DERMATOLOGY

## 2024-07-25 ENCOUNTER — TELEPHONE (OUTPATIENT)
Dept: DERMATOLOGY | Facility: CLINIC | Age: 64
End: 2024-07-25

## 2024-07-26 NOTE — RESULT ENCOUNTER NOTE
Team- please schedule OVS with me or an AP in parallel in roughly 10-12 weeks.    DERMATOPATHOLOGY RESULT NOTE    Results reviewed by ordering physician.  Called patient to personally discuss results. Discussed results with patient.       Instructions for Clinical Derm Team:   (remember to route Result Note to appropriate staff):    Call patient and schedule for recheck in 10-12 weeks    Result & Plan by Specimen:    Specimen A: malignant SCCIS  Plan: prescription for 5-FU BID for 6 weeks      Specimen B: malignant SCCIS  Plan: prescription for 5-FU BID for 6 weeks    Discussed options for both sites to include excision vs Mohs or topical therapy. He elects topical therapy with 5-FU BID for 6 weeks. Will then see him back 4-6 weeks later to recheck. Consider scouting biopsy or excision if site appears inadequately cleared or e/o recurrence.      0 Result Notes      1 Follow-up Encounter     Component   Case Report  Surgical Pathology Report                         Case: [id number]                                  Authorizing Provider:  Fauzia Flores MD     Collected:           07/15/2024 [facility]              Ordering Location:     [facility]      Received:            07/15/2024 [address] [place] [address]                                                                Pathologist:           Fauzia Flores MD                                                      Specimens:   A) - Skin, Other, Specimen A; left forearm                                                         B) - Skin, Other, Specimen B; Left dorsal hand                                          Final Diagnosis  A. Skin, left forearm, shave biopsy:  SQUAMOUS CELL CARCINOMA IN-SITU arising in association with an actinic keratosis; transected  Lichen simplex chronicus-like changes.    Comment: Deeper levels were reviewed. Scattered foci of full thickness atypia are identified.        B. Skin, left dorsal hand,  shave biopsy:  SQUAMOUS CELL CARCINOMA IN-SITU; focally transected    Comment: Deeper levels and SOX10 were reviewed.    Electronically signed by Fauzia Flores MD on 7/24/2024 at 0988  Preliminary result electronically signed by Fauzia Flores MD on 7/19/2024 at 2044

## 2024-08-12 DIAGNOSIS — F51.01 PRIMARY INSOMNIA: ICD-10-CM

## 2024-08-12 NOTE — TELEPHONE ENCOUNTER
Reason for call:   [x] Refill   [] Prior Auth  [] Other:     Office:   [x] PCP/Provider - Olvin Brewster MD   [] Specialty/Provider -     Medication: ALPRAZolam (XANAX) 0.25 mg tablet     Dose/Frequency: Take 1 tablet (0.25 mg total) by mouth daily at bedtime as needed for anxiety     Quantity: 15    [id number] [address]     Does the patient have enough for 3 days?   [] Yes   [x] No - Send as HP to POD

## 2024-08-13 RX ORDER — ALPRAZOLAM 0.25 MG
0.25 TABLET ORAL
Qty: 15 TABLET | Refills: 0 | Status: SHIPPED | OUTPATIENT
Start: 2024-08-13

## 2024-08-14 PROBLEM — H26.9 CATARACT: Status: RESOLVED | Noted: 2022-02-28 | Resolved: 2024-08-14

## 2024-08-14 PROBLEM — G45.9 TIA (TRANSIENT ISCHEMIC ATTACK): Status: RESOLVED | Noted: 2021-11-03 | Resolved: 2024-08-14

## 2024-08-14 PROBLEM — N18.31 CHRONIC KIDNEY DISEASE, STAGE 3A (HCC): Status: ACTIVE | Noted: 2024-08-14

## 2024-08-14 PROBLEM — I73.9 PAD (PERIPHERAL ARTERY DISEASE) (HCC): Status: RESOLVED | Noted: 2023-05-22 | Resolved: 2024-08-14

## 2024-08-14 PROBLEM — I12.9 HYPERTENSIVE CHRONIC KIDNEY DISEASE: Status: ACTIVE | Noted: 2023-07-31

## 2024-08-14 PROBLEM — I70.223 REST PAIN OF BOTH LOWER EXTREMITIES DUE TO ATHEROSCLEROSIS (HCC): Status: ACTIVE | Noted: 2023-08-10

## 2024-08-17 ENCOUNTER — HOSPITAL ENCOUNTER (OUTPATIENT)
Dept: CT IMAGING | Facility: HOSPITAL | Age: 64
Discharge: HOME | End: 2024-08-17
Payer: COMMERCIAL

## 2024-08-17 DIAGNOSIS — Z72.0 TOBACCO ABUSE: ICD-10-CM

## 2024-08-17 DIAGNOSIS — Z12.2 SCREENING FOR LUNG CANCER: ICD-10-CM

## 2024-08-17 DIAGNOSIS — Z87.891 PERSONAL HISTORY OF NICOTINE DEPENDENCE: ICD-10-CM

## 2024-08-17 PROCEDURE — 71271 CT THORAX LUNG CANCER SCR C-: CPT

## 2024-08-20 NOTE — RESULT ENCOUNTER NOTE
Tests were not normal, and should follow at  your scheduled Office visit. Please call about this, if [facility] message is not available or not read by patient.  CT scan showed some issues with coronary calcifications, but otherwise normal.  Follow-up at next scheduled visit, repeat low-dose CT in 1 year.

## 2024-08-29 ENCOUNTER — HOSPITAL ENCOUNTER (OUTPATIENT)
Dept: NON INVASIVE DIAGNOSTICS | Facility: CLINIC | Age: 64
Discharge: HOME | End: 2024-08-29
Payer: COMMERCIAL

## 2024-08-29 DIAGNOSIS — I73.9 PAD (PERIPHERAL ARTERY DISEASE) (HCC): ICD-10-CM

## 2024-08-29 PROCEDURE — 93923 UPR/LXTR ART STDY 3+ LVLS: CPT

## 2024-08-29 PROCEDURE — 93925 LOWER EXTREMITY STUDY: CPT

## 2024-08-29 PROCEDURE — 93925 LOWER EXTREMITY STUDY: CPT | Performed by: SURGERY

## 2024-08-29 PROCEDURE — 93922 UPR/L XTREMITY ART 2 LEVELS: CPT | Performed by: SURGERY

## 2024-09-09 DIAGNOSIS — F51.01 PRIMARY INSOMNIA: ICD-10-CM

## 2024-09-09 RX ORDER — ALPRAZOLAM 0.25 MG
0.25 TABLET ORAL
Qty: 15 TABLET | Refills: 0 | Status: SHIPPED | OUTPATIENT
Start: 2024-09-09

## 2024-09-09 NOTE — TELEPHONE ENCOUNTER
Reason for call:   [x] Refill   [] Prior Auth  [] Other:     Office:   [x] PCP/Provider -   [] Specialty/Provider -     Medication:       Pharmacy: [facility] [place]    Does the patient have enough for 3 days?   [] Yes   [x] No - Send as HP to POD

## 2024-09-12 ENCOUNTER — OFFICE VISIT (OUTPATIENT)
Dept: VASCULAR SURGERY | Facility: CLINIC | Age: 64
End: 2024-09-12
Payer: COMMERCIAL

## 2024-09-12 VITALS
HEIGHT: 72 IN | WEIGHT: 205 LBS | DIASTOLIC BLOOD PRESSURE: 84 MMHG | BODY MASS INDEX: 27.77 KG/M2 | HEART RATE: 74 BPM | SYSTOLIC BLOOD PRESSURE: 156 MMHG

## 2024-09-12 DIAGNOSIS — Z72.0 TOBACCO ABUSE: ICD-10-CM

## 2024-09-12 DIAGNOSIS — I65.23 BILATERAL CAROTID ARTERY STENOSIS: ICD-10-CM

## 2024-09-12 DIAGNOSIS — N18.31 STAGE 3A CHRONIC KIDNEY DISEASE (HCC): Primary | ICD-10-CM

## 2024-09-12 DIAGNOSIS — I67.2 CEREBRAL ATHEROSCLEROSIS: ICD-10-CM

## 2024-09-12 DIAGNOSIS — I73.9 CLAUDICATION OF BOTH LOWER EXTREMITIES (HCC): ICD-10-CM

## 2024-09-12 DIAGNOSIS — I73.9 PAD (PERIPHERAL ARTERY DISEASE) (HCC): ICD-10-CM

## 2024-09-12 DIAGNOSIS — N18.31 CHRONIC KIDNEY DISEASE, STAGE 3A (HCC): ICD-10-CM

## 2024-09-12 PROCEDURE — 99214 OFFICE O/P EST MOD 30 MIN: CPT | Performed by: SURGERY

## 2024-09-12 RX ORDER — GABAPENTIN 300 MG/1
300 CAPSULE ORAL 2 TIMES DAILY
Qty: 90 CAPSULE | Refills: 3 | Status: SHIPPED | OUTPATIENT
Start: 2024-09-12

## 2024-09-12 NOTE — PROGRESS NOTES
Ambulatory Visit  Name: Ryan Montoya      : 1960      MRN: [id number]  Encounter Provider: Lucho Reynoso MD  Encounter Date: 2024   Encounter department: [facility] [place]    Assessment & Plan  Stage 3a chronic kidney disease ([facility])  Lab Results   Component Value Date    EGFR 49 2024    EGFR 58 2023    EGFR 54 10/26/2023    CREATININE 1.46 (H) 2024    CREATININE 1.29 2023    CREATININE 1.37 (H) 10/26/2023       Orders:    Ambulatory referral to Nephrology; Future    PAD (peripheral artery disease) ([facility])    Orders:    gabapentin (Neurontin) 300 mg capsule; Take 1 capsule (300 mg total) by mouth 2 (two) times a day    VAS carotid complete study; Future    VAS ARTERIAL DUPLEX- LOWER LIMB BILATERAL; Future    Cerebral atherosclerosis    Orders:    VAS carotid complete study; Future    Bilateral carotid artery stenosis  Mild bilateral carotid artery stenosis last checked in mid .  Will check carotid artery duplex in 6 months.  Maintaining medical management at this time as per noted in PAD section.         Tobacco abuse  Currently at 0.5 packs/day.  Patient had been doing better however has been recalcitrant with smoking.  He has no plan to quit currently.         Chronic kidney disease, stage 3a ([facility])  Lab Results   Component Value Date    EGFR 49 2024    EGFR 58 2023    EGFR 54 10/26/2023    CREATININE 1.46 (H) 2024    CREATININE 1.29 2023    CREATININE 1.37 (H) 10/26/2023   An additional referral made to nephrology due to patient's concerns for decreased GFR on most recent labs.         Claudication of both lower extremities (HCC)  Patient status post inflow procedures to bilateral lower extremities now with ongoing claudication left worse than right.  No rest pain or tissue loss at this time.  Will continue with aspirin and statin therapy.  Smoking cessation highly encouraged.           History of Present Illness     Ryan  JAYLA Montoya is a 64 y.o. male who presents for discussion about recent studies surveillance for his peripheral arterial occlusive disease, carotid artery stenosis, tobacco abuse and chronic kidney disease.  I have delineated the plans above.  The patient states that he has claudication symptoms after walking 50 to 100 yards.  He continues to smoke about 1/2 pack/day.  He is reliably taking an aspirin and statin.    Patient presents to review IRLANDA done for claudication L>R. Patient can only walk less than 1 block.       Review of Systems   Constitutional: Negative.    HENT: Negative.     Eyes: Negative.    Respiratory: Negative.     Cardiovascular: Negative.    Gastrointestinal: Negative.    Endocrine: Negative.    Genitourinary: Negative.    Musculoskeletal: Negative.    Skin: Negative.    Allergic/Immunologic: Negative.    Neurological: Negative.    Hematological: Negative.    Psychiatric/Behavioral: Negative.             Objective     /84 (BP Location: Left arm, Patient Position: Sitting, Cuff Size: Standard)   Pulse 74   Ht 6' (1.829 m)   Wt 93 kg (205 lb)   BMI 27.80 kg/m²     Physical Exam  Vitals and nursing note reviewed.   Constitutional:       General: He is not in acute distress.     Appearance: He is well-developed.   HENT:      Head: Normocephalic and atraumatic.   Eyes:      Conjunctiva/sclera: Conjunctivae normal.   Cardiovascular:      Rate and Rhythm: Normal rate and regular rhythm.      Pulses:           Dorsalis pedis pulses are detected w/ Doppler on the right side and detected w/ Doppler on the left side.        Posterior tibial pulses are detected w/ Doppler on the right side and detected w/ Doppler on the left side.      Heart sounds: No murmur heard.  Pulmonary:      Effort: Pulmonary effort is normal. No respiratory distress.      Breath sounds: Normal breath sounds.   Abdominal:      Palpations: Abdomen is soft.      Tenderness: There is no abdominal tenderness.   Musculoskeletal:          General: No swelling.      Cervical back: Neck supple.   Skin:     General: Skin is warm and dry.      Capillary Refill: Capillary refill takes less than 2 seconds.   Neurological:      Mental Status: He is alert.   Psychiatric:         Mood and Affect: Mood normal.     Tobacco use is a significant patient-modifiable risk factor for this patient’s vascular disease with multiple vascular comorbidities, and a significant risk factor for failure of and complications from any endovascular or surgical interventions.    I explained to the patient the effects of smoking including peripheral artery disease, coronary artery disease, cerebrovascular disease as well as cancer and chronic obstructive pulmonary disease. I asked the patient to stop smoking immediately. It is never too late to quit, and many studies show significant health benefits as well as economical savings after smoking cessation. I offered to the patient nicotine replacement therapy as well as referral to the smoking cessation program and access to the quit line 6-657-IFPWVQA or ambulatory referral to our network smoking cessation program.    Based on our conversation, this patient does not appear ready to quit    And declined my offer of nicotine replacement or tobacco cessation medications    The patient did not set a quit date. I will continue to  follow up on this issue at our next scheduled visit.     I spent approximately 10 minutes on tobacco cessation counseling with this patient.

## 2024-09-12 NOTE — LETTER
2024     Olvin Brewster MD  [address]  [address]  [place] [address]    Patient: Ryan Montoya   YOB: 1960   Date of Visit: 2024       Dear Dr. Brewster:    Thank you for referring Ryan Montoya to me for evaluation. Below are my notes for this consultation.    If you have questions, please do not hesitate to call me. I look forward to following your patient along with you.         Sincerely,        Lucho Reynoso MD        CC: Ryan DICKERSONLisseth Lindsay Reynoso MD  2024 10:16 PM  Sign when Signing Visit  Ambulatory Visit  Name: Ryan Montoya      : 1960      MRN: [id number]  Encounter Provider: Lucho Reynoso MD  Encounter Date: 2024   Encounter department: [facility] [place]    Assessment & Plan  Stage 3a chronic kidney disease (HCC)  Lab Results   Component Value Date    EGFR 49 2024    EGFR 58 2023    EGFR 54 10/26/2023    CREATININE 1.46 (H) 2024    CREATININE 1.29 2023    CREATININE 1.37 (H) 10/26/2023       Orders:  •  Ambulatory referral to Nephrology; Future    PAD (peripheral artery disease) ([facility])    Orders:  •  gabapentin (Neurontin) 300 mg capsule; Take 1 capsule (300 mg total) by mouth 2 (two) times a day  •  VAS carotid complete study; Future  •  VAS ARTERIAL DUPLEX- LOWER LIMB BILATERAL; Future    Cerebral atherosclerosis    Orders:  •  VAS carotid complete study; Future    Bilateral carotid artery stenosis  Mild bilateral carotid artery stenosis last checked in mid .  Will check carotid artery duplex in 6 months.  Maintaining medical management at this time as per noted in PAD section.         Tobacco abuse  Currently at 0.5 packs/day.  Patient had been doing better however has been recalcitrant with smoking.  He has no plan to quit currently.         Chronic kidney disease, stage 3a (HCC)  Lab Results   Component Value Date    EGFR 49 2024     EGFR 58 11/09/2023    EGFR 54 10/26/2023    CREATININE 1.46 (H) 07/09/2024    CREATININE 1.29 11/09/2023    CREATININE 1.37 (H) 10/26/2023   An additional referral made to nephrology due to patient's concerns for decreased GFR on most recent labs.         Claudication of both lower extremities (HCC)  Patient status post inflow procedures to bilateral lower extremities now with ongoing claudication left worse than right.  No rest pain or tissue loss at this time.  Will continue with aspirin and statin therapy.  Smoking cessation highly encouraged.           History of Present Illness    Ryan Montoya is a 64 y.o. male who presents for discussion about recent studies surveillance for his peripheral arterial occlusive disease, carotid artery stenosis, tobacco abuse and chronic kidney disease.  I have delineated the plans above.  The patient states that he has claudication symptoms after walking 50 to 100 yards.  He continues to smoke about 1/2 pack/day.  He is reliably taking an aspirin and statin.    Patient presents to review IRLANDA done for claudication L>R. Patient can only walk less than 1 block.       Review of Systems   Constitutional: Negative.    HENT: Negative.     Eyes: Negative.    Respiratory: Negative.     Cardiovascular: Negative.    Gastrointestinal: Negative.    Endocrine: Negative.    Genitourinary: Negative.    Musculoskeletal: Negative.    Skin: Negative.    Allergic/Immunologic: Negative.    Neurological: Negative.    Hematological: Negative.    Psychiatric/Behavioral: Negative.             Objective    /84 (BP Location: Left arm, Patient Position: Sitting, Cuff Size: Standard)   Pulse 74   Ht 6' (1.829 m)   Wt 93 kg (205 lb)   BMI 27.80 kg/m²     Physical Exam  Vitals and nursing note reviewed.   Constitutional:       General: He is not in acute distress.     Appearance: He is well-developed.   HENT:      Head: Normocephalic and atraumatic.   Eyes:      Conjunctiva/sclera: Conjunctivae  normal.   Cardiovascular:      Rate and Rhythm: Normal rate and regular rhythm.      Pulses:           Dorsalis pedis pulses are detected w/ Doppler on the right side and detected w/ Doppler on the left side.        Posterior tibial pulses are detected w/ Doppler on the right side and detected w/ Doppler on the left side.      Heart sounds: No murmur heard.  Pulmonary:      Effort: Pulmonary effort is normal. No respiratory distress.      Breath sounds: Normal breath sounds.   Abdominal:      Palpations: Abdomen is soft.      Tenderness: There is no abdominal tenderness.   Musculoskeletal:         General: No swelling.      Cervical back: Neck supple.   Skin:     General: Skin is warm and dry.      Capillary Refill: Capillary refill takes less than 2 seconds.   Neurological:      Mental Status: He is alert.   Psychiatric:         Mood and Affect: Mood normal.     Tobacco use is a significant patient-modifiable risk factor for this patient’s vascular disease with multiple vascular comorbidities, and a significant risk factor for failure of and complications from any endovascular or surgical interventions.    I explained to the patient the effects of smoking including peripheral artery disease, coronary artery disease, cerebrovascular disease as well as cancer and chronic obstructive pulmonary disease. I asked the patient to stop smoking immediately. It is never too late to quit, and many studies show significant health benefits as well as economical savings after smoking cessation. I offered to the patient nicotine replacement therapy as well as referral to the smoking cessation program and access to the quit line 1-256-CFIMAHG or ambulatory referral to our network smoking cessation program.    Based on our conversation, this patient does not appear ready to quit    And declined my offer of nicotine replacement or tobacco cessation medications    The patient did not set a quit date. I will continue to  follow up on  this issue at our next scheduled visit.     I spent approximately 10 minutes on tobacco cessation counseling with this patient.

## 2024-09-13 NOTE — ASSESSMENT & PLAN NOTE
Patient status post inflow procedures to bilateral lower extremities now with ongoing claudication left worse than right.  No rest pain or tissue loss at this time.  Will continue with aspirin and statin therapy.  Smoking cessation highly encouraged.

## 2024-09-13 NOTE — ASSESSMENT & PLAN NOTE
Mild bilateral carotid artery stenosis last checked in mid 2023.  Will check carotid artery duplex in 6 months.  Maintaining medical management at this time as per noted in PAD section.

## 2024-09-13 NOTE — ASSESSMENT & PLAN NOTE
Lab Results   Component Value Date    EGFR 49 07/09/2024    EGFR 58 11/09/2023    EGFR 54 10/26/2023    CREATININE 1.46 (H) 07/09/2024    CREATININE 1.29 11/09/2023    CREATININE 1.37 (H) 10/26/2023   An additional referral made to nephrology due to patient's concerns for decreased GFR on most recent labs.

## 2024-09-13 NOTE — PATIENT INSTRUCTIONS
1. Stage 3a chronic kidney disease ([facility])  -     Ambulatory referral to Nephrology; Future; Expected date: 09/26/2024  2. PAD (peripheral artery disease) ([facility])  -     gabapentin (Neurontin) 300 mg capsule; Take 1 capsule (300 mg total) by mouth 2 (two) times a day  -     VAS carotid complete study; Future; Expected date: 03/12/2025  -     VAS ARTERIAL DUPLEX- LOWER LIMB BILATERAL; Future; Expected date: 03/12/2025  3. Cerebral atherosclerosis  -     VAS carotid complete study; Future; Expected date: 03/12/2025  4. Bilateral carotid artery stenosis  Assessment & Plan:  Mild bilateral carotid artery stenosis last checked in mid 2023.  Will check carotid artery duplex in 6 months.  Maintaining medical management at this time as per noted in PAD section.         5. Tobacco abuse  Assessment & Plan:  Currently at 0.5 packs/day.  Patient had been doing better however has been recalcitrant with smoking.  He has no plan to quit currently.         6. Chronic kidney disease, stage 3a ([facility])  Assessment & Plan:  Lab Results   Component Value Date    EGFR 49 07/09/2024    EGFR 58 11/09/2023    EGFR 54 10/26/2023    CREATININE 1.46 (H) 07/09/2024    CREATININE 1.29 11/09/2023    CREATININE 1.37 (H) 10/26/2023   An additional referral made to nephrology due to patient's concerns for decreased GFR on most recent labs.         7. Claudication of both lower extremities ([facility])  Assessment & Plan:  Patient status post inflow procedures to bilateral lower extremities now with ongoing claudication left worse than right.  No rest pain or tissue loss at this time.  Will continue with aspirin and statin therapy.  Smoking cessation highly encouraged.

## 2024-09-13 NOTE — ASSESSMENT & PLAN NOTE
Currently at 0.5 packs/day.  Patient had been doing better however has been recalcitrant with smoking.  He has no plan to quit currently.

## 2024-09-16 ENCOUNTER — OFFICE VISIT (OUTPATIENT)
Dept: NEPHROLOGY | Facility: CLINIC | Age: 64
End: 2024-09-16
Payer: COMMERCIAL

## 2024-09-16 VITALS
OXYGEN SATURATION: 97 % | HEART RATE: 72 BPM | SYSTOLIC BLOOD PRESSURE: 140 MMHG | BODY MASS INDEX: 28.04 KG/M2 | HEIGHT: 72 IN | DIASTOLIC BLOOD PRESSURE: 80 MMHG | WEIGHT: 207 LBS

## 2024-09-16 DIAGNOSIS — N26.1 LEFT RENAL ATROPHY: ICD-10-CM

## 2024-09-16 DIAGNOSIS — I12.9 BENIGN HYPERTENSION WITH CHRONIC KIDNEY DISEASE, STAGE III (HCC): ICD-10-CM

## 2024-09-16 DIAGNOSIS — N28.1 BILATERAL RENAL CYSTS: ICD-10-CM

## 2024-09-16 DIAGNOSIS — I10 PRIMARY HYPERTENSION: ICD-10-CM

## 2024-09-16 DIAGNOSIS — N18.30 BENIGN HYPERTENSION WITH CHRONIC KIDNEY DISEASE, STAGE III (HCC): ICD-10-CM

## 2024-09-16 DIAGNOSIS — R80.1 PERSISTENT PROTEINURIA: ICD-10-CM

## 2024-09-16 DIAGNOSIS — N18.31 STAGE 3A CHRONIC KIDNEY DISEASE (HCC): Primary | ICD-10-CM

## 2024-09-16 DIAGNOSIS — N20.0 NEPHROLITHIASIS: ICD-10-CM

## 2024-09-16 PROCEDURE — 99214 OFFICE O/P EST MOD 30 MIN: CPT | Performed by: INTERNAL MEDICINE

## 2024-09-16 RX ORDER — AMLODIPINE BESYLATE 2.5 MG/1
2.5 TABLET ORAL DAILY
Qty: 90 TABLET | Refills: 3 | Status: SHIPPED | OUTPATIENT
Start: 2024-09-16

## 2024-09-16 NOTE — ASSESSMENT & PLAN NOTE
-CAT scan from 2023 suggestive of simple cyst both kidneys.  No need for further monitoring  Orders:    Basic metabolic panel; Future

## 2024-09-16 NOTE — PROGRESS NOTES
Ambulatory Visit  Name: Ryan Montoya      : 1960      MRN: [id number]  Encounter Provider: Flakito Mobley MD  Encounter Date: 2024   Encounter department: [facility] [place]    Assessment & Plan  Stage 3a chronic kidney disease (HCC)  Lab Results   Component Value Date    EGFR 49 2024    EGFR 58 2023    EGFR 54 10/26/2023    CREATININE 1.46 (H) 2024    CREATININE 1.29 2023    CREATININE 1.37 (H) 10/26/2023   Baseline Creatinine: 1.4 mg/dl  -Etiology: Chronic kidney disease likely due to left renal atrophy and hypertensive nephrosclerosis repeated urology procedures, nephrolithiasis-recurrent and use of NSAIDs. Smoking+   -CT chest abdomen pelvis from , left kidney atrophic.  Bilateral renal hypodensities more on the left.  -Plan:   Renal function is stable, last blood work showed creatinine 1.46 milligrams per deciliter, electrolytes stable, continue to monitor renal function, stressed on oral hydration continue to avoid nephrotoxins  If any plan for CT with IV contrast in future, will need pre and postcontrast IV fluid  Continue to avoid meloxicam  LDL at goal  86 mg/dl   Avoid Nephrotoxins like NSAIDs and IV contrast.  Avoid meloxicam  Follow-up in 6 months with repeat labs before the next office visit    Orders:    Basic metabolic panel; Future    Protein / creatinine ratio, urine; Future    PTH, intact; Future    Urinalysis with microscopic; Future    Phosphorus; Future    Magnesium; Future    CBC; Future    Benign hypertension with chronic kidney disease, stage III (HCC)  Lab Results   Component Value Date    EGFR 49 2024    EGFR 58 2023    EGFR 54 10/26/2023    CREATININE 1.46 (H) 2024    CREATININE 1.29 2023    CREATININE 1.37 (H) 10/26/2023   -Current medication: Losartan 100 mg     -Current blood pressure: elevated   -Plan:     Started on amlodipine 2.5 mg daily.   -Recommend 2 g sodium diet.    -Recommend daily  exercise and weight loss  -Discussed home monitoring of BP and maintaining a log of blood pressure.  -Recommend goal BP less than 130/80.      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  Recommend need for better blood pressure control to help decrease proteinuria  Orders:    Protein / creatinine ratio, urine; Future      Bilateral renal cysts  -CAT scan from 2023 suggestive of simple cyst both kidneys.  No need for further monitoring  Orders:    Basic metabolic panel; Future    Nephrolithiasis  -Due to cystinuria  -Reviewed stone analysis from November 2008 from [facility] from [facility], suggestive of 97% cystine stone  - status post multiple urology procedure including laser lithotripsy in 1996, 2004, 2008, 2010.  In the past also had percutaneous nephrolithotripsy in 1986, 1988.  History of nephrolithotomy in 1988, ureterolithotomy 1977, 1978, 1993, 1992 as per urology note from [facility]  -as per patient he had 30 + surgery.   -CT abdomen pelvis from August 2023 showed bilateral simple cyst.  No calculi  -was on Thiola per urology at [facility],  on 100 mg 4 times a day but stopped in 2019 when he lost insurance. He was still passing stone while he was taking Thiola.  Last stone passage was in 2019 but still then has been asymptomatic. Says he is not interested in the medication  -Reviewed last urology note from 2018 was told to continue Thiola as well as sodium bicarbonate tablet  -Drinking about a gallon of liquid a day - continue oral hydration.    - Discussed with patient that if found to have recurrent nephrolithiasis in future may need 24-hour stone risk profile.    -not interested in renal ultrasound -  says he has some discomfort in back.  Mentions he does not want anyone to poke on his back  Orders:    Basic metabolic panel; Future    Left renal atrophy  -CT abdomen from 2021 suggestive of left kidney atrophy.  Right renal function stable continue to monitor     Orders:    Basic metabolic panel; Future    Other  PVD- following with vascular surgery.  Tobacco use - currently 0.5 pack/day.     History of Present Illness     Ryan Montoya is a 64 y.o. male who presents for follow-up of chronic kidney disease stage III A.  -Patient denies any new complaints.  Reviewed blood work from July 9, 2024 creatinine was 1.46 mg/dL.  Baseline creatinine around 1.3 to 1.4 mg/dL.  Hemoglobin 14.5     Feeling lethargic       Review of Systems   Constitutional:  Positive for fatigue. Negative for activity change, appetite change, chills, diaphoresis and fever.   HENT:  Negative for congestion, facial swelling and nosebleeds.    Eyes:  Negative for pain and visual disturbance.   Respiratory:  Negative for cough, chest tightness and shortness of breath.    Cardiovascular:  Negative for chest pain and palpitations.   Gastrointestinal:  Negative for abdominal distention, abdominal pain, diarrhea, nausea and vomiting.   Genitourinary:  Negative for difficulty urinating, dysuria, flank pain, frequency and hematuria.   Musculoskeletal:  Negative for arthralgias, back pain and joint swelling.   Skin:  Negative for rash.   Neurological:  Negative for dizziness, seizures, syncope, weakness and headaches.   Psychiatric/Behavioral:  Negative for agitation and confusion. The patient is not nervous/anxious.      Pertinent Medical History     63 y.o. year old male with medical issues of hypertension, nephrolithiasis, cystinosis following with [facility] urology, hyperlipidemia peripheral arterial disease, left foot pain following for chronic kidney disease stage IIIa .Chronic kidney disease likely due to left renal atrophy and hypertensive  nephrosclerosis repeated urology procedures, nephrolithiasis-recurrent and use of NSAIDs.  Baseline creatinine around 1.4    -cystinuria  -stone analysis from November 2008 from [facility] from [facility], suggestive of 97% cystine stone  - status post multiple urology procedure including laser lithotripsy in 1996, 2004, 2008, 2010.  In the past also had percutaneous nephrolithotripsy in 1986, 1988.  History of nephrolithotomy in 1988, ureterolithotomy 1977, 1978, 1993, 1992 as per urology note from [facility]  -as per patient he had 30 + surgery.        Medical History Reviewed by provider this encounter:       Current Outpatient Medications on File Prior to Visit   Medication Sig Dispense Refill    acetaminophen (TYLENOL) 500 mg tablet Take 500 mg by mouth every 6 (six) hours as needed for mild pain      ALPRAZolam (XANAX) 0.25 mg tablet Take 1 tablet (0.25 mg total) by mouth daily at bedtime as needed for anxiety 15 tablet 0    aspirin 81 mg chewable tablet Chew 1 tablet (81 mg total) daily      fluorouracil (EFUDEX) 5 % cream Apply twice a day for two to three weeks on bilateral arms and tho weeks for face 40 g 1    gabapentin (Neurontin) 300 mg capsule Take 1 capsule (300 mg total) by mouth 2 (two) times a day 90 capsule 3    losartan (COZAAR) 100 MG tablet TAKE 1 TABLET BY MOUTH EVERY DAY 90 tablet 1    Melatonin 10 MG TABS Take 10 mg by mouth Pt takes 10mg at bedtime.      naloxone (NARCAN) 4 mg/0.1 mL nasal spray Administer 1 spray into a nostril. If no response after 2-3 minutes, give another dose in the other nostril using a new spray. 1 each 1    rosuvastatin (CRESTOR) 40 MG tablet Take 1 tablet (40 mg total) by mouth daily 90 tablet 3    traZODone (DESYREL) 50 mg tablet TAKE 2 TABLETS (100 MG TOTAL) BY MOUTH DAILY AT BEDTIME 180 tablet 1     No current facility-administered medications on file prior to visit.          Objective     There were no vitals taken for this visit.    Physical Exam  Vitals and nursing  note reviewed.   Constitutional:       General: He is not in acute distress.     Appearance: He is well-developed.   HENT:      Head: Normocephalic and atraumatic.   Eyes:      Conjunctiva/sclera: Conjunctivae normal.   Cardiovascular:      Rate and Rhythm: Normal rate and regular rhythm.      Heart sounds: No murmur heard.  Pulmonary:      Effort: Pulmonary effort is normal. No respiratory distress.      Breath sounds: Normal breath sounds.   Abdominal:      Palpations: Abdomen is soft.      Tenderness: There is no abdominal tenderness.   Musculoskeletal:         General: No swelling.      Cervical back: Neck supple.   Skin:     General: Skin is warm and dry.      Capillary Refill: Capillary refill takes less than 2 seconds.      Coloration: Skin is not jaundiced.   Neurological:      Mental Status: He is alert.   Psychiatric:         Mood and Affect: Mood normal.

## 2024-09-16 NOTE — PATIENT INSTRUCTIONS
-Renal Function is stable   -You have Chronic Kidney Disease Stage 3   -Avoid NSAIDs like Ibuprofen/Motrin, Naproxen/Aleve, Celebrex, meloxicam/Mobic, Diclofenac and other NSAIDs.  -Okay to take Acetaminophen/Tylenol if you do not have any liver problems  -Avoid IV contrast used for CT scan and cardiac catheterization.    -If plan for any study with IV contrast, please let me know so we could hydrate with fluids before and after IV contrast  -Dosage  of certain medications may need to be adjusted depending on Kidney function.     Blood pressure is elevated - started amlodipine .    -Recommend 2 g sodium diet.    -Recommend daily exercise and weight loss  -Discussed home monitoring of BP and maintaining a log of blood pressure.  -Recommend goal BP less than 130/80. Please inform me if SBP below 110 or above 140's persistently.    Follow up: 6  months with repeat Lab work within a week of the scheduled office visit. Will discuss the results of the previsit Labs during the office visit.

## 2024-09-16 NOTE — ASSESSMENT & PLAN NOTE
-Due to cystinuria  -Reviewed stone analysis from November 2008 from [facility] from [facility], suggestive of 97% cystine stone  - status post multiple urology procedure including laser lithotripsy in 1996, 2004, 2008, 2010.  In the past also had percutaneous nephrolithotripsy in 1986, 1988.  History of nephrolithotomy in 1988, ureterolithotomy 1977, 1978, 1993, 1992 as per urology note from [facility]  -as per patient he had 30 + surgery.   -CT abdomen pelvis from August 2023 showed bilateral simple cyst.  No calculi  -was on Thiola per urology at [facility],  on 100 mg 4 times a day but stopped in 2019 when he lost insurance. He was still passing stone while he was taking Thiola.  Last stone passage was in 2019 but still then has been asymptomatic. Says he is not interested in the medication  -Reviewed last urology note from 2018 was told to continue Thiola as well as sodium bicarbonate tablet  -Drinking about a gallon of liquid a day - continue oral hydration.    - Discussed with patient that if found to have recurrent nephrolithiasis in future may need 24-hour stone risk profile.    -not interested in renal ultrasound - says he has some discomfort in back.  Mentions he does not want anyone to poke on his back  Orders:    Basic metabolic panel; Future

## 2024-09-16 NOTE — ASSESSMENT & PLAN NOTE
-CT abdomen from 2021 suggestive of left kidney atrophy.  Right renal function stable continue to monitor     Orders:    Basic metabolic panel; Future

## 2024-09-19 RX ORDER — LOSARTAN POTASSIUM 100 MG/1
TABLET ORAL
Qty: 90 TABLET | Refills: 1 | Status: SHIPPED | OUTPATIENT
Start: 2024-09-19

## 2024-09-20 DIAGNOSIS — I73.9 PAD (PERIPHERAL ARTERY DISEASE) (HCC): ICD-10-CM

## 2024-09-20 NOTE — TELEPHONE ENCOUNTER
Reason for call:   [x] Refill   [] Prior Auth  [] Other:     Office:   [x] PCP/Provider - Olvin Brewster  [] Specialty/Provider -     Medication: Rosuvastatin    Dose/Frequency: 40 mg Daily     Quantity: 90    Pharmacy: [facility] [place]    Does the patient have enough for 3 days?   [x] Yes   [] No - Send as HP to POD

## 2024-09-23 RX ORDER — ROSUVASTATIN CALCIUM 40 MG/1
40 TABLET, COATED ORAL DAILY
Qty: 90 TABLET | Refills: 3 | Status: SHIPPED | OUTPATIENT
Start: 2024-09-23

## 2024-10-08 ENCOUNTER — OFFICE VISIT (OUTPATIENT)
Dept: DERMATOLOGY | Facility: CLINIC | Age: 64
End: 2024-10-08
Payer: COMMERCIAL

## 2024-10-08 VITALS — TEMPERATURE: 98 F | WEIGHT: 205 LBS | BODY MASS INDEX: 27.8 KG/M2

## 2024-10-08 DIAGNOSIS — Z85.89 HISTORY OF SQUAMOUS CELL CARCINOMA: Primary | ICD-10-CM

## 2024-10-08 DIAGNOSIS — L57.0 KERATOSIS, ACTINIC: ICD-10-CM

## 2024-10-08 PROCEDURE — 99214 OFFICE O/P EST MOD 30 MIN: CPT

## 2024-10-08 RX ORDER — TRIAMCINOLONE ACETONIDE 1 MG/G
CREAM TOPICAL 2 TIMES DAILY
Qty: 80 G | Refills: 0 | Status: SHIPPED | OUTPATIENT
Start: 2024-10-08

## 2024-10-08 NOTE — PROGRESS NOTES
"[facility] Note     Patient Name: Ryan Montoya  Encounter Date: 10/8/2024     Have you been cared for by a [facility] Dermatologist in the last 3 years and, if so, which description applies to you?    Yes.  I have been here within the last 3 years, and my medical history has NOT changed since that time.  I am MALE/not capable of bearing children.    REVIEW OF SYSTEMS:  Have you recently had or currently have any of the following? No changes in my recent health.   PAST MEDICAL HISTORY:  Have you personally ever had or currently have any of the following?  If \"YES,\" then please provide more detail. No changes in my medical history.   HISTORY OF IMMUNOSUPPRESSION: Do you have a history of any of the following:  Systemic Immunosuppression such as Diabetes, Biologic or Immunotherapy, Chemotherapy, Organ Transplantation, Bone Marrow Transplantation or Prednisone?  No     Answering \"YES\" requires the addition of the dotphrase \"IMMUNOSUPPRESSED\" as the first diagnosis of the patient's visit.   FAMILY HISTORY:  Any \"first degree relatives\" (parent, brother, sister, or child) with the following?    No changes in my family's known health.   PATIENT EXPERIENCE:    Do you want the Dermatologist to perform a COMPLETE skin exam today including a clinical examination under the \"bra and underwear\" areas?  NO  If necessary, do we have your permission to call and leave a detailed message on your Preferred Phone number that includes your specific medical information?  Yes      Allergies   Allergen Reactions    Eszopiclone Other (See Comments)     Sleep walking    Zolpidem Other (See Comments)     Other reaction(s): sleepwalking.      Current Outpatient Medications:     acetaminophen (TYLENOL) 500 mg tablet, Take 500 mg by mouth every 6 (six) hours as needed for mild pain, Disp: , Rfl:     ALPRAZolam (XANAX) 0.25 mg tablet, Take 1 tablet (0.25 mg total) by mouth daily at bedtime as needed for anxiety, Disp: 15 " tablet, Rfl: 0    amLODIPine (NORVASC) 2.5 mg tablet, Take 1 tablet (2.5 mg total) by mouth daily, Disp: 90 tablet, Rfl: 3    aspirin 81 mg chewable tablet, Chew 1 tablet (81 mg total) daily, Disp: , Rfl:     gabapentin (Neurontin) 300 mg capsule, Take 1 capsule (300 mg total) by mouth 2 (two) times a day, Disp: 90 capsule, Rfl: 3    losartan (COZAAR) 100 MG tablet, TAKE 1 TABLET BY MOUTH EVERY DAY, Disp: 90 tablet, Rfl: 1    Melatonin 10 MG TABS, Take 10 mg by mouth Pt takes 10mg at bedtime., Disp: , Rfl:     rosuvastatin (CRESTOR) 40 MG tablet, Take 1 tablet (40 mg total) by mouth daily, Disp: 90 tablet, Rfl: 3    traZODone (DESYREL) 50 mg tablet, TAKE 2 TABLETS (100 MG TOTAL) BY MOUTH DAILY AT BEDTIME, Disp: 180 tablet, Rfl: 1    fluorouracil (EFUDEX) 5 % cream, Apply twice a day for two to three weeks on bilateral arms and tho weeks for face (Patient not taking: Reported on 10/8/2024), Disp: 40 g, Rfl: 1    naloxone (NARCAN) 4 mg/0.1 mL nasal spray, Administer 1 spray into a nostril. If no response after 2-3 minutes, give another dose in the other nostril using a new spray., Disp: 1 each, Rfl: 1          Whom besides the patient is providing clinical information about today's encounter?   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 Reports spots got red and irritated from the cream. Was advised by Dr. Flores to use BID for 6 weeks     Assessment and Plan:  Based on a thorough discussion of this condition and the management approach to it (including  a comprehensive discussion of the known risks, side effects and potential benefits of treatment), the patient (family) agrees to implement the following specific plan:  SCCIS cleared with efudex therapy  Continue 6 month skin checks  Advised to wear SPF 30+, reapplying every two hours. Wear sun protective clothing, long sleeve shirts, wide brimmed hats, and sunglasses.     ACTINIC KERATOSES  - Relevant exam: On the forearms and dorsal hands are gritty pink papules  - Exam and clinical history consistent with actinic keratoses  - Discussed that these lesions are considered premalignant with the potential to evolve into squamous cell carcinoma.  - The patient agreed to treatment with topical efudex 5% for 14 days BID to the dorsal hands and forearms. Start triamcinolone 0.1& ointment BID for up to two weeks AFTER completing efudex therapy to help with faster healing.    - Common side effects for this treatment were discussed and handout provided    Scribe Attestation      I,:  Alicia Krueger MA am acting as a scribe while in the presence of the attending physician.:       I,:  Zara Velasquez PA-C personally performed the services described in this documentation    as scribed in my presence.:

## 2024-10-22 ENCOUNTER — NURSE TRIAGE (OUTPATIENT)
Age: 64
End: 2024-10-22

## 2024-10-22 DIAGNOSIS — F51.01 PRIMARY INSOMNIA: ICD-10-CM

## 2024-10-22 NOTE — TELEPHONE ENCOUNTER
"LOV 9/12/24 hx of claudication of both LE L>R, LEAD done 8/29/24    Pt calls to report worsening pain to his L leg which extends from his foot up to his thigh which started yesterday.  Pt states he now is experiencing 10/10 pain even at rest and is barely able to walk.  Pain is not relieved by elevation or rest.  Pt denies any temp or color changes to legs and denies any wounds or open areas.  He states the leg is a little swollen and is having a hard time flexing his foot or bending his knee.  He also reports some tingling at rest as well    Urgent f/u appt scheduled for 11/6/24.  Advised pt a message would be sent to provider to review and advise in the meantime.        Answer Assessment - Initial Assessment Questions  1. ONSET: \"When did the pain start?\"       Ongoing yesterday started getting worse  2. LOCATION: \"Where is the pain located?\"       Foot up to thigh mostly in the Left   3. PAIN: \"How bad is the pain?\"    (Scale 1-10; or mild, moderate, severe)   - MILD (1-3): doesn't interfere with normal activities.    - MODERATE (4-7): interferes with normal activities (e.g., work or school) or awakens from sleep, limping.    - SEVERE (8-10): excruciating pain, unable to do any normal activities, unable to walk.       Pain even at rest, hard to walk 10/10  4. WORK OR EXERCISE: \"Has there been any recent work or exercise that involved this part of the body?\"       Denies   5. CAUSE: \"What do you think is causing the foot pain?\"      unsure  6. OTHER SYMPTOMS: \"Do you have any other symptoms?\" (e.g., leg pain, rash, fever, numbness)      Some swelling, tingling, no wounds    Protocols used: Foot Pain-ADULT-[place]    "

## 2024-10-22 NOTE — TELEPHONE ENCOUNTER
If patient is having severe 10/10 rest pain then I would advise he present to the emergency department for expedited work up and evaluation as he has significant PAD to his bilateral lower extremities

## 2024-10-22 NOTE — TELEPHONE ENCOUNTER
Reason for call:   [x] Refill   [] Prior Auth  [] Other:     Office:   [x] PCP/Provider -   [] Specialty/Provider -     Medication: Xanax     Dose/Frequency: 0.25 mg    Quantity: 15 tablet    Pharmacy: [facility] #[id number] - [place] [address]     Does the patient have enough for 3 days?   [x] Yes   [] No - Send as HP to POD

## 2024-10-23 RX ORDER — ALPRAZOLAM 0.25 MG/1
0.25 TABLET ORAL
Qty: 15 TABLET | Refills: 0 | Status: SHIPPED | OUTPATIENT
Start: 2024-10-23

## 2024-10-23 NOTE — TELEPHONE ENCOUNTER
Called and s/w pt, relayed recommendation to go to ER for evaluation. Pt verbalized understanding.

## 2024-11-05 NOTE — PROGRESS NOTES
Ambulatory Visit  Name: Ryan Montoya      : 1960      MRN: [id number]  Encounter Provider: CHRISTIE Butler  Encounter Date: 2024   Enc[facility] department: [facility]    Assessment & Plan  PAD (peripheral artery disease) ([facility])  See below  Orders:    VAS ARTERIAL DUPLEX- LOWER LIMB BILATERAL; Future    Rest pain of both lower extremities due to atherosclerosis (HCC)  64-year-old male smoker with CKD, HTN, asymptomatic carotid stenosis, HLD, and PAD with multilevel LE occlusive disease s/p left CFAE 2023 ([facility]), s/p R CFAE and b/l iliac shockwave lithotripsy 2023 ([facility]) presents with c/o worsening LLE rest pain    - c/o worsening LLE rest pain beginning 10/21/24 this was also associated with LLE edema at that time. Patient had been started on Amlodipine and experienced LE edema. He self discontinued medication and wife gave him Lasix. Edema resolved, however LLE pain continued. Patient feels it is worse then when last seen in office 24  - LLE perfused, M/S intact  - Doppler DP/PT signals appreciated  - Palpable R fem +2, diminished L femoral +1    - continues to smoke 0.5 pk per day  - compliant with ASA and statin    Plan:  - non concern for ALI  - STAT LEAD to eval progression/ change in disease  - Contineu ASA and statin  - Smoking cessation  - Will call with any new /significant findings and follow up recommendations if necessary     Orders:    VAS ARTERIAL DUPLEX- LOWER LIMB BILATERAL; Future      History of Present Illness       Patient presents today for further evaluation of leg pain. Reports leg pain with walking x 1 block. Also reports increased rest pain in feet. H/o multiple vascula interventions.     Ryan Montoya is a 64 y.o. male who presents with complaints of worsening LLE pain.    See assessment and plan.    History obtained from : patient  Review of Systems   Constitutional: Negative.    HENT: Negative.     Eyes: Negative.     Respiratory: Negative.     Cardiovascular: Negative.    Gastrointestinal: Negative.    Endocrine: Negative.    Genitourinary: Negative.    Musculoskeletal:         Leg pain   Skin: Negative.    Allergic/Immunologic: Negative.    Neurological: Negative.    Hematological: Negative.    Psychiatric/Behavioral: Negative.       Medical History Reviewed by provider this encounter:       Past Medical History   Past Medical History:   Diagnosis Date    Acute intractable headache 11/03/2021    Acute maxillary sinusitis     13 dec 2012    ROSENDO (acute kidney injury) ([facility]) 06/08/2018 2018.     Bone spur     toe    BPPV (benign paroxysmal positional vertigo) 10/24/2021    Cataract 02/28/2022    History of transfusion     Hyperlipidemia     Hypertension     Kidney stone     PAD (peripheral artery disease) ([facility]) 05/22/2023    PVD (peripheral vascular disease) ([facility])     Retinal detachment 11/21/2013    Sleep apnea     does not use cpap    TIA (transient ischemic attack) 11/03/2021     Past Surgical History:   Procedure Laterality Date    APPENDECTOMY      CATARACT EXTRACTION Bilateral     IR LOWER EXTREMITY ANGIOGRAM  11/8/2023    KIDNEY STONE SURGERY      multiple surgery in the past    ID SLCTV CATHJ 3RD+ ORD SLCTV ABDL PEL/LXTR BRNCH Right 11/8/2023    Procedure: ARTERIOGRAM;  Surgeon: Lucho Reynoso MD;  Location: [facility];  Service: Vascular    ID TEAEC W/WO PATCH GRAFT COMMON FEMORAL Left 08/14/2023    Procedure: ENDARTERECTOMY ARTERIAL FEMORAL WITH TISSUES PATCH AGIOPLASTY;  Surgeon: Lucho Reynoso MD;  Location: [facility];  Service: Vascular    ID TEAEC W/WO PATCH GRAFT COMMON FEMORAL Right 11/8/2023    Procedure: RIGHT FEMORAL ENDARTERECTOMY WITH PATCH ANGIOPLASTY, RIGHT COMMON ILIAC INTRAVASCULAR LITHOTRIPSY, RIGHT EXTERNAL ILIAC INTRAVASCULAR LITHOTRIPSY, AORTAGRAM, LEFT COMMON ILIAC INTRAVASCULAR LITHOTRIPSY;  Surgeon: Lucho Reynoso MD;  Location: [facility];  Service:  Vascular    RETINAL DETACHMENT SURGERY Bilateral     SEPTOPLASTY      TONSILLECTOMY  2000    [facility]     Family History   Problem Relation Age of Onset    Stroke Mother     No Known Problems Father      Current Outpatient Medications on File Prior to Visit   Medication Sig Dispense Refill    acetaminophen (TYLENOL) 500 mg tablet Take 500 mg by mouth every 6 (six) hours as needed for mild pain      ALPRAZolam (XANAX) 0.25 mg tablet Take 1 tablet (0.25 mg total) by mouth daily at bedtime as needed for anxiety 15 tablet 0    aspirin 81 mg chewable tablet Chew 1 tablet (81 mg total) daily      gabapentin (Neurontin) 300 mg capsule Take 1 capsule (300 mg total) by mouth 2 (two) times a day 90 capsule 3    losartan (COZAAR) 100 MG tablet TAKE 1 TABLET BY MOUTH EVERY DAY 90 tablet 1    Melatonin 10 MG TABS Take 10 mg by mouth Pt takes 10mg at bedtime.      rosuvastatin (CRESTOR) 40 MG tablet Take 1 tablet (40 mg total) by mouth daily 90 tablet 3    traZODone (DESYREL) 50 mg tablet TAKE 2 TABLETS (100 MG TOTAL) BY MOUTH DAILY AT BEDTIME 180 tablet 1    triamcinolone (KENALOG) 0.1 % cream Apply topically 2 (two) times a day For up to two weeks to hands and forearms after completing efudex therapy to help with healing. 80 g 0    amLODIPine (NORVASC) 2.5 mg tablet Take 1 tablet (2.5 mg total) by mouth daily (Patient not taking: Reported on 11/6/2024) 90 tablet 3    fluorouracil (EFUDEX) 5 % cream Apply twice a day for two to three weeks on bilateral arms and tho weeks for face (Patient not taking: Reported on 10/8/2024) 40 g 1    naloxone (NARCAN) 4 mg/0.1 mL nasal spray Administer 1 spray into a nostril. If no response after 2-3 minutes, give another dose in the other nostril using a new spray. 1 each 1     No current facility-administered medications on file prior to visit.     Allergies   Allergen Reactions    Eszopiclone Other (See Comments)     Sleep walking    Zolpidem Other (See Comments)     Other reaction(s):  sleepwalking.      Current Outpatient Medications on File Prior to Visit   Medication Sig Dispense Refill    acetaminophen (TYLENOL) 500 mg tablet Take 500 mg by mouth every 6 (six) hours as needed for mild pain      ALPRAZolam (XANAX) 0.25 mg tablet Take 1 tablet (0.25 mg total) by mouth daily at bedtime as needed for anxiety 15 tablet 0    aspirin 81 mg chewable tablet Chew 1 tablet (81 mg total) daily      gabapentin (Neurontin) 300 mg capsule Take 1 capsule (300 mg total) by mouth 2 (two) times a day 90 capsule 3    losartan (COZAAR) 100 MG tablet TAKE 1 TABLET BY MOUTH EVERY DAY 90 tablet 1    Melatonin 10 MG TABS Take 10 mg by mouth Pt takes 10mg at bedtime.      rosuvastatin (CRESTOR) 40 MG tablet Take 1 tablet (40 mg total) by mouth daily 90 tablet 3    traZODone (DESYREL) 50 mg tablet TAKE 2 TABLETS (100 MG TOTAL) BY MOUTH DAILY AT BEDTIME 180 tablet 1    triamcinolone (KENALOG) 0.1 % cream Apply topically 2 (two) times a day For up to two weeks to hands and forearms after completing efudex therapy to help with healing. 80 g 0    amLODIPine (NORVASC) 2.5 mg tablet Take 1 tablet (2.5 mg total) by mouth daily (Patient not taking: Reported on 11/6/2024) 90 tablet 3    fluorouracil (EFUDEX) 5 % cream Apply twice a day for two to three weeks on bilateral arms and tho weeks for face (Patient not taking: Reported on 10/8/2024) 40 g 1    naloxone (NARCAN) 4 mg/0.1 mL nasal spray Administer 1 spray into a nostril. If no response after 2-3 minutes, give another dose in the other nostril using a new spray. 1 each 1     No current facility-administered medications on file prior to visit.      Social History     Tobacco Use    Smoking status: Every Day     Current packs/day: 0.50     Average packs/day: 0.5 packs/day for 40.0 years (20.0 ttl pk-yrs)     Types: Cigarettes    Smokeless tobacco: Never    Tobacco comments:     exposure to 2nd hand smoke  advised not to smoke prior to procedure   Vaping Use    Vaping status:  Never Used   Substance and Sexual Activity    Alcohol use: Yes     Alcohol/week: 0.0 standard drinks of alcohol     Comment: social    Drug use: Yes     Types: Marijuana     Comment: once a week advised not to smoke prior to procedure    Sexual activity: Yes     Partners: Female         Objective     /82 (BP Location: Right arm, Patient Position: Sitting)   Pulse 80   Ht 6' (1.829 m)   Wt 93.4 kg (206 lb)   BMI 27.94 kg/m²     Physical Exam  Vitals reviewed.   Constitutional:       General: He is not in acute distress.  Cardiovascular:      Rate and Rhythm: Normal rate.      Pulses:           Femoral pulses are 2+ on the right side and 1+ on the left side.       Dorsalis pedis pulses are detected w/ Doppler on the left side.        Posterior tibial pulses are detected w/ Doppler on the left side.   Pulmonary:      Effort: Pulmonary effort is normal. No respiratory distress.   Musculoskeletal:         General: Normal range of motion.      Right lower leg: No edema.      Left lower leg: No edema.      Comments: Walks with cane   Skin:     General: Skin is warm and dry.      Capillary Refill: Capillary refill takes less than 2 seconds.   Neurological:      Mental Status: He is alert and oriented to person, place, and time.      Sensory: No sensory deficit.      Motor: No weakness.   Psychiatric:         Behavior: Behavior normal.       Administrative Statements   I have spent a total time of 20 minutes in caring for this patient on the day of the visit/encounter including Diagnostic results, Instructions for management, Patient and family education, Importance of tx compliance, Risk factor reductions, Impressions, Documenting in the medical record, Reviewing / ordering tests, medicine, procedures  , and Obtaining or reviewing history  .

## 2024-11-06 ENCOUNTER — OFFICE VISIT (OUTPATIENT)
Dept: VASCULAR SURGERY | Facility: CLINIC | Age: 64
End: 2024-11-06
Payer: COMMERCIAL

## 2024-11-06 VITALS
HEART RATE: 80 BPM | WEIGHT: 206 LBS | SYSTOLIC BLOOD PRESSURE: 124 MMHG | DIASTOLIC BLOOD PRESSURE: 82 MMHG | BODY MASS INDEX: 27.9 KG/M2 | HEIGHT: 72 IN

## 2024-11-06 DIAGNOSIS — I70.223 REST PAIN OF BOTH LOWER EXTREMITIES DUE TO ATHEROSCLEROSIS (HCC): Primary | ICD-10-CM

## 2024-11-06 DIAGNOSIS — I73.9 PAD (PERIPHERAL ARTERY DISEASE) (HCC): ICD-10-CM

## 2024-11-06 PROCEDURE — 99213 OFFICE O/P EST LOW 20 MIN: CPT | Performed by: NURSE PRACTITIONER

## 2024-11-06 NOTE — PATIENT INSTRUCTIONS
STAT LEAD    Continue Aspirin and statin    Smoking cessation    Will call with any new /significant findings and follow up recommendations if necessary

## 2024-11-06 NOTE — ASSESSMENT & PLAN NOTE
64-year-old male smoker with CKD, HTN, asymptomatic carotid stenosis, HLD, and PAD with multilevel LE occlusive disease s/p left CFAE 8/14/2023 ([facility]), s/p R CFAE and b/l iliac shockwave lithotripsy 11/8/2023 ([facility]) presents with c/o worsening LLE rest pain    - c/o worsening LLE rest pain beginning 10/21/24 this was also associated with LLE edema at that time. Patient had been started on Amlodipine and experienced LE edema. He self discontinued medication and wife gave him Lasix. Edema resolved, however LLE pain continued. Patient feels it is worse then when last seen in office 9/12/24  - LLE perfused, M/S intact  - Doppler DP/PT signals appreciated  - Palpable R fem +2, diminished L femoral +1    - continues to smoke 0.5 pk per day  - compliant with ASA and statin    Plan:  - non concern for ALI  - STAT LEAD to eval progression/ change in disease  - Contineu ASA and statin  - Smoking cessation  - Will call with any new /significant findings and follow up recommendations if necessary     Orders:    VAS ARTERIAL DUPLEX- LOWER LIMB BILATERAL; Future

## 2024-11-07 ENCOUNTER — TELEPHONE (OUTPATIENT)
Age: 64
End: 2024-11-07

## 2024-11-07 ENCOUNTER — HOSPITAL ENCOUNTER (OUTPATIENT)
Dept: NON INVASIVE DIAGNOSTICS | Facility: CLINIC | Age: 64
Discharge: HOME | End: 2024-11-07
Payer: COMMERCIAL

## 2024-11-07 DIAGNOSIS — I73.9 PAD (PERIPHERAL ARTERY DISEASE) (HCC): ICD-10-CM

## 2024-11-07 DIAGNOSIS — I70.223 REST PAIN OF BOTH LOWER EXTREMITIES DUE TO ATHEROSCLEROSIS (HCC): ICD-10-CM

## 2024-11-07 PROCEDURE — 93925 LOWER EXTREMITY STUDY: CPT

## 2024-11-07 PROCEDURE — 93923 UPR/LXTR ART STDY 3+ LVLS: CPT

## 2024-11-07 NOTE — TELEPHONE ENCOUNTER
Reviewed. Please call patient and inform him that his ultrasound did not show any changes from his previous ultrasound. As such his LLE pain is likely not vascular in nature and he should reach out to his PCP for further evaluation.

## 2024-11-07 NOTE — TELEPHONE ENCOUNTER
Kari from [facility]'s vascular lab calling regarding patient's IRLANDA done today. She states there is no significant difference between today's test and patient's last test in August of this year. ABIs are in the same range and there are no new findings.

## 2024-11-08 PROCEDURE — 93922 UPR/L XTREMITY ART 2 LEVELS: CPT | Performed by: SURGERY

## 2024-11-08 PROCEDURE — 93925 LOWER EXTREMITY STUDY: CPT | Performed by: SURGERY

## 2024-11-19 ENCOUNTER — OFFICE VISIT (OUTPATIENT)
Dept: FAMILY MEDICINE CLINIC | Facility: CLINIC | Age: 64
End: 2024-11-19
Payer: COMMERCIAL

## 2024-11-19 VITALS
SYSTOLIC BLOOD PRESSURE: 154 MMHG | HEIGHT: 72 IN | OXYGEN SATURATION: 96 % | HEART RATE: 72 BPM | DIASTOLIC BLOOD PRESSURE: 82 MMHG | WEIGHT: 207.6 LBS | BODY MASS INDEX: 28.12 KG/M2

## 2024-11-19 DIAGNOSIS — M79.606 PAIN OF LOWER EXTREMITY, UNSPECIFIED LATERALITY: ICD-10-CM

## 2024-11-19 DIAGNOSIS — N18.31 HYPERTENSIVE KIDNEY DISEASE WITH STAGE 3A CHRONIC KIDNEY DISEASE (HCC): Primary | ICD-10-CM

## 2024-11-19 DIAGNOSIS — I73.9 CLAUDICATION OF BOTH LOWER EXTREMITIES (HCC): ICD-10-CM

## 2024-11-19 DIAGNOSIS — N18.31 STAGE 3A CHRONIC KIDNEY DISEASE (HCC): ICD-10-CM

## 2024-11-19 DIAGNOSIS — I12.9 HYPERTENSIVE KIDNEY DISEASE WITH STAGE 3A CHRONIC KIDNEY DISEASE (HCC): Primary | ICD-10-CM

## 2024-11-19 DIAGNOSIS — F51.01 PRIMARY INSOMNIA: ICD-10-CM

## 2024-11-19 PROCEDURE — 99214 OFFICE O/P EST MOD 30 MIN: CPT | Performed by: FAMILY MEDICINE

## 2024-11-19 PROCEDURE — G2211 COMPLEX E/M VISIT ADD ON: HCPCS | Performed by: FAMILY MEDICINE

## 2024-11-19 RX ORDER — ALPRAZOLAM 0.25 MG/1
0.25 TABLET ORAL
Qty: 15 TABLET | Refills: 0 | Status: SHIPPED | OUTPATIENT
Start: 2024-11-19

## 2024-11-19 RX ORDER — DILTIAZEM HYDROCHLORIDE 120 MG/1
120 CAPSULE, EXTENDED RELEASE ORAL DAILY
Qty: 30 CAPSULE | Refills: 5 | Status: SHIPPED | OUTPATIENT
Start: 2024-11-19

## 2024-11-19 NOTE — ASSESSMENT & PLAN NOTE
Lab Results   Component Value Date    EGFR 49 07/09/2024    EGFR 58 11/09/2023    EGFR 54 10/26/2023    CREATININE 1.46 (H) 07/09/2024    CREATININE 1.29 11/09/2023    CREATININE 1.37 (H) 10/26/2023   GFR is 49 at the most recent check, which was admittedly a bit ago, but I would not want to use anti-inflammatories if we can avoid it.

## 2024-11-19 NOTE — PATIENT INSTRUCTIONS
1. Hypertensive kidney disease with stage 3a chronic kidney disease (HCC)  Assessment & Plan:  Lab Results   Component Value Date    EGFR 49 07/09/2024    EGFR 58 11/09/2023    EGFR 54 10/26/2023    CREATININE 1.46 (H) 07/09/2024    CREATININE 1.29 11/09/2023    CREATININE 1.37 (H) 10/26/2023   Patient's blood pressure today is way too high.  Unfortunately, he is not able to take the amlodipine that was prescribed before secondary to lower extremity edema.  When he stopped the amlodipine, the edema resolved.  It was a very small amount as well.    Based on this, I would recommend starting diltiazem, 120 mg of extended release, which would be the lowest dose extended release that we could do.  Hopefully this will decrease blood pressure little bit as it was elevated today, as well as continued to not affect kidney function.    Orders:    diltiazem (DILACOR XR) 120 MG 24 hr capsule; Take 1 capsule (120 mg total) by mouth daily    Orders:  -     diltiazem (DILACOR XR) 120 MG 24 hr capsule; Take 1 capsule (120 mg total) by mouth daily  2. Claudication of both lower extremities ([facility])  Assessment & Plan:  Patient did have studies from vascular that showed that he did not have a change in blood flow as the cause of his left lower extremity pain.    Orders:    Ambulatory Referral to Physical Therapy; Future    Orders:  -     Ambulatory Referral to Physical Therapy; Future  3. Pain of lower extremity, unspecified laterality  Assessment & Plan:  Patient is having significant amount of discomfort in the left lower extremity.  Unsure as to the cause.  Recommend physical therapy, x-ray knee, x-ray tib-fib.  Would rule out fractures with these.  As far as the knee is concerned, there may also be some arthritis as there was some joint line irritation, as well as some tenderness at the bursa just proximal to the knee.  Can also use Voltaren gel topical on the left knee.      Orders:    XR knee 3 vw left non injury; Future    XR  tibia fibula 2 vw left; Future    Ambulatory Referral to Physical Therapy; Future    Orders:  -     XR knee 3 vw left non injury; Future; Expected date: 11/19/2024  -     XR tibia fibula 2 vw left; Future; Expected date: 11/19/2024  -     Ambulatory Referral to Physical Therapy; Future  4. Stage 3a chronic kidney disease (HCC)  Assessment & Plan:  Lab Results   Component Value Date    EGFR 49 07/09/2024    EGFR 58 11/09/2023    EGFR 54 10/26/2023    CREATININE 1.46 (H) 07/09/2024    CREATININE 1.29 11/09/2023    CREATININE 1.37 (H) 10/26/2023   GFR is 49 at the most recent check, which was admittedly a bit ago, but I would not want to use anti-inflammatories if we can avoid it.         5. Primary insomnia  Assessment & Plan:  Unable to review PDMP, [place] was not working.  Based on recent prescription data in the chart, reasonable to refill.  Entered.      Orders:    ALPRAZolam (XANAX) 0.25 mg tablet; Take 1 tablet (0.25 mg total) by mouth daily at bedtime as needed for anxiety    Orders:  -     ALPRAZolam (XANAX) 0.25 mg tablet; Take 1 tablet (0.25 mg total) by mouth daily at bedtime as needed for anxiety      COVID 19 Instructions    Ryan Montoya was advised to limit contact with others to essential tasks such as getting food, medications, and medical care.    Proper handwashing reviewed, and Hand sanitzer when washing is not available.    If the patient develops symptoms of COVID 19, the patient should call the office as soon as possible.    It is strongly recommended that Flu Vaccinations be obtained.      Virtual Visits:  Diallo: This works on smart phones (any phone with Internet browsing capability).  You should get a text message when the provider is ready to see you.  Click on the link in the text message, and the call should start.  You will need to type in your name, and allow camera and microphone access.  This is HIPPA compliant, and secure.      If you have not already done so, get immunized to  COVID 19.      We are committed to getting you vaccinated as soon as possible and will be closely following [facility] and [facility] guidelines as they are released and revised.  Please refer to our COVID-19 vaccine webpage for the most up to date information on the vaccine and our distribution efforts.    This site will also have the most up to date recommendations for COVID booster vaccine.    [email or web address]    Call [phone] ([phone]), option 7    You can also visit https://www.vaccines.gov/ to find vaccines in your area.    OUR LOCATION:    [facility]  [address] [place] [address], [address]  [place], [place], [address] [phone]  Fax: [phone]    Lab services, Rheumatology, and OB/GYN are at this location as well.

## 2024-11-19 NOTE — PROGRESS NOTES
Name: Ryan Montoya      : 1960      MRN: [id number]  Encounter Provider: Olvin Brewster MD  Encounter Date: 2024   Encounter department: [facility]  :  Assessment & Plan  Hypertensive kidney disease with stage 3a chronic kidney disease (HCC)  Lab Results   Component Value Date    EGFR 49 2024    EGFR 58 2023    EGFR 54 10/26/2023    CREATININE 1.46 (H) 2024    CREATININE 1.29 2023    CREATININE 1.37 (H) 10/26/2023   Patient's blood pressure today is way too high.  Unfortunately, he is not able to take the amlodipine that was prescribed before secondary to lower extremity edema.  When he stopped the amlodipine, the edema resolved.  It was a very small amount as well.    Based on this, I would recommend starting diltiazem, 120 mg of extended release, which would be the lowest dose extended release that we could do.  Hopefully this will decrease blood pressure little bit as it was elevated today, as well as continued to not affect kidney function.    Orders:    diltiazem (DILACOR XR) 120 MG 24 hr capsule; Take 1 capsule (120 mg total) by mouth daily    Claudication of both lower extremities (HCC)  Patient did have studies from vascular that showed that he did not have a change in blood flow as the cause of his left lower extremity pain.    Orders:    Ambulatory Referral to Physical Therapy; Future    Pain of lower extremity, unspecified laterality  Patient is having significant amount of discomfort in the left lower extremity.  Unsure as to the cause.  Recommend physical therapy, x-ray knee, x-ray tib-fib.  Would rule out fractures with these.  As far as the knee is concerned, there may also be some arthritis as there was some joint line irritation, as well as some tenderness at the bursa just proximal to the knee.  Can also use Voltaren gel topical on the left knee.      Orders:    XR knee 3 vw left non injury; Future    XR tibia fibula 2 vw left;  Future    Ambulatory Referral to Physical Therapy; Future    Stage 3a chronic kidney disease (HCC)  Lab Results   Component Value Date    EGFR 49 07/09/2024    EGFR 58 11/09/2023    EGFR 54 10/26/2023    CREATININE 1.46 (H) 07/09/2024    CREATININE 1.29 11/09/2023    CREATININE 1.37 (H) 10/26/2023   GFR is 49 at the most recent check, which was admittedly a bit ago, but I would not want to use anti-inflammatories if we can avoid it.         Primary insomnia  Unable to review PDMP, [place] was not working.  Based on recent prescription data in the chart, reasonable to refill.  Entered.      Orders:    ALPRAZolam (XANAX) 0.25 mg tablet; Take 1 tablet (0.25 mg total) by mouth daily at bedtime as needed for anxiety             1. Hypertensive kidney disease with stage 3a chronic kidney disease (HCC)  Assessment & Plan:  Lab Results   Component Value Date    EGFR 49 07/09/2024    EGFR 58 11/09/2023    EGFR 54 10/26/2023    CREATININE 1.46 (H) 07/09/2024    CREATININE 1.29 11/09/2023    CREATININE 1.37 (H) 10/26/2023   Patient's blood pressure today is way too high.  Unfortunately, he is not able to take the amlodipine that was prescribed before secondary to lower extremity edema.  When he stopped the amlodipine, the edema resolved.  It was a very small amount as well.    Based on this, I would recommend starting diltiazem, 120 mg of extended release, which would be the lowest dose extended release that we could do.  Hopefully this will decrease blood pressure little bit as it was elevated today, as well as continued to not affect kidney function.  Orders:  -     diltiazem (DILACOR XR) 120 MG 24 hr capsule; Take 1 capsule (120 mg total) by mouth daily  2. Claudication of both lower extremities (HCC)  Assessment & Plan:  Patient did have studies from vascular that showed that he did not have a change in blood flow as the cause of his left lower extremity pain.         Orders:  -     Ambulatory Referral to Physical Therapy;  "Future  3. Pain of lower extremity, unspecified laterality  Assessment & Plan:  Patient is having significant amount of discomfort in the left lower extremity.  Unsure as to the cause.  Recommend physical therapy, x-ray knee, x-ray tib-fib.  Would rule out fractures with these.  As far as the knee is concerned, there may also be some arthritis as there was some joint line irritation, as well as some tenderness at the bursa just proximal to the knee.           Orders:  -     XR knee 3 vw left non injury; Future; Expected date: 11/19/2024  -     XR tibia fibula 2 vw left; Future; Expected date: 11/19/2024  -     Ambulatory Referral to Physical Therapy; Future  4. Stage 3a chronic kidney disease (HCC)  Assessment & Plan:  Lab Results   Component Value Date    EGFR 49 07/09/2024    EGFR 58 11/09/2023    EGFR 54 10/26/2023    CREATININE 1.46 (H) 07/09/2024    CREATININE 1.29 11/09/2023    CREATININE 1.37 (H) 10/26/2023   GFR is 49 at the most recent check, which was admittedly a bit ago, but I would not want to use anti-inflammatories if we can avoid it.         5. Primary insomnia  -     ALPRAZolam (XANAX) 0.25 mg tablet; Take 1 tablet (0.25 mg total) by mouth daily at bedtime as needed for anxiety      Chief Complaint   Patient presents with    Leg Pain     Left leg pain onset for a while patient states he's been cardiovascular and was told that its not due to circulations          History of Present Illness     Patient's left leg has a significant mount of problems.  When trying to walk he notes that there is significant discomfort, \"feels like it will get about to snap.\"  This is from the knee down.  Did go to vascular recently.  They did a stat lead study which showed that it was likely not a vascular cause.    Patient does have some of the discomfort in the knee itself, occasionally feels like it is giving out.    Some numbness on the top of the foot.  seems to be more along the shin with the " discomfort.      Review of Systems       Objective   /82 (BP Location: Left arm, Patient Position: Sitting, Cuff Size: Standard)   Pulse 72   Ht 6' (1.829 m)   Wt 94.2 kg (207 lb 9.6 oz)   SpO2 96%   BMI 28.16 kg/m²      Physical Exam  Vitals and nursing note reviewed.   Musculoskeletal:      Left knee: Tenderness present over the medial joint line.        Legs:       Comments: Discomfort in the left lower extremity, as noted in the drawing.  Tends to be on the medial aspect of the shin.

## 2024-11-19 NOTE — ASSESSMENT & PLAN NOTE
Unable to review PDMP, [place] was not working.  Based on recent prescription data in the chart, reasonable to refill.  Entered.      Orders:    ALPRAZolam (XANAX) 0.25 mg tablet; Take 1 tablet (0.25 mg total) by mouth daily at bedtime as needed for anxiety
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
## 2024-11-19 NOTE — ASSESSMENT & PLAN NOTE
Patient is having significant amount of discomfort in the left lower extremity.  Unsure as to the cause.  Recommend physical therapy, x-ray knee, x-ray tib-fib.  Would rule out fractures with these.  As far as the knee is concerned, there may also be some arthritis as there was some joint line irritation, as well as some tenderness at the bursa just proximal to the knee.  Can also use Voltaren gel topical on the left knee.      Orders:    XR knee 3 vw left non injury; Future    XR tibia fibula 2 vw left; Future    Ambulatory Referral to Physical Therapy; Future

## 2024-11-19 NOTE — ASSESSMENT & PLAN NOTE
Patient did have studies from vascular that showed that he did not have a change in blood flow as the cause of his left lower extremity pain.    Orders:    Ambulatory Referral to Physical Therapy; Future

## 2024-11-22 ENCOUNTER — HOSPITAL ENCOUNTER (OUTPATIENT)
Dept: RADIOLOGY | Facility: HOSPITAL | Age: 64
Discharge: HOME | End: 2024-11-22
Payer: COMMERCIAL

## 2024-11-22 DIAGNOSIS — M79.606 PAIN OF LOWER EXTREMITY, UNSPECIFIED LATERALITY: ICD-10-CM

## 2024-11-22 PROCEDURE — 73590 X-RAY EXAM OF LOWER LEG: CPT

## 2024-11-22 PROCEDURE — 73562 X-RAY EXAM OF KNEE 3: CPT

## 2024-11-26 ENCOUNTER — RESULTS FOLLOW-UP (OUTPATIENT)
Dept: FAMILY MEDICINE CLINIC | Facility: CLINIC | Age: 64
End: 2024-11-26

## 2024-12-11 DIAGNOSIS — N18.31 HYPERTENSIVE KIDNEY DISEASE WITH STAGE 3A CHRONIC KIDNEY DISEASE (HCC): ICD-10-CM

## 2024-12-11 DIAGNOSIS — I12.9 HYPERTENSIVE KIDNEY DISEASE WITH STAGE 3A CHRONIC KIDNEY DISEASE (HCC): ICD-10-CM

## 2024-12-12 RX ORDER — DILTIAZEM HYDROCHLORIDE 120 MG/1
120 CAPSULE, EXTENDED RELEASE ORAL DAILY
Qty: 90 CAPSULE | Refills: 1 | Status: SHIPPED | OUTPATIENT
Start: 2024-12-12 | End: 2024-12-20 | Stop reason: SDUPTHER

## 2024-12-20 ENCOUNTER — OFFICE VISIT (OUTPATIENT)
Dept: FAMILY MEDICINE CLINIC | Facility: CLINIC | Age: 64
End: 2024-12-20
Payer: COMMERCIAL

## 2024-12-20 VITALS
HEART RATE: 82 BPM | SYSTOLIC BLOOD PRESSURE: 126 MMHG | HEIGHT: 72 IN | DIASTOLIC BLOOD PRESSURE: 82 MMHG | BODY MASS INDEX: 28.2 KG/M2 | OXYGEN SATURATION: 98 % | WEIGHT: 208.2 LBS

## 2024-12-20 DIAGNOSIS — N18.31 STAGE 3A CHRONIC KIDNEY DISEASE (HCC): Primary | ICD-10-CM

## 2024-12-20 DIAGNOSIS — I12.9 HYPERTENSIVE KIDNEY DISEASE WITH STAGE 3A CHRONIC KIDNEY DISEASE (HCC): ICD-10-CM

## 2024-12-20 DIAGNOSIS — E72.01 CYSTINURIA (HCC): ICD-10-CM

## 2024-12-20 DIAGNOSIS — N18.31 HYPERTENSIVE KIDNEY DISEASE WITH STAGE 3A CHRONIC KIDNEY DISEASE (HCC): ICD-10-CM

## 2024-12-20 DIAGNOSIS — Z23 ENCOUNTER FOR IMMUNIZATION: ICD-10-CM

## 2024-12-20 DIAGNOSIS — Z23 NEED FOR COVID-19 VACCINE: ICD-10-CM

## 2024-12-20 PROCEDURE — 99214 OFFICE O/P EST MOD 30 MIN: CPT | Performed by: FAMILY MEDICINE

## 2024-12-20 PROCEDURE — 90480 ADMN SARSCOV2 VAC 1/ONLY CMP: CPT | Performed by: FAMILY MEDICINE

## 2024-12-20 PROCEDURE — G0008 ADMIN INFLUENZA VIRUS VAC: HCPCS | Performed by: FAMILY MEDICINE

## 2024-12-20 PROCEDURE — 90662 IIV NO PRSV INCREASED AG IM: CPT | Performed by: FAMILY MEDICINE

## 2024-12-20 PROCEDURE — 91320 SARSCV2 VAC 30MCG TRS-SUC IM: CPT | Performed by: FAMILY MEDICINE

## 2024-12-20 RX ORDER — DILTIAZEM HYDROCHLORIDE 120 MG/1
120 CAPSULE, EXTENDED RELEASE ORAL DAILY
Qty: 90 CAPSULE | Refills: 1 | Status: SHIPPED | OUTPATIENT
Start: 2024-12-20

## 2024-12-20 NOTE — PATIENT INSTRUCTIONS
1. Stage 3a chronic kidney disease (HCC)  Assessment & Plan:  Lab Results   Component Value Date    EGFR 49 07/09/2024    EGFR 58 11/09/2023    EGFR 54 10/26/2023    CREATININE 1.46 (H) 07/09/2024    CREATININE 1.29 11/09/2023    CREATININE 1.37 (H) 10/26/2023   Stable at the moment.  No specific change.  Encourage hydration, blood pressure control.       2. Hypertensive kidney disease with stage 3a chronic kidney disease (HCC)  Assessment & Plan:  Lab Results   Component Value Date    EGFR 49 07/09/2024    EGFR 58 11/09/2023    EGFR 54 10/26/2023    CREATININE 1.46 (H) 07/09/2024    CREATININE 1.29 11/09/2023    CREATININE 1.37 (H) 10/26/2023   Blood pressure doing very well at this point.  Continue on diltiazem.  Orders:    diltiazem (DILACOR XR) 120 MG 24 hr capsule; Take 1 capsule (120 mg total) by mouth daily    Orders:  -     diltiazem (DILACOR XR) 120 MG 24 hr capsule; Take 1 capsule (120 mg total) by mouth daily  3. Cystinuria (HCC)  Assessment & Plan:  Stable.  Follow with urology.       4. Encounter for immunization  -     influenza vaccine, high-dose, PF 0.5 mL (Fluzone High Dose)  5. Need for COVID-19 vaccine  -     COVID-19 Pfizer mRNA vaccine 12 yr and older (Comirnaty pre-filled syringe)      COVID 19 Instructions    Ryan DICKERSON Lindsay was advised to limit contact with others to essential tasks such as getting food, medications, and medical care.    Proper handwashing reviewed, and Hand sanitzer when washing is not available.    If the patient develops symptoms of COVID 19, the patient should call the office as soon as possible.    It is strongly recommended that Flu Vaccinations be obtained.      Virtual Visits:  Diallo: This works on smart phones (any phone with Internet browsing capability).  You should get a text message when the provider is ready to see you.  Click on the link in the text message, and the call should start.  You will need to type in your name, and allow camera and microphone  access.  This is HIPPA compliant, and secure.      If you have not already done so, get immunized to COVID 19.      We are committed to getting you vaccinated as soon as possible and will be closely following [facility] and [facility] guidelines as they are released and revised.  Please refer to our COVID-19 vaccine webpage for the most up to date information on the vaccine and our distribution efforts.    This site will also have the most up to date recommendations for COVID booster vaccine.    [email or web address]-vaccine    Call [phone] ([phone]), option 7    You can also visit https://www.vaccines.gov/ to find vaccines in your area.    OUR LOCATION:    [facility]  [address] [place] [address], [address]  [place], [place], [address] [phone]  Fax: [phone]    Lab services, Rheumatology, and OB/GYN are at this location as well.

## 2024-12-20 NOTE — PROGRESS NOTES
Name: Ryan Montoya      : 1960      MRN: [id number]  Encounter Provider: Olvin Brewster MD  Encounter Date: 2024   Encounter department: [facility]  :  Assessment & Plan  Hypertensive kidney disease with stage 3a chronic kidney disease (HCC)  Lab Results   Component Value Date    EGFR 49 2024    EGFR 58 2023    EGFR 54 10/26/2023    CREATININE 1.46 (H) 2024    CREATININE 1.29 2023    CREATININE 1.37 (H) 10/26/2023   Blood pressure doing very well at this point.  Continue on diltiazem.  Orders:  •  diltiazem (DILACOR XR) 120 MG 24 hr capsule; Take 1 capsule (120 mg total) by mouth daily    Stage 3a chronic kidney disease (HCC)  Lab Results   Component Value Date    EGFR 49 2024    EGFR 58 2023    EGFR 54 10/26/2023    CREATININE 1.46 (H) 2024    CREATININE 1.29 2023    CREATININE 1.37 (H) 10/26/2023   Stable at the moment.  No specific change.  Encourage hydration, blood pressure control.       Cystinuria (HCC)  Stable.  Follow with urology.       Encounter for immunization    Orders:  •  influenza vaccine, high-dose, PF 0.5 mL (Fluzone High Dose)    Need for COVID-19 vaccine    Orders:  •  COVID-19 Pfizer mRNA vaccine 12 yr and older (Comirnaty pre-filled syringe)           History of Present Illness     Patient is here to follow-up on hypertension.    No swelling.  Blood pressure reviewed.    CKD: No issues or problems.    Cystinuria: Continues to follow with urology.  No specific issues.      Review of Systems   Constitutional: Negative.    HENT: Negative.     Respiratory: Negative.     Cardiovascular: Negative.        Objective   /82 (BP Location: Left arm, Patient Position: Sitting, Cuff Size: Adult)   Pulse 82   Ht 6' (1.829 m)   Wt 94.4 kg (208 lb 3.2 oz)   SpO2 98%   BMI 28.24 kg/m²      Physical Exam  Vitals and nursing note reviewed.   Constitutional:       Appearance: Normal appearance.   Neck:       Vascular: No carotid bruit.   Cardiovascular:      Rate and Rhythm: Normal rate and regular rhythm.      Pulses: Normal pulses.           Carotid pulses are 2+ on the right side and 2+ on the left side.     Heart sounds: Normal heart sounds. No murmur heard.     No gallop.   Pulmonary:      Effort: Pulmonary effort is normal. No respiratory distress.      Breath sounds: Normal breath sounds. No stridor. No wheezing, rhonchi or rales.   Chest:      Chest wall: No tenderness.   Neurological:      Mental Status: He is alert.

## 2024-12-20 NOTE — ASSESSMENT & PLAN NOTE
Stable.  Follow with urology.      Otezla Counseling: The side effects of Otezla were discussed with the patient, including but not limited to worsening or new depression, weight loss, diarrhea, nausea, upper respiratory tract infection, and headache. Patient instructed to call the office should any adverse effect occur.  The patient verbalized understanding of the proper use and possible adverse effects of Otezla.  All the patient's questions and concerns were addressed.

## 2024-12-20 NOTE — ASSESSMENT & PLAN NOTE
Lab Results   Component Value Date    EGFR 49 07/09/2024    EGFR 58 11/09/2023    EGFR 54 10/26/2023    CREATININE 1.46 (H) 07/09/2024    CREATININE 1.29 11/09/2023    CREATININE 1.37 (H) 10/26/2023   Stable at the moment.  No specific change.  Encourage hydration, blood pressure control.

## 2024-12-20 NOTE — ASSESSMENT & PLAN NOTE
Lab Results   Component Value Date    EGFR 49 07/09/2024    EGFR 58 11/09/2023    EGFR 54 10/26/2023    CREATININE 1.46 (H) 07/09/2024    CREATININE 1.29 11/09/2023    CREATININE 1.37 (H) 10/26/2023   Blood pressure doing very well at this point.  Continue on diltiazem.  Orders:  •  diltiazem (DILACOR XR) 120 MG 24 hr capsule; Take 1 capsule (120 mg total) by mouth daily

## 2025-01-07 ENCOUNTER — OFFICE VISIT (OUTPATIENT)
Dept: FAMILY MEDICINE CLINIC | Facility: CLINIC | Age: 65
End: 2025-01-07
Payer: COMMERCIAL

## 2025-01-07 VITALS
HEART RATE: 78 BPM | HEIGHT: 72 IN | OXYGEN SATURATION: 93 % | BODY MASS INDEX: 28.17 KG/M2 | DIASTOLIC BLOOD PRESSURE: 92 MMHG | WEIGHT: 208 LBS | SYSTOLIC BLOOD PRESSURE: 150 MMHG

## 2025-01-07 DIAGNOSIS — E72.01 CYSTINURIA (HCC): ICD-10-CM

## 2025-01-07 DIAGNOSIS — I74.01: ICD-10-CM
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
## 2025-01-07 DIAGNOSIS — Z23 ENCOUNTER FOR IMMUNIZATION: ICD-10-CM

## 2025-01-07 DIAGNOSIS — L03.113 CELLULITIS OF RIGHT HAND: Primary | ICD-10-CM

## 2025-01-07 PROCEDURE — 99214 OFFICE O/P EST MOD 30 MIN: CPT | Performed by: NURSE PRACTITIONER

## 2025-01-07 PROCEDURE — 90715 TDAP VACCINE 7 YRS/> IM: CPT | Performed by: NURSE PRACTITIONER

## 2025-01-07 PROCEDURE — 90471 IMMUNIZATION ADMIN: CPT | Performed by: NURSE PRACTITIONER

## 2025-01-07 RX ORDER — SULFAMETHOXAZOLE AND TRIMETHOPRIM 800; 160 MG/1; MG/1
1 TABLET ORAL EVERY 12 HOURS SCHEDULED
Qty: 14 TABLET | Refills: 0 | Status: SHIPPED | OUTPATIENT
Start: 2025-01-07 | End: 2025-01-15

## 2025-01-07 NOTE — PROGRESS NOTES
Name: Ryan Montoya      : 1960      MRN: [id number]  Encounter Provider: CHRISTIE Tamayo  Encounter Date: 2025   Encounter department: [facility] PRIMARY CARE  :  Assessment & Plan  Cellulitis of right hand  7-day course of Bactrim was ordered for treatment of cellulitis of the right hand.  Patient was unsure when he received his last Tdap so he was given an updated Tdap vaccine in the office today.  Orders:  •  sulfamethoxazole-trimethoprim (BACTRIM DS) 800-160 mg per tablet; Take 1 tablet by mouth every 12 (twelve) hours for 7 days  •  TDAP VACCINE GREATER THAN OR EQUAL TO 8YO IM    Saddle embolus of abdominal aorta (HCC)  Patient continues to follow with vascular surgery regarding this.       Cystinuria (HCC)  Patient continues to follow with nephrology and urology regarding this.       Encounter for immunization    Orders:  •  TDAP VACCINE GREATER THAN OR EQUAL TO 8YO IM    Cat bite, initial encounter    Orders:  •  TDAP VACCINE GREATER THAN OR EQUAL TO 8YO IM          Depression Screening and Follow-up Plan: Patient was screened for depression during today's encounter. They screened negative with a PHQ-2 score of 0.    Tobacco Cessation Counseling: Tobacco cessation counseling was provided. The patient is sincerely urged to quit consumption of tobacco. He is not ready to quit tobacco.       History of Present Illness     Cat bite of right hand: Patient was bit on the right hand by his cat last night.  He reports that since that time he has noted swelling, redness, and tenderness of the hand.  He denies any fevers, chills, or discharge from the area.    Cystinuria: Patient continues to follow with urology and nephrology regarding this.    Saddle embolus of abdominal aorta: Patient continues to follow with vascular surgery regarding this.      Review of Systems   Constitutional:  Negative for chills and fever.   HENT:  Negative for ear pain and sore throat.    Eyes:  Negative for  pain and visual disturbance.   Respiratory:  Negative for cough, chest tightness, shortness of breath and wheezing.    Cardiovascular:  Negative for chest pain, palpitations and leg swelling.   Gastrointestinal:  Negative for abdominal pain, constipation, diarrhea, nausea and vomiting.   Endocrine: Negative for cold intolerance and heat intolerance.   Genitourinary:  Negative for decreased urine volume, dysuria and hematuria.   Musculoskeletal:  Positive for arthralgias (right hand) and joint swelling (right hand). Negative for back pain.   Skin:  Positive for color change (right hand) and wound (right hand). Negative for rash.   Allergic/Immunologic: Negative for environmental allergies.   Neurological:  Negative for dizziness, seizures, syncope, weakness, light-headedness, numbness and headaches.   Hematological:  Negative for adenopathy.   Psychiatric/Behavioral:  Negative for confusion. The patient is not nervous/anxious.    All other systems reviewed and are negative.      Objective   /92 (BP Location: Right arm, Patient Position: Sitting, Cuff Size: Standard)   Pulse 78   Ht 6' (1.829 m)   Wt 94.3 kg (208 lb)   SpO2 93%   BMI 28.21 kg/m²      Physical Exam  Vitals and nursing note reviewed.   Constitutional:       General: He is not in acute distress.     Appearance: Normal appearance. He is well-developed. He is not ill-appearing.   HENT:      Head: Normocephalic.   Eyes:      Conjunctiva/sclera: Conjunctivae normal.   Cardiovascular:      Rate and Rhythm: Normal rate and regular rhythm.      Pulses: Normal pulses.           Carotid pulses are 2+ on the right side and 2+ on the left side.       Radial pulses are 2+ on the right side and 2+ on the left side.        Posterior tibial pulses are 2+ on the right side and 2+ on the left side.      Heart sounds: Normal heart sounds. No murmur heard.  Pulmonary:      Effort: Pulmonary effort is normal. No respiratory distress.      Breath sounds: Normal  breath sounds. No decreased breath sounds, wheezing, rhonchi or rales.   Abdominal:      General: Abdomen is flat. Bowel sounds are normal. There is no distension.      Palpations: Abdomen is soft.      Tenderness: There is no abdominal tenderness. There is no guarding.   Musculoskeletal:         General: No swelling. Normal range of motion.        Hands:       Cervical back: Normal range of motion and neck supple.      Right lower leg: No edema.      Left lower leg: No edema.   Skin:     General: Skin is warm and dry.      Capillary Refill: Capillary refill takes less than 2 seconds.   Neurological:      General: No focal deficit present.      Mental Status: He is alert and oriented to person, place, and time.   Psychiatric:         Mood and Affect: Mood normal.         Behavior: Behavior normal.         Thought Content: Thought content normal.         Judgment: Judgment normal.

## 2025-01-07 NOTE — ASSESSMENT & PLAN NOTE
7-day course of Bactrim was ordered for treatment of cellulitis of the right hand.  Patient was unsure when he received his last Tdap so he was given an updated Tdap vaccine in the office today.  Orders:  •  sulfamethoxazole-trimethoprim (BACTRIM DS) 800-160 mg per tablet; Take 1 tablet by mouth every 12 (twelve) hours for 7 days  •  TDAP VACCINE GREATER THAN OR EQUAL TO 8YO IM

## 2025-01-12 DIAGNOSIS — F51.01 PRIMARY INSOMNIA: ICD-10-CM

## 2025-01-13 ENCOUNTER — RESULTS FOLLOW-UP (OUTPATIENT)
Dept: FAMILY MEDICINE CLINIC | Facility: CLINIC | Age: 65
End: 2025-01-13

## 2025-01-13 ENCOUNTER — HOSPITAL ENCOUNTER (OUTPATIENT)
Dept: RADIOLOGY | Facility: HOSPITAL | Age: 65
Discharge: HOME | End: 2025-01-13
Payer: COMMERCIAL

## 2025-01-13 ENCOUNTER — OFFICE VISIT (OUTPATIENT)
Dept: FAMILY MEDICINE CLINIC | Facility: CLINIC | Age: 65
End: 2025-01-13
Payer: COMMERCIAL

## 2025-01-13 VITALS
DIASTOLIC BLOOD PRESSURE: 80 MMHG | OXYGEN SATURATION: 95 % | HEIGHT: 72 IN | HEART RATE: 80 BPM | SYSTOLIC BLOOD PRESSURE: 146 MMHG | BODY MASS INDEX: 27.63 KG/M2 | WEIGHT: 204 LBS | RESPIRATION RATE: 20 BRPM

## 2025-01-13 DIAGNOSIS — L03.113 CELLULITIS OF RIGHT HAND: Primary | ICD-10-CM

## 2025-01-13 DIAGNOSIS — Z12.11 COLON CANCER SCREENING: ICD-10-CM

## 2025-01-13 DIAGNOSIS — Z00.00 ENCOUNTER FOR ANNUAL WELLNESS VISIT (AWV) IN MEDICARE PATIENT: ICD-10-CM

## 2025-01-13 DIAGNOSIS — L03.113 CELLULITIS OF RIGHT HAND: ICD-10-CM

## 2025-01-13 DIAGNOSIS — Z12.5 PROSTATE CANCER SCREENING: ICD-10-CM

## 2025-01-13 DIAGNOSIS — N18.31 STAGE 3A CHRONIC KIDNEY DISEASE (HCC): ICD-10-CM

## 2025-01-13 DIAGNOSIS — E78.2 MIXED HYPERLIPIDEMIA: ICD-10-CM

## 2025-01-13 DIAGNOSIS — N18.31 HYPERTENSIVE KIDNEY DISEASE WITH STAGE 3A CHRONIC KIDNEY DISEASE (HCC): ICD-10-CM

## 2025-01-13 DIAGNOSIS — Z23 NEED FOR ZOSTER VACCINE: ICD-10-CM

## 2025-01-13 DIAGNOSIS — S83.422A SPRAIN OF LATERAL COLLATERAL LIGAMENT OF LEFT KNEE, INITIAL ENCOUNTER: ICD-10-CM

## 2025-01-13 DIAGNOSIS — Z29.11 NEED FOR RSV VACCINATION: ICD-10-CM

## 2025-01-13 DIAGNOSIS — Z23 NEED FOR VACCINATION FOR STREP PNEUMONIAE: ICD-10-CM

## 2025-01-13 DIAGNOSIS — I12.9 HYPERTENSIVE KIDNEY DISEASE WITH STAGE 3A CHRONIC KIDNEY DISEASE (HCC): ICD-10-CM

## 2025-01-13 DIAGNOSIS — R73.9 HYPERGLYCEMIA: ICD-10-CM

## 2025-01-13 PROBLEM — S83.429A LATERAL COLLATERAL LIGAMENT SPRAIN OF KNEE: Status: ACTIVE | Noted: 2025-01-13

## 2025-01-13 PROCEDURE — 99214 OFFICE O/P EST MOD 30 MIN: CPT | Performed by: FAMILY MEDICINE

## 2025-01-13 PROCEDURE — 73130 X-RAY EXAM OF HAND: CPT

## 2025-01-13 PROCEDURE — G0439 PPPS, SUBSEQ VISIT: HCPCS | Performed by: FAMILY MEDICINE

## 2025-01-13 RX ORDER — TRAZODONE HYDROCHLORIDE 50 MG/1
100 TABLET, FILM COATED ORAL
Qty: 180 TABLET | Refills: 1 | Status: SHIPPED | OUTPATIENT
Start: 2025-01-13

## 2025-01-13 NOTE — PATIENT INSTRUCTIONS
Medicare Preventive Visit Patient Instructions  Thank you for completing your Welcome to Medicare Visit or Medicare Annual Wellness Visit today. Your next wellness visit will be due in one year (1/14/2026).  The screening/preventive services that you may require over the next 5-10 years are detailed below. Some tests may not apply to you based off risk factors and/or age. Screening tests ordered at today's visit but not completed yet may show as past due. Also, please note that scanned in results may not display below.  Preventive Screenings:  Service Recommendations Previous Testing/Comments   Colorectal Cancer Screening  Colonoscopy    Fecal Occult Blood Test (FOBT)/Fecal Immunochemical Test (FIT)  Fecal DNA/Cologuard Test  Flexible Sigmoidoscopy Age: 45-75 years old   Colonoscopy: every 10 years (May be performed more frequently if at higher risk)  OR  FOBT/FIT: every 1 year  OR  Cologuard: every 3 years  OR  Sigmoidoscopy: every 5 years  Screening may be recommended earlier than age 45 if at higher risk for colorectal cancer. Also, an individualized decision between you and your healthcare provider will decide whether screening between the ages of 76-85 would be appropriate. Colonoscopy: Not on file  FOBT/FIT: Not on file  Cologuard: Not on file  Sigmoidoscopy: Not on file          Prostate Cancer Screening Individualized decision between patient and health care provider in men between ages of 55-69   Medicare will cover every 12 months beginning on the day after your 50th birthday PSA: 0.3 ng/mL           Hepatitis C Screening Once for adults born between 1945 and 1965  More frequently in patients at high risk for Hepatitis C Hep C Antibody: 06/16/2021    Screening Current   Diabetes Screening 1-2 times per year if you're at risk for diabetes or have pre-diabetes Fasting glucose: 125 mg/dL (7/9/2024)  A1C: 5.6 % (10/24/2021)  Screening Current   Cholesterol Screening Once every 5 years if you don't have a lipid  disorder. May order more often based on risk factors. Lipid panel: 07/09/2024  Screening Not Indicated  History Lipid Disorder      Other Preventive Screenings Covered by Medicare:  Abdominal Aortic Aneurysm (AAA) Screening: covered once if your at risk. You're considered to be at risk if you have a family history of AAA or a male between the age of 65-75 who smoking at least 100 cigarettes in your lifetime.  Lung Cancer Screening: covers low dose CT scan once per year if you meet all of the following conditions: (1) Age 55-77; (2) No signs or symptoms of lung cancer; (3) Current smoker or have quit smoking within the last 15 years; (4) You have a tobacco smoking history of at least 20 pack years (packs per day x number of years you smoked); (5) You get a written order from a healthcare provider.  Glaucoma Screening: covered annually if you're considered high risk: (1) You have diabetes OR (2) Family history of glaucoma OR (3)  aged 50 and older OR (4)  American aged 65 and older  Osteoporosis Screening: covered every 2 years if you meet one of the following conditions: (1) Have a vertebral abnormality; (2) On glucocorticoid therapy for more than 3 months; (3) Have primary hyperparathyroidism; (4) On osteoporosis medications and need to assess response to drug therapy.  HIV Screening: covered annually if you're between the age of 15-65. Also covered annually if you are younger than 15 and older than 65 with risk factors for HIV infection. For pregnant patients, it is covered up to 3 times per pregnancy.    Immunizations:  Immunization Recommendations   Influenza Vaccine Annual influenza vaccination during flu season is recommended for all persons aged >= 6 months who do not have contraindications   Pneumococcal Vaccine   * Pneumococcal conjugate vaccine = PCV13 (Prevnar 13), PCV15 (Vaxneuvance), PCV20 (Prevnar 20)  * Pneumococcal polysaccharide vaccine = PPSV23 (Pneumovax) Adults 19-65 yo  with certain risk factors or if 65+ yo  If never received any pneumonia vaccine: recommend Prevnar 20 (PCV20)  Give PCV20 if previously received 1 dose of PCV13 or PPSV23   Hepatitis B Vaccine 3 dose series if at intermediate or high risk (ex: diabetes, end stage renal disease, liver disease)   Respiratory syncytial virus (RSV) Vaccine - COVERED BY MEDICARE PART D  * RSVPreF3 (Arexvy) CDC recommends that adults 60 years of age and older may receive a single dose of RSV vaccine using shared clinical decision-making (SCDM)   Tetanus (Td) Vaccine - COST NOT COVERED BY MEDICARE PART B Following completion of primary series, a booster dose should be given every 10 years to maintain immunity against tetanus. Td may also be given as tetanus wound prophylaxis.   Tdap Vaccine - COST NOT COVERED BY MEDICARE PART B Recommended at least once for all adults. For pregnant patients, recommended with each pregnancy.   Shingles Vaccine (Shingrix) - COST NOT COVERED BY MEDICARE PART B  2 shot series recommended in those 19 years and older who have or will have weakened immune systems or those 50 years and older     Health Maintenance Due:      Topic Date Due   • Colorectal Cancer Screening  Never done   • Lung Cancer Screening  08/17/2025   • HIV Screening  Completed   • Hepatitis C Screening  Completed     Immunizations Due:      Topic Date Due   • Pneumococcal Vaccine: 65+ Years (1 of 2 - PCV) Never done   • Hepatitis A Vaccine (1 of 2 - Risk 2-dose series) Never done     Advance Directives   What are advance directives?  Advance directives are legal documents that state your wishes and plans for medical care. These plans are made ahead of time in case you lose your ability to make decisions for yourself. Advance directives can apply to any medical decision, such as the treatments you want, and if you want to donate organs.   What are the types of advance directives?  There are many types of advance directives, and each state has  rules about how to use them. You may choose a combination of any of the following:  Living will:  This is a written record of the treatment you want. You can also choose which treatments you do not want, which to limit, and which to stop at a certain time. This includes surgery, medicine, IV fluid, and tube feedings.   Durable power of  for healthcare (DPAHC):  This is a written record that states who you want to make healthcare choices for you when you are unable to make them for yourself. This person, called a proxy, is usually a family member or a friend. You may choose more than 1 proxy.  Do not resuscitate (DNR) order:  A DNR order is used in case your heart stops beating or you stop breathing. It is a request not to have certain forms of treatment, such as CPR. A DNR order may be included in other types of advance directives.  Medical directive:  This covers the care that you want if you are in a coma, near death, or unable to make decisions for yourself. You can list the treatments you want for each condition. Treatment may include pain medicine, surgery, blood transfusions, dialysis, IV or tube feedings, and a ventilator (breathing machine).  Values history:  This document has questions about your views, beliefs, and how you feel and think about life. This information can help others choose the care that you would choose.  Why are advance directives important?  An advance directive helps you control your care. Although spoken wishes may be used, it is better to have your wishes written down. Spoken wishes can be misunderstood, or not followed. Treatments may be given even if you do not want them. An advance directive may make it easier for your family to make difficult choices about your care.   Cigarette Smoking and Your Health   Risks to your health if you smoke:  Nicotine and other chemicals found in tobacco damage every cell in your body. Even if you are a light smoker, you have an increased risk  for cancer, heart disease, and lung disease. If you are pregnant or have diabetes, smoking increases your risk for complications.   Benefits to your health if you stop smoking:   You decrease respiratory symptoms such as coughing, wheezing, and shortness of breath.   You reduce your risk for cancers of the lung, mouth, throat, kidney, bladder, pancreas, stomach, and cervix. If you already have cancer, you increase the benefits of chemotherapy. You also reduce your risk for cancer returning or a second cancer from developing.   You reduce your risk for heart disease, blood clots, heart attack, and stroke.   You reduce your risk for lung infections, and diseases such as pneumonia, asthma, chronic bronchitis, and emphysema.  Your circulation improves. More oxygen can be delivered to your body. If you have diabetes, you lower your risk for complications, such as kidney, artery, and eye diseases. You also lower your risk for nerve damage. Nerve damage can lead to amputations, poor vision, and blindness.  You improve your body's ability to heal and to fight infections.  For more information and support to stop smoking:   [employer]  Phone: 2- 502 - 287-6900  Web Address: www.[employer]  Weight Management   Why it is important to manage your weight:  Being overweight increases your risk of health conditions such as heart disease, high blood pressure, type 2 diabetes, and certain types of cancer. It can also increase your risk for osteoarthritis, sleep apnea, and other respiratory problems. Aim for a slow, steady weight loss. Even a small amount of weight loss can lower your risk of health problems.  How to lose weight safely:  A safe and healthy way to lose weight is to eat fewer calories and get regular exercise. You can lose up about 1 pound a week by decreasing the number of calories you eat by 500 calories each day.   Healthy meal plan for weight management:  A healthy meal plan includes a variety of foods,  contains fewer calories, and helps you stay healthy. A healthy meal plan includes the following:  Eat whole-grain foods more often.  A healthy meal plan should contain fiber. Fiber is the part of grains, fruits, and vegetables that is not broken down by your body. Whole-grain foods are healthy and provide extra fiber in your diet. Some examples of whole-grain foods are whole-wheat breads and pastas, oatmeal, brown rice, and bulgur.  Eat a variety of vegetables every day.  Include dark, leafy greens such as spinach, kale, alvaro greens, and mustard greens. Eat yellow and orange vegetables such as carrots, sweet potatoes, and winter squash.   Eat a variety of fruits every day.  Choose fresh or canned fruit (canned in its own juice or light syrup) instead of juice. Fruit juice has very little or no fiber.  Eat low-fat dairy foods.  Drink fat-free (skim) milk or 1% milk. Eat fat-free yogurt and low-fat cottage cheese. Try low-fat cheeses such as mozzarella and other reduced-fat cheeses.  Choose meat and other protein foods that are low in fat.  Choose beans or other legumes such as split peas or lentils. Choose fish, skinless poultry (chicken or turkey), or lean cuts of red meat (beef or pork). Before you cook meat or poultry, cut off any visible fat.   Use less fat and oil.  Try baking foods instead of frying them. Add less fat, such as margarine, sour cream, regular salad dressing and mayonnaise to foods. Eat fewer high-fat foods. Some examples of high-fat foods include french fries, doughnuts, ice cream, and cakes.  Eat fewer sweets.  Limit foods and drinks that are high in sugar. This includes candy, cookies, regular soda, and sweetened drinks.  Exercise:  Exercise at least 30 minutes per day on most days of the week. Some examples of exercise include walking, biking, dancing, and swimming. You can also fit in more physical activity by taking the stairs instead of the elevator or parking farther away from stores.  Ask your healthcare provider about the best exercise plan for you.      © Copyright [employer] 2018 Information is for End User's use only and may not be sold, redistributed or otherwise used for commercial purposes. All illustrations and images included in CareNotes® are the copyrighted property of [employer] or [employer]

## 2025-01-13 NOTE — PROGRESS NOTES
Name: Ryan Montoya      : 1960      MRN: [id number]  Encounter Provider: Olvin Brewster MD  Encounter Date: 2025   Encounter department: [facility] PRIMARY [facility]ARE    Assessment & Plan  Cellulitis of right hand  Cat bite and scratch.  Still swelling and irritated.  Was on Bactrim.  Will change to augmentin.  Given that there is also purulent drainage from this area, will check x-ray of the hand stat, and put in for hand surgery follow-up.  Biggest concern would be related to the possibility of osteomyelitis.  If no change in 2 days, ER.    Orders:  •  amoxicillin-clavulanate (AUGMENTIN) 875-125 mg per tablet; Take 1 tablet by mouth every 12 (twelve) hours for 10 days  •  XR hand 3+ vw right; Future  •  Ambulatory Referral to Orthopedic Surgery; Future    Mixed hyperlipidemia  Due for labs.  Follow-up afterwards.       Hypertensive kidney disease with stage 3a chronic kidney disease (HCC)  Lab Results   Component Value Date    EGFR 49 2024    EGFR 58 2023    EGFR 54 10/26/2023    CREATININE 1.46 (H) 2024    CREATININE 1.29 2023    CREATININE 1.37 (H) 10/26/2023   Due for blood work at some point.  Follow-up afterwards.  Blood pressure appears to be at goal, but is slightly higher than what I would like to see.  Given the infection and knee issues, will not make any adjustments today.       Hyperglycemia  Due for blood work.  Follow-up afterwards at some point.       Stage 3a chronic kidney disease (HCC)  Lab Results   Component Value Date    EGFR 49 2024    EGFR 58 2023    EGFR 54 10/26/2023    CREATININE 1.46 (H) 2024    CREATININE 1.29 2023    CREATININE 1.37 (H) 10/26/2023          Sprain of lateral collateral ligament of left knee, initial encounter  Patient appears to have some irritation in the lateral collateral ligament with it being lax at this point.  He has had some concerns about the knee giving out.  There is a risk of falls  with that.  He has been curtailing activity because of this.  Already had x-ray showing effusion, consistent with significant sprain of the left knee.  Check MRI to rule out further damage.  Consider physical therapy.  Orders:  •  MRI knee left  wo contrast; Future    Encounter for annual wellness visit (AWV) in Medicare patient  Reviewed health maintenance, advance directives, immunizations.       Need for RSV vaccination         Need for vaccination for Strep pneumoniae         Need for zoster vaccine         Colon cancer screening         Prostate cancer screening            Preventive health issues were discussed with patient, and age appropriate screening tests were ordered as noted in patient's After Visit Summary. Personalized health advice and appropriate referrals for health education or preventive services given if needed, as noted in patient's After Visit Summary.      1. Cellulitis of right hand  Assessment & Plan:  Cat bite and scratch.  Still swelling and irritated.  Was on Bactrim.  Will change to augmentin.  Given that there is also purulent drainage from this area, will check x-ray of the hand stat, and put in for hand surgery follow-up.  Biggest concern would be related to the possibility of osteomyelitis.  If no change in 2 days, ER.    Orders:  •  amoxicillin-clavulanate (AUGMENTIN) 875-125 mg per tablet; Take 1 tablet by mouth every 12 (twelve) hours for 10 days  •  XR hand 3+ vw right; Future  •  Ambulatory Referral to Orthopedic Surgery; Future    Orders:  -     amoxicillin-clavulanate (AUGMENTIN) 875-125 mg per tablet; Take 1 tablet by mouth every 12 (twelve) hours for 10 days  -     XR hand 3+ vw right; Future; Expected date: 01/13/2025  -     Ambulatory Referral to Orthopedic Surgery; Future  2. Mixed hyperlipidemia  Assessment & Plan:  Due for labs.  Follow-up afterwards.       3. Hypertensive kidney disease with stage 3a chronic kidney disease (HCC)  Assessment & Plan:  Lab Results    Component Value Date    EGFR 49 07/09/2024    EGFR 58 11/09/2023    EGFR 54 10/26/2023    CREATININE 1.46 (H) 07/09/2024    CREATININE 1.29 11/09/2023    CREATININE 1.37 (H) 10/26/2023   Due for blood work at some point.  Follow-up afterwards.  Blood pressure appears to be at goal, but is slightly higher than what I would like to see.  Given the infection and knee issues, will not make any adjustments today.       4. Hyperglycemia  Assessment & Plan:  Due for blood work.  Follow-up afterwards at some point.       5. Stage 3a chronic kidney disease (HCC)  Assessment & Plan:  Lab Results   Component Value Date    EGFR 49 07/09/2024    EGFR 58 11/09/2023    EGFR 54 10/26/2023    CREATININE 1.46 (H) 07/09/2024    CREATININE 1.29 11/09/2023    CREATININE 1.37 (H) 10/26/2023          6. Sprain of lateral collateral ligament of left knee, initial encounter  Assessment & Plan:  Patient appears to have some irritation in the lateral collateral ligament with it being lax at this point.  He has had some concerns about the knee giving out.  There is a risk of falls with that.  He has been curtailing activity because of this.  Already had x-ray showing effusion, consistent with significant sprain of the left knee.  Check MRI to rule out further damage.  Consider physical therapy.  Orders:  •  MRI knee left  wo contrast; Future    Orders:  -     MRI knee left  wo contrast; Future; Expected date: 01/13/2025  7. Encounter for annual wellness visit (AWV) in Medicare patient  Comments:  Reviewed health maintenance, advance directives, vaccinations.  8. Need for RSV vaccination  Comments:  Based on age, could consider RSV vaccine at local pharmacy.  9. Need for vaccination for Strep pneumoniae  Comments:  Consider Prevnar 20.  This is once over the age of 65.  Given the current infection, would hold off on vaccine today.  10. Need for zoster vaccine  Comments:  Consider shingles vaccine series at local pharmacy.  11. Colon cancer  screening  Comments:  Consider Cologuard vs colonoscopy.  Declined any testing.  12. Prostate cancer screening  Comments:  Due for PSA. Would recommend with next labs.      Chief Complaint   Patient presents with   • Medicare Wellness Visit         History of Present Illness     Has a wound on the right hand.  Had been bitten/scratched by a cat.  Continues with inflamation, swelling, and some oozing at the wound site.    Knee pain: Patient has had knee pain for a bit now.  Did have x-rays done.  Showed arthritis and effusion.  When discussing it today, he feels that the knee feels like it is going to snap.  Feels like it will give out.  He is trying to avoid that occurring so he is not doing much work with the knee at all.  Now having some hip issues as well.           Patient Care Team:  Olvin Brewster MD as PCP - General (Family Medicine)  Flakito Mobley MD (Nephrology)    Review of Systems  Medical History Reviewed by provider this encounter:       Annual Wellness Visit Questionnaire   Ryan is here for his Subsequent Wellness visit.     Health Risk Assessment:   Patient rates overall health as good. Patient feels that their physical health rating is same. Patient is very satisfied with their life. Eyesight was rated as same. Hearing was rated as same. Patient feels that their emotional and mental health rating is same. Patients states they are never, rarely angry. Patient states they are sometimes unusually tired/fatigued. Pain experienced in the last 7 days has been some. Patient's pain rating has been 8/10. Patient states that he has experienced no weight loss or gain in last 6 months.     Depression Screening:   PHQ-2 Score: 0      Fall Risk Screening:   In the past year, patient has experienced: no history of falling in past year      Home Safety:  Patient does not have trouble with stairs inside or outside of their home. Patient has working smoke alarms and has working carbon monoxide detector.  Home safety hazards include: none.     Nutrition:   Current diet is Regular.     Medications:   Patient is not currently taking any over-the-counter supplements. Patient is not able to manage medications.     Activities of Daily Living (ADLs)/Instrumental Activities of Daily Living (IADLs):   Walk and transfer into and out of bed and chair?: Yes  Dress and groom yourself?: Yes    Bathe or shower yourself?: Yes    Feed yourself? Yes  Do your laundry/housekeeping?: Yes  Manage your money, pay your bills and track your expenses?: Yes  Make your own meals?: Yes    Do your own shopping?: Yes    Previous Hospitalizations:   Any hospitalizations or ED visits within the last 12 months?: No      Advance Care Planning:   Living will: Yes    Durable POA for healthcare: Yes    Advanced directive: Yes    Advanced directive counseling given: Yes      Cognitive Screening:   Provider or family/friend/caregiver concerned regarding cognition?: No    PREVENTIVE SCREENINGS      Cardiovascular Screening:    General: Screening Not Indicated and History Lipid Disorder      Diabetes Screening:     General: Screening Current      Abdominal Aortic Aneurysm (AAA) Screening:    Risk factors include: age between 65-76 yo and tobacco use        Lung Cancer Screening:     General: Screening Current      Hepatitis C Screening:    General: Screening Current    Screening, Brief Intervention, and Referral to Treatment (SBIRT)    Screening  Typical number of drinks in a day: 0  Typical number of drinks in a week: 2  Interpretation: Low risk drinking behavior.    Single Item Drug Screening:  How often have you used an illegal drug (including marijuana) or a prescription medication for non-medical reasons in the past year? never    Single Item Drug Screen Score: 0  Interpretation: Negative screen for possible drug use disorder    Social Drivers of Health     Financial Resource Strain: Low Risk  (1/8/2024)    Overall Financial Resource Strain (CARDIA)     • Difficulty of Paying Living Expenses: Not hard at all   Food Insecurity: No Food Insecurity (1/13/2025)    Hunger Vital Sign    • Worried About Running Out of Food in the Last Year: Never true    • Ran Out of Food in the Last Year: Never true   Transportation Needs: No Transportation Needs (1/13/2025)    PRAPARE - Transportation    • Lack of Transportation (Medical): No    • Lack of Transportation (Non-Medical): No   Housing Stability: Low Risk  (1/13/2025)    Housing Stability Vital Sign    • Unable to Pay for Housing in the Last Year: No    • Number of Times Moved in the Last Year: 0    • Homeless in the Last Year: No   Utilities: Not At Risk (1/13/2025)    [facility] Utilities    • Threatened with loss of utilities: No     Vision Screening    Right eye Left eye Both eyes   Without correction 20/40 20/40 20/40   With correction          Objective   /80 (Patient Position: Sitting, Cuff Size: Large)   Pulse 80   Resp 20   Ht 6' (1.829 m)   Wt 92.5 kg (204 lb)   SpO2 95%   BMI 27.67 kg/m²     Physical Exam  Vitals and nursing note reviewed.   Constitutional:       General: He is not in acute distress.     Appearance: He is well-developed. He is not diaphoretic.   HENT:      Head: Normocephalic and atraumatic.      Right Ear: Hearing, tympanic membrane, ear canal and external ear normal.      Left Ear: Hearing, tympanic membrane, ear canal and external ear normal.      Nose: Nose normal.      Right Sinus: No maxillary sinus tenderness or frontal sinus tenderness.      Left Sinus: No maxillary sinus tenderness or frontal sinus tenderness.      Mouth/Throat:      Pharynx: Uvula midline. No oropharyngeal exudate.   Eyes:      General: Lids are everted, no foreign bodies appreciated.      Conjunctiva/sclera: Conjunctivae normal.      Pupils: Pupils are equal, round, and reactive to light.   Neck:      Thyroid: No thyromegaly.      Vascular: No carotid bruit.      Trachea: No tracheal deviation.   Cardiovascular:       Rate and Rhythm: Normal rate and regular rhythm.      Pulses:           Carotid pulses are 2+ on the right side and 2+ on the left side.     Heart sounds: Normal heart sounds. No murmur heard.     No friction rub. No gallop.   Pulmonary:      Effort: Pulmonary effort is normal. No respiratory distress.      Breath sounds: Normal breath sounds. No wheezing or rales.   Abdominal:      General: Bowel sounds are normal. There is no distension.      Palpations: Abdomen is soft.      Tenderness: There is no abdominal tenderness.   Musculoskeletal:      Cervical back: Normal range of motion and neck supple.   Lymphadenopathy:      Cervical: No cervical adenopathy.   Skin:     General: Skin is warm and dry.      Comments: Wound on the extensor aspect of the right hand.  Some purulent drainage noted.  Minimal amount appears to be expressible.  Increased tenderness with this, surrounding redness.   Neurological:      Mental Status: He is alert and oriented to person, place, and time.      Coordination: Coordination normal.   Psychiatric:         Behavior: Behavior normal.         Thought Content: Thought content normal.         Judgment: Judgment normal.

## 2025-01-13 NOTE — ASSESSMENT & PLAN NOTE
Cat bite and scratch.  Still swelling and irritated.  Was on Bactrim.  Will change to augmentin.  Given that there is also purulent drainage from this area, will check x-ray of the hand stat, and put in for hand surgery follow-up.  Biggest concern would be related to the possibility of osteomyelitis.  If no change in 2 days, ER.    Orders:  •  amoxicillin-clavulanate (AUGMENTIN) 875-125 mg per tablet; Take 1 tablet by mouth every 12 (twelve) hours for 10 days  •  XR hand 3+ vw right; Future  •  Ambulatory Referral to Orthopedic Surgery; Future

## 2025-01-13 NOTE — ASSESSMENT & PLAN NOTE
Patient appears to have some irritation in the lateral collateral ligament with it being lax at this point.  He has had some concerns about the knee giving out.  There is a risk of falls with that.  He has been curtailing activity because of this.  Already had x-ray showing effusion, consistent with significant sprain of the left knee.  Check MRI to rule out further damage.  Consider physical therapy.  Orders:  •  MRI knee left  wo contrast; Future

## 2025-01-13 NOTE — ASSESSMENT & PLAN NOTE
Lab Results   Component Value Date    EGFR 49 07/09/2024    EGFR 58 11/09/2023    EGFR 54 10/26/2023    CREATININE 1.46 (H) 07/09/2024    CREATININE 1.29 11/09/2023    CREATININE 1.37 (H) 10/26/2023   Due for blood work at some point.  Follow-up afterwards.  Blood pressure appears to be at goal, but is slightly higher than what I would like to see.  Given the infection and knee issues, will not make any adjustments today.

## 2025-01-13 NOTE — ASSESSMENT & PLAN NOTE
Lab Results   Component Value Date    EGFR 49 07/09/2024    EGFR 58 11/09/2023    EGFR 54 10/26/2023    CREATININE 1.46 (H) 07/09/2024    CREATININE 1.29 11/09/2023    CREATININE 1.37 (H) 10/26/2023

## 2025-01-14 ENCOUNTER — OFFICE VISIT (OUTPATIENT)
Dept: OBGYN CLINIC | Facility: CLINIC | Age: 65
End: 2025-01-14
Payer: COMMERCIAL

## 2025-01-14 ENCOUNTER — PREP FOR PROCEDURE (OUTPATIENT)
Dept: OBGYN CLINIC | Facility: CLINIC | Age: 65
End: 2025-01-14

## 2025-01-14 ENCOUNTER — APPOINTMENT (OUTPATIENT)
Dept: LAB | Facility: CLINIC | Age: 65
End: 2025-01-14
Payer: COMMERCIAL

## 2025-01-14 ENCOUNTER — APPOINTMENT (OUTPATIENT)
Dept: LAB | Facility: AMBULARY SURGERY CENTER | Age: 65
End: 2025-01-14
Payer: COMMERCIAL

## 2025-01-14 VITALS — WEIGHT: 204 LBS | BODY MASS INDEX: 27.63 KG/M2 | HEIGHT: 72 IN

## 2025-01-14 DIAGNOSIS — Z01.818 PREOP TESTING: Primary | ICD-10-CM

## 2025-01-14 DIAGNOSIS — Z01.818 PREOP TESTING: ICD-10-CM

## 2025-01-14 DIAGNOSIS — L03.113 CELLULITIS OF RIGHT HAND: ICD-10-CM

## 2025-01-14 LAB
ANION GAP SERPL CALCULATED.3IONS-SCNC: 7 MMOL/L (ref 4–13)
BASOPHILS # BLD MANUAL: 0 THOUSAND/UL (ref 0–0.1)
BASOPHILS NFR MAR MANUAL: 0 % (ref 0–1)
BUN SERPL-MCNC: 21 MG/DL (ref 5–25)
CALCIUM SERPL-MCNC: 9.7 MG/DL (ref 8.4–10.2)
CHLORIDE SERPL-SCNC: 101 MMOL/L (ref 96–108)
CO2 SERPL-SCNC: 29 MMOL/L (ref 21–32)
CREAT SERPL-MCNC: 1.8 MG/DL (ref 0.6–1.3)
CRP SERPL QL: 19.1 MG/L
EOSINOPHIL # BLD MANUAL: 0.45 THOUSAND/UL (ref 0–0.4)
EOSINOPHIL NFR BLD MANUAL: 5 % (ref 0–6)
ERYTHROCYTE [DISTWIDTH] IN BLOOD BY AUTOMATED COUNT: 12.7 % (ref 11.6–15.1)
ERYTHROCYTE [SEDIMENTATION RATE] IN BLOOD: 43 MM/HOUR (ref 0–19)
GFR SERPL CREATININE-BSD FRML MDRD: 38 ML/MIN/1.73SQ M
GLUCOSE SERPL-MCNC: 87 MG/DL (ref 65–140)
HCT VFR BLD AUTO: 43.9 % (ref 36.5–49.3)
HGB BLD-MCNC: 14.5 G/DL (ref 12–17)
LYMPHOCYTES # BLD AUTO: 1.71 THOUSAND/UL (ref 0.6–4.47)
LYMPHOCYTES # BLD AUTO: 18 % (ref 14–44)
MCH RBC QN AUTO: 31 PG (ref 26.8–34.3)
MCHC RBC AUTO-ENTMCNC: 33 G/DL (ref 31.4–37.4)
MCV RBC AUTO: 94 FL (ref 82–98)
MONOCYTES # BLD AUTO: 1.17 THOUSAND/UL (ref 0–1.22)
MONOCYTES NFR BLD: 13 % (ref 4–12)
NEUTROPHILS # BLD MANUAL: 5.68 THOUSAND/UL (ref 1.85–7.62)
NEUTS BAND NFR BLD MANUAL: 1 % (ref 0–8)
NEUTS SEG NFR BLD AUTO: 62 % (ref 43–75)
PLATELET # BLD AUTO: 321 THOUSANDS/UL (ref 149–390)
PLATELET BLD QL SMEAR: ADEQUATE
PMV BLD AUTO: 11.2 FL (ref 8.9–12.7)
POTASSIUM SERPL-SCNC: 4.8 MMOL/L (ref 3.5–5.3)
RBC # BLD AUTO: 4.68 MILLION/UL (ref 3.88–5.62)
RBC MORPH BLD: NORMAL
SODIUM SERPL-SCNC: 137 MMOL/L (ref 135–147)
VARIANT LYMPHS # BLD AUTO: 1 %
WBC # BLD AUTO: 9.01 THOUSAND/UL (ref 4.31–10.16)

## 2025-01-14 PROCEDURE — 36415 COLL VENOUS BLD VENIPUNCTURE: CPT

## 2025-01-14 PROCEDURE — 99204 OFFICE O/P NEW MOD 45 MIN: CPT | Performed by: STUDENT IN AN ORGANIZED HEALTH CARE EDUCATION/TRAINING PROGRAM

## 2025-01-14 PROCEDURE — 85652 RBC SED RATE AUTOMATED: CPT

## 2025-01-14 PROCEDURE — 85027 COMPLETE CBC AUTOMATED: CPT

## 2025-01-14 PROCEDURE — 80048 BASIC METABOLIC PNL TOTAL CA: CPT

## 2025-01-14 PROCEDURE — 86140 C-REACTIVE PROTEIN: CPT

## 2025-01-14 PROCEDURE — 85007 BL SMEAR W/DIFF WBC COUNT: CPT

## 2025-01-14 RX ORDER — CHLORHEXIDINE GLUCONATE ORAL RINSE 1.2 MG/ML
15 SOLUTION DENTAL ONCE
Status: CANCELLED | OUTPATIENT
Start: 2025-01-14 | End: 2025-01-14

## 2025-01-14 NOTE — H&P (VIEW-ONLY)
ORTHOPAEDIC HAND, WRIST, AND ELBOW OFFICE  VISIT      ASSESSMENT/PLAN:      Diagnoses and all orders for this visit:    Preop testing  -     C-reactive protein; Future  -     Sedimentation rate, automated; Future  -     CBC and differential; Future  -     Basic metabolic panel; Future  -     EKG 12 lead; Future    Cellulitis of right hand  -     Ambulatory Referral to Orthopedic Surgery  -     Case request operating room: INCISION AND DRAINAGE (I&D) RIGHT HAND; Standing  -     Case request operating room: INCISION AND DRAINAGE (I&D) RIGHT HAND    Other orders  -     Diet NPO; Sips with meds; Standing  -     Void on call to OR; Standing  -     Insert peripheral IV; Standing  -     Nursing Communication Warmimg Interventions Implemented; Standing  -     Nursing Communication CHG bath, have staff wash entire body (neck down) per pre-op bathing protocol. Routine, evening prior to, and day of surgery.; Standing  -     Nursing Communication Swab both nares with Povidone-Iodine solution, EXCLUDE if patient has shellfish/Iodine allergy. Routine, day of surgery, on call to OR; Standing  -     chlorhexidine (PERIDEX) 0.12 % oral rinse 15 mL  -     Procalcitonin; Standing          64 y.o. male with cat bite of the R hand from 01/06/25  Anatomy of the condition/s as well as treatment options and expected outcomes were discussed.  Any pertinent imaging or lab results were reviewed with the patient.   The patient verbalized understanding of exam findings and treatment plan.   The patient was given the opportunity to ask questions.  Questions were answered to the patient's satisfaction.  Discussed potential for hospital admission with use of IV abx versus performing I&D in the OR tomorrow and try to treat this outpatient.   Thankfully, this does not appear to be affecting the joint, but it does continue to appear infected and has worsened over the course of the past 8 days. I would recommend a formal washout to the area with  continued abx coverage.   Details of surgery and postoperative recovery discussed and pt agrees to proceed.      Follow Up:  After surgery       To Do Next Visit:  Re-evaluation of current issue      Discussions:  Standard Consent: The risks and benefits of the procedure were explained to the patient, which include, but are not limited to: Bleeding, infection, recurrence, pain, scar, damage to tendons, damage to nerves, and damage to blood vessels, failure to give desired results and complications related to anesthesia.  These risks, along with alternative conservative treatment options, and postoperative protocols were voiced back and understood by the patient.  All questions were answered to the patient's satisfaction.  The patient agrees to comply with a standard postoperative protocol, and is willing to proceed.  Education was provided via written and auditory forms.  There were no barriers to learning. Written handouts regarding wound care, incision and scar care, and general preoperative information was provided to the patient.  Prior to surgery, the patient may be requested to stop all anti-inflammatory medications.  Prophylactic aspirin, Plavix, and Coumadin may be allowed to be continued.  Medications including vitamin E., ginkgo, and fish oil are requested to be stopped approximately one week prior to surgery.  Hypertensive medications and beta blockers, if taken, should be continued.      Marky Richardson MD  Attending, Orthopaedic Surgery  Hand, Wrist, and Elbow Surgery  [facility]    ______________________________________________________________________________________________    CHIEF COMPLAINT:  Chief Complaint   Patient presents with    Right Hand - Animal Bite     Cat        SUBJECTIVE:  Patient is a 64 y.o. ambidextrous male who presents today for evaluation and treatment of cat bite to the R hand sustained 01/06/25. Pt seen by PCP's CHRISTIE who had prescribed a course of  Bactrim for tx of R hand cellulitis. Pt then followed up with PCP yesterday at which point he was described as having continued edema/erythema and purulent drainage. Was switched to a course of Augmentin and asked to follow up here. He describes persistent pain and feels like this has worsened since last week. Describes difficulty moving the fingers 2/2 pain. States he had clear drainage for a few days but for the last 2 days has noticed pus draining from the wound.   Referred for evaluation by Olvin Brewster MD     Occupation: Retired     I have personally reviewed all the relevant PMH, PSH, SH, FH, Medications and allergies      PAST MEDICAL HISTORY:  Past Medical History:   Diagnosis Date    Acute intractable headache 11/03/2021    Acute maxillary sinusitis     13 dec 2012    ROSENDO (acute kidney injury) ([facility]) 06/08/2018 2018.     Bone spur     toe    BPPV (benign paroxysmal positional vertigo) 10/24/2021    Cataract 02/28/2022    History of transfusion     Hyperlipidemia     Hypertension     Kidney stone     PAD (peripheral artery disease) ([facility]) 05/22/2023    PVD (peripheral vascular disease) ([facility])     Retinal detachment 11/21/2013    Sleep apnea     does not use cpap    TIA (transient ischemic attack) 11/03/2021       PAST SURGICAL HISTORY:  Past Surgical History:   Procedure Laterality Date    APPENDECTOMY      CATARACT EXTRACTION Bilateral     IR LOWER EXTREMITY ANGIOGRAM  11/8/2023    KIDNEY STONE SURGERY      multiple surgery in the past    [place] SLCTV CATHJ 3RD+ ORD SLCTV ABDL PEL/LXTR BRNCH Right 11/8/2023    Procedure: ARTERIOGRAM;  Surgeon: Lucho Reynoso MD;  [facility];  Service: Vascular    WA TEAEC W/WO PATCH GRAFT COMMON FEMORAL Left 08/14/2023    Procedure: ENDARTERECTOMY ARTERIAL FEMORAL WITH TISSUES PATCH AGIOPLASTY;  Surgeon: Lucho Reynoso MD;  [facility];  Service: Vascular    WA TEAEC W/WO PATCH GRAFT COMMON FEMORAL Right 11/8/2023    Procedure:  RIGHT FEMORAL ENDARTERECTOMY WITH PATCH ANGIOPLASTY, RIGHT COMMON ILIAC INTRAVASCULAR LITHOTRIPSY, RIGHT EXTERNAL ILIAC INTRAVASCULAR LITHOTRIPSY, AORTAGRAM, LEFT COMMON ILIAC INTRAVASCULAR LITHOTRIPSY;  Surgeon: Lucho Reynoso MD;  [facility];  Service: Vascular    RETINAL DETACHMENT SURGERY Bilateral     SEPTOPLASTY      TONSILLECTOMY  2000    meg Vidal       FAMILY HISTORY:  Family History   Problem Relation Age of Onset    Stroke Mother     No Known Problems Father        SOCIAL HISTORY:  Social History     Tobacco Use    Smoking status: Every Day     Current packs/day: 0.50     Average packs/day: 0.5 packs/day for 40.0 years (20.0 ttl pk-yrs)     Types: Cigarettes    Smokeless tobacco: Never    Tobacco comments:     exposure to 2nd hand smoke  advised not to smoke prior to procedure   Vaping Use    Vaping status: Never Used   Substance Use Topics    Alcohol use: Yes     Alcohol/week: 0.0 standard drinks of alcohol     Comment: social    Drug use: Yes     Types: Marijuana     Comment: once a week advised not to smoke prior to procedure       MEDICATIONS:    Current Outpatient Medications:     acetaminophen (TYLENOL) 500 mg tablet, Take 500 mg by mouth every 6 (six) hours as needed for mild pain, Disp: , Rfl:     ALPRAZolam (XANAX) 0.25 mg tablet, Take 1 tablet (0.25 mg total) by mouth daily at bedtime as needed for anxiety, Disp: 15 tablet, Rfl: 0    amoxicillin-clavulanate (AUGMENTIN) 875-125 mg per tablet, Take 1 tablet by mouth every 12 (twelve) hours for 10 days, Disp: 20 tablet, Rfl: 0    aspirin 81 mg chewable tablet, Chew 1 tablet (81 mg total) daily, Disp: , Rfl:     diltiazem (DILACOR XR) 120 MG 24 hr capsule, Take 1 capsule (120 mg total) by mouth daily, Disp: 90 capsule, Rfl: 1    gabapentin (Neurontin) 300 mg capsule, Take 1 capsule (300 mg total) by mouth 2 (two) times a day, Disp: 90 capsule, Rfl: 3    losartan (COZAAR) 100 MG tablet, TAKE 1 TABLET BY MOUTH EVERY DAY,  Disp: 90 tablet, Rfl: 1    Melatonin 10 MG TABS, Take 10 mg by mouth Pt takes 10mg at bedtime., Disp: , Rfl:     rosuvastatin (CRESTOR) 40 MG tablet, Take 1 tablet (40 mg total) by mouth daily, Disp: 90 tablet, Rfl: 3    sulfamethoxazole-trimethoprim (BACTRIM DS) 800-160 mg per tablet, Take 1 tablet by mouth every 12 (twelve) hours for 7 days, Disp: 14 tablet, Rfl: 0    traZODone (DESYREL) 50 mg tablet, TAKE 2 TABLETS (100 MG TOTAL) BY MOUTH DAILY AT BEDTIME, Disp: 180 tablet, Rfl: 1    triamcinolone (KENALOG) 0.1 % cream, Apply topically 2 (two) times a day For up to two weeks to hands and forearms after completing efudex therapy to help with healing., Disp: 80 g, Rfl: 0    fluorouracil (EFUDEX) 5 % cream, Apply twice a day for two to three weeks on bilateral arms and tho weeks for face (Patient not taking: Reported on 10/8/2024), Disp: 40 g, Rfl: 1    naloxone (NARCAN) 4 mg/0.1 mL nasal spray, Administer 1 spray into a nostril. If no response after 2-3 minutes, give another dose in the other nostril using a new spray., Disp: 1 each, Rfl: 1    ALLERGIES:  Allergies   Allergen Reactions    Amlodipine Edema    Eszopiclone Other (See Comments)     Sleep walking    Zolpidem Other (See Comments)     Other reaction(s): sleepwalking.           REVIEW OF SYSTEMS:  Musculoskeletal:        As noted in HPI.   All other systems reviewed and are negative.    VITALS:  There were no vitals filed for this visit.    LABS:  HgA1c:   Lab Results   Component Value Date    HGBA1C 5.6 10/24/2021     BMP:   Lab Results   Component Value Date    GLUCOSE 84 06/04/2015    CALCIUM 9.6 07/09/2024     06/04/2015    K 4.2 07/09/2024    CO2 23 07/09/2024     07/09/2024    BUN 22 07/09/2024    CREATININE 1.46 (H) 07/09/2024       _____________________________________________________  PHYSICAL EXAMINATION:  General: Well developed and well nourished, alert & oriented x 3, appears comfortable  Psychiatric: Normal  HEENT: Normocephalic,  Atraumatic Trachea Midline, No torticollis  Pulmonary: No audible wheezing or respiratory distress   Abdomen/GI: Non tender, non distended   Cardiovascular: No pitting edema, 2+ radial pulse   Skin: No Fluctuation, No Ulcerations  Neurovascular: Sensation Intact to the Median, Ulnar, Radial Nerve, Motor Intact to the Median, Ulnar, Radial Nerve, and Pulses Intact  Musculoskeletal: Normal, except as noted in detailed exam and in HPI.      MUSCULOSKELETAL EXAMINATION:  Right Hand:  Pt with + edema, erythema and small amount of fluctuance along the dorsal distal hand near level of middle MCP.   2 bite marks located along this area.  Pt with active clear/yellow drainage from here.  + warmth.  + TTP.  Pulp to palm 4.5 cm.  Full digit extension.  No pain with short arc motion of MCP.   Active MCP flexion 0-90 with IP extension  He is also noted to have what appears to be a hematoma just proximal and ulnar to this.  No scratches/bite marks seen in this area.      ___________________________________________________  STUDIES REVIEWED:  Xrays of the right hand were reviewed and independently interpreted in PACS by Dr. Richardson and demonstrate no evidence of acute osteomyelitis.          PROCEDURES PERFORMED:  Procedures  No Procedures performed today    _____________________________________________________      Scribe Attestation      I,:  Olive Esquivel PA-C am acting as a scribe while in the presence of the attending physician.:       I,:  Marky Richardson MD personally performed the services described in this documentation    as scribed in my presence.:

## 2025-01-14 NOTE — PROGRESS NOTES
ORTHOPAEDIC HAND, WRIST, AND ELBOW OFFICE  VISIT      ASSESSMENT/PLAN:      Diagnoses and all orders for this visit:    Preop testing  -     C-reactive protein; Future  -     Sedimentation rate, automated; Future  -     CBC and differential; Future  -     Basic metabolic panel; Future  -     EKG 12 lead; Future    Cellulitis of right hand  -     Ambulatory Referral to Orthopedic Surgery  -     Case request operating room: INCISION AND DRAINAGE (I&D) RIGHT HAND; Standing  -     Case request operating room: INCISION AND DRAINAGE (I&D) RIGHT HAND    Other orders  -     Diet NPO; Sips with meds; Standing  -     Void on call to OR; Standing  -     Insert peripheral IV; Standing  -     Nursing Communication Warmimg Interventions Implemented; Standing  -     Nursing Communication CHG bath, have staff wash entire body (neck down) per pre-op bathing protocol. Routine, evening prior to, and day of surgery.; Standing  -     Nursing Communication Swab both nares with Povidone-Iodine solution, EXCLUDE if patient has shellfish/Iodine allergy. Routine, day of surgery, on call to OR; Standing  -     chlorhexidine (PERIDEX) 0.12 % oral rinse 15 mL  -     Procalcitonin; Standing          64 y.o. male with cat bite of the R hand from 01/06/25  Anatomy of the condition/s as well as treatment options and expected outcomes were discussed.  Any pertinent imaging or lab results were reviewed with the patient.   The patient verbalized understanding of exam findings and treatment plan.   The patient was given the opportunity to ask questions.  Questions were answered to the patient's satisfaction.  Discussed potential for hospital admission with use of IV abx versus performing I&D in the OR tomorrow and try to treat this outpatient.   Thankfully, this does not appear to be affecting the joint, but it does continue to appear infected and has worsened over the course of the past 8 days. I would recommend a formal washout to the area with  continued abx coverage.   Details of surgery and postoperative recovery discussed and pt agrees to proceed.      Follow Up:  After surgery       To Do Next Visit:  Re-evaluation of current issue      Discussions:  Standard Consent: The risks and benefits of the procedure were explained to the patient, which include, but are not limited to: Bleeding, infection, recurrence, pain, scar, damage to tendons, damage to nerves, and damage to blood vessels, failure to give desired results and complications related to anesthesia.  These risks, along with alternative conservative treatment options, and postoperative protocols were voiced back and understood by the patient.  All questions were answered to the patient's satisfaction.  The patient agrees to comply with a standard postoperative protocol, and is willing to proceed.  Education was provided via written and auditory forms.  There were no barriers to learning. Written handouts regarding wound care, incision and scar care, and general preoperative information was provided to the patient.  Prior to surgery, the patient may be requested to stop all anti-inflammatory medications.  Prophylactic aspirin, Plavix, and Coumadin may be allowed to be continued.  Medications including vitamin E., ginkgo, and fish oil are requested to be stopped approximately one week prior to surgery.  Hypertensive medications and beta blockers, if taken, should be continued.      Marky Richardson MD  Attending, Orthopaedic Surgery  Hand, Wrist, and Elbow Surgery  [facility]    ______________________________________________________________________________________________    CHIEF COMPLAINT:  Chief Complaint   Patient presents with    Right Hand - Animal Bite     Cat        SUBJECTIVE:  Patient is a 64 y.o. ambidextrous male who presents today for evaluation and treatment of cat bite to the R hand sustained 01/06/25. Pt seen by PCP's CHRISTIE who had prescribed a course of  Bactrim for tx of R hand cellulitis. Pt then followed up with PCP yesterday at which point he was described as having continued edema/erythema and purulent drainage. Was switched to a course of Augmentin and asked to follow up here. He describes persistent pain and feels like this has worsened since last week. Describes difficulty moving the fingers 2/2 pain. States he had clear drainage for a few days but for the last 2 days has noticed pus draining from the wound.   Referred for evaluation by Olvin Brewster MD     Occupation: Retired     I have personally reviewed all the relevant PMH, PSH, SH, FH, Medications and allergies      PAST MEDICAL HISTORY:  Past Medical History:   Diagnosis Date    Acute intractable headache 11/03/2021    Acute maxillary sinusitis     13 dec 2012    ROSENDO (acute kidney injury) ([facility]) 06/08/2018 2018.     Bone spur     toe    BPPV (benign paroxysmal positional vertigo) 10/24/2021    Cataract 02/28/2022    History of transfusion     Hyperlipidemia     Hypertension     Kidney stone     PAD (peripheral artery disease) ([facility]) 05/22/2023    PVD (peripheral vascular disease) ([facility])     Retinal detachment 11/21/2013    Sleep apnea     does not use cpap    TIA (transient ischemic attack) 11/03/2021       PAST SURGICAL HISTORY:  Past Surgical History:   Procedure Laterality Date    APPENDECTOMY      CATARACT EXTRACTION Bilateral     IR LOWER EXTREMITY ANGIOGRAM  11/8/2023    KIDNEY STONE SURGERY      multiple surgery in the past    [place] SLCTV CATHJ 3RD+ ORD SLCTV ABDL PEL/LXTR BRNCH Right 11/8/2023    Procedure: ARTERIOGRAM;  Surgeon: Lucho Reynoso MD;  [facility];  Service: Vascular    [place] TEAEC W/WO PATCH GRAFT COMMON FEMORAL Left 08/14/2023    Procedure: ENDARTERECTOMY ARTERIAL FEMORAL WITH TISSUES PATCH AGIOPLASTY;  Surgeon: Lucho Reynoso MD;  [facility];  Service: Vascular    [place] TEAEC W/WO PATCH GRAFT COMMON FEMORAL Right 11/8/2023    Procedure:  RIGHT FEMORAL ENDARTERECTOMY WITH PATCH ANGIOPLASTY, RIGHT COMMON ILIAC INTRAVASCULAR LITHOTRIPSY, RIGHT EXTERNAL ILIAC INTRAVASCULAR LITHOTRIPSY, AORTAGRAM, LEFT COMMON ILIAC INTRAVASCULAR LITHOTRIPSY;  Surgeon: Lucho Reynoso MD;  [facility];  Service: Vascular    RETINAL DETACHMENT SURGERY Bilateral     SEPTOPLASTY      TONSILLECTOMY  2000    meg Vidal       FAMILY HISTORY:  Family History   Problem Relation Age of Onset    Stroke Mother     No Known Problems Father        SOCIAL HISTORY:  Social History     Tobacco Use    Smoking status: Every Day     Current packs/day: 0.50     Average packs/day: 0.5 packs/day for 40.0 years (20.0 ttl pk-yrs)     Types: Cigarettes    Smokeless tobacco: Never    Tobacco comments:     exposure to 2nd hand smoke  advised not to smoke prior to procedure   Vaping Use    Vaping status: Never Used   Substance Use Topics    Alcohol use: Yes     Alcohol/week: 0.0 standard drinks of alcohol     Comment: social    Drug use: Yes     Types: Marijuana     Comment: once a week advised not to smoke prior to procedure       MEDICATIONS:    Current Outpatient Medications:     acetaminophen (TYLENOL) 500 mg tablet, Take 500 mg by mouth every 6 (six) hours as needed for mild pain, Disp: , Rfl:     ALPRAZolam (XANAX) 0.25 mg tablet, Take 1 tablet (0.25 mg total) by mouth daily at bedtime as needed for anxiety, Disp: 15 tablet, Rfl: 0    amoxicillin-clavulanate (AUGMENTIN) 875-125 mg per tablet, Take 1 tablet by mouth every 12 (twelve) hours for 10 days, Disp: 20 tablet, Rfl: 0    aspirin 81 mg chewable tablet, Chew 1 tablet (81 mg total) daily, Disp: , Rfl:     diltiazem (DILACOR XR) 120 MG 24 hr capsule, Take 1 capsule (120 mg total) by mouth daily, Disp: 90 capsule, Rfl: 1    gabapentin (Neurontin) 300 mg capsule, Take 1 capsule (300 mg total) by mouth 2 (two) times a day, Disp: 90 capsule, Rfl: 3    losartan (COZAAR) 100 MG tablet, TAKE 1 TABLET BY MOUTH EVERY DAY,  Disp: 90 tablet, Rfl: 1    Melatonin 10 MG TABS, Take 10 mg by mouth Pt takes 10mg at bedtime., Disp: , Rfl:     rosuvastatin (CRESTOR) 40 MG tablet, Take 1 tablet (40 mg total) by mouth daily, Disp: 90 tablet, Rfl: 3    sulfamethoxazole-trimethoprim (BACTRIM DS) 800-160 mg per tablet, Take 1 tablet by mouth every 12 (twelve) hours for 7 days, Disp: 14 tablet, Rfl: 0    traZODone (DESYREL) 50 mg tablet, TAKE 2 TABLETS (100 MG TOTAL) BY MOUTH DAILY AT BEDTIME, Disp: 180 tablet, Rfl: 1    triamcinolone (KENALOG) 0.1 % cream, Apply topically 2 (two) times a day For up to two weeks to hands and forearms after completing efudex therapy to help with healing., Disp: 80 g, Rfl: 0    fluorouracil (EFUDEX) 5 % cream, Apply twice a day for two to three weeks on bilateral arms and tho weeks for face (Patient not taking: Reported on 10/8/2024), Disp: 40 g, Rfl: 1    naloxone (NARCAN) 4 mg/0.1 mL nasal spray, Administer 1 spray into a nostril. If no response after 2-3 minutes, give another dose in the other nostril using a new spray., Disp: 1 each, Rfl: 1    ALLERGIES:  Allergies   Allergen Reactions    Amlodipine Edema    Eszopiclone Other (See Comments)     Sleep walking    Zolpidem Other (See Comments)     Other reaction(s): sleepwalking.           REVIEW OF SYSTEMS:  Musculoskeletal:        As noted in HPI.   All other systems reviewed and are negative.    VITALS:  There were no vitals filed for this visit.    LABS:  HgA1c:   Lab Results   Component Value Date    HGBA1C 5.6 10/24/2021     BMP:   Lab Results   Component Value Date    GLUCOSE 84 06/04/2015    CALCIUM 9.6 07/09/2024     06/04/2015    K 4.2 07/09/2024    CO2 23 07/09/2024     07/09/2024    BUN 22 07/09/2024    CREATININE 1.46 (H) 07/09/2024       _____________________________________________________  PHYSICAL EXAMINATION:  General: Well developed and well nourished, alert & oriented x 3, appears comfortable  Psychiatric: Normal  HEENT: Normocephalic,  Atraumatic Trachea Midline, No torticollis  Pulmonary: No audible wheezing or respiratory distress   Abdomen/GI: Non tender, non distended   Cardiovascular: No pitting edema, 2+ radial pulse   Skin: No Fluctuation, No Ulcerations  Neurovascular: Sensation Intact to the Median, Ulnar, Radial Nerve, Motor Intact to the Median, Ulnar, Radial Nerve, and Pulses Intact  Musculoskeletal: Normal, except as noted in detailed exam and in HPI.      MUSCULOSKELETAL EXAMINATION:  Right Hand:  Pt with + edema, erythema and small amount of fluctuance along the dorsal distal hand near level of middle MCP.   2 bite marks located along this area.  Pt with active clear/yellow drainage from here.  + warmth.  + TTP.  Pulp to palm 4.5 cm.  Full digit extension.  No pain with short arc motion of MCP.   Active MCP flexion 0-90 with IP extension  He is also noted to have what appears to be a hematoma just proximal and ulnar to this.  No scratches/bite marks seen in this area.      ___________________________________________________  STUDIES REVIEWED:  Xrays of the right hand were reviewed and independently interpreted in PACS by Dr. Richardson and demonstrate no evidence of acute osteomyelitis.          PROCEDURES PERFORMED:  Procedures  No Procedures performed today    _____________________________________________________      Scribe Attestation      I,:  Olive Esquivel PA-C am acting as a scribe while in the presence of the attending physician.:       I,:  Marky Richardson MD personally performed the services described in this documentation    as scribed in my presence.:

## 2025-01-15 ENCOUNTER — TELEPHONE (OUTPATIENT)
Dept: OBGYN CLINIC | Facility: CLINIC | Age: 65
End: 2025-01-15

## 2025-01-15 ENCOUNTER — ANESTHESIA EVENT (OUTPATIENT)
Dept: PERIOP | Facility: AMBULARY SURGERY CENTER | Age: 65
End: 2025-01-15
Payer: COMMERCIAL

## 2025-01-15 ENCOUNTER — ANESTHESIA (OUTPATIENT)
Dept: PERIOP | Facility: AMBULARY SURGERY CENTER | Age: 65
End: 2025-01-15
Payer: COMMERCIAL

## 2025-01-15 ENCOUNTER — HOSPITAL ENCOUNTER (OUTPATIENT)
Facility: AMBULARY SURGERY CENTER | Age: 65
Setting detail: OUTPATIENT SURGERY
Discharge: HOME | End: 2025-01-15
Attending: STUDENT IN AN ORGANIZED HEALTH CARE EDUCATION/TRAINING PROGRAM | Admitting: STUDENT IN AN ORGANIZED HEALTH CARE EDUCATION/TRAINING PROGRAM
Payer: COMMERCIAL

## 2025-01-15 VITALS
DIASTOLIC BLOOD PRESSURE: 65 MMHG | SYSTOLIC BLOOD PRESSURE: 133 MMHG | OXYGEN SATURATION: 93 % | BODY MASS INDEX: 27.5 KG/M2 | HEIGHT: 72 IN | HEART RATE: 53 BPM | TEMPERATURE: 98 F | RESPIRATION RATE: 18 BRPM | WEIGHT: 203 LBS

## 2025-01-15 DIAGNOSIS — L03.113 CELLULITIS OF RIGHT HAND: ICD-10-CM

## 2025-01-15 LAB
ATRIAL RATE: 54 BPM
P AXIS: 52 DEGREES
PR INTERVAL: 174 MS
QRS AXIS: 31 DEGREES
QRSD INTERVAL: 78 MS
QT INTERVAL: 420 MS
QTC INTERVAL: 398 MS
T WAVE AXIS: 88 DEGREES
VENTRICULAR RATE: 54 BPM

## 2025-01-15 PROCEDURE — 93010 ELECTROCARDIOGRAM REPORT: CPT | Performed by: INTERNAL MEDICINE

## 2025-01-15 PROCEDURE — 10140 I&D HMTMA SEROMA/FLUID COLLJ: CPT | Performed by: STUDENT IN AN ORGANIZED HEALTH CARE EDUCATION/TRAINING PROGRAM

## 2025-01-15 PROCEDURE — 10140 I&D HMTMA SEROMA/FLUID COLLJ: CPT | Performed by: PHYSICIAN ASSISTANT

## 2025-01-15 PROCEDURE — 87070 CULTURE OTHR SPECIMN AEROBIC: CPT | Performed by: STUDENT IN AN ORGANIZED HEALTH CARE EDUCATION/TRAINING PROGRAM

## 2025-01-15 PROCEDURE — 87205 SMEAR GRAM STAIN: CPT | Performed by: STUDENT IN AN ORGANIZED HEALTH CARE EDUCATION/TRAINING PROGRAM

## 2025-01-15 PROCEDURE — 87075 CULTR BACTERIA EXCEPT BLOOD: CPT | Performed by: STUDENT IN AN ORGANIZED HEALTH CARE EDUCATION/TRAINING PROGRAM

## 2025-01-15 PROCEDURE — 26410 REPAIR HAND TENDON: CPT | Performed by: PHYSICIAN ASSISTANT

## 2025-01-15 PROCEDURE — 87176 TISSUE HOMOGENIZATION CULTR: CPT | Performed by: STUDENT IN AN ORGANIZED HEALTH CARE EDUCATION/TRAINING PROGRAM

## 2025-01-15 PROCEDURE — 26410 REPAIR HAND TENDON: CPT | Performed by: STUDENT IN AN ORGANIZED HEALTH CARE EDUCATION/TRAINING PROGRAM

## 2025-01-15 RX ORDER — LIDOCAINE HYDROCHLORIDE 20 MG/ML
INJECTION, SOLUTION EPIDURAL; INFILTRATION; INTRACAUDAL; PERINEURAL AS NEEDED
Status: DISCONTINUED | OUTPATIENT
Start: 2025-01-15 | End: 2025-01-15

## 2025-01-15 RX ORDER — FENTANYL CITRATE 50 UG/ML
INJECTION, SOLUTION INTRAMUSCULAR; INTRAVENOUS AS NEEDED
Status: DISCONTINUED | OUTPATIENT
Start: 2025-01-15 | End: 2025-01-15

## 2025-01-15 RX ORDER — ONDANSETRON 2 MG/ML
4 INJECTION INTRAMUSCULAR; INTRAVENOUS ONCE AS NEEDED
Status: DISCONTINUED | OUTPATIENT
Start: 2025-01-15 | End: 2025-01-15 | Stop reason: HOSPADM

## 2025-01-15 RX ORDER — MAGNESIUM HYDROXIDE 1200 MG/15ML
LIQUID ORAL AS NEEDED
Status: DISCONTINUED | OUTPATIENT
Start: 2025-01-15 | End: 2025-01-15 | Stop reason: HOSPADM

## 2025-01-15 RX ORDER — OXYCODONE HYDROCHLORIDE 5 MG/1
5 TABLET ORAL EVERY 6 HOURS PRN
Qty: 10 TABLET | Refills: 0 | Status: SHIPPED | OUTPATIENT
Start: 2025-01-15

## 2025-01-15 RX ORDER — CEFAZOLIN SODIUM 1 G/3ML
INJECTION, POWDER, FOR SOLUTION INTRAMUSCULAR; INTRAVENOUS AS NEEDED
Status: DISCONTINUED | OUTPATIENT
Start: 2025-01-15 | End: 2025-01-15

## 2025-01-15 RX ORDER — OXYCODONE HYDROCHLORIDE 5 MG/1
5 TABLET ORAL EVERY 6 HOURS PRN
Status: DISCONTINUED | OUTPATIENT
Start: 2025-01-15 | End: 2025-01-15 | Stop reason: HOSPADM

## 2025-01-15 RX ORDER — FENTANYL CITRATE/PF 50 MCG/ML
25 SYRINGE (ML) INJECTION
Status: DISCONTINUED | OUTPATIENT
Start: 2025-01-15 | End: 2025-01-15 | Stop reason: HOSPADM

## 2025-01-15 RX ORDER — PROPOFOL 10 MG/ML
INJECTION, EMULSION INTRAVENOUS AS NEEDED
Status: DISCONTINUED | OUTPATIENT
Start: 2025-01-15 | End: 2025-01-15

## 2025-01-15 RX ORDER — CHLORHEXIDINE GLUCONATE ORAL RINSE 1.2 MG/ML
15 SOLUTION DENTAL ONCE
Status: COMPLETED | OUTPATIENT
Start: 2025-01-15 | End: 2025-01-15

## 2025-01-15 RX ORDER — MIDAZOLAM HYDROCHLORIDE 2 MG/2ML
INJECTION, SOLUTION INTRAMUSCULAR; INTRAVENOUS AS NEEDED
Status: DISCONTINUED | OUTPATIENT
Start: 2025-01-15 | End: 2025-01-15

## 2025-01-15 RX ORDER — SODIUM CHLORIDE, SODIUM LACTATE, POTASSIUM CHLORIDE, CALCIUM CHLORIDE 600; 310; 30; 20 MG/100ML; MG/100ML; MG/100ML; MG/100ML
INJECTION, SOLUTION INTRAVENOUS CONTINUOUS PRN
Status: DISCONTINUED | OUTPATIENT
Start: 2025-01-15 | End: 2025-01-15

## 2025-01-15 RX ADMIN — SODIUM CHLORIDE, SODIUM LACTATE, POTASSIUM CHLORIDE, AND CALCIUM CHLORIDE: .6; .31; .03; .02 INJECTION, SOLUTION INTRAVENOUS at 13:43

## 2025-01-15 RX ADMIN — CHLORHEXIDINE GLUCONATE 15 ML: 1.2 SOLUTION ORAL at 13:15

## 2025-01-15 RX ADMIN — PROPOFOL 40 MG: 10 INJECTION, EMULSION INTRAVENOUS at 13:59

## 2025-01-15 RX ADMIN — SODIUM CHLORIDE, SODIUM LACTATE, POTASSIUM CHLORIDE, AND CALCIUM CHLORIDE: .6; .31; .03; .02 INJECTION, SOLUTION INTRAVENOUS at 13:49

## 2025-01-15 RX ADMIN — FENTANYL CITRATE 50 MCG: 50 INJECTION INTRAMUSCULAR; INTRAVENOUS at 13:53

## 2025-01-15 RX ADMIN — OXYCODONE HYDROCHLORIDE 5 MG: 5 TABLET ORAL at 15:31

## 2025-01-15 RX ADMIN — DEXMEDETOMIDINE 12 MCG: 100 INJECTION, SOLUTION INTRAVENOUS at 13:53

## 2025-01-15 RX ADMIN — DEXMEDETOMIDINE 8 MCG: 100 INJECTION, SOLUTION INTRAVENOUS at 13:57

## 2025-01-15 RX ADMIN — CEFAZOLIN 2000 MG: 1 INJECTION, POWDER, FOR SOLUTION INTRAMUSCULAR; INTRAVENOUS at 14:03

## 2025-01-15 RX ADMIN — PROPOFOL 100 MCG/KG/MIN: 10 INJECTION, EMULSION INTRAVENOUS at 14:01

## 2025-01-15 RX ADMIN — MIDAZOLAM 2 MG: 1 INJECTION INTRAMUSCULAR; INTRAVENOUS at 13:53

## 2025-01-15 RX ADMIN — LIDOCAINE HYDROCHLORIDE 100 MG: 20 INJECTION, SOLUTION EPIDURAL; INFILTRATION; INTRACAUDAL at 13:58

## 2025-01-15 NOTE — INTERVAL H&P NOTE
H&P reviewed. After examining the patient I find no changes in the patients condition since the H&P had been written.    Vitals:    01/15/25 1315   BP: 131/76   Pulse: 60   Resp: 18   Temp: (!) 96.8 °F (36 °C)   SpO2: 94%

## 2025-01-15 NOTE — ANESTHESIA PREPROCEDURE EVALUATION
Procedure:  INCISION AND DRAINAGE (I&D) RIGHT HAND (Right: Hand)    Relevant Problems   CARDIO   (+) Carotid stenosis   (+) Hyperlipidemia   (+) Rest pain of both lower extremities due to atherosclerosis (HCC)   (+) Saddle embolus of abdominal aorta (HCC)      ENDO   (+) Hypothyroid      /RENAL   (+) Bilateral renal cysts   (+) CKD (chronic kidney disease)   (+) Cystinuria (HCC)   (+) Hypertensive chronic kidney disease   (+) Left renal atrophy   (+) Nephrolithiasis      NEURO/PSYCH   (+) Anxiety   (+) Chronic pain   (+) Claudication of both lower extremities (HCC)      PULMONARY   (+) Obstructive sleep apnea        Physical Exam    Airway    Mallampati score: II  TM Distance: >3 FB  Neck ROM: limited     Dental       Cardiovascular  Rhythm: regular, Rate: normal, Cardiovascular exam normal    Pulmonary  Pulmonary exam normal Breath sounds clear to auscultation    Other Findings        Anesthesia Plan  ASA Score- 3     Anesthesia Type- IV sedation with anesthesia with ASA Monitors.         Additional Monitors:     Airway Plan:            Plan Factors-Exercise tolerance (METS): >4 METS.    Chart reviewed. EKG reviewed. Imaging results reviewed. Existing labs reviewed. Patient summary reviewed.    Patient is a current smoker.  Patient instructed to abstain from smoking on day of procedure. Patient smoked on day of surgery.            Induction- intravenous.    Postoperative Plan-     Perioperative Resuscitation Plan - Level 1 - Full Code.       Informed Consent- Anesthetic plan and risks discussed with patient and spouse.  I personally reviewed this patient with the CRNA. Discussed and agreed on the Anesthesia Plan with the CRNA..      NPO Status:  No vitals data found for the desired time range.

## 2025-01-15 NOTE — DISCHARGE INSTR - AVS FIRST PAGE
Post Operative Instructions    You have had surgery on your arm today, please read and follow the information below:  Elevate your hand above your elbow during the next 24-48 hours to help with swelling.  Place your hand and arm over your head with motion at your shoulder three times a day.  Do not apply any cream/ointment/oil to your incisions including antibiotics (I.e.Neosporin)  Do not use peroxide to clean your wound   Do not soak your hands in standing water (dishwater, tubs, Jacuzzi's, pools, etc.) until given permission (typically 2-3 weeks after injury)    Call the office if you notice any:  Increased numbness or tingling of your hand or fingers that is not relieved with elevation.  Increasing pain that is not controlled with medication.  Difficulty chewing, breathing, swallowing.  Chest pains or shortness of breath.  Fever over 101.4 degrees.    Bandage: Do NOT remove bandage until follow-up appointment.    Motion: Move fingers into a fist 5 times a day, DO NOT move any splinted fingers.    Weight bearing status: Avoid heavy lifting (>2 pounds) with the extremity that was operated on until follow up appointment. Normal activities of daily living are OK.    Ice: Ice for 10 minutes every hour as needed for swelling x 24 hours.    Sling: No sling necessary.    Pain medication:   Oxycodone 1 tab every 6 hours AS NEEDED for pain  *   You may take Tylenol (acetaminophen) in addition to your pain medication. This is over the counter. Please follow the instructions on the bottle. BE AWARE - your pain medication (hydrocodone/oxycodone) may already include acetaminophen in it. If it does, you must include this in your daily amount and make sure not take more than 3000 mg per day.   *   You may take an antiinflammatory medication (i.e. ibuprofen/naproxen) in addition to your pain medication. These are found over the counter, but higher prescription doses are available if needed. Please follow the dosing instructions  on the bottle and it is recommended you take these with food. DO NOT take these medications if you are on a blood thinner, have history of gastrointestinal bleeding, chronic kidney disease, or if you have ever been told by a physician not to. You CAN take this with Tylenol (acetaminophen), but should only take ONE antiinflammatory at a time.    Antibiotics:  Augmentin (amoxicillin/clavulanate) 500 mg 1 tab every 12 hours - please finish your current prescription     Therapy: No therapy needed at this time.    Follow-up Appointment: 10-14 days.        Please call the office if you have any questions or concerns regarding your post-operative care.

## 2025-01-15 NOTE — ANESTHESIA POSTPROCEDURE EVALUATION
Post-Op Assessment Note    CV Status:  Stable  Pain Score: 0    Pain management: adequate       Mental Status:  Awake and alert   Hydration Status:  Stable   PONV Controlled:  None   Airway Patency:  Patent and adequate     Post Op Vitals Reviewed: Yes    No anethesia notable event occurred.    Staff: CRNA, Anesthesiologist           Last Filed PACU Vitals:  Vitals Value Taken Time   Temp 97.9    Pulse 57    BP 97/53    Resp 16    SpO2 97        Modified Hillary:     Vitals Value Taken Time   Activity 0 01/15/25 1445   Respiration 0 01/15/25 1445   Circulation 2 01/15/25 1445   Consciousness 1 01/15/25 1445   Oxygen Saturation 1 01/15/25 1445     Modified Hillary Score: 4

## 2025-01-15 NOTE — TELEPHONE ENCOUNTER
----- Message from Olive Esquivel PA-C sent at 1/15/2025  2:58 PM EST -----  Pt had surgery today but b/c he was an infx, Doc wants to see him back next Tuesday 1/21. Can we please arrange? Thanks so much!

## 2025-01-15 NOTE — ANESTHESIA POSTPROCEDURE EVALUATION
Post-Op Assessment Note            No anethesia notable event occurred.            Last Filed PACU Vitals:  Vitals Value Taken Time   Temp 97.9 °F (36.6 °C) 01/15/25 1445   Pulse 53 01/15/25 1515   /65 01/15/25 1515   Resp 18 01/15/25 1515   SpO2 95 % 01/15/25 1515   Vitals shown include unfiled device data.    Modified Hillary:     Vitals Value Taken Time   Activity 2 01/15/25 1515   Respiration 2 01/15/25 1515   Circulation 2 01/15/25 1515   Consciousness 2 01/15/25 1515   Oxygen Saturation 2 01/15/25 1515     Modified Hillary Score: 10

## 2025-01-15 NOTE — OP NOTE
OPERATIVE REPORT  PATIENT NAME: Ryan Montoya    :  1960  MRN: [id number]  Pt Location: [facility] OR [facility]OM 01    SURGERY DATE: 1/15/2025     Surgeons and Role:     * Marky Richardson MD - Primary     * Olive Esquivel PA-C - Assisting    Physician Assistant need: A physician assistant was required during the procedure for retraction, tissue handling, dissection, and suturing. No qualified resident was available.    Pre-Op Diagnosis Codes:   Right hand cat bite  Right hand infection    Post-Op Diagnosis Codes:  Right hand cat bite  Right hand infection    Procedure(s) (LRB):  RIGHT MIDDLE FINGER EXTENSOR TENDON REPAIR (ZONE 5)  RIGHT HAND/MIDDLE FINGER EXTENSOR TENOSYNOVECTOMY  RIGHT HAND IRRIGATION AND DEBRIDEMENT AFTER CAT BITE (SKIN, SUBCUTANEOUS TISSUE, TENDON)      Specimen(s):  * No specimens in log *    Estimated Blood Loss:   Minimal    Drains:  None    Implants:  * No implants in log *    Anesthesia Type:   IV Sedation with Anesthesia    Operative Indications:  Patient is a 64 y.o. male evaluated in clinic with symptoms and clinical exam consistent with right hand infection.  Patient states symptoms started after he was bit by his cat on 2025.  Patient was evaluated by family medicine and started on antibiotics the following day.  Despite treatment with antibiotics, patient had continued pain and tenderness. We discussed treatment options with patient including conservative management and surgical management. Discussed nonoperative management with continued antibiotics and observation.  We discussed risk of surgery including pain, bleeding, stiffness, damage to neurovascular structures, need for therapy, continued infection, need for long term antibiotics. Patient elected for surgical management.  Informed consent was obtained.     Operative Findings:  50% laceration through the EDC of the middle finger at zone 5, treated with repair.  No evidence of traumatic arthrotomy to the middle finger MCP    Inflammatory tissue along the middle finger extensor tendon consistent with tenosynovitis.     Complications:   None     Procedure and Technique:  Patient was identified in the preoperative holding area.  Surgical sites were marked with patient participation. Patient was taken back to the operating theater.  Anesthesia was induced. Extremity was prepped and draped in typical fashion.  Formal time-out was performed confirming site, patient, and procedure. All present were in agreement.  Tourniquet was inflated to 250 mmHg.  A 50-50 mixture of 1% lidocaine without epinephrine and 0.25% bupivacaine without epinephrine was used for local field blocks.     There was a fluctuant area 2 cm x 2 cm over the middle dorsum of the hand.  A poke hole incision was made over this area.  Hematoma was expressed.    Attention was turned to the hand at the level of the MCP joint of the middle finger.  The 2 traumatic puncture wounds were connected.  Total incision length measured 3 cm.  No obviously purulent drainage was encountered.  This area was cultured. Tenosynovitis was noted along the extensor tendon to the middle finger, which was removed using a synovial rongeur.  The extensor tendon was noted to be 50% lacerated.  The volar most portion of the extensor tendon was noted to be intact.  Given extensor tendon was intact volarly without evidence of puncture wound through tendon and clinical exam, there was no evidence of MCP joint involvement.  A knife was used to remove nonviable skin and subcutaneous tissue sharply.  Inflamed cut the subcutaneous tissue was removed with a rongeur.  Betadine was placed into the wound and allowed to sit for 2 minutes.  Wound was thoroughly irrigated with 3 L of normal saline.  At the termination of the debridement, all tissues appeared viable.  Wound measured 3 cm long by 1.5 cm wide.    Gloves and instruments were changed.  Attention was turned to the extensor tendon repair of EDC at zone 5 of  the middle finger.  5-0 Prolene suture in running simple fashion was used to repair extensor tendon laceration.  Digit was ranged after repair and there was no gapping of the repair.  Skin was loosely closed with 4-0 nylon in simple fashion.  Wounds were dressed with telfa, 4x4s, cast padding, volar slab splint with finger extension Ace bandage.   Tourniquet was deflated.  Patient was taken to PACU in stable condition.     I was present for the entire procedure     Postoperative Plan:  Patient may range digits as tolerated.  We will follow cultures and tailor antibiotics accordingly.   We will continue volar slab splint for 1 week.      Patient Disposition:  Recovery        SIGNATURE: Marky Richardson MD  DATE: January 15, 2025  TIME: 2:03 PM

## 2025-01-18 LAB
BACTERIA SPEC ANAEROBE CULT: NO GROWTH
BACTERIA TISS AEROBE CULT: NO GROWTH
GRAM STN SPEC: NORMAL
GRAM STN SPEC: NORMAL

## 2025-01-20 ENCOUNTER — TELEPHONE (OUTPATIENT)
Age: 65
End: 2025-01-20

## 2025-01-20 NOTE — TELEPHONE ENCOUNTER
Patient called to confirm next appointment , he though it was scheduled for today but that appointment  was cancelled due to an error with our schedule  and rescheduled on 04/28

## 2025-01-21 ENCOUNTER — OFFICE VISIT (OUTPATIENT)
Dept: OBGYN CLINIC | Facility: CLINIC | Age: 65
End: 2025-01-21

## 2025-01-21 VITALS — BODY MASS INDEX: 27.5 KG/M2 | HEIGHT: 72 IN | WEIGHT: 203 LBS

## 2025-01-21 DIAGNOSIS — Z47.89 AFTERCARE FOLLOWING SURGERY OF THE MUSCULOSKELETAL SYSTEM: Primary | ICD-10-CM

## 2025-01-21 PROCEDURE — 99024 POSTOP FOLLOW-UP VISIT: CPT | Performed by: STUDENT IN AN ORGANIZED HEALTH CARE EDUCATION/TRAINING PROGRAM

## 2025-01-21 NOTE — PROGRESS NOTES
Assessment/Plan:  Patient ID: 64 y.o. male   Surgery: Incision And Drainage (i&d) Right Hand - Right  Date of Surgery: 1/15/2025    The patient is doing well postoperatively. There is no erythema or signs of infection. The patient was instructed to begin betadine soaks 2 x's a day. He may wash with soap and water. He was instructed to work on gentle range of motion.   Follow Up:  1 week    To Do Next Visit:  Re-evaluation of current issue      CHIEF COMPLAINT:  Chief Complaint   Patient presents with    Right Hand - Animal Bite     Cat          SUBJECTIVE:  Patient is a 64 y.o. year old male who presents for follow up after Incision And Drainage (i&d) Right Hand - Right. Today patient states the hand is feeling pretty much the same.     Occupation: retired     PHYSICAL EXAMINATION:  General: Well developed and well nourished, alert and oriented x 3, appears comfortable  Psychiatric: Normal    MUSCULOSKELETAL EXAMINATION:  Incision: Clean, dry, intact  Surgery Site: normal, no evidence of infection , no erythema, no drainage. Dried blood over wound. No purulence.  Range of Motion: Full digit extension, pulp to palm distance 5 cm  Neurovascular status: Neuro intact, good cap refill         STUDIES REVIEWED:  No new studies to review       PROCEDURES PERFORMED:  Procedures  No Procedures performed today    Scribe Attestation      I,:  Lanny Zimmerman MA am acting as a scribe while in the presence of the attending physician.:       I,:  Marky Richardson MD personally performed the services described in this documentation    as scribed in my presence.:

## 2025-01-21 NOTE — PATIENT INSTRUCTIONS
BETADINE SOAKS  Mix 50/50 mix betadine and bottled water.  Soak affected part of hand for 15 minutes.  Perform soaks 2x/day.  Rinse off betadine with bottled water and pat dry.  Re-cover the wound with clean dressings.

## 2025-01-25 DIAGNOSIS — L57.0 ACTINIC KERATOSIS: ICD-10-CM

## 2025-01-27 ENCOUNTER — TELEPHONE (OUTPATIENT)
Age: 65
End: 2025-01-27

## 2025-01-27 NOTE — TELEPHONE ENCOUNTER
Patient states he will be unable to lay in the MRI machine. Asking if a CT scan can be ordered instead or if some kind of medication can be sent for him to be able to withstand the Mri machine.

## 2025-01-28 ENCOUNTER — OFFICE VISIT (OUTPATIENT)
Dept: OBGYN CLINIC | Facility: CLINIC | Age: 65
End: 2025-01-28

## 2025-01-28 VITALS — HEIGHT: 72 IN | BODY MASS INDEX: 27.5 KG/M2 | WEIGHT: 203 LBS

## 2025-01-28 DIAGNOSIS — Z47.89 AFTERCARE FOLLOWING SURGERY OF THE MUSCULOSKELETAL SYSTEM: Primary | ICD-10-CM

## 2025-01-28 PROCEDURE — 99024 POSTOP FOLLOW-UP VISIT: CPT | Performed by: STUDENT IN AN ORGANIZED HEALTH CARE EDUCATION/TRAINING PROGRAM

## 2025-01-28 RX ORDER — FLUOROURACIL 50 MG/G
CREAM TOPICAL
Qty: 40 G | Refills: 1 | OUTPATIENT
Start: 2025-01-28

## 2025-01-28 NOTE — PROGRESS NOTES
Assessment/Plan:  Patient ID: 64 y.o. male   Surgery: Incision And Drainage (i&d) Right Hand - Right  Date of Surgery: 1/15/2025    The patient is doing well. There is no erythema or signs of infection. The sutures were removed in the office today. He may discontinue the betadine soaks and keep the incision covered with a band-aide. A referral was provided to OT to work on range of motion.    Follow Up:  2 weeks    To Do Next Visit:  Re-evaluation of current issue      CHIEF COMPLAINT:  Chief Complaint   Patient presents with    Right Hand - Animal Bite     Cat          SUBJECTIVE:  Patient is a 64 y.o. year old male who presents for follow up after Incision And Drainage (i&d) Right Hand - Right. Today patient states he has been having discharge daily. He has been complaint with betadine soaks.    Occupation: retired    PHYSICAL EXAMINATION:  General: Well developed and well nourished, alert and oriented x 3, appears comfortable  Psychiatric: Normal    MUSCULOSKELETAL EXAMINATION:  Incision: Clean, dry, intact  Surgery Site: normal, no evidence of infection   Range of Motion: As expected  Good digit ext  Good IP motion  MCP 60 shy full ext to middle finger  No drainage  No fluctuance  Neurovascular status: Neuro intact, good cap refill         STUDIES REVIEWED:  No new studies to review       PROCEDURES PERFORMED:  Procedures  No Procedures performed today    Scribe Attestation      I,:  Lanny Zimmerman MA am acting as a scribe while in the presence of the attending physician.:       I,:  Marky Richardson MD personally performed the services described in this documentation    as scribed in my presence.:

## 2025-01-28 NOTE — TELEPHONE ENCOUNTER
Called patient to determine if he requested the refill. Patient does not need refills at this time.    07-Jun-2019 15:15

## 2025-01-28 NOTE — TELEPHONE ENCOUNTER
The best test for the knee would be the MRI.  If he cannot do that, I would recommend follow-up with orthopedics and have them reevaluate.

## 2025-01-29 ENCOUNTER — VBI (OUTPATIENT)
Dept: ADMINISTRATIVE | Facility: OTHER | Age: 65
End: 2025-01-29

## 2025-01-29 NOTE — TELEPHONE ENCOUNTER
01/29/25 12:11 PM     Chart reviewed for Blood Pressure was/were submitted to the patient's insurance.     Corazon Hernandez MA   [facility]

## 2025-01-31 ENCOUNTER — TELEPHONE (OUTPATIENT)
Age: 65
End: 2025-01-31

## 2025-01-31 NOTE — TELEPHONE ENCOUNTER
Isabel @[facility] would like a copy of the order for the MRI faxed over to [phone].

## 2025-02-04 NOTE — TELEPHONE ENCOUNTER
Patient needs PA for his MRI.     NPI- [id number]  Tax ID-  [id number]  [place] [address],   [place] [address]

## 2025-02-10 ENCOUNTER — OFFICE VISIT (OUTPATIENT)
Dept: FAMILY MEDICINE CLINIC | Facility: CLINIC | Age: 65
End: 2025-02-10
Payer: COMMERCIAL

## 2025-02-10 ENCOUNTER — RESULTS FOLLOW-UP (OUTPATIENT)
Dept: FAMILY MEDICINE CLINIC | Facility: CLINIC | Age: 65
End: 2025-02-10

## 2025-02-10 VITALS
HEIGHT: 72 IN | SYSTOLIC BLOOD PRESSURE: 136 MMHG | DIASTOLIC BLOOD PRESSURE: 70 MMHG | WEIGHT: 205 LBS | RESPIRATION RATE: 20 BRPM | HEART RATE: 64 BPM | OXYGEN SATURATION: 95 % | BODY MASS INDEX: 27.77 KG/M2

## 2025-02-10 DIAGNOSIS — L03.113 CELLULITIS OF RIGHT HAND: Primary | ICD-10-CM

## 2025-02-10 DIAGNOSIS — S83.242A ACUTE MEDIAL MENISCUS TEAR OF LEFT KNEE, INITIAL ENCOUNTER: ICD-10-CM

## 2025-02-10 DIAGNOSIS — F51.01 PRIMARY INSOMNIA: ICD-10-CM

## 2025-02-10 PROBLEM — S83.249A ACUTE MEDIAL MENISCUS TEAR: Status: ACTIVE | Noted: 2025-01-13

## 2025-02-10 PROCEDURE — G2211 COMPLEX E/M VISIT ADD ON: HCPCS | Performed by: FAMILY MEDICINE

## 2025-02-10 PROCEDURE — 99214 OFFICE O/P EST MOD 30 MIN: CPT | Performed by: FAMILY MEDICINE

## 2025-02-10 RX ORDER — ALPRAZOLAM 0.25 MG/1
0.25 TABLET ORAL
Qty: 15 TABLET | Refills: 0 | Status: SHIPPED | OUTPATIENT
Start: 2025-02-10

## 2025-02-10 NOTE — PROGRESS NOTES
Name: Ryan Montoya      : 1960      MRN: [id number]  Encounter Provider: Olvin Brewster MD  Encounter Date: 2/10/2025   Encounter department: [facility]  :  Assessment & Plan  Cellulitis of right hand  Cellulitis seems to be somewhat improved.  Still, unfortunately, has some irritation.  Will continue to follow.       Acute medial meniscus tear of left knee, initial encounter  Patient does have a tear in the posterior horn of the meniscus.  Severe osteoarthritis noted on the knee as well.  Recommend follow-up with orthopedics to discuss further.  Orders:  •  Ambulatory Referral to Orthopedic Surgery; Future    Primary insomnia  Stable.  Doing well with this.  Renewed.  Orders:  •  ALPRAZolam (XANAX) 0.25 mg tablet; Take 1 tablet (0.25 mg total) by mouth daily at bedtime as needed for anxiety          1. Cellulitis of right hand  Assessment & Plan:  Cellulitis seems to be somewhat improved.  Still, unfortunately, has some irritation.  Will continue to follow.       2. Acute medial meniscus tear of left knee, initial encounter  Assessment & Plan:  Patient does have a tear in the posterior horn of the meniscus.  Severe osteoarthritis noted on the knee as well.  Recommend follow-up with orthopedics to discuss further.  Orders:  •  Ambulatory Referral to Orthopedic Surgery; Future    Orders:  -     Ambulatory Referral to Orthopedic Surgery; Future  3. Primary insomnia  Assessment & Plan:  Stable.  Doing well with this.  Renewed.  Orders:  •  ALPRAZolam (XANAX) 0.25 mg tablet; Take 1 tablet (0.25 mg total) by mouth daily at bedtime as needed for anxiety    Orders:  -     ALPRAZolam (XANAX) 0.25 mg tablet; Take 1 tablet (0.25 mg total) by mouth daily at bedtime as needed for anxiety      Chief Complaint   Patient presents with   • Follow-up     2 weeks follow up            History of Present Illness   Patient is here to follow-up on several issues.    Patient did have incision and  drainage of the hand from surgeon.  Continues to have open area, i.e. drainage from the wound.        Review of Systems   Constitutional: Negative.    HENT: Negative.     Eyes: Negative.    Respiratory: Negative.     Cardiovascular: Negative.    Gastrointestinal: Negative.    Endocrine: Negative.    Genitourinary: Negative.    Musculoskeletal: Negative.    Skin: Negative.    Allergic/Immunologic: Negative.    Neurological: Negative.    Hematological: Negative.    Psychiatric/Behavioral: Negative.         Objective   /70 (BP Location: Left arm, Patient Position: Sitting, Cuff Size: Large)   Pulse 64   Resp 20   Ht 6' (1.829 m)   Wt 93 kg (205 lb)   SpO2 95%   BMI 27.80 kg/m²      Physical Exam  Vitals and nursing note reviewed.   Constitutional:       Appearance: Normal appearance. He is well-developed.   HENT:      Head: Normocephalic and atraumatic.   Cardiovascular:      Rate and Rhythm: Normal rate and regular rhythm.      Pulses:           Carotid pulses are 2+ on the right side and 2+ on the left side.     Heart sounds: Normal heart sounds. No murmur heard.     No friction rub. No gallop.   Pulmonary:      Effort: Pulmonary effort is normal. No respiratory distress.      Breath sounds: Normal breath sounds. No wheezing or rales.   Musculoskeletal:      Cervical back: Normal range of motion and neck supple.   Neurological:      Mental Status: He is alert.

## 2025-02-10 NOTE — ASSESSMENT & PLAN NOTE
Cellulitis seems to be somewhat improved.  Still, unfortunately, has some irritation.  Will continue to follow.

## 2025-02-10 NOTE — ASSESSMENT & PLAN NOTE
Stable.  Doing well with this.  Renewed.  Orders:  •  ALPRAZolam (XANAX) 0.25 mg tablet; Take 1 tablet (0.25 mg total) by mouth daily at bedtime as needed for anxiety

## 2025-02-10 NOTE — ASSESSMENT & PLAN NOTE
Patient does have a tear in the posterior horn of the meniscus.  Severe osteoarthritis noted on the knee as well.  Recommend follow-up with orthopedics to discuss further.  Orders:  •  Ambulatory Referral to Orthopedic Surgery; Future

## 2025-02-10 NOTE — PATIENT INSTRUCTIONS
1. Cellulitis of right hand  Assessment & Plan:  Cellulitis seems to be somewhat improved.  Still, unfortunately, has some irritation.  Will continue to follow.       2. Acute medial meniscus tear of left knee, initial encounter  Assessment & Plan:  Patient does have a tear in the posterior horn of the meniscus.  Severe osteoarthritis noted on the knee as well.  Recommend follow-up with orthopedics to discuss further.  Orders:    Ambulatory Referral to Orthopedic Surgery; Future    Orders:  -     Ambulatory Referral to Orthopedic Surgery; Future  3. Primary insomnia  Assessment & Plan:  Stable.  Doing well with this.  Renewed.  Orders:    ALPRAZolam (XANAX) 0.25 mg tablet; Take 1 tablet (0.25 mg total) by mouth daily at bedtime as needed for anxiety    Orders:  -     ALPRAZolam (XANAX) 0.25 mg tablet; Take 1 tablet (0.25 mg total) by mouth daily at bedtime as needed for anxiety      COVID 19 Instructions    Ryan Montoya was advised to limit contact with others to essential tasks such as getting food, medications, and medical care.    Proper handwashing reviewed, and Hand sanitzer when washing is not available.    If the patient develops symptoms of COVID 19, the patient should call the office as soon as possible.    It is strongly recommended that Flu Vaccinations be obtained.      Virtual Visits:  AmWell: This works on smart phones (any phone with Internet browsing capability).  You should get a text message when the provider is ready to see you.  Click on the link in the text message, and the call should start.  You will need to type in your name, and allow camera and microphone access.  This is HIPPA compliant, and secure.      If you have not already done so, get immunized to COVID 19.      We are committed to getting you vaccinated as soon as possible and will be closely following CDC and [place] [employer] guidelines as they are released and revised.  Please refer to our COVID-19 vaccine webpage for the most  up to date information on the vaccine and our distribution efforts.    This site will also have the most up to date recommendations for COVID booster vaccine.    [email or web address]://[email or web address]-vaccine    Call [phone]-DMFJVWM ([phone]), option 7    You can also visit https://www.vaccines.gov/ to find vaccines in your area.    OUR LOCATION:    [facility]  [address] [place] [address], [address]  [place], [place], [address] [phone]  Fax: [phone]    Lab services, Rheumatology, and OB/GYN are at this location as well.

## 2025-02-10 NOTE — RESULT ENCOUNTER NOTE
Tests were not normal, and should follow at  your scheduled Office visit.  Please see the patient message for [email or web address] for discussion/instructions if patient has [email or web address] account.  Torn meniscus in the knee, as well as significant arthritis.  Would recommend follow-up with orthopedics to discuss next steps.

## 2025-02-11 ENCOUNTER — OFFICE VISIT (OUTPATIENT)
Dept: OBGYN CLINIC | Facility: CLINIC | Age: 65
End: 2025-02-11

## 2025-02-11 VITALS — BODY MASS INDEX: 27.77 KG/M2 | HEIGHT: 72 IN | WEIGHT: 205 LBS

## 2025-02-11 DIAGNOSIS — G89.29 CHRONIC PAIN OF LEFT KNEE: ICD-10-CM

## 2025-02-11 DIAGNOSIS — Z47.89 AFTERCARE FOLLOWING SURGERY OF THE MUSCULOSKELETAL SYSTEM: Primary | ICD-10-CM

## 2025-02-11 DIAGNOSIS — M25.562 CHRONIC PAIN OF LEFT KNEE: ICD-10-CM

## 2025-02-11 PROCEDURE — 99024 POSTOP FOLLOW-UP VISIT: CPT | Performed by: STUDENT IN AN ORGANIZED HEALTH CARE EDUCATION/TRAINING PROGRAM

## 2025-02-11 NOTE — PROGRESS NOTES
Assessment/Plan:  Patient ID: 65 y.o. male   Surgery: Incision And Drainage (i&d) Right Hand - Right  Date of Surgery: 1/15/2025      Patient overall is present well s/p surgery.  Continue AROM of finger and hand. Continue HEP  May take OTC medications as needed  Continue activity as tolerated   He will follow up in 6 weeks for reevaluation.  The patient overall is doing well he may cancel his appointment.    Follow Up:  6 weeks    To Do Next Visit:  Re-evaluation of current issue      CHIEF COMPLAINT:  No chief complaint on file.        SUBJECTIVE:  Patient is a 65 y.o. year old male who presents for follow up after Incision And Drainage (i&d) Right Hand - Right. Today patient states he is doing okay. He has not yet started therapy. He states he has been able to do exercises at home.  Patient is overall pleased with his progress.    Occupation: Retired    PHYSICAL EXAMINATION:  General: Well developed and well nourished, alert and oriented x 3, appears comfortable  Psychiatric: Normal    MUSCULOSKELETAL EXAMINATION:  Incision: Clean, dry, intact  Surgery Site: normal, no evidence of infection   Range of Motion: As expected  Neurovascular status: Neuro intact, good cap refill   Full digit extension  Full composite fist   No erythema and no drainage   Dry eschar over incision      STUDIES REVIEWED:  No new studies to review       PROCEDURES PERFORMED:  Procedures  No Procedures performed today    Scribe Attestation      I,:  Yang Galeas am acting as a scribe while in the presence of the attending physician.:       I,:  Marky Richardson MD personally performed the services described in this documentation    as scribed in my presence.:

## 2025-03-03 ENCOUNTER — TELEPHONE (OUTPATIENT)
Dept: OBGYN CLINIC | Facility: MEDICAL CENTER | Age: 65
End: 2025-03-03

## 2025-03-03 ENCOUNTER — OFFICE VISIT (OUTPATIENT)
Dept: OBGYN CLINIC | Facility: MEDICAL CENTER | Age: 65
End: 2025-03-03
Payer: COMMERCIAL

## 2025-03-03 VITALS — WEIGHT: 196 LBS | BODY MASS INDEX: 26.55 KG/M2 | HEIGHT: 72 IN

## 2025-03-03 DIAGNOSIS — M25.562 CHRONIC PAIN OF LEFT KNEE: ICD-10-CM

## 2025-03-03 DIAGNOSIS — Z01.89 ENCOUNTER FOR LOWER EXTREMITY COMPARISON IMAGING STUDY: ICD-10-CM

## 2025-03-03 DIAGNOSIS — M25.562 LEFT KNEE PAIN, UNSPECIFIED CHRONICITY: ICD-10-CM

## 2025-03-03 DIAGNOSIS — G89.29 CHRONIC PAIN OF LEFT KNEE: ICD-10-CM

## 2025-03-03 DIAGNOSIS — S83.242A ACUTE MEDIAL MENISCUS TEAR OF LEFT KNEE, INITIAL ENCOUNTER: ICD-10-CM

## 2025-03-03 DIAGNOSIS — M17.12 PRIMARY OSTEOARTHRITIS OF LEFT KNEE: Primary | ICD-10-CM

## 2025-03-03 PROCEDURE — 99203 OFFICE O/P NEW LOW 30 MIN: CPT | Performed by: ORTHOPAEDIC SURGERY

## 2025-03-03 NOTE — PROGRESS NOTES
Orthopaedic Surgery - Office Note  Ryan Montoya (65 y.o. male)   : 1960   MRN: [id number]  Encounter Date: 3/3/2025    Assessment / Plan    Left knee osteoarthritis, degenerative medial meniscus tear  We reviewed his radiographs and MRI findings.   We dicussed that his medial meniscus tear is degenerative in nature and I do not think he would benefit from arthroscopic treatment for that based on the degree of his osteoarthritis.  We discussed conservative treatment including activity modification, oral anti-inflammatory medications, intra-articular injections, physical therapy, medial  brace, and total verus unicompartmental knee arthroplasty.   He will plan to try physical therapy, and continue with activity modification, oral anti-inflammatory medications. Left knee intra-articular CSI was 2 weeks ago, will have him continue to monitor his symptoms and see if he develops any benefit from that. He will also try the medial  brace.   Discussed that if his symptoms persist, we can consider further treatment with arthroplasty.   Follow-up: 2 months      Chief Complaint / Date of Onset  Chronic left knee pain  Injury Mechanism / Date  None  Surgery / Date  None    History of Present Illness   Ryan Montoya is a 65 y.o. male who presents for one year of left knee pain. Pain is located primarily to medial compartment and is associated with a sensation that the knee may give way. He saw Dr. Khoury at [facility] 2 weeks ago for his symptoms and was recommend knee aspiration (45 cc yellow) and CSI, as well as PT. He notes after the aspiration and CSI he noted only 1 day of pain relief. He is not interested in doing physical therapy. He has been taking tylenol as needed for his symptoms with minimal relief. He feels his symptoms are worsening. It is worst with activity but is also present with rest. The pain does not significantly radiate to other locations. He smokes 1.2 PPD. He takes ASA 81 mg  daily. He denies history of traumatic event.    Treatment Summary  Medications / Modalities  Tylenol  Bracing / Immobilization  None  Physical Therapy  None  Injections  CSI (2/20/25)  Prior Surgeries  None  Other Treatments  None    Employment / Current Status  Retired    Sport / Organization / Current Status  Active      Review of Systems  Pertinent items are noted in HPI.  All other systems were reviewed and are negative.      Physical Exam  Ht 6' (1.829 m)   Wt 88.9 kg (196 lb)   BMI 26.58 kg/m²   Cons: Appears well.  No apparent distress.  Psych: Alert. Oriented x3.  Mood and affect normal.  Eyes: PERRLA, EOMI  Resp: Normal effort.  No audible wheezing or stridor.  CV: Palpable pulse.  No discernable arrhythmia.  No LE edema.  Lymph:  No palpable cervical, axillary, or inguinal lymphadenopathy.  Skin: Warm.  No palpable masses.  No visible lesions.  Neuro: Normal muscle tone.  Normal and symmetric DTR's.     Left Lower Extremity:  TTP over medial compartment  No palpable effusion   AROM 0-120  Parapatellar crepitus  Stable varus/valgus  Negative anterior/posterior drawer      Studies Reviewed  XR of left knee - Mild-moderate degenerative changes most pronounced to medial compartment with joint space narrowing and subchondral sclerosis  MRI of left knee - Full thickness cartilage loss to medial femoral condyle and medial tibial plateau with underlying bony edema; posterior horn medial meniscus tear       Procedures  No procedures today.    Medical, Surgical, Family, and Social History  The patient's medical history, family history, and social history, were reviewed and updated as appropriate.    Past Medical History:   Diagnosis Date    Acute intractable headache 11/03/2021    Acute maxillary sinusitis     13 dec 2012    ROSENDO (acute kidney injury) (HCC) 06/08/2018 2018.     Bone spur     toe    BPPV (benign paroxysmal positional vertigo) 10/24/2021    Cataract 02/28/2022    History of transfusion      Hyperlipidemia     Hypertension     Kidney stone     PAD (peripheral artery disease) ([facility]) 05/22/2023    PVD (peripheral vascular disease) ([facility])     Retinal detachment 11/21/2013    Sleep apnea     does not use cpap    TIA (transient ischemic attack) 11/03/2021       Past Surgical History:   Procedure Laterality Date    APPENDECTOMY      CATARACT EXTRACTION Bilateral     INCISION AND DRAINAGE OF WOUND Right 1/15/2025    Procedure: INCISION AND DRAINAGE (I&D) RIGHT HAND;  Surgeon: Marky Richardson MD;  Location: [facility];  Service: Orthopedics    IR LOWER EXTREMITY ANGIOGRAM  11/8/2023    KIDNEY STONE SURGERY      multiple surgery in the past    CO SLCTV CATHJ 3RD+ ORD SLCTV ABDL PEL/LXTR BRNCH Right 11/8/2023    Procedure: ARTERIOGRAM;  Surgeon: Lucho Reynoso MD;  Location: [facility];  Service: Vascular    CO TEAEC W/WO PATCH GRAFT COMMON FEMORAL Left 08/14/2023    Procedure: ENDARTERECTOMY ARTERIAL FEMORAL WITH TISSUES PATCH AGIOPLASTY;  Surgeon: Lucho Reynoso MD;  Location: [facility];  Service: Vascular    CO TEAEC W/WO PATCH GRAFT COMMON FEMORAL Right 11/8/2023    Procedure: RIGHT FEMORAL ENDARTERECTOMY WITH PATCH ANGIOPLASTY, RIGHT COMMON ILIAC INTRAVASCULAR LITHOTRIPSY, RIGHT EXTERNAL ILIAC INTRAVASCULAR LITHOTRIPSY, AORTAGRAM, LEFT COMMON ILIAC INTRAVASCULAR LITHOTRIPSY;  Surgeon: Lucho Reynoso MD;  Location: [facility];  Service: Vascular    RETINAL DETACHMENT SURGERY Bilateral     SEPTOPLAST[facility]      [facility]       Family History   Problem Relation Age of Onset    Stroke Mother     No Known Problems Father        Social History     Occupational History    Occupation: attendant for [employer]     Comment: bus maintenance (/)   Tobacco Use    Smoking status: Every Day     Current packs/day: 0.50     Average packs/day: 0.5 packs/day for 40.0 years (20.0 ttl pk-yrs)     Types: Cigarettes    Smokeless tobacco: Never     Tobacco comments:     exposure to 2nd hand smoke  advised not to smoke prior to procedure   Vaping Use    Vaping status: Never Used   Substance and Sexual Activity    Alcohol use: Yes     Alcohol/week: 0.0 standard drinks of alcohol     Comment: social    Drug use: Yes     Types: Marijuana     Comment: once a week advised not to smoke prior to procedure    Sexual activity: Yes     Partners: Female       Allergies   Allergen Reactions    Amlodipine Edema    Eszopiclone Other (See Comments)     Sleep walking    Zolpidem Other (See Comments)     Other reaction(s): sleepwalking.         Current Outpatient Medications:     acetaminophen (TYLENOL) 500 mg tablet, Take 500 mg by mouth every 6 (six) hours as needed for mild pain, Disp: , Rfl:     ALPRAZolam (XANAX) 0.25 mg tablet, Take 1 tablet (0.25 mg total) by mouth daily at bedtime as needed for anxiety, Disp: 15 tablet, Rfl: 0    aspirin 81 mg chewable tablet, Chew 1 tablet (81 mg total) daily, Disp: , Rfl:     diltiazem (DILACOR XR) 120 MG 24 hr capsule, Take 1 capsule (120 mg total) by mouth daily, Disp: 90 capsule, Rfl: 1    gabapentin (Neurontin) 300 mg capsule, Take 1 capsule (300 mg total) by mouth 2 (two) times a day, Disp: 90 capsule, Rfl: 3    losartan (COZAAR) 100 MG tablet, TAKE 1 TABLET BY MOUTH EVERY DAY, Disp: 90 tablet, Rfl: 1    Melatonin 10 MG TABS, Take 10 mg by mouth Pt takes 10mg at bedtime., Disp: , Rfl:     rosuvastatin (CRESTOR) 40 MG tablet, Take 1 tablet (40 mg total) by mouth daily, Disp: 90 tablet, Rfl: 3    traZODone (DESYREL) 50 mg tablet, TAKE 2 TABLETS (100 MG TOTAL) BY MOUTH DAILY AT BEDTIME, Disp: 180 tablet, Rfl: 1    triamcinolone (KENALOG) 0.1 % cream, Apply topically 2 (two) times a day For up to two weeks to hands and forearms after completing efudex therapy to help with healing., Disp: 80 g, Rfl: 0    fluorouracil (EFUDEX) 5 % cream, Apply twice a day for two to three weeks on bilateral arms and tho weeks for face (Patient not  taking: Reported on 10/8/2024), Disp: 40 g, Rfl: 1    naloxone (NARCAN) 4 mg/0.1 mL nasal spray, Administer 1 spray into a nostril. If no response after 2-3 minutes, give another dose in the other nostril using a new spray., Disp: 1 each, Rfl: 1    oxyCODONE (ROXICODONE) 5 immediate release tablet, Take 1 tablet (5 mg total) by mouth every 6 (six) hours as needed for severe pain for up to 10 doses Max Daily Amount: 20 mg (Patient not taking: Reported on 1/28/2025), Disp: 10 tablet, Rfl: 0      Sandy Moreno MD    Scribe Attestation      I,:  Sandy Moreno MD am acting as a scribe while in the presence of the attending physician.:       I,:  Sheldon Montesinos MD personally performed the services described in this documentation    as scribed in my presence.:

## 2025-03-03 NOTE — TELEPHONE ENCOUNTER
Pt was in today for an appointment; a   L Medial    Was ordered. Please reach out to patient in regards to this.   Thanks!

## 2025-03-05 DIAGNOSIS — M17.12 PRIMARY OSTEOARTHRITIS OF LEFT KNEE: Primary | ICD-10-CM

## 2025-03-10 DIAGNOSIS — I73.9 PAD (PERIPHERAL ARTERY DISEASE) (HCC): ICD-10-CM

## 2025-03-10 RX ORDER — GABAPENTIN 300 MG/1
300 CAPSULE ORAL 2 TIMES DAILY
Qty: 90 CAPSULE | Refills: 3 | Status: SHIPPED | OUTPATIENT
Start: 2025-03-10

## 2025-03-10 NOTE — TELEPHONE ENCOUNTER
gabapentin (Neurontin) 300 mg capsule- Take 1 capsule (300 mg total) by mouth 2 (two) times a day    [id number] - [place] - [address]     Lucho Reynoso MD-[facility]

## 2025-03-17 ENCOUNTER — TELEPHONE (OUTPATIENT)
Dept: OBGYN CLINIC | Facility: MEDICAL CENTER | Age: 65
End: 2025-03-17

## 2025-03-17 NOTE — TELEPHONE ENCOUNTER
Left voicemail for the pt. Letting him know I received his wyatt auth for his  brace.Trying to schedule a fitting. Call back #[phone]

## 2025-03-19 ENCOUNTER — HOSPITAL ENCOUNTER (OUTPATIENT)
Dept: NON INVASIVE DIAGNOSTICS | Facility: HOSPITAL | Age: 65
Discharge: HOME | End: 2025-03-19
Attending: SURGERY
Payer: COMMERCIAL

## 2025-03-19 DIAGNOSIS — I73.9 PAD (PERIPHERAL ARTERY DISEASE) (HCC): ICD-10-CM

## 2025-03-19 DIAGNOSIS — I67.2 CEREBRAL ATHEROSCLEROSIS: ICD-10-CM

## 2025-03-19 PROCEDURE — 93880 EXTRACRANIAL BILAT STUDY: CPT | Performed by: SURGERY

## 2025-03-19 PROCEDURE — 93880 EXTRACRANIAL BILAT STUDY: CPT

## 2025-03-22 DIAGNOSIS — I10 PRIMARY HYPERTENSION: ICD-10-CM

## 2025-03-24 RX ORDER — LOSARTAN POTASSIUM 100 MG/1
100 TABLET ORAL DAILY
Qty: 90 TABLET | Refills: 1 | Status: SHIPPED | OUTPATIENT
Start: 2025-03-24

## 2025-03-25 ENCOUNTER — OFFICE VISIT (OUTPATIENT)
Dept: OBGYN CLINIC | Facility: CLINIC | Age: 65
End: 2025-03-25

## 2025-03-25 VITALS — BODY MASS INDEX: 26.55 KG/M2 | HEIGHT: 72 IN | WEIGHT: 196 LBS

## 2025-03-25 DIAGNOSIS — Z47.89 AFTERCARE FOLLOWING SURGERY OF THE MUSCULOSKELETAL SYSTEM: Primary | ICD-10-CM

## 2025-03-25 PROCEDURE — 99024 POSTOP FOLLOW-UP VISIT: CPT | Performed by: STUDENT IN AN ORGANIZED HEALTH CARE EDUCATION/TRAINING PROGRAM

## 2025-03-25 NOTE — PROGRESS NOTES
Assessment/Plan:  Patient ID: 65 y.o. male   Surgery: Incision And Drainage (i&d) Right Hand - Right  Date of Surgery: 1/15/2025    Assessment & Plan  Aftercare following surgery of the musculoskeletal system  The patient is doing well. On exam, there is no erythema or signs of infection. He is doing well and is happy with his outcome. Discussed mild scar tissue noted dorsally. He was instructed to work on scar massage. We discussed a referral to therapy however, the patient declined at this time.           Follow Up:  8 weeks or PRN    To Do Next Visit:  Re-evaluation of current issue      CHIEF COMPLAINT:  Chief Complaint   Patient presents with    Right Hand - Follow-up     Has gotten better, not completely healed but better than before         SUBJECTIVE:  Patient is a 65 y.o. year old male who presents for follow up after Incision And Drainage (i&d) Right Hand - Right. Today patient states he is doing well. He reports some thickened tissue over dorsum of hand.    Occupation: Retired     PHYSICAL EXAMINATION:  General: Well developed and well nourished, alert and oriented x 3, appears comfortable  Psychiatric: Normal    MUSCULOSKELETAL EXAMINATION:  Incision: Clean, dry, intact  Surgery Site: normal, no evidence of infection   Range of Motion: composite fist  Full digit extension  Mild scar tissue at dorsum of hand noted  Some skin thickening with dry tissue over top  Neurovascular status: Neuro intact, good cap refill           STUDIES REVIEWED:  No new studies to review       PROCEDURES PERFORMED:  Procedures  No Procedures performed today    Scribe Attestation      I,:  Lanny Zimmerman MA am acting as a scribe while in the presence of the attending physician.:       I,:  Marky Richardson MD personally performed the services described in this documentation    as scribed in my presence.:

## 2025-04-08 DIAGNOSIS — F51.01 PRIMARY INSOMNIA: ICD-10-CM

## 2025-04-08 NOTE — TELEPHONE ENCOUNTER
Reason for call:   [x] Refill   [] Prior Auth  [] Other:     Office:   [x] PCP/Provider -   [] Specialty/Provider -     Medication:     ALPRAZolam (XANAX) 0.25 mg Take 1 tablet (0.25 mg total) by mouth daily at bedtime as needed for anxiety          Pharmacy:  [id number] - [place]     Local Pharmacy   Does the patient have enough for 3 days?   [x] Yes   [] No - Send as HP to POD    Mail Away Pharmacy   Does the patient have enough for 10 days?   [] Yes   [] No - Send as HP to POD

## 2025-04-09 RX ORDER — ALPRAZOLAM 0.25 MG
0.25 TABLET ORAL
Qty: 15 TABLET | Refills: 0 | Status: SHIPPED | OUTPATIENT
Start: 2025-04-09

## 2025-04-17 ENCOUNTER — EVALUATION (OUTPATIENT)
Dept: PHYSICAL THERAPY | Facility: CLINIC | Age: 65
End: 2025-04-17
Payer: COMMERCIAL

## 2025-04-17 DIAGNOSIS — G89.29 CHRONIC PAIN OF LEFT KNEE: ICD-10-CM

## 2025-04-17 DIAGNOSIS — M17.12 PRIMARY OSTEOARTHRITIS OF LEFT KNEE: ICD-10-CM

## 2025-04-17 DIAGNOSIS — M25.562 CHRONIC PAIN OF LEFT KNEE: ICD-10-CM

## 2025-04-17 PROCEDURE — 97161 PT EVAL LOW COMPLEX 20 MIN: CPT

## 2025-04-17 PROCEDURE — 97110 THERAPEUTIC EXERCISES: CPT

## 2025-04-17 NOTE — PROGRESS NOTES
PT Evaluation     Today's date: 2025  Patient name: Ryan Montoya  : 1960  MRN: [id number]  Referring provider: Olvin Brewster*  Dx:   Encounter Diagnosis     ICD-10-CM    1. Chronic pain of left knee  M25.562 Ambulatory Referral to Physical Therapy    G89.29       2. Primary osteoarthritis of left knee  M17.12 Ambulatory Referral to Physical Therapy          Start Time: 1625  Stop Time: 1715  Total time in clinic (min): 50 minutes    Assessment  Impairments: abnormal muscle firing, abnormal or restricted ROM, activity intolerance, impaired physical strength, lacks appropriate home exercise program, pain with function, weight-bearing intolerance and poor body mechanics  Symptom irritability: moderate    Assessment details: Pt is a 65 y.o. year old male presenting to physical therapy for Chronic pain of left knee and Primary osteoarthritis of left knee.  He presents with the following impairments: slightly decreased L quad activation, decreased b/l lateral hip strength with more deficits noted on the L, poor squat mechanics, good b/l knee AROM, TTP of L medial joint line, medial aspect of patella, and patellar tendon, and poor s/l stance on each limb affecting his function with standing, walking, navigating stairs, ADLs, and functional ability.  Pt will benefit from skilled physical therapy to address functional limitations noted in evaluation and meet patient goals.   Barriers to therapy: N/A.  Understanding of Dx/Px/POC: good     Prognosis: good    Goals  ST. Pt will be independent with HEP.  2. Pt will improve L quad activation to good.  3. Pt will improve squat mechanics to WNL.  LT. Pt will improve pain with weight bearing to 2/10.  2. Pt will improve LLE strength grossly by 2 grades or more to improve functional ability.  3. Pt will improve FOTO score to WNL by time of discharge.     Plan  Patient would benefit from: PT eval and skilled physical therapy  Planned modality  interventions: biofeedback, manual electrical stimulation, microcurrent electrical stimulation, TENS, electrical stimulation/Russian stimulation, thermotherapy: hydrocollator packs, cryotherapy and unattended electrical stimulation    Planned therapy interventions: abdominal trunk stabilization, joint mobilization, manual therapy, massage, ADL retraining, neuromuscular re-education, body mechanics training, patient education, postural training, strengthening, stretching, therapeutic activities, therapeutic exercise, flexibility, functional ROM exercises and home exercise program    Frequency: 1x week  Duration in weeks: 6  Treatment plan discussed with: patient  Plan details: Pt stated that he would like to attend once a week for now until discussing scheduling surgery with MD on 5/5/25 appointment and perform HEP at home in addition to visits.         Subjective Evaluation    History of Present Illness  Mechanism of injury: Pt presents to the clinic with history of L chronic knee pain that started at least a year ago. Pt had MRI imaging completed on 2/07/25 that showed severe medial compartment osteoarthritis and complex degenerative tear of the L medial meniscus. Pt stated that he would like to get his L knee replaced but stated that his MD told him that he needed to get physical therapy first. Pt stated that he is able to walk short distances but anything past that is very difficult and has a lot of pain when walking more than a block or two. Pt stated that he is wearing a stability brace on his LLE which helps a lot when standing and walking because without it his knee feels very unstable. Pt stated that he has circulatory issues below his knees where his shins and ankles feel like they are burning.   Patient Goals  Patient goals for therapy: increased motion, improved balance, decreased pain, increased strength, independence with ADLs/IADLs and return to sport/leisure activities    Pain  Current pain rating:  "0  At best pain ratin  At worst pain ratin  Quality: discomfort, dull ache, radiating, tight and pulling          Objective     Palpation   Left   Tenderness of the distal biceps femoris, distal semimembranosus and distal semitendinosus.     Tenderness   Left Knee   Tenderness in the medial joint line, medial patella and patellar tendon.     Active Range of Motion   Left Knee   Flexion: 128 degrees   Extension: 1 degrees     Right Knee   Flexion: 128 degrees   Extension: 0 degrees     Mobility   Patellar Mobility:   Left Knee   WFL: medial, lateral, superior and inferior.     Right Knee   WFL: lateral  Hypomobile: medial, superior and inferior     Strength/Myotome Testing     Left Hip   Planes of Motion   Flexion: 4+  Abduction: 4-    Right Hip   Planes of Motion   Flexion: 5  Abduction: 4    Left Knee   Flexion: 5  Extension: 4+  Quadriceps contraction: fair    Right Knee   Flexion: 5  Extension: 5  Quadriceps contraction: good             Precautions: N/A    Date             Visit # 1            FOTO IE             Re-eval IE              Manuals                                                                 Neuro Re-Ed             Quad sets 10x3\"            S/l clamshells 10x3\" GTB L            Bridge w abd             SAQ                                                    Ther Ex             bike             SLR 10x w QS            Standing hip abd/ext 10x GTB ea abd            sidestepping             HS str 3x15\" sit L            Leg press             S/l hip abd                          Ther Activity             squats             Step ups             Lateral step ups             Gait Training                                       Modalities                                            "

## 2025-04-17 NOTE — HOME EXERCISE EDUCATION
Program_ID:[id number]   Access Code: [email or web address]  URL: [email or web address]  Date: 04-  Prepared By: Rolando Khan    Program Notes      Exercises      - Supine Quad Set - 1 x daily - 7 x weekly - 23 sets - 10 reps - 3 hold      - Active Straight Leg Raise with Quad Set - 1 x daily - 7 x weekly - 2 sets - 10 reps      - Clamshell with Resistance - 1 x daily - 7 x weekly - 23 sets - 10 reps - 3 hold      - Seated Hamstring Stretch - 1 x daily - 7 x weekly -  sets - 5 reps - 20 hold      - Standing Hip Abduction with Resistance at Ankles and Counter Support - 1 x daily - 7 x weekly - 2 sets - 10 reps

## 2025-04-24 ENCOUNTER — OFFICE VISIT (OUTPATIENT)
Dept: PHYSICAL THERAPY | Facility: CLINIC | Age: 65
End: 2025-04-24
Payer: COMMERCIAL

## 2025-04-24 DIAGNOSIS — G89.29 CHRONIC PAIN OF LEFT KNEE: Primary | ICD-10-CM

## 2025-04-24 DIAGNOSIS — M25.562 CHRONIC PAIN OF LEFT KNEE: Primary | ICD-10-CM

## 2025-04-24 DIAGNOSIS — M17.12 PRIMARY OSTEOARTHRITIS OF LEFT KNEE: ICD-10-CM

## 2025-04-24 PROCEDURE — 97110 THERAPEUTIC EXERCISES: CPT

## 2025-04-24 PROCEDURE — 97112 NEUROMUSCULAR REEDUCATION: CPT

## 2025-04-24 NOTE — PROGRESS NOTES
"Daily Note     Today's date: 2025  Patient name: Ryan Montoya  : 1960  MRN: [id number]  Referring provider: Olvin Brewster*  Dx:   Encounter Diagnosis     ICD-10-CM    1. Chronic pain of left knee  M25.562     G89.29       2. Primary osteoarthritis of left knee  M17.12           Start Time: 1400  Stop Time: 1445  Total time in clinic (min): 45 minutes    Subjective: Pt stated that he continues to have pain without change in his symptoms.       Objective: See treatment diary below      Assessment: Pt tolerated warm up on bike well at beginning of session without increase in discomfort during or after activity. Pt performed leg press activity well but did have mild discomfort on the medial aspect of his L knee with extension back on leg press. Pt did not tolerate forwards step up exercise well due to pain when applying pressure up onto the step and with eccentric control over, modify step height nv to pt's tolerance. Pt was challenged with progression of resistance with knee extension and quad activation activities with mild fatigue and discomfort post performance of SLR flexion exercise. Pt would benefit from continued skilled PT to improve LLE strength, mobility, flexibility, balance, gait, quad activation, and functional ability.     Plan: Continue per plan of care.  Progress treatment as tolerated.       Precautions: N/A    Date            Visit # 1 2           FOTO IE             Re-eval IE              Manuals                                                                Neuro Re-Ed            Quad sets 10x3\"            S/l clamshells 10x3\" GTB L 2x10 3\" GTB L           Bridge w abd  2x10 3\" GTB           SAQ  2x10 3\" 2# L                                                  Ther Ex            bike  4'           SLR 10x w QS 2x10 w QS 2# L           Standing hip abd/ext 10x GTB ea abd 2x10 GTB ea            sidestepping  4 laps GTB           HS str 3x15\" " "sit L 10x15\" L sit           Leg press  2x15 155# BLE           S/l hip abd                          Ther Activity 4/17 4/24           squats  2x10           Step ups  5x 1R L up + over pn!           Lateral step ups  2x10 1R ea           Gait Training 4/17                                      Modalities 4/17                                           "

## 2025-04-28 ENCOUNTER — OFFICE VISIT (OUTPATIENT)
Dept: DERMATOLOGY | Facility: CLINIC | Age: 65
End: 2025-04-28
Payer: COMMERCIAL

## 2025-04-28 VITALS — TEMPERATURE: 98.2 F | WEIGHT: 200.7 LBS | BODY MASS INDEX: 27.22 KG/M2

## 2025-04-28 DIAGNOSIS — D18.01 CHERRY ANGIOMA: ICD-10-CM

## 2025-04-28 DIAGNOSIS — L72.0 EPIDERMAL INCLUSION CYST: ICD-10-CM

## 2025-04-28 DIAGNOSIS — L82.1 SEBORRHEIC KERATOSES: ICD-10-CM

## 2025-04-28 DIAGNOSIS — D22.9 MULTIPLE MELANOCYTIC NEVI: ICD-10-CM

## 2025-04-28 DIAGNOSIS — D48.5 NEOPLASM OF UNCERTAIN BEHAVIOR OF SKIN: ICD-10-CM

## 2025-04-28 DIAGNOSIS — Z85.89 HISTORY OF SQUAMOUS CELL CARCINOMA: Primary | ICD-10-CM

## 2025-04-28 DIAGNOSIS — L57.0 ACTINIC KERATOSES: ICD-10-CM

## 2025-04-28 DIAGNOSIS — L81.4 LENTIGINES: ICD-10-CM

## 2025-04-28 PROCEDURE — 88341 IMHCHEM/IMCYTCHM EA ADD ANTB: CPT | Performed by: STUDENT IN AN ORGANIZED HEALTH CARE EDUCATION/TRAINING PROGRAM

## 2025-04-28 PROCEDURE — 17000 DESTRUCT PREMALG LESION: CPT | Performed by: REGISTERED NURSE

## 2025-04-28 PROCEDURE — 88305 TISSUE EXAM BY PATHOLOGIST: CPT | Performed by: STUDENT IN AN ORGANIZED HEALTH CARE EDUCATION/TRAINING PROGRAM

## 2025-04-28 PROCEDURE — 88342 IMHCHEM/IMCYTCHM 1ST ANTB: CPT | Performed by: STUDENT IN AN ORGANIZED HEALTH CARE EDUCATION/TRAINING PROGRAM

## 2025-04-28 PROCEDURE — 11103 TANGNTL BX SKIN EA SEP/ADDL: CPT | Performed by: REGISTERED NURSE

## 2025-04-28 PROCEDURE — 11102 TANGNTL BX SKIN SINGLE LES: CPT | Performed by: REGISTERED NURSE

## 2025-04-28 PROCEDURE — 99214 OFFICE O/P EST MOD 30 MIN: CPT | Performed by: REGISTERED NURSE

## 2025-04-28 NOTE — PATIENT INSTRUCTIONS
HISTORY OF SQUAMOUS CELL CARCINOMA      Physical Exam:  Anatomic Location Affected:  left forearm, left dorsal hand     Assessment and Plan:  Based on a thorough discussion of this condition and the management approach to it (including a comprehensive discussion of the known risks, side effects and potential benefits of treatment), the patient (family) agrees to implement the following specific plan:  Monitor for change or recurrence  Follow up in 1 year     How can SCC be prevented?  The most important way to prevent SCC is to avoid sunburn. This is especially important in childhood and early life. Fair skinned individuals and those with a personal or family history of BCC should protect their skin from sun exposure daily, year-round and lifelong.  Stay indoors or under the shade in the middle of the day   Wear covering clothing   Apply high protection factor SPF50+ broad-spectrum sunscreens generously to exposed skin if outdoors   Avoid indoor tanning (sun beds, solaria)        What is the outlook for SCC?  Most SCC are cured by treatment. Cure is most likely if treatment is undertaken when the lesion is small. A small percent of SCC's can spread to lymph  nodes and long term monitoring is indicated.   They are also at increased risk of other skin cancers, especially melanoma. Regular self-skin examinations and long-term annual skin checks by an experienced health professional are recommended.     2. SEBORRHEIC KERATOSES  - Relevant exam: Scattered over the trunk/extremities are waxy brown to black plaques and papules with stuck on appearance and consistent dermoscopy  - Exam and clinical history consistent with seborrheic keratoses  - Counseled that these are benign growths that do not require treatment     3. MELANOCYTIC NEVI  - Relevant exam: Scattered over the trunk/extremities are homogenously pigmented brown macules and papules. ELM performed and without concerning findings. No outliers unless otherwise  noted in today's note  - Exam and clinical history consistent with melanocytic nevi  - Counseled to return to clinic prior to scheduled appointment should any of these lesions change or should any new lesions of concern arise  - Counseled on use of sun protection daily. Reviewed latest FDA sunscreen guidelines, including use of broad spectrum (UVA and UVB blocking) sunscreen or sun protective clothing with SPF 30-50 every 2-3 hours and reapplied after exposure to water     4. LENTIGINES  OTHER SKIN CHANGES DUE TO CHRONIC EXPOSURE TO NONIONIZING RADIATION  - Relevant exam: Over sun exposed areas are brown macules. ELM performed and without concerning findings.  - Exam and clinical history consistent with lentigines.  - Counseled to return to clinic prior to scheduled appointment should any of these lesions change or should any new lesions of concern arise.  - Recommended use of sunscreen as above and below.     5. SALAMANCA ANGIOMAS  - Relevant exam: Scattered over the trunk/extremities are red papules  - Exam and clinical history consistent with cherry angiomas  - Educated that these are benign     6. ACTINIC KERATOSES  - Relevant exam: On the left ear are scaly pink macules without palpable dermal component     - Exam and clinical history consistent with actinic keratoses  - Discussed that these lesions are considered premalignant with the potential to evolve into squamous cell carcinoma.   - Discussed treatment options, which may inclue liquid nitrogen destruction, topical immunotherapy including risks, benefits  - Patient counseled to return to the office in 4-6 weeks after completion of treatment for recheck if not resolved at which time retreatment or biopsy to rule out SCC will be determined based on clinic findings     OPTION 3:     PROCEDURE:  DESTRUCTION OF PRE-MALIGNANT LESIONS     - After a thorough discussion of treatment options and risk/benefits/alternatives (including but not limited to local pain,  "scarring, dyspigmentation, blistering, and possible superinfection), verbal and written consent were obtained and the aforementioned lesions were treated on with cryotherapy using liquid nitrogen x 1 cycle for 5-10 seconds.     TOTAL NUMBER of  1 pre-malignant lesions were treated today on the ANATOMIC LOCATION: left ear.         The patient tolerated the procedure well, and after-care instructions were provided.     7. NEOPLASM OF UNCERTAIN BEHAVIOR OF SKIN     Physical Exam:  (Anatomic Location)  A: right upper abdomen  B: right lower back     Assessment and Plan:  I have discussed with the patient that a sample of skin via a \"skin biopsy” would be potentially helpful to further make a specific diagnosis under the microscope.  Based on a thorough discussion of this condition and the management approach to it (including a comprehensive discussion of the known risks, side effects and potential benefits of treatment), the patient (family) agrees to implement the following specific plan:     Procedure:  Skin Biopsy.  After a thorough discussion of treatment options and risk/benefits/alternatives (including but not limited to local pain, scarring, dyspigmentation, blistering, possible superinfection, and inability to confirm a diagnosis via histopathology), verbal and written consent were obtained and portion of the rash was biopsied for tissue sample.  See below for consent that was obtained from patient and subsequent Procedure Note.     PROCEDURE TANGENTIAL (SHAVE) BIOPSY NOTE:     INFORMED CONSENT DISCUSSION AND POST-OPERATIVE INSTRUCTIONS FOR PATIENT     I.  RATIONALE FOR PROCEDURE  I understand that a skin biopsy allows the Dermatologist to test a lesion or rash under the microscope to obtain a diagnosis.  It usually involves numbing the area with numbing medication and removing a small piece of skin; sometimes the area will be closed with sutures. In this specific procedure, sutures are not usually needed.  If " "any sutures are placed, then they are usually need to be removed in 2 weeks or less.     I understand that my Dermatologist recommends that a skin \"shave\" biopsy be performed today.  A local anesthetic, similar to the kind that a dentist uses when filling a cavity, will be injected with a very small needle into the skin area to be sampled.  The injected skin and tissue underneath \"will go to sleep” and become numb so no pain should be felt afterwards.  An instrument shaped like a tiny \"razor blade\" (shave biopsy instrument) will be used to cut a small piece of tissue and skin from the area so that a sample of tissue can be taken and examined more closely under the microscope.  A slight amount of bleeding will occur, but it will be stopped with direct pressure and a pressure bandage and any other appropriate methods.  I understands that a scar will form where the wound was created.  Surgical ointment will be applied to help protect the wound.  Sutures are not usually needed.     II.  RISKS AND POTENTIAL COMPLICATIONS   I understand the risks and potential complications of a skin biopsy include but are not limited to the following:  Bleeding  Infection  Pain  Scar/keloid  Skin discoloration  Incomplete Removal  Recurrence  Nerve Damage/Numbness/Loss of Function  Allergic Reaction to Anesthesia  Biopsies are diagnostic procedures and based on findings additional treatment or evaluation may be required  Loss or destruction of specimen resulting in no additional findings     My Dermatologist has explained to me the nature of the condition, the nature of the procedure, and the benefits to be reasonably expected compared with alternative approaches.  My Dermatologist has discussed the likelihood of major risks or complications of this procedure including the specific risks listed above, such as bleeding, infection, and scarring/keloid.  I understand that a scar is expected after this procedure.  I understand that my " "physician cannot predict if the scar will form a \"keloid,\" which extends beyond the borders of the wound that is created.  A keloid is a thick, painful, and bumpy scar.  A keloid can be difficult to treat, as it does not always respond well to therapy, which includes injecting cortisone directly into the keloid every few weeks.  While this usually reduces the pain and size of the scar, it does not eliminate it.       I understand that photographs may be taken before and after the procedure.  These will be maintained as part of the medical providers confidential records and may not be made available to me.  I further authorize the medical provider to use the photographs for teaching purposes or to illustrate scientific papers, books, or lectures if in his/her judgment, medical research, education, or science may benefit from its use.     I have had an opportunity to fully inquire about the risks and benefits of this procedure and its alternatives.   I have been given ample time and opportunity to ask questions and to seek a second opinion if I wished to do so.  I acknowledge that there have specifically been no guarantees as to the cosmetic results from the procedure.  I am aware that with any procedure there is always the possibility of an unexpected complication.    III. POST-PROCEDURAL CARE (WHAT YOU WILL NEED TO DO \"AFTER THE BIOPSY\" TO OPTIMIZE HEALING)     Keep the area clean and dry.  Try NOT to remove the bandage or get it wet for the first 24 hours.     Gently clean the area and apply surgical ointment (such as Vaseline petrolatum ointment, which is available \"over the counter\" and not a prescription) to the biopsy site for up to 2 weeks straight.  This acts to protect the wound from the outside world.       Sutures are not usually placed in this procedure.  If any sutures were placed, return for suture removal as instructed (generally 1 week for the face, 2 weeks for the body).       Take Acetaminophen " (Tylenol) for discomfort, if no contraindications.  Ibuprofen or aspirin could make bleeding worse.     Call our office immediately for signs of infection: fever, chills, increased redness, warmth, tenderness, discomfort/pain, or pus or foul smell coming from the wound.     WHAT TO DO IF THERE IS ANY BLEEDING?  If a small amount of bleeding is noticed, place a clean cloth over the area and apply firm pressure for ten minutes.  Check the wound after 10 minutes of direct pressure.  If bleeding persists, try one more time for an additional 10 minutes of direct pressure on the area.  If the bleeding becomes heavier or does not stop after the second attempt, or if you have any other questions about this procedure, then please call your [facility] Dermatologist by calling [phone] (SKIN).      I hereby acknowledge that I have reviewed and verified the site with my Dermatologist and have requested and authorized my Dermatologist to proceed with the procedure.     7. EPIDERMAL INCLUSION CYST     Physical Exam:  Anatomic Location Affected:  left upper flank     Assessment and Plan:  Based on a thorough discussion of this condition and the management approach to it (including a comprehensive discussion of the known risks, side effects and potential benefits of treatment), the patient (family) agrees to implement the following specific plan:  Counseled that this is benign  Educated on removal and discussed excision   Appointment scheduled     What are epidermal inclusion cysts?  Epidermal inclusion cysts are the most common, benign cutaneous cysts. There are many different names for epidermal inclusion cysts, including epidermoid cyst, epidermal cyst, infundibular cyst, inclusion cyst, and keratin cyst. These cysts can occur anywhere on the body and typically present as nodules directly underneath the skin. There is often a visible pore or opening in the center. The cysts are freely moveable and can range from a few  millimeters to several centimeters in diameter. The center of epidermoid cysts almost always contains keratin, which has a cheesy appearance, and not sebum. They also do not originate from sebaceous glands. Therefore, epidermal inclusion cysts are not the same as sebaceous cysts.     Cysts may remain stable or progressively enlarge over time. There are no reliable predictive factors to tell if an epidermal inclusion cyst will enlarge, become inflamed, or remain quiescent. Infected cysts tend to become larger, turn red, and are more noticeable to the patient. There may be accompanying pain and discomfort.      What causes epidermal inclusion cysts?  Epidermal inclusion cysts often appear out of the blue and are not contagious. They are due to a proliferation of epidermal cells within the dermis and are more common in men than women. They occur more frequently in patients in their 20s to 40s. Epidermal inclusion cysts by themselves are usually not inherited, but they can be hereditary in rare syndromes such as Morales syndrome, nodular elastosis with cysts and comedones (Favre-Racouchot syndrome), and basal cell nevus syndrome (Gorlin syndrome). Elderly patients with chronic sun-damaged skin areas have a higher likelihood of developing epidermoid cysts. They often occur in areas where hair follicles have been inflamed or repeatedly irritated are more frequent in patients with acne vulgaris. In the  period, they are called milia.      Patients on BRAF inhibitors such as imiquimod and cyclosporine have a higher incidence of epidermoid cysts of the face.     How do we diagnose an epidermal inclusion cyst?  Epidermoid inclusion cysts are often diagnosed by history and physical exam. There is usually no need for biopsy prior to removal.  Radiographic and laboratory exams, such as ultrasound studies, are unnecessary and not typically ordered unless the practitioner suspects a genetic condition.     What is the  treatment for an epidermal inclusion cyst?  Inflamed, uninfected epidermal inclusion cysts rarely resolve spontaneously without therapy or surgical intervention. Treatment is not emergent unless desired by the patient.      Definitive treatment is via surgical excision with walls intact. This method will prevent recurrence. This is best done when the cyst is not inflamed, to decrease the probability of rupture during surgery.   A local anesthetic will be injected around the cyst  A small incision is made in the skin overlying the cyst, and contents are expressed  The incision is repaired with sutures     Another option is to use a 4mm punch biopsy with cyst extraction through the defect.     Incision and drainage is often needed if the cyst is infected or inflamed. If there is surrounding cellulitis, oral antibiotic therapy may be necessary. The common agents used target methicillin sensitive Staphylococcal aureus and methicillin resistant S aureus in areas of high prevalence.

## 2025-04-28 NOTE — PROGRESS NOTES
"[facility] Dermatology Clinic Note     Patient Name: Ryan Montoya  Encounter Date: 04/28/2025       Have you been cared for by a [facility] Dermatologist in the last 3 years and, if so, which description applies to you? Yes. I have been here within the last 3 years, and my medical history has NOT changed since that time. I am not of child-bearing potential.     REVIEW OF SYSTEMS:  Have you recently had or currently have any of the following? No changes in my recent health.   PAST MEDICAL HISTORY:  Have you personally ever had or currently have any of the following?  If \"YES,\" then please provide more detail. No changes in my medical history.   HISTORY OF IMMUNOSUPPRESSION: Do you have a history of any of the following:  Systemic Immunosuppression such as Diabetes, Biologic or Immunotherapy, Chemotherapy, Organ Transplantation, Bone Marrow Transplantation or Prednisone?  No     Answering \"YES\" requires the addition of the dotphrase \"IMMUNOSUPPRESSED\" as the first diagnosis of the patient's visit.   FAMILY HISTORY:  Any \"first degree relatives\" (parent, brother, sister, or child) with the following?    No changes in my family's known health.   PATIENT EXPERIENCE:    Do you want the Dermatologist to perform a COMPLETE skin exam today including a clinical examination under the \"bra and underwear\" areas?  Yes  If necessary, do we have your permission to call and leave a detailed message on your Preferred Phone number that includes your specific medical information?  Yes      Allergies   Allergen Reactions    Amlodipine Edema    Eszopiclone Other (See Comments)     Sleep walking    Zolpidem Other (See Comments)     Other reaction(s): sleepwalking.      Current Outpatient Medications:     acetaminophen (TYLENOL) 500 mg tablet, Take 500 mg by mouth every 6 (six) hours as needed for mild pain, Disp: , Rfl:     ALPRAZolam (XANAX) 0.25 mg tablet, Take 1 tablet (0.25 mg total) by mouth daily at bedtime as needed for anxiety, " Disp: 15 tablet, Rfl: 0    aspirin 81 mg chewable tablet, Chew 1 tablet (81 mg total) daily, Disp: , Rfl:     diltiazem (DILACOR XR) 120 MG 24 hr capsule, Take 1 capsule (120 mg total) by mouth daily, Disp: 90 capsule, Rfl: 1    gabapentin (Neurontin) 300 mg capsule, Take 1 capsule (300 mg total) by mouth 2 (two) times a day, Disp: 90 capsule, Rfl: 3    losartan (COZAAR) 100 MG tablet, TAKE 1 TABLET BY MOUTH EVERY DAY, Disp: 90 tablet, Rfl: 1    Melatonin 10 MG TABS, Take 10 mg by mouth Pt takes 10mg at bedtime., Disp: , Rfl:     naloxone (NARCAN) 4 mg/0.1 mL nasal spray, Administer 1 spray into a nostril. If no response after 2-3 minutes, give another dose in the other nostril using a new spray., Disp: 1 each, Rfl: 1    rosuvastatin (CRESTOR) 40 MG tablet, Take 1 tablet (40 mg total) by mouth daily, Disp: 90 tablet, Rfl: 3    traZODone (DESYREL) 50 mg tablet, TAKE 2 TABLETS (100 MG TOTAL) BY MOUTH DAILY AT BEDTIME, Disp: 180 tablet, Rfl: 1    triamcinolone (KENALOG) 0.1 % cream, Apply topically 2 (two) times a day For up to two weeks to hands and forearms after completing efudex therapy to help with healing., Disp: 80 g, Rfl: 0    fluorouracil (EFUDEX) 5 % cream, Apply twice a day for two to three weeks on bilateral arms and tho weeks for face (Patient not taking: Reported on 10/8/2024), Disp: 40 g, Rfl: 1    oxyCODONE (ROXICODONE) 5 immediate release tablet, Take 1 tablet (5 mg total) by mouth every 6 (six) hours as needed for severe pain for up to 10 doses Max Daily Amount: 20 mg (Patient not taking: Reported on 1/28/2025), Disp: 10 tablet, Rfl: 0              Whom besides the patient is providing clinical information about today's encounter?   NO ADDITIONAL HISTORIAN (patient alone provided history)    Physical Exam and Assessment/Plan by Diagnosis:    HISTORY OF SQUAMOUS CELL CARCINOMA     Physical Exam:  Anatomic Location Affected:  left forearm, left dorsal hand  Morphological Description of Scar:  well  healed scars  Suspected Recurrence: no  Regional adenopathy: no    Additional History of Present Condition:  History of squamous cell carcinoma with no sign of recurrence. Biopsy confirmed 07/15/2024 and treated with 5-FU BID for 6 weeks.    Notes from previous note on 10/08/2024:  Patient states he used Efudex cream once daily for a little over a month and then slowly tapered off and used cream for a total of about 1.5 months. Reports spots got red and irritated from the cream. Was advised by Dr. Flores to use BID for 6 weeks     Assessment and Plan:  Based on a thorough discussion of this condition and the management approach to it (including a comprehensive discussion of the known risks, side effects and potential benefits of treatment), the patient (family) agrees to implement the following specific plan:  Monitor for change or recurrence  Follow up in 1 year    How can SCC be prevented?  The most important way to prevent SCC is to avoid sunburn. This is especially important in childhood and early life. Fair skinned individuals and those with a personal or family history of BCC should protect their skin from sun exposure daily, year-round and lifelong.  Stay indoors or under the shade in the middle of the day   Wear covering clothing   Apply high protection factor SPF50+ broad-spectrum sunscreens generously to exposed skin if outdoors   Avoid indoor tanning (sun beds, solaria)      What is the outlook for SCC?  Most SCC are cured by treatment. Cure is most likely if treatment is undertaken when the lesion is small. A small percent of SCC's can spread to lymph  nodes and long term monitoring is indicated.   They are also at increased risk of other skin cancers, especially melanoma. Regular self-skin examinations and long-term annual skin checks by an experienced health professional are recommended.    SEBORRHEIC KERATOSES  - Relevant exam: Scattered over the trunk/extremities are waxy brown to black plaques and  papules with stuck on appearance and consistent dermoscopy  - Exam and clinical history consistent with seborrheic keratoses  - Counseled that these are benign growths that do not require treatment    MELANOCYTIC NEVI  - Relevant exam: Scattered over the trunk/extremities are homogenously pigmented brown macules and papules. ELM performed and without concerning findings. No outliers unless otherwise noted in today's note  - Exam and clinical history consistent with melanocytic nevi  - Counseled to return to clinic prior to scheduled appointment should any of these lesions change or should any new lesions of concern arise  - Counseled on use of sun protection daily. Reviewed latest FDA sunscreen guidelines, including use of broad spectrum (UVA and UVB blocking) sunscreen or sun protective clothing with SPF 30-50 every 2-3 hours and reapplied after exposure to water    LENTIGINES  OTHER SKIN CHANGES DUE TO CHRONIC EXPOSURE TO NONIONIZING RADIATION  - Relevant exam: Over sun exposed areas are brown macules. ELM performed and without concerning findings.  - Exam and clinical history consistent with lentigines.  - Counseled to return to clinic prior to scheduled appointment should any of these lesions change or should any new lesions of concern arise.  - Recommended use of sunscreen as above and below.    CHERRY ANGIOMAS  - Relevant exam: Scattered over the trunk/extremities are red papules  - Exam and clinical history consistent with cherry angiomas  - Educated that these are benign    ACTINIC KERATOSES  - Relevant exam: On the left ear are scaly pink macules without palpable dermal component    - Exam and clinical history consistent with actinic keratoses  - Discussed that these lesions are considered premalignant with the potential to evolve into squamous cell carcinoma.   - Discussed treatment options, which may inclue liquid nitrogen destruction, topical immunotherapy including risks, benefits  - Patient counseled  "to return to the office in 4-6 weeks after completion of treatment for recheck if not resolved at which time retreatment or biopsy to rule out SCC will be determined based on clinic findings    OPTION 3:    PROCEDURE:  DESTRUCTION OF PRE-MALIGNANT LESIONS    - After a thorough discussion of treatment options and risk/benefits/alternatives (including but not limited to local pain, scarring, dyspigmentation, blistering, and possible superinfection), verbal and written consent were obtained and the aforementioned lesions were treated on with cryotherapy using liquid nitrogen x 1 cycle for 5-10 seconds.    TOTAL NUMBER of  1 pre-malignant lesions were treated today on the ANATOMIC LOCATION: left ear.     The patient tolerated the procedure well, and after-care instructions were provided.    NEOPLASM OF UNCERTAIN BEHAVIOR OF SKIN    Physical Exam:  (Anatomic Location); (Size and Morphological Description); (Differential Diagnosis):  A: right upper abdomen; 0.6 cm thin pink papule; DDX: blk vs scc  B: right lower back; 1.1 cm pink scaly plaque; DDX: bcc vs sccis  Pertinent Positives:  Pertinent Negatives:    Additional History of Present Condition:  Noted on exam.    Assessment and Plan:  I have discussed with the patient that a sample of skin via a \"skin biopsy” would be potentially helpful to further make a specific diagnosis under the microscope.  Based on a thorough discussion of this condition and the management approach to it (including a comprehensive discussion of the known risks, side effects and potential benefits of treatment), the patient (family) agrees to implement the following specific plan:    Procedure:  Skin Biopsy.  After a thorough discussion of treatment options and risk/benefits/alternatives (including but not limited to local pain, scarring, dyspigmentation, blistering, possible superinfection, and inability to confirm a diagnosis via histopathology), verbal and written consent were obtained and " "portion of the rash was biopsied for tissue sample.  See below for consent that was obtained from patient and subsequent Procedure Note.    PROCEDURE TANGENTIAL (SHAVE) BIOPSY NOTE:    Performing Physician:  Dr. Castro  Anatomic Location; Clinical Description with size (cm); Pre-Op Diagnosis:   A: right upper abdomen; 0.6 cm thin pink papule; DDX: blk vs scc  B: right lower back; 1.1 cm pink scaly plaque; DDX: bcc vs sccis  Post-op diagnosis: Same     Local anesthesia: 1% xylocaine with epi      Topical anesthesia: None    Hemostasis: Aluminum chloride       After obtaining informed consent  at which time there was a discussion about the purpose of biopsy  and low risks of infection and bleeding.  The area was prepped and draped in the usual fashion. Anesthesia was obtained with 1% lidocaine with epinephrine. A shave biopsy to an appropriate sampling depth was obtained by Shave (Dermablade or 15 blade) The resulting wound was covered with surgical ointment and bandaged appropriately.     The patient tolerated the procedure well without complications and was without signs of functional compromise.      Specimen has been sent for review by Dermatopathology.    Standard post-procedure care has been explained and has been included in written form within the patient's copy of Informed Consent.    INFORMED CONSENT DISCUSSION AND POST-OPERATIVE INSTRUCTIONS FOR PATIENT    I.  RATIONALE FOR PROCEDURE  I understand that a skin biopsy allows the Dermatologist to test a lesion or rash under the microscope to obtain a diagnosis.  It usually involves numbing the area with numbing medication and removing a small piece of skin; sometimes the area will be closed with sutures. In this specific procedure, sutures are not usually needed.  If any sutures are placed, then they are usually need to be removed in 2 weeks or less.    I understand that my Dermatologist recommends that a skin \"shave\" biopsy be performed today.  A local " "anesthetic, similar to the kind that a dentist uses when filling a cavity, will be injected with a very small needle into the skin area to be sampled.  The injected skin and tissue underneath \"will go to sleep” and become numb so no pain should be felt afterwards.  An instrument shaped like a tiny \"razor blade\" (shave biopsy instrument) will be used to cut a small piece of tissue and skin from the area so that a sample of tissue can be taken and examined more closely under the microscope.  A slight amount of bleeding will occur, but it will be stopped with direct pressure and a pressure bandage and any other appropriate methods.  I understands that a scar will form where the wound was created.  Surgical ointment will be applied to help protect the wound.  Sutures are not usually needed.    II.  RISKS AND POTENTIAL COMPLICATIONS   I understand the risks and potential complications of a skin biopsy include but are not limited to the following:  Bleeding  Infection  Pain  Scar/keloid  Skin discoloration  Incomplete Removal  Recurrence  Nerve Damage/Numbness/Loss of Function  Allergic Reaction to Anesthesia  Biopsies are diagnostic procedures and based on findings additional treatment or evaluation may be required  Loss or destruction of specimen resulting in no additional findings    My Dermatologist has explained to me the nature of the condition, the nature of the procedure, and the benefits to be reasonably expected compared with alternative approaches.  My Dermatologist has discussed the likelihood of major risks or complications of this procedure including the specific risks listed above, such as bleeding, infection, and scarring/keloid.  I understand that a scar is expected after this procedure.  I understand that my physician cannot predict if the scar will form a \"keloid,\" which extends beyond the borders of the wound that is created.  A keloid is a thick, painful, and bumpy scar.  A keloid can be difficult to " "treat, as it does not always respond well to therapy, which includes injecting cortisone directly into the keloid every few weeks.  While this usually reduces the pain and size of the scar, it does not eliminate it.      I understand that photographs may be taken before and after the procedure.  These will be maintained as part of the medical providers confidential records and may not be made available to me.  I further authorize the medical provider to use the photographs for teaching purposes or to illustrate scientific papers, books, or lectures if in his/her judgment, medical research, education, or science may benefit from its use.    I have had an opportunity to fully inquire about the risks and benefits of this procedure and its alternatives.   I have been given ample time and opportunity to ask questions and to seek a second opinion if I wished to do so.  I acknowledge that there have specifically been no guarantees as to the cosmetic results from the procedure.  I am aware that with any procedure there is always the possibility of an unexpected complication.    III. POST-PROCEDURAL CARE (WHAT YOU WILL NEED TO DO \"AFTER THE BIOPSY\" TO OPTIMIZE HEALING)    Keep the area clean and dry.  Try NOT to remove the bandage or get it wet for the first 24 hours.    Gently clean the area and apply surgical ointment (such as Vaseline petrolatum ointment, which is available \"over the counter\" and not a prescription) to the biopsy site for up to 2 weeks straight.  This acts to protect the wound from the outside world.      Sutures are not usually placed in this procedure.  If any sutures were placed, return for suture removal as instructed (generally 1 week for the face, 2 weeks for the body).      Take Acetaminophen (Tylenol) for discomfort, if no contraindications.  Ibuprofen or aspirin could make bleeding worse.    Call our office immediately for signs of infection: fever, chills, increased redness, warmth, tenderness, " discomfort/pain, or pus or foul smell coming from the wound.    WHAT TO DO IF THERE IS ANY BLEEDING?  If a small amount of bleeding is noticed, place a clean cloth over the area and apply firm pressure for ten minutes.  Check the wound after 10 minutes of direct pressure.  If bleeding persists, try one more time for an additional 10 minutes of direct pressure on the area.  If the bleeding becomes heavier or does not stop after the second attempt, or if you have any other questions about this procedure, then please call your [facility] Dermatologist by calling [phone] (SKIN).     I hereby acknowledge that I have reviewed and verified the site with my Dermatologist and have requested and authorized my Dermatologist to proceed with the procedure.    EPIDERMAL INCLUSION CYST    Physical Exam:  Anatomic Location Affected:  left upper flank  Morphological Description:  skin colored subcutaneous nodule   Pertinent Positives:  Pertinent Negatives:    Additional History of Present Condition:  Reported by patient.    Assessment and Plan:  Based on a thorough discussion of this condition and the management approach to it (including a comprehensive discussion of the known risks, side effects and potential benefits of treatment), the patient (family) agrees to implement the following specific plan:  Counseled that this is benign  Educated on removal and discussed excision   Appointment scheduled    What are epidermal inclusion cysts?  Epidermal inclusion cysts are the most common, benign cutaneous cysts. There are many different names for epidermal inclusion cysts, including epidermoid cyst, epidermal cyst, infundibular cyst, inclusion cyst, and keratin cyst. These cysts can occur anywhere on the body and typically present as nodules directly underneath the skin. There is often a visible pore or opening in the center. The cysts are freely moveable and can range from a few millimeters to several centimeters in diameter. The  center of epidermoid cysts almost always contains keratin, which has a cheesy appearance, and not sebum. They also do not originate from sebaceous glands. Therefore, epidermal inclusion cysts are not the same as sebaceous cysts.    Cysts may remain stable or progressively enlarge over time. There are no reliable predictive factors to tell if an epidermal inclusion cyst will enlarge, become inflamed, or remain quiescent. Infected cysts tend to become larger, turn red, and are more noticeable to the patient. There may be accompanying pain and discomfort.     What causes epidermal inclusion cysts?  Epidermal inclusion cysts often appear out of the blue and are not contagious. They are due to a proliferation of epidermal cells within the dermis and are more common in men than women. They occur more frequently in patients in their 20s to 40s. Epidermal inclusion cysts by themselves are usually not inherited, but they can be hereditary in rare syndromes such as Morales syndrome, nodular elastosis with cysts and comedones (Favre-Racouchot syndrome), and basal cell nevus syndrome (Gorlin syndrome). Elderly patients with chronic sun-damaged skin areas have a higher likelihood of developing epidermoid cysts. They often occur in areas where hair follicles have been inflamed or repeatedly irritated are more frequent in patients with acne vulgaris. In the  period, they are called milia.     Patients on BRAF inhibitors such as imiquimod and cyclosporine have a higher incidence of epidermoid cysts of the face.    How do we diagnose an epidermal inclusion cyst?  Epidermoid inclusion cysts are often diagnosed by history and physical exam. There is usually no need for biopsy prior to removal.  Radiographic and laboratory exams, such as ultrasound studies, are unnecessary and not typically ordered unless the practitioner suspects a genetic condition.    What is the treatment for an epidermal inclusion cyst?  Inflamed,  uninfected epidermal inclusion cysts rarely resolve spontaneously without therapy or surgical intervention. Treatment is not emergent unless desired by the patient.     Definitive treatment is via surgical excision with walls intact. This method will prevent recurrence. This is best done when the cyst is not inflamed, to decrease the probability of rupture during surgery.   A local anesthetic will be injected around the cyst  A small incision is made in the skin overlying the cyst, and contents are expressed  The incision is repaired with sutures    Another option is to use a 4mm punch biopsy with cyst extraction through the defect.    Incision and drainage is often needed if the cyst is infected or inflamed. If there is surrounding cellulitis, oral antibiotic therapy may be necessary. The common agents used target methicillin sensitive Staphylococcal aureus and methicillin resistant S aureus in areas of high prevalence.       Scribe Attestation      I,:  Sari Malone MA am acting as a scribe while in the presence of the attending physician.:       I,:  Shaquille Castro MD personally performed the services described in this documentation    as scribed in my presence.:

## 2025-05-01 ENCOUNTER — OFFICE VISIT (OUTPATIENT)
Dept: PHYSICAL THERAPY | Facility: CLINIC | Age: 65
End: 2025-05-01
Payer: COMMERCIAL

## 2025-05-01 ENCOUNTER — RESULTS FOLLOW-UP (OUTPATIENT)
Age: 65
End: 2025-05-01

## 2025-05-01 DIAGNOSIS — M25.562 CHRONIC PAIN OF LEFT KNEE: Primary | ICD-10-CM

## 2025-05-01 DIAGNOSIS — M17.12 PRIMARY OSTEOARTHRITIS OF LEFT KNEE: ICD-10-CM

## 2025-05-01 DIAGNOSIS — G89.29 CHRONIC PAIN OF LEFT KNEE: Primary | ICD-10-CM

## 2025-05-01 PROCEDURE — 97110 THERAPEUTIC EXERCISES: CPT

## 2025-05-01 PROCEDURE — 88342 IMHCHEM/IMCYTCHM 1ST ANTB: CPT | Performed by: STUDENT IN AN ORGANIZED HEALTH CARE EDUCATION/TRAINING PROGRAM

## 2025-05-01 PROCEDURE — 88305 TISSUE EXAM BY PATHOLOGIST: CPT | Performed by: STUDENT IN AN ORGANIZED HEALTH CARE EDUCATION/TRAINING PROGRAM

## 2025-05-01 PROCEDURE — 88341 IMHCHEM/IMCYTCHM EA ADD ANTB: CPT | Performed by: STUDENT IN AN ORGANIZED HEALTH CARE EDUCATION/TRAINING PROGRAM

## 2025-05-01 PROCEDURE — 97112 NEUROMUSCULAR REEDUCATION: CPT

## 2025-05-01 NOTE — PROGRESS NOTES
"Daily Note     Today's date: 2025  Patient name: Ryan Montoya  : 1960  MRN: [id number]  Referring provider: Olvin Brewster*  Dx:   Encounter Diagnosis     ICD-10-CM    1. Chronic pain of left knee  M25.562     G89.29       2. Primary osteoarthritis of left knee  M17.12           Start Time: 1400  Stop Time: 1450  Total time in clinic (min): 50 minutes    Subjective: Pt stated that his L knee is feeling more sore and painful today due to having had done a lot of yard work over the past two days.       Objective: See treatment diary below      Assessment: Pt tolerated warm up on bike well at beginning of session without increase in discomfort during or after activity. Pt tolerated manual LLE PROM and HS stretching with reported decrease in stiffness post manual treatment. Pt required verbal and tactile cueing to perform quad setting with proper form, pt adapted to instruction well. Modified some pt activities today to perform with less resistance due to reports of pain with some activities throughout session. Pt had better tolerance to standing activities compared to last visit, progress activities nv. Pt stated that he wants to hold off on scheduling more PT visits until he talks to his MD on Monday, educated pt that he would benefit from continued PT as he has only had two treatment sessions but instructed to call PT office after visit to discuss POC. Pt would benefit from continued skilled PT to improve LLE strength, mobility, quad activation, flexibility, gait, balance, and functional ability.     Plan: Continue per plan of care.  Progress treatment as tolerated.       Precautions: N/A    Date           Visit # 1 2 3          FOTO IE             Re-eval IE              Manuals           L knee PROM   DK + HS str                                                 Neuro Re-Ed           Quad sets 10x3\"  20x5\" L          S/l clamshells 10x3\" GTB L 2x10 3\" GTB " "L 2x10 3\" GTB L          Bridge w abd  2x10 3\" GTB 2x10 3\" GTB          SAQ  2x10 3\" 2# L 2x10 3\" 2# L                                                 Ther Ex 4/17 4/24 5/1          bike  4' 6'          SLR 10x w QS 2x10 w QS 2# L 2x10 2 w QS 0# L          Standing hip abd/ext 10x GTB ea abd 2x10 GTB ea            sidestepping  4 laps GTB 4 laps GTB          Monster walks   4 laps GTB          HS str 3x15\" sit L 10x15\" L sit man          Leg press  2x15 155# BLE 2x15 165# BLE          S/l hip abd   2x10 L                       Ther Activity 4/17 4/24 5/1          squats  2x10 2x10          Step ups  5x 1R L up + over pn!           Lateral step ups  2x10 1R ea           Gait Training 4/17                                      Modalities 4/17                                             "

## 2025-05-01 NOTE — RESULT ENCOUNTER NOTE
DERMATOPATHOLOGY RESULT NOTE    Results reviewed by ordering physician.  Called patient to personally discuss results. Discussed results with patient.       Instructions for Clinical Derm Team:   (remember to route Result Note to appropriate staff):    Call patient and schedule for ED&C of lesions A&B     Result & Plan by Specimen:    Specimen A: malignant  Plan: electrodessication and curretage      Specimen B: malignant  Plan: electrodessication and curretage        A. Skin, right upper abdomen, shave biopsy:    BASAL CELL CARCINOMA (SUPERFICIAL TYPE); extends to the tissue edges.         B. Skin, right lower back, shave biopsy:    BASAL CELL CARCINOMA (SUPERFICIAL TYPE); extends to the tissue edges.      Electronically signed by Odessa Oneal MD on 5/1/2025 at 1428 EDT

## 2025-05-05 ENCOUNTER — OFFICE VISIT (OUTPATIENT)
Dept: OBGYN CLINIC | Facility: MEDICAL CENTER | Age: 65
End: 2025-05-05
Payer: COMMERCIAL

## 2025-05-05 ENCOUNTER — APPOINTMENT (OUTPATIENT)
Dept: RADIOLOGY | Facility: MEDICAL CENTER | Age: 65
End: 2025-05-05
Attending: ORTHOPAEDIC SURGERY
Payer: COMMERCIAL

## 2025-05-05 VITALS — WEIGHT: 200 LBS | BODY MASS INDEX: 27.09 KG/M2 | HEIGHT: 72 IN

## 2025-05-05 DIAGNOSIS — M25.552 PAIN IN LEFT HIP: ICD-10-CM

## 2025-05-05 DIAGNOSIS — M16.12 PRIMARY OSTEOARTHRITIS OF ONE HIP, LEFT: ICD-10-CM

## 2025-05-05 DIAGNOSIS — M54.50 LUMBAR SPINE PAIN: ICD-10-CM

## 2025-05-05 DIAGNOSIS — M17.12 PRIMARY OSTEOARTHRITIS OF LEFT KNEE: Primary | ICD-10-CM

## 2025-05-05 PROCEDURE — 72170 X-RAY EXAM OF PELVIS: CPT

## 2025-05-05 PROCEDURE — 99214 OFFICE O/P EST MOD 30 MIN: CPT | Performed by: ORTHOPAEDIC SURGERY

## 2025-05-05 PROCEDURE — 72100 X-RAY EXAM L-S SPINE 2/3 VWS: CPT

## 2025-05-05 NOTE — ASSESSMENT & PLAN NOTE
Discussed operative vs non-operative treatments  Viscosupplementation prior authorization placed.  Discussed this will take about 1 to 2 weeks for authorization.  My office will reach out once the prior authorization is approved and we will set up the appointment for the injection at that time  Referral placed for spine and pain management for any intra-articular left hip injection  Follow-up for Visco supplementation injection  Follow-up:  No follow-ups on file.  Orders:    Injection Procedure Prior Authorization; Future

## 2025-05-09 ENCOUNTER — PROCEDURE VISIT (OUTPATIENT)
Dept: DERMATOLOGY | Facility: CLINIC | Age: 65
End: 2025-05-09
Payer: COMMERCIAL

## 2025-05-09 DIAGNOSIS — L72.0 EPIDERMAL INCLUSION CYST: Primary | ICD-10-CM

## 2025-05-09 PROCEDURE — 11400 EXC TR-EXT B9+MARG 0.5 CM<: CPT | Performed by: DERMATOLOGY

## 2025-05-09 PROCEDURE — 88304 TISSUE EXAM BY PATHOLOGIST: CPT | Performed by: STUDENT IN AN ORGANIZED HEALTH CARE EDUCATION/TRAINING PROGRAM

## 2025-05-09 NOTE — PROGRESS NOTES
"[facility] Dermatology Clinic Note     Patient Name: Ryan Montoya  Encounter Date: 5/9/2025       Have you been cared for by a [facility] Dermatologist in the last 3 years and, if so, which description applies to you? Yes. I have been here within the last 3 years, and my medical history has NOT changed since that time. I am not of child-bearing potential.     REVIEW OF SYSTEMS:  Have you recently had or currently have any of the following? No changes in my recent health.   PAST MEDICAL HISTORY:  Have you personally ever had or currently have any of the following?  If \"YES,\" then please provide more detail. No changes in my medical history.   HISTORY OF IMMUNOSUPPRESSION: Do you have a history of any of the following:  Systemic Immunosuppression such as Diabetes, Biologic or Immunotherapy, Chemotherapy, Organ Transplantation, Bone Marrow Transplantation or Prednisone?  No     Answering \"YES\" requires the addition of the dotphrase \"IMMUNOSUPPRESSED\" as the first diagnosis of the patient's visit.   FAMILY HISTORY:  Any \"first degree relatives\" (parent, brother, sister, or child) with the following?    No changes in my family's known health.   PATIENT EXPERIENCE:    Do you want the Dermatologist to perform a COMPLETE skin exam today including a clinical examination under the \"bra and underwear\" areas?  NO  If necessary, do we have your permission to call and leave a detailed message on your Preferred Phone number that includes your specific medical information?  Yes      Allergies   Allergen Reactions    Amlodipine Edema    Eszopiclone Other (See Comments)     Sleep walking    Zolpidem Other (See Comments)     Other reaction(s): sleepwalking.      Current Outpatient Medications:     acetaminophen (TYLENOL) 500 mg tablet, Take 500 mg by mouth every 6 (six) hours as needed for mild pain, Disp: , Rfl:     ALPRAZolam (XANAX) 0.25 mg tablet, Take 1 tablet (0.25 mg total) by mouth daily at bedtime as needed for anxiety, " Disp: 15 tablet, Rfl: 0    aspirin 81 mg chewable tablet, Chew 1 tablet (81 mg total) daily, Disp: , Rfl:     diltiazem (DILACOR XR) 120 MG 24 hr capsule, Take 1 capsule (120 mg total) by mouth daily, Disp: 90 capsule, Rfl: 1    fluorouracil (EFUDEX) 5 % cream, Apply twice a day for two to three weeks on bilateral arms and tho weeks for face (Patient not taking: Reported on 10/8/2024), Disp: 40 g, Rfl: 1    gabapentin (Neurontin) 300 mg capsule, Take 1 capsule (300 mg total) by mouth 2 (two) times a day, Disp: 90 capsule, Rfl: 3    losartan (COZAAR) 100 MG tablet, TAKE 1 TABLET BY MOUTH EVERY DAY, Disp: 90 tablet, Rfl: 1    Melatonin 10 MG TABS, Take 10 mg by mouth Pt takes 10mg at bedtime., Disp: , Rfl:     naloxone (NARCAN) 4 mg/0.1 mL nasal spray, Administer 1 spray into a nostril. If no response after 2-3 minutes, give another dose in the other nostril using a new spray., Disp: 1 each, Rfl: 1    oxyCODONE (ROXICODONE) 5 immediate release tablet, Take 1 tablet (5 mg total) by mouth every 6 (six) hours as needed for severe pain for up to 10 doses Max Daily Amount: 20 mg (Patient not taking: Reported on 1/28/2025), Disp: 10 tablet, Rfl: 0    rosuvastatin (CRESTOR) 40 MG tablet, Take 1 tablet (40 mg total) by mouth daily, Disp: 90 tablet, Rfl: 3    traZODone (DESYREL) 50 mg tablet, TAKE 2 TABLETS (100 MG TOTAL) BY MOUTH DAILY AT BEDTIME, Disp: 180 tablet, Rfl: 1    triamcinolone (KENALOG) 0.1 % cream, Apply topically 2 (two) times a day For up to two weeks to hands and forearms after completing efudex therapy to help with healing., Disp: 80 g, Rfl: 0        Whom besides the patient is providing clinical information about today's encounter?   NO ADDITIONAL HISTORIAN (patient alone provided history)    Physical Exam and Assessment/Plan by Diagnosis:    PROCEDURE:  EXCISION NOTE     Procedural Plan:     Attending:  Dr. Zelaya  Assistant:  Presybeterian J. Orwig DCA  Lesion Anatomic Location (use description from previous  "biopsy if applicable): Left Axilla  Pre-Op Diagnosis: Cyst  Lesion is being treated as \"benign\" or \"MALIGNANT\": benign  Accession Number of any associated previous biopsy/excision: N/A    Written and verbal (witnessed) informed consent was obtained. We discussed that \"excision\" is a method of removing lesions, both benign and malignant lesions.  A portion of normal skin is often taken to ensure completeness of removal.  I reviewed that this procedure will include numbing the area, cutting around and under the skin lesion, undermining (\"freeing up\") surrounding tissue, and closing the wound with sutures (stitches) both inside and out.  Risks include and are not limited to the following:  Bleeding, pain, infection, scarring, recurrence, more required treatment, no additional information, numbness and/or loss of function (if nerves are damaged).  These risks were considered against the benefits that we discussed, and the patient opted to continue with the procedure. It was discussed with patient that every effort is made to minimize scarring, but scarring is influenced also by extrinsic factor such as location, age and genetics.     Procedural Time Out:      Correct patient? yes  Correct site per Clinic Report? yes  Correct site per previous Path Report? yes  Correct site per Patient's recollection? yes    Anesthesia:      Local anesthesia: 1% xylocaine with epi     Excision Description:      Post-Op Diagnosis: Same as Pre-Op Diagnosis (above)  Pre-op Size: 0.3 cm  Margins (enter \"0\" for lipoma/cyst or similar diagnosis): 0 cm  TOTAL POSTOPERATIVE DEFECT SIZE (I.e., Pre-op Size + Margins on both sides):  0.3 cm    The patient was seated in the procedure/exam room, anesthetized locally, prepped and draped in the usual fashion. Using a #15 blade with a scalpel, the lesion was excised in elliptical fashion.     REQUIRED Kathie MELANOMA DATA      This procedure was not performed to treat primary cutaneous melanoma through " "wide local excision      Closure Description:      The specific type of closure that was utilized:     SIMPLE Closure (no deep sutures were placed)  SIMPLE CLOSURE     The patient was brought back into the procedure room, anesthetized locally, prepped and draped in the usual fashion. Using a #15 blade with a scalpel, the lesion was excised in elliptical fashion.  Hemostasis was achieved with light electrocoagulation.    The wound was NOT closed with subcutaneous sutures.    Epidermal edge closure was accomplished with superficial sutures as follows:    Superficial suture: 4-0 Prolene    FINAL LENGTH OF CLOSURE (please enter a length even for lipoma/cyst or similar diagnosis): 1 cm       Postoperative Care:      The wound was cleaned with sterile saline, dried off, surgical vaseline ointment was applied, and the wound was covered. A pressure dressing was applied for stabilization and light pressure.    Estimated blood loss:  Less than 3ml.  Complications: none  Post-op medications: none  Additional notes: none    Discharge Plans:      Discharge plans: Plan for return to us for suture removal, as scheduled in 13 days.   Patient condition at discharge: STABLE    The patient was given detailed oral and written instructions on postoperative care as detailed in consent. We urged the patient to call us if any problems or question should arise.         Please complete this section and then \"cut and paste\" it into the Patient Instructions section.  These notes should be printed and shared directly with the patient for review PRIOR TO leaving our office.         YOUR \"AFTER SURGERY\" REVIEW & INSTRUCTIONS    What to Know About Your Procedure  An \"excision\" was performed today to allow the dermatologist to remove a skin lesion. The procedure involves a local numbing medication and removing the entire lesion (or as much as possible). Typically, the lesion is being removed because it does not look \"normal,\" because it is becoming " irritated and traumatized, or for significant appearance reasons.  The skin was cut deeply and then repaired - usually with sutures (stitches).  The removed tissue has been sent to the pathologist who will confirm the diagnosis and verify if the lesion has been completely removed.  Surgical “Vaseline-type” ointment has been applied after the procedure to help create a barrier between your wound and the outside world.     The advantage of using sutures (stitches) to repair a skin excision is that it allows the lesion to heal as quickly as possible with the least amount of scarring and risk of infection, Still, there are some risks and potential complications that you should watch out for that include but are not limited to the following:    Some bleeding is normal at the time of procedure and some bleeding on the gauze bandage after the procedure is normal for the first few days after surgery.  Profuse bleeding or bleeding with swelling and pain is NOT normal and should be reported as detailed below.  Infection is uncommon after skin surgery.  Infection should be reported and is indicated by pain, redness, and discharge of purulent material.  Some pain may occur initially the day after surgery.  Persistent pain or increasing pain days after surgery is not expected and should be reported.  Every effort is made to minimize scar, but location, size, and genetics do play a role in scar appearance.  A surgical wound does not achieve its optimal appearance until 6 months.  There are several treatments available if scarring would be problematic including scar creams, silicone pad, laser and scar revision.  Skin discoloration can occur especially in people of color.  Its important to avoid sun on wound in first 6 months after surgery.  Treatment is available if pigment is problematic.  Incomplete removal of the lesion or recurrence of lesion can occur and - depending on the lesion - this would then require further treatment  "and more surgery.  Nerve damage/numbness and/or loss of function is very rare, but is most likely to occur if the lesion being removed is large or if it is in a \"high risk\" location.  Allergic reaction to lidocaine is rare.  More commonly, epinephrine is used with the lidocaine, and, occasionally, epinephrine (a.k.a., adrenalin) may cause a brief feeling of anxiety or jitteriness.  The person at the microscope (pathologist) may provide additional information that was unexpected. This unexpected finding could prompt the need for additional treatment or evaluation.    At-Home Wound Care  Try NOT to remove the pressure bandage for 48 hours. Keep the area clean and dry while this bandage is on.   After removing the bandage for the first time, gently clean the area with soap and water. If the bandage is difficult to remove, getting the bandage wet in the shower will sometimes help soften the adhesive and allow it to be removed more easily.   You will now need to cleanse this area daily in the shower with gentle soap. There is no need to scrub the area. You will need to apply plain Vaseline ointment (this is over the counter and not a prescription) to the site for up to 2 weeks followed by a clean appropriately sized bandage to area.  Non stick dressing and paper tape (or Hypafix) are recommended for sensitive skin but a bandaid is fine if it covers the area well.  In general, sutures (stitches) are removed in about 5-7 days for face wounds and in about 12-14 days for the body wounds.  Your dermatologist wants you to return for suture removal in 13 DAYS.     General Restrictions  For TWO (2) DAYS:  You will need to take it very easy as this time is highest risk for bleeding. Being a \"couch potato\" during these two days is generally recommended.   For surgeries on the face/neck/scalp: Avoid leaning down to pick things up off the floor as this brings blood up to your head. Instead, squat down to pick things up.     For TWO " "WEEKS (14 DAYS):   No heavy lifting (anything greater than 10 pounds)   You can start to do slow, gentle activities such as slow walking but nothing to increase your heart rate and blood pressure too much (such as cardiovascular exercise).  It is important to take it easy as there is still a risk for bleeding and a high risk popping of stitches open during this time.     Site Specific Restrictions  If we did surgery near your eyes (including the nose, forehead, front part of your scalp, cheeks): It is VERY common to get a large amount of swelling around your eyes (puffy eyes). Although less frequent, this can be enough to swell your eyes shut and can also come along with bruising. This should not hurt and is very expected and normal. It is typically worst at ~ 3 days out from your surgery and dramatically better 1 week post-operatively.   If we did surgery around your nose: No blowing your nose as this puts you at higher risk of popping stitches durign this time. Instead dab under your nose with a tissue or use a Q-tip inside your nose.  If we did surgery on the skin above or below your lip or your lip itself: No sipping from straws as this uses a lot of the muscles around your mouth and increases the risk of popping stitches during this time.    Managing Your Pain After Surgery  You can expect to have some pain after surgery. This is normal. The pain is typically worse the first two days after surgery, and quickly begins to get better.   You can use heating pads or ice packs on your incisions to help reduce your pain.   The best strategy for controlling your pain after surgery is \"around the clock\" pain control. You can take \"over-the-counter\" (non-prescription) Acetaminophen (Tylenol) for discomfort, unless you have been told not to by your physician.  If you are taking Tylenol at the maximum dose, you can alternate Tylenol with Advil/Motrin (ibuprofen) as long as there are no contraindications.  Alternating these " "medications with each other (I.e., Tylenol followed by Motrin/Advil) allows you to maximize your pain control.  To alternate these medications properly, you will take a dose of pain medication every three hours, alternating Tylenol (acetaminophen) and Advil/Motrin (ibuprofen).  Start by taking 650 mg of Tylenol (2 pills of 325 mg)  3 hours later take 600 mg of Motrin (3 pills of 200 mg)  3 hours after taking the Motrin take 650 mg of Tylenol  3 hours after that take 600 mg of Motrin.    As an example, if your first dose of Tylenol (acetaminophen) is at 12:00 PM, then you would alternate with Motrin as directed below, continuing usually for no more than a total of 48 hours straight:     12:00 PM  Tylenol 650 mg (2 pills of 325 mg)    3:00 PM  Motrin 600 mg (3 pills of 200 mg)    6:00 PM  Tylenol 650 mg (2 pills of 325 mg)    9:00 PM  Motrin 600 mg (3 pills of 200 mg)      WARNING:  Do NOT take more than 4000 mg of Tylenol or 3200 mg of Motrin in a \"24-hour\" period.       What if you still have pain?    If you have pain that is not controlled with the over-the-counter pain medications (Tylenol and Motrin/Advil), do not hesitate to call our staff using the number provided. We will help make sure you are managing your pain in the best way possible, and if necessary, we can provide a prescription for additional pain medication.     Call our office IMMEDIATELY with any signs of wound infection.  This includes fever, chills, increasing redness, warmth, tenderness, severe discomfort/pain, or pus or foul smell coming from the wound. [facility] can be directly at [phone] (SKIN) and ask for the on-call Dermatologist covering surgery/Mohs.    If Bleeding is Noticed  If bleeding is soaking through the bandage, remove the bandage and see where the bleeding is coming from.  Place a clean cloth over the area and apply firm pressure directly to the area that is bleeding for thirty minutes.    Check the wound ONLY " after 30 minutes of direct pressure; do not cheat and sneak a peak, as that does not count (i.e., resets the clock back to zero).  If bleeding persists after 30 minutes of legitimate direct pressure, then try one more round of direct pressure to the area.    Should bleeding become heavier or not stop after the second application of direct pressure for 30 minutes, then call [facility] directly at [phone] (SKIN) and ask to speak with the on-call Dermatologist covering surgery/Mohs.  If after hours, go to your nearest Emergency Room or Urgent Care and have the team call [facility] directly at [phone] (SKIN); you will be connected to our after hours team.          Alicia Zelaya MD  Dermatology, PGY-2  Scribe Attestation      I,:  Lucho Degroot MA am acting as a scribe while in the presence of the attending physician.:       I,:  Shaquille Stewart MD personally performed the services described in this documentation    as scribed in my presence.:

## 2025-05-14 ENCOUNTER — RESULTS FOLLOW-UP (OUTPATIENT)
Dept: DERMATOLOGY | Facility: CLINIC | Age: 65
End: 2025-05-14

## 2025-05-14 PROCEDURE — 88304 TISSUE EXAM BY PATHOLOGIST: CPT | Performed by: STUDENT IN AN ORGANIZED HEALTH CARE EDUCATION/TRAINING PROGRAM

## 2025-05-14 NOTE — RESULT ENCOUNTER NOTE
DERMATOPATHOLOGY RESULT NOTE    Results reviewed by ordering physician.  Called patient to personally discuss results. Discussed results with patient.       Instructions for Clinical Derm Team:   (remember to route Result Note to appropriate staff):    None    Result & Plan by Specimen:    Specimen A: benign  Plan: reassured, benign    [id number]  Order: [id number]   Status: Final result       Dx: Epidermal inclusion cyst    Test Result Released: No (inaccessible in [facility])    View Follow-Up Encounter      Component  Ref Range & Units (hover)    Case Report   Surgical Pathology Report                         Case: [id number]                                   Authorizing Provider:  Alicia Zelaya MD        Collected:           05/09/2025 1643               Ordering Location:     [facility]      Received:            05/09/2025 [address] [place] [address]                                                                 Pathologist:           Odessa Oneal MD                                                           Specimen:    Skin, Other, A- Left Axilla                                                                Final Diagnosis   A. Skin, left axilla:      Fragments of EPIDERMAL INCLUSION CYST.         Electronically signed by Odessa Oneal MD on 5/14/2025 at 1345 EDT

## 2025-05-15 ENCOUNTER — HOSPITAL ENCOUNTER (OUTPATIENT)
Dept: RADIOLOGY | Facility: MEDICAL CENTER | Age: 65
End: 2025-05-15
Attending: ORTHOPAEDIC SURGERY
Payer: COMMERCIAL

## 2025-05-15 VITALS
RESPIRATION RATE: 20 BRPM | DIASTOLIC BLOOD PRESSURE: 86 MMHG | HEART RATE: 59 BPM | SYSTOLIC BLOOD PRESSURE: 142 MMHG | OXYGEN SATURATION: 92 %

## 2025-05-15 DIAGNOSIS — M16.12 PRIMARY OSTEOARTHRITIS OF ONE HIP, LEFT: ICD-10-CM

## 2025-05-15 PROCEDURE — 20610 DRAIN/INJ JOINT/BURSA W/O US: CPT | Performed by: ANESTHESIOLOGY

## 2025-05-15 PROCEDURE — 77002 NEEDLE LOCALIZATION BY XRAY: CPT

## 2025-05-15 PROCEDURE — 77002 NEEDLE LOCALIZATION BY XRAY: CPT | Performed by: ANESTHESIOLOGY

## 2025-05-15 RX ORDER — LIDOCAINE HYDROCHLORIDE 10 MG/ML
3 INJECTION, SOLUTION EPIDURAL; INFILTRATION; INTRACAUDAL; PERINEURAL ONCE
Status: COMPLETED | OUTPATIENT
Start: 2025-05-15 | End: 2025-05-15

## 2025-05-15 RX ORDER — ROPIVACAINE HYDROCHLORIDE 2 MG/ML
3 INJECTION, SOLUTION EPIDURAL; INFILTRATION; PERINEURAL ONCE
Status: COMPLETED | OUTPATIENT
Start: 2025-05-15 | End: 2025-05-15

## 2025-05-15 RX ORDER — METHYLPREDNISOLONE ACETATE 40 MG/ML
40 INJECTION, SUSPENSION INTRA-ARTICULAR; INTRALESIONAL; INTRAMUSCULAR; PARENTERAL; SOFT TISSUE ONCE
Status: COMPLETED | OUTPATIENT
Start: 2025-05-15 | End: 2025-05-15

## 2025-05-15 RX ADMIN — ROPIVACAINE HYDROCHLORIDE 3 ML: 2 INJECTION, SOLUTION EPIDURAL; INFILTRATION at 13:35

## 2025-05-15 RX ADMIN — METHYLPREDNISOLONE ACETATE 40 MG: 40 INJECTION, SUSPENSION INTRA-ARTICULAR; INTRALESIONAL; INTRAMUSCULAR; SOFT TISSUE at 13:35

## 2025-05-15 RX ADMIN — IOHEXOL 1 ML: 300 INJECTION, SOLUTION INTRAVENOUS at 13:35

## 2025-05-15 RX ADMIN — LIDOCAINE HYDROCHLORIDE 3 ML: 10 INJECTION, SOLUTION EPIDURAL; INFILTRATION; INTRACAUDAL at 13:34

## 2025-05-15 NOTE — DISCHARGE INSTR - LAB

## 2025-05-15 NOTE — H&P
History of Present Illness: The patient is a 65 y.o. male who presents with complaints of L hip pain    Past Medical History:   Diagnosis Date    Acute intractable headache 11/03/2021    Acute maxillary sinusitis     13 dec 2012    ROSENDO (acute kidney injury) ([facility]) 06/08/2018 2018.     Bone spur     toe    BPPV (benign paroxysmal positional vertigo) 10/24/2021    Cataract 02/28/2022    History of transfusion     Hyperlipidemia     Hypertension     Kidney stone     PAD (peripheral artery disease) ([facility]) 05/22/2023    PVD (peripheral vascular disease) ([facility])     Retinal detachment 11/21/2013    Sleep apnea     does not use cpap    TIA (transient ischemic attack) 11/03/2021       Past Surgical History:   Procedure Laterality Date    APPENDECTOMY      CATARACT EXTRACTION Bilateral     INCISION AND DRAINAGE OF WOUND Right 1/15/2025    Procedure: INCISION AND DRAINAGE (I&D) RIGHT HAND;  Surgeon: Marky Richardson MD;  [facility];  Service: Orthopedics    IR LOWER EXTREMITY ANGIOGRAM  11/8/2023    KIDNEY STONE SURGERY      multiple surgery in the past    CA SLCTV CATHJ 3RD+ ORD SLCTV ABDL PEL/LXTR BRNCH Right 11/8/2023    Procedure: ARTERIOGRAM;  Surgeon: Lucho Reynoso MD;  [facility];  Service: Vascular    CA TEAEC W/WO PATCH GRAFT COMMON FEMORAL Left 08/14/2023    Procedure: ENDARTERECTOMY ARTERIAL FEMORAL WITH TISSUES PATCH AGIOPLASTY;  Surgeon: Lucho Reynoso MD;  [facility];  Service: Vascular    CA TEAEC W/WO PATCH GRAFT COMMON FEMORAL Right 11/8/2023    Procedure: RIGHT FEMORAL ENDARTERECTOMY WITH PATCH ANGIOPLASTY, RIGHT COMMON ILIAC INTRAVASCULAR LITHOTRIPSY, RIGHT EXTERNAL ILIAC INTRAVASCULAR LITHOTRIPSY, AORTAGRAM, LEFT COMMON ILIAC INTRAVASCULAR LITHOTRIPSY;  Surgeon: Lucho Reynoso MD;  [facility];  Service: Vascular    RETINAL DETACHMENT SURGERY Bilateral     SEPTOPLASTY      TONSILLECTOMY  2000    meg Vidal       Current  Medications[1]    Allergies[2]    Physical Exam:   Vitals:    05/15/25 1318   BP: 161/92   Resp: 20   SpO2: 93%     General: Awake, Alert, Oriented x 3, Mood and affect appropriate  Respiratory: Respirations even and unlabored  Cardiovascular: Peripheral pulses intact; no edema  Musculoskeletal Exam: left hip pain    ASA Score: 3    Patient/Chart Verification  Patient ID Verified: Verbal  ID Band Applied: No  Consents Confirmed: To be obtained in the Procedural area  Interval H&P(within 24 hr) Complete (required for Outpatients and Surgery Admit only): To be obtained in the Procedural area  Allergies Reviewed: Yes  Anticoag/NSAID held?: NA  Currently on antibiotics?: No    Assessment:   1. Primary osteoarthritis of one hip, left        Plan: left intra-articular hip injection          [1]   Current Outpatient Medications:     acetaminophen (TYLENOL) 500 mg tablet, Take 500 mg by mouth every 6 (six) hours as needed for mild pain, Disp: , Rfl:     ALPRAZolam (XANAX) 0.25 mg tablet, Take 1 tablet (0.25 mg total) by mouth daily at bedtime as needed for anxiety, Disp: 15 tablet, Rfl: 0    aspirin 81 mg chewable tablet, Chew 1 tablet (81 mg total) daily, Disp: , Rfl:     diltiazem (DILACOR XR) 120 MG 24 hr capsule, Take 1 capsule (120 mg total) by mouth daily, Disp: 90 capsule, Rfl: 1    fluorouracil (EFUDEX) 5 % cream, Apply twice a day for two to three weeks on bilateral arms and tho weeks for face (Patient not taking: Reported on 10/8/2024), Disp: 40 g, Rfl: 1    gabapentin (Neurontin) 300 mg capsule, Take 1 capsule (300 mg total) by mouth 2 (two) times a day, Disp: 90 capsule, Rfl: 3    losartan (COZAAR) 100 MG tablet, TAKE 1 TABLET BY MOUTH EVERY DAY, Disp: 90 tablet, Rfl: 1    Melatonin 10 MG TABS, Take 10 mg by mouth Pt takes 10mg at bedtime., Disp: , Rfl:     naloxone (NARCAN) 4 mg/0.1 mL nasal spray, Administer 1 spray into a nostril. If no response after 2-3 minutes, give another dose in the other nostril using  a new spray., Disp: 1 each, Rfl: 1    oxyCODONE (ROXICODONE) 5 immediate release tablet, Take 1 tablet (5 mg total) by mouth every 6 (six) hours as needed for severe pain for up to 10 doses Max Daily Amount: 20 mg (Patient not taking: Reported on 1/28/2025), Disp: 10 tablet, Rfl: 0    rosuvastatin (CRESTOR) 40 MG tablet, Take 1 tablet (40 mg total) by mouth daily, Disp: 90 tablet, Rfl: 3    traZODone (DESYREL) 50 mg tablet, TAKE 2 TABLETS (100 MG TOTAL) BY MOUTH DAILY AT BEDTIME, Disp: 180 tablet, Rfl: 1    triamcinolone (KENALOG) 0.1 % cream, Apply topically 2 (two) times a day For up to two weeks to hands and forearms after completing efudex therapy to help with healing., Disp: 80 g, Rfl: 0    Current Facility-Administered Medications:     iohexol (OMNIPAQUE) 300 mg/mL injection 1 mL, 1 mL, Intra-articular, Once, Will Andrade Mariano MD    lidocaine (PF) (XYLOCAINE-MPF) 1 % injection 3 mL, 3 mL, Infiltration, Once, Will Andrade Mariano MD    methylPREDNISolone acetate (DEPO-MEDROL) injection 40 mg, 40 mg, Intra-articular, Once, Will Andrade Mariano MD    ropivacaine (NAROPIN) injection 3 mL, 3 mL, Intra-articular, Once, Will Andrade Mariano MD  [2]   Allergies  Allergen Reactions    Amlodipine Edema    Eszopiclone Other (See Comments)     Sleep walking    Zolpidem Other (See Comments)     Other reaction(s): sleepwalking.

## 2025-05-19 ENCOUNTER — HOSPITAL ENCOUNTER (OUTPATIENT)
Dept: NON INVASIVE DIAGNOSTICS | Facility: HOSPITAL | Age: 65
Discharge: HOME | End: 2025-05-19
Attending: SURGERY
Payer: COMMERCIAL

## 2025-05-19 ENCOUNTER — PROCEDURE VISIT (OUTPATIENT)
Dept: OBGYN CLINIC | Facility: MEDICAL CENTER | Age: 65
End: 2025-05-19

## 2025-05-19 VITALS — BODY MASS INDEX: 27.09 KG/M2 | HEIGHT: 72 IN | WEIGHT: 200 LBS

## 2025-05-19 DIAGNOSIS — I73.9 PAD (PERIPHERAL ARTERY DISEASE) (HCC): ICD-10-CM

## 2025-05-19 DIAGNOSIS — M16.12 PRIMARY OSTEOARTHRITIS OF ONE HIP, LEFT: ICD-10-CM

## 2025-05-19 DIAGNOSIS — M17.12 PRIMARY OSTEOARTHRITIS OF LEFT KNEE: Primary | ICD-10-CM

## 2025-05-19 PROCEDURE — 93925 LOWER EXTREMITY STUDY: CPT

## 2025-05-19 PROCEDURE — 93923 UPR/LXTR ART STDY 3+ LVLS: CPT

## 2025-05-19 RX ORDER — ROPIVACAINE HYDROCHLORIDE 5 MG/ML
5 INJECTION, SOLUTION EPIDURAL; INFILTRATION; PERINEURAL
Status: COMPLETED | OUTPATIENT
Start: 2025-05-19 | End: 2025-05-19

## 2025-05-19 RX ADMIN — ROPIVACAINE HYDROCHLORIDE 5 ML: 5 INJECTION, SOLUTION EPIDURAL; INFILTRATION; PERINEURAL at 12:00

## 2025-05-19 NOTE — ASSESSMENT & PLAN NOTE
- We do have discussed about treatment options for osteoarthritis of the knee.  These included both conservative versus surgical treatment.  -After discussion about risk and benefits patient agreed to proceed with a aspiration and Durolane injection of the left knee.  45 mL of clear serous fluid was aspirated and discarded at this time.  Patient did tolerate the procedure well.  -Continue with ice and analgesics/NSAIDs as needed for pain.  -Continue home exercise program.  -Discussed options going forward in regards to injections for repeat viscosupplementation after 6 months and steroid injections every 3 months as needed.  -Follow-up 3 months with Vincent for reevaluation left knee.

## 2025-05-19 NOTE — PROGRESS NOTES
Orthopaedic Surgery - Office Note  Ryan Montoya (65 y.o. male)   : 1960   MRN: [id number]  Encounter Date: 2025    Assessment / Plan  Left knee osteoarthritis with degenerative medial meniscus tear  Left hip osteoarthritis  Assessment & Plan  Primary osteoarthritis of left knee  - We do have discussed about treatment options for osteoarthritis of the knee.  These included both conservative versus surgical treatment.  -After discussion about risk and benefits patient agreed to proceed with a aspiration and Durolane injection of the left knee.  45 mL of clear serous fluid was aspirated and discarded at this time.  Patient did tolerate the procedure well.  -Continue with ice and analgesics/NSAIDs as needed for pain.  -Continue home exercise program.  -Discussed options going forward in regards to injections for repeat viscosupplementation after 6 months and steroid injections every 3 months as needed.  -Follow-up 3 months with Vincent for reevaluation left knee.       Primary osteoarthritis of one hip, left  - Continue with ice and analgesics as needed for pain.  -Continue home exercise program.  -Continue to monitor progress following intra-articular guided hip injection by pain management from 5/15/2025.       Chief Complaint / Date of Onset  Chronic left knee pain  Injury Mechanism / Date  None  Surgery / Date  None    History of Present Illness   Ryan Montoya is a 65 y.o. male who presents for follow-up for left knee primary osteoarthritis with degenerative medial meniscus tear.  Is coming in today for Durolane injection.  He has not had viscosupplementation up to this point.  He is complaining of pain of 6 out of 10 most of the time which could increase to 8 out of 10 with ambulation.  He reports good symptom relief for 1 to 2 days from previous cortisone injection on 2025 by Dr. Khoury at [facility].  He reports having mild symptom relief through physical therapy.  He reports today having some  hip pain with that pain in the groin area.  He did undergo a guided hip steroid injection on 5/15/2025 by Dr. Vazquez with some benefit up to this point.  He also describes nerve related radiating symptoms down his leg to about mid tibia.    Treatment Summary  Medications / Modalities  Tylenol  Bracing / Immobilization  None  Physical Therapy  Yes -started on 4/17/2025  Injections  CSI with Dr. Khoury at [facility] on 2/20/2025  CSI left hip guided by Dr. Vazquez 5/15/2025  Aspiration 45 mL with Durolane injection left knee 5/19/2025  Prior Surgeries  None  Other Treatments  None    Employment / Current Status  Retired    Sport / Organization / Current Status  Active      Review of Systems  Pertinent items are noted in HPI.  All other systems were reviewed and are negative.      Physical Exam  Ht 6' (1.829 m)   Wt 90.7 kg (200 lb)   BMI 27.12 kg/m²   Cons: Appears well.  No apparent distress.  Psych: Alert. Oriented x3.  Mood and affect normal.  Eyes: PERRLA, EOMI  Resp: Normal effort.  No audible wheezing or stridor.  CV: Palpable pulse.  No discernable arrhythmia.  No LE edema.  Lymph:  No palpable cervical, axillary, or inguinal lymphadenopathy.  Skin: Warm.  No palpable masses.  No visible lesions.  Neuro: Normal muscle tone.  Normal and symmetric DTR's.     Left Knee Exam  Alignment:  Normal knee alignment.  Inspection:  No swelling. No ecchymosis.  Palpation:  Medial joint line tenderness.  ROM:  Knee Extension 0. Knee Flexion 120.  Strength:  5/5 quadriceps and hamstrings.  Stability:  No objective knee instability. Stable Varus / Valgus stress, Lachman, and Posterior drawer.  Tests:  No pertinent positive or negative tests.  Patella:  Patella tracks centrally with crepitus.  Neurovascular:  Sensation intact in DP/SP/Weller/Sa/T nerve distributions.  2+ DP & PT pulses.  Gait:  Steady.       Studies Reviewed  I have personally reviewed pertinent films in PACS.  X-rays of lumbar spine -osteoarthritis with  osteophyte formation at multiple levels of lumbar spine.  X-ray AP pelvis -left hip osteoarthritis with joint space narrowing and osteophyte formation.  CAM lesion present.      Large joint arthrocentesis: L knee    Performed by: Vincent Garsia PA-C  Authorized by: Vincent Garsia PA-C    Universal Protocol:  Consent: Verbal consent obtained  Consent given by: patient  Patient identity confirmed: verbally with patient  Supporting Documentation  Indications: pain and joint swelling     Is this a Visco injection? Yes  Non-Pharmacologic Treatments Attempted: Home Exercise and Physical Therapy  Pharmacologic Treatments Attempted: NSAIDs and Tylenol  Pain Score: 6Procedure Details  Location: knee - L knee  Needle size: 18 G  Ultrasound guidance: no  Approach: lateral  Medications administered: 5 mL ropivacaine 0.5 %; 3 mL sodium hyaluronate 60 MG/3ML    Aspirate amount: 45 mL  Aspirate: clear, serous and yellow  Patient tolerance: patient tolerated the procedure well with no immediate complications  Dressing:  Sterile dressing applied             Medical, Surgical, Family, and Social History  The patient's medical history, family history, and social history, were reviewed and updated as appropriate.    Past Medical History:   Diagnosis Date    Acute intractable headache 11/03/2021    Acute maxillary sinusitis     13 dec 2012    ROSENDO (acute kidney injury) ([facility]) 06/08/2018 2018.     Bone spur     toe    BPPV (benign paroxysmal positional vertigo) 10/24/2021    Cataract 02/28/2022    History of transfusion     Hyperlipidemia     Hypertension     Kidney stone     PAD (peripheral artery disease) ([facility]) 05/22/2023    PVD (peripheral vascular disease) ([facility])     Retinal detachment 11/21/2013    Sleep apnea     does not use cpap    TIA (transient ischemic attack) 11/03/2021       Past Surgical History:   Procedure Laterality Date    APPENDECTOMY      CATARACT EXTRACTION Bilateral     INCISION AND DRAINAGE OF WOUND Right  1/15/2025    Procedure: INCISION AND DRAINAGE (I&D) RIGHT HAND;  Surgeon: Marky Richardson MD;  [facility];  Service: Orthopedics    IR LOWER EXTREMITY ANGIOGRAM  11/8/2023    KIDNEY STONE SURGERY      multiple surgery in the past    ID SLCTV CATHJ 3RD+ ORD SLCTV ABDL PEL/LXTR BRNCH Right 11/8/2023    Procedure: ARTERIOGRAM;  Surgeon: Lucho Reynoso MD;  [facility];  Service: Vascular    ID TEAEC W/WO PATCH GRAFT COMMON FEMORAL Left 08/14/2023    Procedure: ENDARTERECTOMY ARTERIAL FEMORAL WITH TISSUES PATCH AGIOPLASTY;  Surgeon: Lucho Reynoso MD;  [facility];  Service: Vascular    ID TEAEC W/WO PATCH GRAFT COMMON FEMORAL Right 11/8/2023    Procedure: RIGHT FEMORAL ENDARTERECTOMY WITH PATCH ANGIOPLASTY, RIGHT COMMON ILIAC INTRAVASCULAR LITHOTRIPSY, RIGHT EXTERNAL ILIAC INTRAVASCULAR LITHOTRIPSY, AORTAGRAM, LEFT COMMON ILIAC INTRAVASCULAR LITHOTRIPSY;  Surgeon: Lucho Reynoso MD;  [facility];  Service: Vascular    RETINAL DETACHMENT SURGERY Bilateral     SEPTOPLASTY      [facility]  2000    [facility]       Family History   Problem Relation Age of Onset    Stroke Mother     No Known Problems Father        Social History     Occupational History    Occupation: attendant for [employer]     Comment: bus maintenance (/)   Tobacco Use    Smoking status: Every Day     Current packs/day: 0.50     Average packs/day: 0.5 packs/day for 40.0 years (20.0 ttl pk-yrs)     Types: Cigarettes    Smokeless tobacco: Never    Tobacco comments:     exposure to 2nd hand smoke  advised not to smoke prior to procedure   Vaping Use    Vaping status: Never Used   Substance and Sexual Activity    Alcohol use: Yes     Alcohol/week: 0.0 standard drinks of alcohol     Comment: social    Drug use: Yes     Types: Marijuana     Comment: once a week advised not to smoke prior to procedure    Sexual activity: Yes     Partners: Female        Allergies   Allergen Reactions    Amlodipine Edema    Eszopiclone Other (See Comments)     Sleep walking    Zolpidem Other (See Comments)     Other reaction(s): sleepwalking.         Current Outpatient Medications:     acetaminophen (TYLENOL) 500 mg tablet, Take 500 mg by mouth every 6 (six) hours as needed for mild pain, Disp: , Rfl:     ALPRAZolam (XANAX) 0.25 mg tablet, Take 1 tablet (0.25 mg total) by mouth daily at bedtime as needed for anxiety, Disp: 15 tablet, Rfl: 0    aspirin 81 mg chewable tablet, Chew 1 tablet (81 mg total) daily, Disp: , Rfl:     diltiazem (DILACOR XR) 120 MG 24 hr capsule, Take 1 capsule (120 mg total) by mouth daily, Disp: 90 capsule, Rfl: 1    gabapentin (Neurontin) 300 mg capsule, Take 1 capsule (300 mg total) by mouth 2 (two) times a day, Disp: 90 capsule, Rfl: 3    losartan (COZAAR) 100 MG tablet, TAKE 1 TABLET BY MOUTH EVERY DAY, Disp: 90 tablet, Rfl: 1    Melatonin 10 MG TABS, Take 10 mg by mouth Pt takes 10mg at bedtime., Disp: , Rfl:     rosuvastatin (CRESTOR) 40 MG tablet, Take 1 tablet (40 mg total) by mouth daily, Disp: 90 tablet, Rfl: 3    traZODone (DESYREL) 50 mg tablet, TAKE 2 TABLETS (100 MG TOTAL) BY MOUTH DAILY AT BEDTIME, Disp: 180 tablet, Rfl: 1    triamcinolone (KENALOG) 0.1 % cream, Apply topically 2 (two) times a day For up to two weeks to hands and forearms after completing efudex therapy to help with healing., Disp: 80 g, Rfl: 0    fluorouracil (EFUDEX) 5 % cream, Apply twice a day for two to three weeks on bilateral arms and tho weeks for face (Patient not taking: Reported on 10/8/2024), Disp: 40 g, Rfl: 1    naloxone (NARCAN) 4 mg/0.1 mL nasal spray, Administer 1 spray into a nostril. If no response after 2-3 minutes, give another dose in the other nostril using a new spray., Disp: 1 each, Rfl: 1    oxyCODONE (ROXICODONE) 5 immediate release tablet, Take 1 tablet (5 mg total) by mouth every 6 (six) hours as needed for severe pain for up to 10 doses Max  Daily Amount: 20 mg (Patient not taking: Reported on 1/28/2025), Disp: 10 tablet, Rfl: 0      Vincent Garsia PA-C    Scribe Attestation      I,:   am acting as a scribe while in the presence of the attending physician.:       I,:   personally performed the services described in this documentation    as scribed in my presence.:

## 2025-05-20 DIAGNOSIS — F51.01 PRIMARY INSOMNIA: ICD-10-CM

## 2025-05-20 PROCEDURE — 93925 LOWER EXTREMITY STUDY: CPT | Performed by: SURGERY

## 2025-05-20 PROCEDURE — 93922 UPR/L XTREMITY ART 2 LEVELS: CPT | Performed by: SURGERY

## 2025-05-20 RX ORDER — ALPRAZOLAM 0.25 MG
0.25 TABLET ORAL
Qty: 15 TABLET | Refills: 0 | Status: SHIPPED | OUTPATIENT
Start: 2025-05-20

## 2025-05-20 NOTE — TELEPHONE ENCOUNTER
Reason for call:   [x] Refill   [] Prior Auth  [] Other:     Office:   [x] PCP/Provider -  Olvin Brewster MD   [] Specialty/Provider -     Medication: (XANAX) 0.25 mg     Dose/Frequency: 1 tab daily    Quantity: 15 tabs    Pharmacy: [facility]/pharmacy #[id number] - [place] - [address] [place] [address] Pharmacy   Does the patient have enough for 3 days?   [] Yes   [x] No - Send as HP to POD    Mail Away Pharmacy   Does the patient have enough for 10 days?   [] Yes   [] No - Send as HP to POD

## 2025-05-22 ENCOUNTER — OFFICE VISIT (OUTPATIENT)
Dept: VASCULAR SURGERY | Facility: CLINIC | Age: 65
End: 2025-05-22
Payer: COMMERCIAL

## 2025-05-22 ENCOUNTER — CLINICAL SUPPORT (OUTPATIENT)
Dept: DERMATOLOGY | Facility: CLINIC | Age: 65
End: 2025-05-22

## 2025-05-22 VITALS
WEIGHT: 203 LBS | HEART RATE: 68 BPM | BODY MASS INDEX: 27.5 KG/M2 | SYSTOLIC BLOOD PRESSURE: 130 MMHG | HEIGHT: 72 IN | DIASTOLIC BLOOD PRESSURE: 96 MMHG

## 2025-05-22 DIAGNOSIS — I73.9 PAD (PERIPHERAL ARTERY DISEASE) (HCC): Primary | ICD-10-CM

## 2025-05-22 DIAGNOSIS — Z48.02 ENCOUNTER FOR REMOVAL OF SUTURES: Primary | ICD-10-CM

## 2025-05-22 DIAGNOSIS — I70.223 REST PAIN OF BOTH LOWER EXTREMITIES DUE TO ATHEROSCLEROSIS (HCC): ICD-10-CM

## 2025-05-22 PROCEDURE — 99214 OFFICE O/P EST MOD 30 MIN: CPT | Performed by: SURGERY

## 2025-05-22 NOTE — ASSESSMENT & PLAN NOTE
Rest pain has since resolved after inflow procedures to bilateral lower extremities via femoral endarterectomies.  He still develops claudication.  He is still smoking.  He has tried to quit however has been unable.  He understands the risks of ongoing tobacco abuse.  He is reliably taking his aspirin and statin.  His current duplex evaluation demonstrates stable ABIs and toe pressures.  They are currently above the healing threshold.  We will reconvene in 6 months with a surveillance duplex and office visit.

## 2025-05-22 NOTE — PROGRESS NOTES
"Suture removal    Date/Time: 5/22/2025 1:15 PM    Performed by: Parisa Verdin RN  Authorized by: Shaquille Stewart MD    [place] Protocol:  procedure performed by consultantConsent: Verbal consent obtained. Written consent not obtained  Risks and benefits: risks, benefits and alternatives were discussed  Consent given by: patient  Time out: Immediately prior to procedure a \"time out\" was called to verify the correct patient, procedure, equipment, support staff and site/side marked as required.  Timeout called at: 5/22/2025 1:15 PM.  Patient understanding: patient states understanding of the procedure being performed  Patient consent: the patient's understanding of the procedure matches consent given  Procedure consent: procedure consent matches procedure scheduled  Relevant documents: relevant documents not present or verified  Test results: test results not available  Site marked: the operative site was not marked  Radiology Images displayed and confirmed. If images not available, report reviewed: imaging studies not available  Patient identity confirmed: verbally with patient      Patient location:  Clinic  Location:     Laterality:  Left  Procedure details:     Tools used:  Suture removal kit    Wound appearance:  No sign(s) of infection, good wound healing, clean, moist and pink    Number of sutures removed:  2  Post-procedure details:     Post-removal:  No dressing applied    Patient tolerance of procedure:  Tolerated well, no immediate complications      Before suture removal     After suture removal   "

## 2025-05-22 NOTE — PATIENT INSTRUCTIONS
1. PAD (peripheral artery disease) (HCC)  -     VAS ARTERIAL DUPLEX- LOWER LIMB BILATERAL; Future; Expected date: 11/22/2025  2. Rest pain of both lower extremities due to atherosclerosis (HCC)  Assessment & Plan:  Rest pain has since resolved after inflow procedures to bilateral lower extremities via femoral endarterectomies.  He still develops claudication.  He is still smoking.  He has tried to quit however has been unable.  He understands the risks of ongoing tobacco abuse.  He is reliably taking his aspirin and statin.  His current duplex evaluation demonstrates stable ABIs and toe pressures.  They are currently above the healing threshold.  We will reconvene in 6 months with a surveillance duplex and office visit.

## 2025-05-22 NOTE — LETTER
May 22, 2025     Olvin Brewster MD  [address]  [address]  [place] [address]    Patient: Ryan Montoya   YOB: 1960   Date of Visit: 5/22/2025       Dear Dr. Olvin Brewster MD  Ryan Montoya:    Thank you for referring Ryan Montoya to me for evaluation. Below are my notes for this consultation.    If you have questions, please do not hesitate to call me. I look forward to following your patient along with you.         Sincerely,        Lucho Reynoso MD        CC: Ryan Reynoso MD  5/22/2025 10:38 AM  Sign when Signing Visit  :  Assessment & Plan  PAD (peripheral artery disease) ([facility])    Orders:  •  VAS ARTERIAL DUPLEX- LOWER LIMB BILATERAL; Future    Rest pain of both lower extremities due to atherosclerosis (HCC)  Rest pain has since resolved after inflow procedures to bilateral lower extremities via femoral endarterectomies.  He still develops claudication.  He is still smoking.  He has tried to quit however has been unable.  He understands the risks of ongoing tobacco abuse.  He is reliably taking his aspirin and statin.  His current duplex evaluation demonstrates stable ABIs and toe pressures.  They are currently above the healing threshold.  We will reconvene in 6 months with a surveillance duplex and office visit.             History of Present Illness    Patient presents today to review carotid duplex 3/19/25 and IRLANDA 5/19/25. H/o BLE vascular intervention. Stable b/l calf claudication x 25-50 yards. No wounds. Denies any s/s of CVA.       Ryan Montoya is a 65 y.o. male who presents to the office to review his recent duplex study.  He has no new complaints of rest pain or tissue loss.  He is able to ambulate short distances.  He is unfortunately continuing to smoke and understands the risks of ongoing tobacco abuse.  He is reliably taking his aspirin therapy.  He uses a cane to ambulate.  He is going on a cruise  The patient is a 77y Female complaining of at the end of this week with his wife.  He has no new concerns at this time.  No wound healing issues from his prior endarterectomies.    Review of Systems   Constitutional: Negative.    HENT: Negative.     Eyes: Negative.    Respiratory:  Positive for cough and shortness of breath.    Cardiovascular: Negative.    Gastrointestinal: Negative.    Endocrine: Negative.    Genitourinary: Negative.    Musculoskeletal:         Leg pain  Calf cramping with walking   Skin: Negative.    Allergic/Immunologic: Negative.    Neurological: Negative.    Hematological: Negative.    Psychiatric/Behavioral: Negative.       Objective  /96 (BP Location: Right arm, Patient Position: Sitting)   Pulse 68   Ht 6' (1.829 m)   Wt 92.1 kg (203 lb)   BMI 27.53 kg/m²      Physical Exam  Vitals and nursing note reviewed.   Constitutional:       General: He is not in acute distress.     Appearance: He is well-developed.   HENT:      Head: Normocephalic and atraumatic.     Eyes:      Conjunctiva/sclera: Conjunctivae normal.       Cardiovascular:      Rate and Rhythm: Normal rate and regular rhythm.      Pulses:           Dorsalis pedis pulses are detected w/ Doppler on the right side and detected w/ Doppler on the left side.        Posterior tibial pulses are detected w/ Doppler on the right side and detected w/ Doppler on the left side.      Heart sounds: No murmur heard.     Comments: No wounds  Pulmonary:      Effort: Pulmonary effort is normal. No respiratory distress.      Breath sounds: Normal breath sounds.   Abdominal:      Palpations: Abdomen is soft.      Tenderness: There is no abdominal tenderness.     Musculoskeletal:         General: No swelling.      Cervical back: Neck supple.     Skin:     General: Skin is warm and dry.      Capillary Refill: Capillary refill takes less than 2 seconds.     Neurological:      Mental Status: He is alert.     Psychiatric:         Mood and Affect: Mood normal.

## 2025-05-22 NOTE — PROGRESS NOTES
:  Assessment & Plan  PAD (peripheral artery disease) ([facility])    Orders:    VAS ARTERIAL DUPLEX- LOWER LIMB BILATERAL; Future    Rest pain of both lower extremities due to atherosclerosis ([facility])  Rest pain has since resolved after inflow procedures to bilateral lower extremities via femoral endarterectomies.  He still develops claudication.  He is still smoking.  He has tried to quit however has been unable.  He understands the risks of ongoing tobacco abuse.  He is reliably taking his aspirin and statin.  His current duplex evaluation demonstrates stable ABIs and toe pressures.  They are currently above the healing threshold.  We will reconvene in 6 months with a surveillance duplex and office visit.             History of Present Illness     Patient presents today to review carotid duplex 3/19/25 and IRLANDA 5/19/25. H/o BLE vascular intervention. Stable b/l calf claudication x 25-50 yards. No wounds. Denies any s/s of CVA.       Ryan Montoya is a 65 y.o. male who presents to the office to review his recent duplex study.  He has no new complaints of rest pain or tissue loss.  He is able to ambulate short distances.  He is unfortunately continuing to smoke and understands the risks of ongoing tobacco abuse.  He is reliably taking his aspirin therapy.  He uses a cane to ambulate.  He is going on a cruise at the end of this week with his wife.  He has no new concerns at this time.  No wound healing issues from his prior endarterectomies.    Review of Systems   Constitutional: Negative.    HENT: Negative.     Eyes: Negative.    Respiratory:  Positive for cough and shortness of breath.    Cardiovascular: Negative.    Gastrointestinal: Negative.    Endocrine: Negative.    Genitourinary: Negative.    Musculoskeletal:         Leg pain  Calf cramping with walking   Skin: Negative.    Allergic/Immunologic: Negative.    Neurological: Negative.    Hematological: Negative.    Psychiatric/Behavioral: Negative.       Objective   BP  130/96 (BP Location: Right arm, Patient Position: Sitting)   Pulse 68   Ht 6' (1.829 m)   Wt 92.1 kg (203 lb)   BMI 27.53 kg/m²      Physical Exam  Vitals and nursing note reviewed.   Constitutional:       General: He is not in acute distress.     Appearance: He is well-developed.   HENT:      Head: Normocephalic and atraumatic.     Eyes:      Conjunctiva/sclera: Conjunctivae normal.       Cardiovascular:      Rate and Rhythm: Normal rate and regular rhythm.      Pulses:           Dorsalis pedis pulses are detected w/ Doppler on the right side and detected w/ Doppler on the left side.        Posterior tibial pulses are detected w/ Doppler on the right side and detected w/ Doppler on the left side.      Heart sounds: No murmur heard.     Comments: No wounds  Pulmonary:      Effort: Pulmonary effort is normal. No respiratory distress.      Breath sounds: Normal breath sounds.   Abdominal:      Palpations: Abdomen is soft.      Tenderness: There is no abdominal tenderness.     Musculoskeletal:         General: No swelling.      Cervical back: Neck supple.     Skin:     General: Skin is warm and dry.      Capillary Refill: Capillary refill takes less than 2 seconds.     Neurological:      Mental Status: He is alert.     Psychiatric:         Mood and Affect: Mood normal.

## 2025-05-29 ENCOUNTER — TELEPHONE (OUTPATIENT)
Dept: PAIN MEDICINE | Facility: CLINIC | Age: 65
End: 2025-05-29

## 2025-06-02 ENCOUNTER — NURSE TRIAGE (OUTPATIENT)
Dept: OTHER | Facility: OTHER | Age: 65
End: 2025-06-02

## 2025-06-02 ENCOUNTER — DOCUMENTATION (OUTPATIENT)
Dept: FAMILY MEDICINE CLINIC | Facility: CLINIC | Age: 65
End: 2025-06-02

## 2025-06-02 ENCOUNTER — TELEPHONE (OUTPATIENT)
Dept: FAMILY MEDICINE CLINIC | Facility: CLINIC | Age: 65
End: 2025-06-02

## 2025-06-02 ENCOUNTER — OFFICE VISIT (OUTPATIENT)
Dept: FAMILY MEDICINE CLINIC | Facility: CLINIC | Age: 65
End: 2025-06-02
Payer: COMMERCIAL

## 2025-06-02 VITALS
BODY MASS INDEX: 27.22 KG/M2 | DIASTOLIC BLOOD PRESSURE: 84 MMHG | OXYGEN SATURATION: 97 % | TEMPERATURE: 98 F | HEART RATE: 75 BPM | SYSTOLIC BLOOD PRESSURE: 132 MMHG | WEIGHT: 201 LBS | HEIGHT: 72 IN

## 2025-06-02 DIAGNOSIS — R60.0 BILATERAL LEG EDEMA: Primary | ICD-10-CM

## 2025-06-02 DIAGNOSIS — L03.116 CELLULITIS OF LEFT LOWER EXTREMITY: Primary | ICD-10-CM

## 2025-06-02 DIAGNOSIS — L03.116 CELLULITIS OF LEFT LOWER EXTREMITY: ICD-10-CM

## 2025-06-02 DIAGNOSIS — M79.604 PAIN IN BOTH LOWER EXTREMITIES: ICD-10-CM

## 2025-06-02 DIAGNOSIS — N18.32 STAGE 3B CHRONIC KIDNEY DISEASE (HCC): ICD-10-CM

## 2025-06-02 DIAGNOSIS — M79.605 PAIN IN BOTH LOWER EXTREMITIES: ICD-10-CM

## 2025-06-02 PROBLEM — L03.113 CELLULITIS OF RIGHT HAND: Status: RESOLVED | Noted: 2025-01-07 | Resolved: 2025-06-02

## 2025-06-02 PROBLEM — T16.2XXA FOREIGN BODY OF LEFT EAR: Status: RESOLVED | Noted: 2021-10-14 | Resolved: 2025-06-02

## 2025-06-02 PROBLEM — L03.032 CELLULITIS OF TOE OF LEFT FOOT: Status: RESOLVED | Noted: 2023-07-26 | Resolved: 2025-06-02

## 2025-06-02 PROBLEM — R73.9 HYPERGLYCEMIA: Status: RESOLVED | Noted: 2022-11-14 | Resolved: 2025-06-02

## 2025-06-02 PROBLEM — S83.249A ACUTE MEDIAL MENISCUS TEAR: Status: RESOLVED | Noted: 2025-01-13 | Resolved: 2025-06-02

## 2025-06-02 PROCEDURE — G2211 COMPLEX E/M VISIT ADD ON: HCPCS | Performed by: FAMILY MEDICINE

## 2025-06-02 PROCEDURE — 99214 OFFICE O/P EST MOD 30 MIN: CPT | Performed by: FAMILY MEDICINE

## 2025-06-02 RX ORDER — CEPHALEXIN 500 MG/1
500 CAPSULE ORAL 3 TIMES DAILY
Qty: 21 CAPSULE | Refills: 0 | Status: SHIPPED | OUTPATIENT
Start: 2025-06-02 | End: 2025-06-09

## 2025-06-02 RX ORDER — TRAMADOL HYDROCHLORIDE 50 MG/1
50 TABLET ORAL EVERY 6 HOURS PRN
Qty: 20 TABLET | Refills: 0 | Status: SHIPPED | OUTPATIENT
Start: 2025-06-02 | End: 2025-06-10 | Stop reason: SDUPTHER

## 2025-06-02 RX ORDER — TIZANIDINE 2 MG/1
2 TABLET ORAL EVERY 8 HOURS PRN
Qty: 30 TABLET | Refills: 0 | Status: SHIPPED | OUTPATIENT
Start: 2025-06-02 | End: 2025-06-10 | Stop reason: SDUPTHER

## 2025-06-02 NOTE — TELEPHONE ENCOUNTER
Caller: Wife Toya     Doctor: Dr. Mariano     Reason for call: His other leg is starting to swell nobody has called him back yet should he go to ER his main swelling left knee    Call back#: [phone]

## 2025-06-02 NOTE — TELEPHONE ENCOUNTER
Received message from answering service, tramadol was not sent at time of visit with Dr. Murphy today.  I will send a quantity of 20 as discussed with Dr. Murphy.

## 2025-06-02 NOTE — TELEPHONE ENCOUNTER
Caller: Ryan   Doctor/office: Dr Mariano  CB#: [phone]    % of improvement:0 %    Pain Scale (1-10): 10/10     Pt states he is having swelling in left leg and ankle. Foot is red and feeling warm to touch.

## 2025-06-02 NOTE — PROGRESS NOTES
Name: Ryan Montoya      : 1960      MRN: [id number]  Encounter Provider: Fauzia Murphy MD  Encounter Date: 2025   Encounter department: [facility] PRIMARY CARE  :  Assessment & Plan  Bilateral leg edema  Check  venous  doppler and  lab, may take 1/2  of wife's  lasix  until results  Stage 3b chronic kidney disease (HCC)  Lab Results   Component Value Date    EGFR 38 2025    EGFR 49 2024    EGFR 58 2023    CREATININE 1.80 (H) 2025    CREATININE 1.46 (H) 2024    CREATININE 1.29 2023   Can't  take  NSAIDs  due to kidney  function         Cellulitis of left lower extremity  Keflex 500 tid  Pain in both lower extremities  Tizanidine  2  mg up to three times/day, tramadol up to 3/day  for  next  few  days         History of Present Illness   Patient presents with:  Knee Swelling: Left knee and entire leg and foot swollen and painful.   had a knee injection.ortho told  him  to come to his  family  doc  Was on a cruise last week.didn't  eat excessively  salty, no  hx injury, went  out of [place]  so no plane  travel or  excessive  car  travel  His right ankle is slightly swollen. Wife  gave  1/2  Lasix, urinated  more, no decrease  in edema, legs  a little  red, no hx  insect  bite         Review of Systems   Constitutional:  Negative for activity change, appetite change, chills, fatigue and fever.   Respiratory:  Negative for shortness of breath.    Cardiovascular:  Positive for leg swelling. Negative for chest pain.   Gastrointestinal:  Positive for nausea. Negative for vomiting.   Skin:  Positive for rash.   Neurological:  Negative for dizziness, light-headedness and headaches.   Hematological:  Negative for adenopathy.       Objective   /84 (BP Location: Right arm, Patient Position: Sitting, Cuff Size: Large)   Pulse 75   Temp 98 °F (36.7 °C)   Ht 6' (1.829 m)   Wt 91.2 kg (201 lb)   SpO2 97%   BMI 27.26 kg/m²      Physical Exam  Vitals reviewed.    Constitutional:       Appearance: Normal appearance.     Cardiovascular:      Rate and Rhythm: Normal rate and regular rhythm.      Pulses: Normal pulses.      Heart sounds: Normal heart sounds.   Pulmonary:      Effort: Pulmonary effort is normal.      Breath sounds: Normal breath sounds.     Musculoskeletal:         General: Tenderness present.      Right lower leg: Edema present.      Left lower leg: Edema present.      Comments: Both ;egs  tender  to touch and  slightly  red, 2  plus edema  l  , 1 plus   r   Lymphadenopathy:      Cervical: No cervical adenopathy.     Neurological:      Mental Status: He is alert.

## 2025-06-02 NOTE — TELEPHONE ENCOUNTER
"Regarding: Medication Problem, Severe Pain  ----- Message from Stefan ARRINGTON sent at 6/2/2025  6:11 PM EDT -----  \"I saw my doctor today and she was supposed to prescribe an antibiotic, muscle relaxer, and pain med. My leg is very swollen and I am in a lot of pain. When we went to the pharmacy, only the antibiotic and the muscle relaxer were there. I need the pain med.\"    "

## 2025-06-02 NOTE — TELEPHONE ENCOUNTER
Patient called in regarding his appointment on 6/2 with Dr. Murphy. In the after visit summary, it indicates Dr. Murphy intended to prescribe Tramadol for pain to the patient. However, the patient stated when he went to the pharmacy, only the antibiotic and muscle relaxer had been received from the provider. Please follow-up and advise. Thank you in advance.

## 2025-06-02 NOTE — ASSESSMENT & PLAN NOTE
Lab Results   Component Value Date    EGFR 38 01/14/2025    EGFR 49 07/09/2024    EGFR 58 11/09/2023    CREATININE 1.80 (H) 01/14/2025    CREATININE 1.46 (H) 07/09/2024    CREATININE 1.29 11/09/2023   Can't  take  NSAIDs  due to kidney  function

## 2025-06-02 NOTE — TELEPHONE ENCOUNTER
"S/W pt.  Pt stated his right ankle and left leg are swollen.  Yesterday it started with his left ankle.  Left foot is reddish and warm.  Left leg hurts all the time.  He did not sleep good the last 2 nights b/c of the pain.  Left hip is a 6/10.  Leg feels like it is \"on fire and angry.\"  Advised pt to be evaluated at UC or ER.  Advised pt to tell his PCP.  Pt verbalized understanding.  "

## 2025-06-02 NOTE — TELEPHONE ENCOUNTER
"REASON FOR CONVERSATION: Medication Refill    SYMPTOMS: seen late afternoon in office and Tramadol was not sent in for his legs being painful and swollen. Doppler scheduled in AM.    OTHER HEALTH INFORMATION: none    PROTOCOL DISPOSITION: Call PCP Now    CARE ADVICE PROVIDED:  Tramadol at the pharmacy now.    PRACTICE FOLLOW-UP: None needed      Reason for Disposition   [1] Prescription refill request for ESSENTIAL medicine (i.e., likelihood of harm to patient if not taken) AND [2] triager unable to refill per department policy    Answer Assessment - Initial Assessment Questions  1. DRUG NAME: \"What medicine do you need to have refilled?\"      Tramadol for pain    2. REFILLS REMAINING: \"How many refills are remaining?\" (Note: The label on the medicine or pill bottle will show how many refills are remaining. If there are no refills remaining, then a renewal may be needed.)      None    3. EXPIRATION DATE: \"What is the expiration date?\" (Note: The label states when the prescription will , and thus can no longer be refilled.)      N/A    4. PRESCRIBING HCP: \"Who prescribed it?\" Reason: If prescribed by specialist, call should be referred to that group.      Dr. Murphy    5. SYMPTOMS: \"Do you have any symptoms?\"      Pain in his legs and swelling too.    Protocols used: Medication Refill and Renewal Call-Adult-    "

## 2025-06-03 ENCOUNTER — HOSPITAL ENCOUNTER (OUTPATIENT)
Dept: NON INVASIVE DIAGNOSTICS | Facility: HOSPITAL | Age: 65
Discharge: HOME | End: 2025-06-03
Attending: FAMILY MEDICINE
Payer: COMMERCIAL

## 2025-06-03 ENCOUNTER — RESULTS FOLLOW-UP (OUTPATIENT)
Dept: FAMILY MEDICINE CLINIC | Facility: CLINIC | Age: 65
End: 2025-06-03

## 2025-06-03 ENCOUNTER — APPOINTMENT (OUTPATIENT)
Dept: LAB | Facility: CLINIC | Age: 65
End: 2025-06-03
Payer: COMMERCIAL

## 2025-06-03 DIAGNOSIS — N20.0 NEPHROLITHIASIS: ICD-10-CM

## 2025-06-03 DIAGNOSIS — R59.0 INGUINAL LYMPHADENOPATHY: Primary | ICD-10-CM

## 2025-06-03 DIAGNOSIS — I12.9 BENIGN HYPERTENSION WITH CHRONIC KIDNEY DISEASE, STAGE III (HCC): ICD-10-CM

## 2025-06-03 DIAGNOSIS — E78.2 MIXED HYPERLIPIDEMIA: ICD-10-CM

## 2025-06-03 DIAGNOSIS — N18.30 BENIGN HYPERTENSION WITH CHRONIC KIDNEY DISEASE, STAGE III (HCC): ICD-10-CM

## 2025-06-03 DIAGNOSIS — N18.31 STAGE 3A CHRONIC KIDNEY DISEASE (HCC): ICD-10-CM

## 2025-06-03 DIAGNOSIS — R80.1 PERSISTENT PROTEINURIA: ICD-10-CM

## 2025-06-03 DIAGNOSIS — R60.0 BILATERAL LEG EDEMA: ICD-10-CM

## 2025-06-03 DIAGNOSIS — N26.1 LEFT RENAL ATROPHY: ICD-10-CM

## 2025-06-03 DIAGNOSIS — I10 PRIMARY HYPERTENSION: ICD-10-CM

## 2025-06-03 DIAGNOSIS — N28.1 BILATERAL RENAL CYSTS: ICD-10-CM

## 2025-06-03 DIAGNOSIS — R73.9 HYPERGLYCEMIA: ICD-10-CM

## 2025-06-03 LAB
ANION GAP SERPL CALCULATED.3IONS-SCNC: 9 MMOL/L (ref 4–13)
BASOPHILS # BLD AUTO: 0.06 THOUSANDS/ÂΜL (ref 0–0.1)
BASOPHILS NFR BLD AUTO: 1 % (ref 0–1)
BNP SERPL-MCNC: 11 PG/ML (ref 0–100)
BUN SERPL-MCNC: 27 MG/DL (ref 5–25)
CALCIUM SERPL-MCNC: 9.5 MG/DL (ref 8.4–10.2)
CHLORIDE SERPL-SCNC: 95 MMOL/L (ref 96–108)
CO2 SERPL-SCNC: 31 MMOL/L (ref 21–32)
CREAT SERPL-MCNC: 1.63 MG/DL (ref 0.6–1.3)
EOSINOPHIL # BLD AUTO: 0.42 THOUSAND/ÂΜL (ref 0–0.61)
EOSINOPHIL NFR BLD AUTO: 4 % (ref 0–6)
ERYTHROCYTE [DISTWIDTH] IN BLOOD BY AUTOMATED COUNT: 12.8 % (ref 11.6–15.1)
GFR SERPL CREATININE-BSD FRML MDRD: 43 ML/MIN/1.73SQ M
GLUCOSE P FAST SERPL-MCNC: 95 MG/DL (ref 65–99)
HCT VFR BLD AUTO: 43.7 % (ref 36.5–49.3)
HGB BLD-MCNC: 14.4 G/DL (ref 12–17)
IMM GRANULOCYTES # BLD AUTO: 0.06 THOUSAND/UL (ref 0–0.2)
IMM GRANULOCYTES NFR BLD AUTO: 1 % (ref 0–2)
LYMPHOCYTES # BLD AUTO: 2.26 THOUSANDS/ÂΜL (ref 0.6–4.47)
LYMPHOCYTES NFR BLD AUTO: 19 % (ref 14–44)
MCH RBC QN AUTO: 31.2 PG (ref 26.8–34.3)
MCHC RBC AUTO-ENTMCNC: 33 G/DL (ref 31.4–37.4)
MCV RBC AUTO: 95 FL (ref 82–98)
MONOCYTES # BLD AUTO: 1.18 THOUSAND/ÂΜL (ref 0.17–1.22)
MONOCYTES NFR BLD AUTO: 10 % (ref 4–12)
NEUTROPHILS # BLD AUTO: 7.68 THOUSANDS/ÂΜL (ref 1.85–7.62)
NEUTS SEG NFR BLD AUTO: 65 % (ref 43–75)
NRBC BLD AUTO-RTO: 0 /100 WBCS
PLATELET # BLD AUTO: 289 THOUSANDS/UL (ref 149–390)
PMV BLD AUTO: 12.1 FL (ref 8.9–12.7)
POTASSIUM SERPL-SCNC: 4.9 MMOL/L (ref 3.5–5.3)
RBC # BLD AUTO: 4.61 MILLION/UL (ref 3.88–5.62)
SODIUM SERPL-SCNC: 135 MMOL/L (ref 135–147)
TSH SERPL DL<=0.05 MIU/L-ACNC: 47.91 UIU/ML (ref 0.45–4.5)
WBC # BLD AUTO: 11.66 THOUSAND/UL (ref 4.31–10.16)

## 2025-06-03 PROCEDURE — 93970 EXTREMITY STUDY: CPT

## 2025-06-03 PROCEDURE — 85025 COMPLETE CBC W/AUTO DIFF WBC: CPT | Performed by: FAMILY MEDICINE

## 2025-06-03 PROCEDURE — 80048 BASIC METABOLIC PNL TOTAL CA: CPT

## 2025-06-03 PROCEDURE — 83880 ASSAY OF NATRIURETIC PEPTIDE: CPT

## 2025-06-03 PROCEDURE — 85379 FIBRIN DEGRADATION QUANT: CPT

## 2025-06-03 PROCEDURE — 93970 EXTREMITY STUDY: CPT | Performed by: SURGERY

## 2025-06-03 PROCEDURE — 84443 ASSAY THYROID STIM HORMONE: CPT | Performed by: FAMILY MEDICINE

## 2025-06-03 PROCEDURE — 36415 COLL VENOUS BLD VENIPUNCTURE: CPT

## 2025-06-04 DIAGNOSIS — E03.9 ACQUIRED HYPOTHYROIDISM: Primary | ICD-10-CM

## 2025-06-04 LAB — D DIMER PPP FEU-MCNC: 0.63 UG/ML FEU

## 2025-06-04 RX ORDER — LEVOTHYROXINE SODIUM 50 UG/1
50 TABLET ORAL
Qty: 100 TABLET | Refills: 3 | Status: SHIPPED | OUTPATIENT
Start: 2025-06-04

## 2025-06-09 ENCOUNTER — TELEPHONE (OUTPATIENT)
Dept: NEPHROLOGY | Facility: CLINIC | Age: 65
End: 2025-06-09

## 2025-06-09 NOTE — TELEPHONE ENCOUNTER
----- Message from Neri PASTRANA sent at 6/9/2025  7:59 AM EDT -----    ----- Message -----  From: Flakito Mobley MD  Sent: 6/8/2025   9:27 PM EDT  To: [facility]    Please arrange for follow up with me or an AP

## 2025-06-10 ENCOUNTER — OFFICE VISIT (OUTPATIENT)
Dept: FAMILY MEDICINE CLINIC | Facility: CLINIC | Age: 65
End: 2025-06-10
Payer: COMMERCIAL

## 2025-06-10 VITALS
OXYGEN SATURATION: 96 % | WEIGHT: 197.8 LBS | SYSTOLIC BLOOD PRESSURE: 132 MMHG | TEMPERATURE: 98.6 F | HEIGHT: 72 IN | DIASTOLIC BLOOD PRESSURE: 88 MMHG | BODY MASS INDEX: 26.79 KG/M2 | HEART RATE: 84 BPM

## 2025-06-10 DIAGNOSIS — I73.9 CLAUDICATION OF BOTH LOWER EXTREMITIES (HCC): ICD-10-CM

## 2025-06-10 DIAGNOSIS — L03.116 CELLULITIS OF LEFT LOWER EXTREMITY: Primary | ICD-10-CM

## 2025-06-10 DIAGNOSIS — M79.604 PAIN IN BOTH LOWER EXTREMITIES: ICD-10-CM

## 2025-06-10 DIAGNOSIS — M79.605 PAIN IN BOTH LOWER EXTREMITIES: ICD-10-CM

## 2025-06-10 DIAGNOSIS — R19.09 INGUINAL MASS: ICD-10-CM

## 2025-06-10 PROBLEM — F11.20 OPIOID DEPENDENCE, UNCOMPLICATED (HCC): Status: ACTIVE | Noted: 2025-06-10

## 2025-06-10 PROBLEM — F11.20 OPIOID DEPENDENCE, UNCOMPLICATED (HCC): Status: RESOLVED | Noted: 2025-06-10 | Resolved: 2025-06-10

## 2025-06-10 PROCEDURE — 99214 OFFICE O/P EST MOD 30 MIN: CPT | Performed by: PHYSICIAN ASSISTANT

## 2025-06-10 PROCEDURE — G2211 COMPLEX E/M VISIT ADD ON: HCPCS | Performed by: PHYSICIAN ASSISTANT

## 2025-06-10 RX ORDER — TIZANIDINE 2 MG/1
2 TABLET ORAL EVERY 8 HOURS PRN
Qty: 30 TABLET | Refills: 0 | Status: SHIPPED | OUTPATIENT
Start: 2025-06-10 | End: 2025-06-19 | Stop reason: SDUPTHER

## 2025-06-10 RX ORDER — TRAMADOL HYDROCHLORIDE 50 MG/1
50 TABLET ORAL EVERY 6 HOURS PRN
Qty: 20 TABLET | Refills: 0 | Status: SHIPPED | OUTPATIENT
Start: 2025-06-10

## 2025-06-10 RX ORDER — SULFAMETHOXAZOLE AND TRIMETHOPRIM 800; 160 MG/1; MG/1
1 TABLET ORAL EVERY 12 HOURS SCHEDULED
Qty: 20 TABLET | Refills: 0 | Status: SHIPPED | OUTPATIENT
Start: 2025-06-10 | End: 2025-06-20

## 2025-06-10 NOTE — PROGRESS NOTES
Name: Ryan Montoya      : 1960      MRN: [id number]  Encounter Provider: John Mayes PA-C  Encounter Date: 6/10/2025   Encounter department: [facility] PRIMARY CARE  :  Assessment & Plan  Cellulitis of left lower extremity  Patient appears to have persistent cellulitis, unaffected with cephalexin treatment which was completed.  He will start Bactrim twice daily for 10 days.  We will continue with tizanidine and tramadol as necessary for pain.  Appropriate use of medications was reviewed with patient.  Would recommend follow-up in 7 to 10 days with Dr. Brewster.  Orders:  •  sulfamethoxazole-trimethoprim (BACTRIM DS) 800-160 mg per tablet; Take 1 tablet by mouth every 12 (twelve) hours for 10 days  •  traMADol (Ultram) 50 mg tablet; Take 1 tablet (50 mg total) by mouth every 6 (six) hours as needed for moderate pain    Pain in both lower extremities  Continue muscle relaxer as necessary.  Orders:  •  tiZANidine (ZANAFLEX) 2 mg tablet; Take 1 tablet (2 mg total) by mouth every 8 (eight) hours as needed for muscle spasms    Inguinal mass  Ultrasound was reviewed with patient.  He does have CT scan ordered by Dr. Murphy.  He was encouraged to schedule this at the  today.  Recommend follow-up with Dr. Brewster in 7 to 10 days.       Claudication of both lower extremities (HCC)  Pain is likely complicated by vascular disease.  Consider further evaluation by vascular specialist.  Capillary refill at the distal extremities is less than 2 seconds.              History of Present Illness   HPI: This is a 65-year-old gentleman that presents to the office for ongoing pain especially in the left lower leg.  This seemed to start about a week or 2 ago with redness and swelling of the ankle.  He does have quite a bit of tenderness to touch in the area.  He has had history of vascular and arterial disease of the lower extremities.  He is having significant pain and using tramadol and Zanaflex as  necessary currently.  He was given Keflex for the cellulitis but it has not seemed to improve at all.  He is not having any fevers or chills and he has not had any redness or streaking proximal to the area previous diagnosed.      Review of Systems   Constitutional:  Negative for chills, fatigue and fever.   HENT:  Negative for congestion, ear pain and sinus pressure.    Eyes:  Negative for visual disturbance.   Respiratory:  Negative for cough, chest tightness and shortness of breath.    Cardiovascular:  Negative for chest pain and palpitations.   Gastrointestinal:  Negative for diarrhea, nausea and vomiting.   Endocrine: Negative for polyuria.   Genitourinary:  Negative for dysuria and frequency.   Musculoskeletal:  Positive for arthralgias and myalgias.   Skin:  Positive for rash. Negative for pallor.   Neurological:  Negative for dizziness, weakness, light-headedness, numbness and headaches.   Psychiatric/Behavioral:  Negative for agitation, behavioral problems and sleep disturbance.    All other systems reviewed and are negative.      Objective   /88 (BP Location: Left arm, Patient Position: Sitting, Cuff Size: Adult)   Pulse 84   Temp 98.6 °F (37 °C) (Tympanic)   Ht 6' (1.829 m)   Wt 89.7 kg (197 lb 12.8 oz)   SpO2 96%   BMI 26.83 kg/m²      Physical Exam  Constitutional:       Appearance: He is well-developed.   HENT:      Head: Normocephalic.     Cardiovascular:      Rate and Rhythm: Normal rate and regular rhythm.   Pulmonary:      Effort: Pulmonary effort is normal. No respiratory distress.      Breath sounds: Normal breath sounds.   Abdominal:      Palpations: Abdomen is soft.     Musculoskeletal:         General: Normal range of motion.      Left lower leg: Edema present.     Skin:     Capillary Refill: Capillary refill takes less than 2 seconds.      Comments: Patient does have localized erythema and edema of the left lower extremity from the knee distal patella to the toes.  There is some  tenderness to palpation.  Capillary refill is still less than 2 seconds at the toes.     Neurological:      Mental Status: He is alert and oriented to person, place, and time.

## 2025-06-10 NOTE — ASSESSMENT & PLAN NOTE
Pain is likely complicated by vascular disease.  Consider further evaluation by vascular specialist.  Capillary refill at the distal extremities is less than 2 seconds.

## 2025-06-11 NOTE — TELEPHONE ENCOUNTER
Called and spoke with Ryan. He said he does not want to schedule with Dr. Mobley. He did not say why. I asked if he wanted to schedule a transfer of care and he said he would rather seek somewhere else.

## 2025-06-18 ENCOUNTER — TELEPHONE (OUTPATIENT)
Dept: ADMINISTRATIVE | Facility: OTHER | Age: 65
End: 2025-06-18

## 2025-06-18 NOTE — TELEPHONE ENCOUNTER
06/18/25 3:33 PM    Patient contacted to bring Advance Directive, POLST, or Living Will document to next scheduled pcp visit.[facility] Department left message.    Thank you.  Sherice Carrasquillo MA  [facility]

## 2025-06-19 ENCOUNTER — OFFICE VISIT (OUTPATIENT)
Dept: FAMILY MEDICINE CLINIC | Facility: CLINIC | Age: 65
End: 2025-06-19
Payer: COMMERCIAL

## 2025-06-19 VITALS
SYSTOLIC BLOOD PRESSURE: 118 MMHG | WEIGHT: 197 LBS | DIASTOLIC BLOOD PRESSURE: 70 MMHG | HEIGHT: 72 IN | BODY MASS INDEX: 26.68 KG/M2 | OXYGEN SATURATION: 96 % | HEART RATE: 81 BPM

## 2025-06-19 DIAGNOSIS — I12.9 HYPERTENSIVE KIDNEY DISEASE WITH STAGE 3A CHRONIC KIDNEY DISEASE (HCC): ICD-10-CM

## 2025-06-19 DIAGNOSIS — M79.605 PAIN IN BOTH LOWER EXTREMITIES: ICD-10-CM

## 2025-06-19 DIAGNOSIS — I73.9 CLAUDICATION OF BOTH LOWER EXTREMITIES (HCC): Primary | ICD-10-CM

## 2025-06-19 DIAGNOSIS — I74.01: ICD-10-CM

## 2025-06-19 DIAGNOSIS — M79.604 PAIN IN BOTH LOWER EXTREMITIES: ICD-10-CM

## 2025-06-19 DIAGNOSIS — F51.01 PRIMARY INSOMNIA: ICD-10-CM

## 2025-06-19 DIAGNOSIS — E03.9 ACQUIRED HYPOTHYROIDISM: ICD-10-CM

## 2025-06-19 DIAGNOSIS — N18.31 HYPERTENSIVE KIDNEY DISEASE WITH STAGE 3A CHRONIC KIDNEY DISEASE (HCC): ICD-10-CM

## 2025-06-19 PROCEDURE — G2211 COMPLEX E/M VISIT ADD ON: HCPCS | Performed by: FAMILY MEDICINE

## 2025-06-19 PROCEDURE — 99214 OFFICE O/P EST MOD 30 MIN: CPT | Performed by: FAMILY MEDICINE

## 2025-06-19 RX ORDER — TRAZODONE HYDROCHLORIDE 50 MG/1
100 TABLET ORAL
Qty: 180 TABLET | Refills: 1 | Status: SHIPPED | OUTPATIENT
Start: 2025-06-19

## 2025-06-19 RX ORDER — TIZANIDINE 2 MG/1
2 TABLET ORAL EVERY 8 HOURS PRN
Qty: 180 TABLET | Refills: 1 | Status: SHIPPED | OUTPATIENT
Start: 2025-06-19

## 2025-06-19 RX ORDER — DILTIAZEM HYDROCHLORIDE 120 MG/1
120 CAPSULE, EXTENDED RELEASE ORAL DAILY
Qty: 90 CAPSULE | Refills: 1 | Status: SHIPPED | OUTPATIENT
Start: 2025-06-19

## 2025-06-19 NOTE — ASSESSMENT & PLAN NOTE
Orders:  •  tiZANidine (ZANAFLEX) 2 mg tablet; Take 1 tablet (2 mg total) by mouth every 8 (eight) hours as needed for muscle spasms

## 2025-06-19 NOTE — ASSESSMENT & PLAN NOTE
Lab Results   Component Value Date    EGFR 43 06/03/2025    EGFR 38 01/14/2025    EGFR 49 07/09/2024    CREATININE 1.63 (H) 06/03/2025    CREATININE 1.80 (H) 01/14/2025    CREATININE 1.46 (H) 07/09/2024   GFR stable.  No specific change.  Follow-up with nephrology as scheduled.  Continue with blood pressure control, it was quite good today.  Orders:  •  diltiazem (DILACOR XR) 120 MG 24 hr capsule; Take 1 capsule (120 mg total) by mouth daily

## 2025-06-19 NOTE — PROGRESS NOTES
Name: Ryan Montoya      : 1960      MRN: [id number]  Encounter Provider: Olvin Brewster MD  Encounter Date: 2025   Encounter department: [facility]  :  Assessment & Plan  Claudication of both lower extremities (HCC)  Patient reporting claudication is significantly better with tizanidine.  No change.  Follow-up with vascular.       Hypertensive kidney disease with stage 3a chronic kidney disease (HCC)  Lab Results   Component Value Date    EGFR 43 2025    EGFR 38 2025    EGFR 49 2024    CREATININE 1.63 (H) 2025    CREATININE 1.80 (H) 2025    CREATININE 1.46 (H) 2024   GFR stable.  No specific change.  Follow-up with nephrology as scheduled.  Continue with blood pressure control, it was quite good today.  Orders:  •  diltiazem (DILACOR XR) 120 MG 24 hr capsule; Take 1 capsule (120 mg total) by mouth daily    Saddle embolus of abdominal aorta (HCC)  When reviewing the chart, I could not find any evidence that this actually occurred.  Would recommend follow-up with vascular.  If he does actually have a history of pulmonary embolus, or other issues with this, consider anticoagulation.  Will defer to vascular for that.  In their previous notes, as well as ultrasounds looking for arterial and venous issues, nothing has been found.       Acquired hypothyroidism  Last TSH was quite a bit elevated.  Currently on 50 mcg Synthroid.  This was started after the TSH was elevated.  Recommend check in approximately 3 months to make sure that it is coming down.  Orders:  •  TSH, 3rd generation; Future    Pain in both lower extremities    Orders:  •  tiZANidine (ZANAFLEX) 2 mg tablet; Take 1 tablet (2 mg total) by mouth every 8 (eight) hours as needed for muscle spasms    Primary insomnia    Orders:  •  traZODone (DESYREL) 50 mg tablet; Take 2 tablets (100 mg total) by mouth daily at bedtime          Depression Screening and Follow-up Plan: Patient was  screened for depression during today's encounter. They screened negative with a PHQ-2 score of 0.        History of Present Illness   Here to follow-up on multiple issues.    Lower extremity claudication: He reports that he has improvement with using tizanidine.  Chest seems to settle things down a bit.    Reviewed about the diagnosis of saddle embolism on the chart, but there does not appear to be a significant amount of data showing that.    Hypothyroid: Noted before.  TSH was quite a bit elevated recently.  Started on 50 mcg Synthroid.    Hyperlipidemia: He is due for blood work.  This was ordered previously.    CKD: The GFR continues to be low.  It is at 43, prior was 38 and 49.  Reviewed hydration, blood pressure control.          Review of Systems   Constitutional:  Negative for chills and fever.   HENT:  Negative for ear pain and sore throat.    Eyes:  Negative for pain and visual disturbance.   Respiratory:  Negative for cough and shortness of breath.    Cardiovascular:  Negative for chest pain and palpitations.   Gastrointestinal:  Negative for abdominal pain and vomiting.   Genitourinary:  Negative for dysuria and hematuria.   Musculoskeletal:  Negative for arthralgias and back pain.   Skin:  Negative for color change and rash.   Neurological:  Negative for seizures and syncope.   All other systems reviewed and are negative.      Objective   /70 (BP Location: Left arm, Patient Position: Sitting, Cuff Size: Standard)   Pulse 81   Ht 6' (1.829 m)   Wt 89.4 kg (197 lb)   SpO2 96%   BMI 26.72 kg/m²      Physical Exam  Vitals and nursing note reviewed.   Constitutional:       Appearance: Normal appearance. He is well-developed.   HENT:      Head: Normocephalic and atraumatic.     Cardiovascular:      Rate and Rhythm: Normal rate and regular rhythm.      Pulses:           Carotid pulses are 2+ on the right side and 2+ on the left side.     Heart sounds: Normal heart sounds. No murmur heard.     No  friction rub. No gallop.   Pulmonary:      Effort: Pulmonary effort is normal. No respiratory distress.      Breath sounds: Normal breath sounds. No wheezing or rales.     Musculoskeletal:      Cervical back: Normal range of motion and neck supple.     Neurological:      Mental Status: He is alert.

## 2025-06-19 NOTE — ASSESSMENT & PLAN NOTE
Last TSH was quite a bit elevated.  Currently on 50 mcg Synthroid.  This was started after the TSH was elevated.  Recommend check in approximately 3 months to make sure that it is coming down.  Orders:  •  TSH, 3rd generation; Future

## 2025-06-19 NOTE — ASSESSMENT & PLAN NOTE
When reviewing the chart, I could not find any evidence that this actually occurred.  Would recommend follow-up with vascular.  If he does actually have a history of pulmonary embolus, or other issues with this, consider anticoagulation.  Will defer to vascular for that.  In their previous notes, as well as ultrasounds looking for arterial and venous issues, nothing has been found.

## 2025-06-19 NOTE — PATIENT INSTRUCTIONS
1. Claudication of both lower extremities (HCC)  Assessment & Plan:  Patient reporting claudication is significantly better with tizanidine.  No change.  Follow-up with vascular.       2. Hypertensive kidney disease with stage 3a chronic kidney disease (HCC)  Assessment & Plan:  Lab Results   Component Value Date    EGFR 43 06/03/2025    EGFR 38 01/14/2025    EGFR 49 07/09/2024    CREATININE 1.63 (H) 06/03/2025    CREATININE 1.80 (H) 01/14/2025    CREATININE 1.46 (H) 07/09/2024   GFR stable.  No specific change.  Follow-up with nephrology as scheduled.  Continue with blood pressure control, it was quite good today.       Orders:  -     diltiazem (DILACOR XR) 120 MG 24 hr capsule; Take 1 capsule (120 mg total) by mouth daily  3. Saddle embolus of abdominal aorta (HCC)  Assessment & Plan:  When reviewing the chart, I could not find any evidence that this actually occurred.  Would recommend follow-up with vascular.  If he does actually have a history of pulmonary embolus, or other issues with this, consider anticoagulation.  Will defer to vascular for that.  In their previous notes, as well as ultrasounds looking for arterial and venous issues, nothing has been found.       4. Acquired hypothyroidism  Assessment & Plan:  Last TSH was quite a bit elevated.  Currently on 50 mcg Synthroid.  This was started after the TSH was elevated.  Recommend check in approximately 3 months to make sure that it is coming down.       Orders:  -     TSH, 3rd generation; Future; Expected date: 09/19/2025  5. Pain in both lower extremities  Assessment & Plan:         Orders:  -     tiZANidine (ZANAFLEX) 2 mg tablet; Take 1 tablet (2 mg total) by mouth every 8 (eight) hours as needed for muscle spasms  6. Primary insomnia  Assessment & Plan:         Orders:  -     traZODone (DESYREL) 50 mg tablet; Take 2 tablets (100 mg total) by mouth daily at bedtime      COVID 19 Instructions    Ryan Montoya was advised to limit contact with others to  essential tasks such as getting food, medications, and medical care.    Proper handwashing reviewed, and Hand sanitzer when washing is not available.    If the patient develops symptoms of COVID 19, the patient should call the office as soon as possible.    It is strongly recommended that Flu Vaccinations be obtained.      Virtual Visits:  You should get a text message when the provider is ready to see you.  Click on the link in the text message, and the call should start.  Make sure you allow camera and microphone access.  This is HIPPA compliant, and secure.  Also, you could access this visit from [employer] in the [facility] Mobile annika.      If you have not already done so, get immunized to COVID 19.      We are committed to getting you vaccinated as soon as possible and will be closely following CDC and [facility] guidelines as they are released and revised.  Please refer to our COVID-19 vaccine webpage for the most up to date information on the vaccine and our distribution efforts.    This site will also have the most up to date recommendations for COVID booster vaccine.    https://[email or web address].org/covid-19/[email or web address]-[email or web address]-vaccine    Call 3-461-[phone] ([phone]), option 7    You can also visit https://www.vaccines.gov/ to find vaccines in your area.    OUR LOCATION:    [facility]  [address] [place] [address], [address]  [place], [place], [address] [phone]  Fax: [phone]    Lab services, Rheumatology, and OB/GYN are at this location as well.

## 2025-06-19 NOTE — ASSESSMENT & PLAN NOTE
Orders:  •  traZODone (DESYREL) 50 mg tablet; Take 2 tablets (100 mg total) by mouth daily at bedtime

## 2025-06-19 NOTE — ASSESSMENT & PLAN NOTE
Patient reporting claudication is significantly better with tizanidine.  No change.  Follow-up with vascular.

## 2025-06-24 ENCOUNTER — PROCEDURE VISIT (OUTPATIENT)
Age: 65
End: 2025-06-24
Payer: COMMERCIAL

## 2025-06-24 VITALS — BODY MASS INDEX: 26.85 KG/M2 | TEMPERATURE: 98 F | WEIGHT: 198 LBS

## 2025-06-24 DIAGNOSIS — L57.0 ACTINIC KERATOSES: ICD-10-CM

## 2025-06-24 DIAGNOSIS — C44.519 BASAL CELL CARCINOMA (BCC) OF SKIN OF OTHER PART OF TORSO: Primary | ICD-10-CM

## 2025-06-24 PROCEDURE — 17000 DESTRUCT PREMALG LESION: CPT | Performed by: REGISTERED NURSE

## 2025-06-24 PROCEDURE — 17262 DSTRJ MAL LES T/A/L 1.1-2.0: CPT | Performed by: REGISTERED NURSE

## 2025-06-24 NOTE — PROGRESS NOTES
"[facility] Note     Patient Name: Ryan Montoya  Encounter Date: 6/24/25       Have you been cared for by a [facility] Dermatologist in the last 3 years and, if so, which description applies to you? Yes. I have been here within the last 3 years, and my medical history has NOT changed since that time. I am not of child-bearing potential.     REVIEW OF SYSTEMS:  Have you recently had or currently have any of the following? No changes in my recent health.   PAST MEDICAL HISTORY:  Have you personally ever had or currently have any of the following?  If \"YES,\" then please provide more detail. No changes in my medical history.   HISTORY OF IMMUNOSUPPRESSION: Do you have a history of any of the following:  Systemic Immunosuppression such as Diabetes, Biologic or Immunotherapy, Chemotherapy, Organ Transplantation, Bone Marrow Transplantation or Prednisone?  No     Answering \"YES\" requires the addition of the dotphrase \"IMMUNOSUPPRESSED\" as the first diagnosis of the patient's visit.   FAMILY HISTORY:  Any \"first degree relatives\" (parent, brother, sister, or child) with the following?    No changes in my family's known health.   PATIENT EXPERIENCE:    Do you want the Dermatologist to perform a COMPLETE skin exam today including a clinical examination under the \"bra and underwear\" areas?  NO  If necessary, do we have your permission to call and leave a detailed message on your Preferred Phone number that includes your specific medical information?  Yes      Allergies[1] Current Medications[2]        Whom besides the patient is providing clinical information about today's encounter?   NO ADDITIONAL HISTORIAN (patient alone provided history)    Physical Exam and Assessment/Plan by Diagnosis:    CURETTAGE AND DESICCATION Malignant and Premalignant Lesion    Diagnosis: BCC  Prior biopsy: Yes  Access Number: [id number]   Location:  A: right upper abdomen  B: right lower back  Size preop:  A: 1.4 cm  B: 2 " cm  Size postop:  A: 1.5 cm  B: 2.1 cm    Additional History of Present Condition:  Biopsy done 4/28/25, findings consistent with superficial BCC.    Assessment and Plan:  Based on a thorough discussion of this condition and the management approach to it (including a comprehensive discussion of the known risks, side effects and potential benefits of treatment), the patient (family) agrees to treat the above described lesion with desiccation and curettage.    Procedure:  The area was cleanly  prepped in usual manner  Anesthesia:1% xylocaine with epi    The above described lesion was aggressively curetted to mid dermis followed by desiccation  Number of cycles of desiccation and curettage 3  A clean dry dressing was placed on site. Oral and written postop care instructions were discussed and reviewed      DISCUSSION OF TREATMENT AND POSTOP CARE FOR PATIENT    What is curettage and desiccation?  Curettage and desiccation is a type of electrosurgery in which a skin lesion is scraped off and heat is applied to the skin surface.    What is involved in curettage and cautery?  Your doctor will explain to you why your skin lesion needs treatment and the procedure involved. You may have to sign a consent form to indicate that you consent to the surgical procedure. Tell your doctor if you are taking any medication, or if you have any allergies or medical conditions.  The doctor will inject some local anaesthetic into the area surrounding the lesion to be treated. This will make the skin go numb so no pain should be felt during the procedure. You may feel a pushing sensation but this should not be painful. The skin lesion is scraped off with a curette, which is like a small spoon with very sharp edges.  The lesion should be sent to a pathology laboratory for analysis. The wound surface is then cauterised with a hot wire beaded tip or electrosurgical unit (diathermy). This stops bleeding and helps destroys any remaining skin  tumour cells.  This procedure is usually repeated twice for malignant skin lesions (serial curettage and cautery).  A dressing may be applied and instructions should be given on how to care for your wound.    What types of skin lesions can be treated by curettage?  Curettage is suitable to treat lesions where the material being scraped off is softer than the surrounding skin or when there is a natural cleavage plane between the lesion and the surrounding normal tissue. The following are sometimes treated by curettage:  Squamous cell carcinoma in situ   Actinic keratoses   Basal cell carcinomas that are large, deep or recurrent are usually not suitable for curettage. Lesions with poorly defined edges are also generally unsuitable.Curettage and desiccation is most often used in more superficial type basal cell carcinoma.    Will I have a scar?  Curettage often results in some sort of scar especially if accompanied by cautery.   The scars from curettage are usually flat and round. They are a similar size to that of the original skin lesion. Some people have an abnormal response to skin healing and these people may get larger scars than usual (keloids and hypertrophic scarring).    How do I look after the wound following skin curettage?  The wound may be tender when the local anaesthetic wears off.  Leave the dressing in place till the nest day. Avoid strenuous exertion and stretching of the area.   If there is any bleeding, press on the wound firmly with a folded towel without looking at it for 30 minutes. If it is still bleeding after this time, seek medical attention.  Follow instructions a written in our consent ,  The wound from curettage will take approximately 2-3 weeks to heal over. The scar will initially be red and raised but usually reduces in colour and size over several months.      ACTINIC KERATOSES  Physical Exam:  Anatomic Location Affected:  left ear  Morphological Description:  Thin pink papule(s) with  gritty scale     Assessment and Plan:  Based on a thorough discussion of this condition and the management approach to it (including a comprehensive discussion of the known risks, side effects and potential benefits of treatment), the patient (family) agrees to implement the following specific plan:  Treated with cryotherapy today; written and verbal consent obtained     PROCEDURE:  DESTRUCTION OF PRE-MALIGNANT LESIONS  After a thorough discussion of treatment options and risk/benefits/alternatives (including but not limited to local pain, scarring, dyspigmentation, blistering, and possible superinfection), verbal and written consent were obtained and the aforementioned lesions were treated on with cryotherapy using liquid nitrogen x 2 cycles for 5-10 seconds. The patient tolerated the procedure well, and after-care instructions were provided.     TOTAL NUMBER of 1 pre-malignant lesions were treated today on the ANATOMIC LOCATION: left ear.     Patient instructions:  Your pre-cancerous lesions (called actinic keratosis) were treated with liquid nitrogen today. The treated areas will get more red, crusted over the next few days. There might be some blistering. Apply vaseline to the treated area for the next week to help it heal fully. Do not pick at the area. Return in 3-4 weeks for another round of liquid nitrogen treatment if lesion(s)  fails to fully resolve.    Yfn Amin MD  PGY-III Dermatology Resident       Scribe Attestation      I,:  Ana Rosa Middleton MA am acting as a scribe while in the presence of the attending physician.:       I,:  Shaquille Castro MD personally performed the services described in this documentation    as scribed in my presence.:                [1]   Allergies  Allergen Reactions    Amlodipine Edema    Eszopiclone Other (See Comments)     Sleep walking    Zolpidem Other (See Comments)     Other reaction(s): sleepwalking.   [2]   Current Outpatient Medications:     acetaminophen (TYLENOL)  500 mg tablet, Take 500 mg by mouth every 6 (six) hours as needed for mild pain, Disp: , Rfl:     ALPRAZolam (XANAX) 0.25 mg tablet, Take 1 tablet (0.25 mg total) by mouth daily at bedtime as needed for anxiety, Disp: 15 tablet, Rfl: 0    aspirin 81 mg chewable tablet, Chew 1 tablet (81 mg total) daily, Disp: , Rfl:     diltiazem (DILACOR XR) 120 MG 24 hr capsule, Take 1 capsule (120 mg total) by mouth daily, Disp: 90 capsule, Rfl: 1    gabapentin (Neurontin) 300 mg capsule, Take 1 capsule (300 mg total) by mouth 2 (two) times a day, Disp: 90 capsule, Rfl: 3    levothyroxine 50 mcg tablet, Take 1 tablet (50 mcg total) by mouth daily in the early morning, Disp: 100 tablet, Rfl: 3    losartan (COZAAR) 100 MG tablet, TAKE 1 TABLET BY MOUTH EVERY DAY, Disp: 90 tablet, Rfl: 1    Melatonin 10 MG TABS, Take 10 mg by mouth Pt takes 10mg at bedtime., Disp: , Rfl:     naloxone (NARCAN) 4 mg/0.1 mL nasal spray, Administer 1 spray into a nostril. If no response after 2-3 minutes, give another dose in the other nostril using a new spray. (Patient not taking: Reported on 5/22/2025), Disp: 1 each, Rfl: 1    rosuvastatin (CRESTOR) 40 MG tablet, Take 1 tablet (40 mg total) by mouth daily, Disp: 90 tablet, Rfl: 3    tiZANidine (ZANAFLEX) 2 mg tablet, Take 1 tablet (2 mg total) by mouth every 8 (eight) hours as needed for muscle spasms, Disp: 180 tablet, Rfl: 1    traMADol (Ultram) 50 mg tablet, Take 1 tablet (50 mg total) by mouth every 6 (six) hours as needed for moderate pain, Disp: 20 tablet, Rfl: 0    traZODone (DESYREL) 50 mg tablet, Take 2 tablets (100 mg total) by mouth daily at bedtime, Disp: 180 tablet, Rfl: 1    triamcinolone (KENALOG) 0.1 % cream, Apply topically 2 (two) times a day For up to two weeks to hands and forearms after completing efudex therapy to help with healing., Disp: 80 g, Rfl: 0

## 2025-07-07 DIAGNOSIS — F51.01 PRIMARY INSOMNIA: ICD-10-CM

## 2025-07-07 NOTE — TELEPHONE ENCOUNTER
Reason for call:   [x] Refill   [] Prior Auth  [] Other:     Office:   [x] PCP/Provider - [facility]   [] Specialty/Provider -     Medication: ALPRAZolam (XANAX) 0.25 mg tablet     Dose/Frequency: 0.25 mg, Daily at bedtime PRN     Quantity: 15    Pharmacy: [id number]    Local Pharmacy   Does the patient have enough for 3 days?   [x] Yes   [] No - Send as HP to POD    Mail Away Pharmacy   Does the patient have enough for 10 days?   [] Yes   [] No - Send as HP to POD

## 2025-07-10 RX ORDER — ALPRAZOLAM 0.25 MG
0.25 TABLET ORAL
Qty: 15 TABLET | Refills: 0 | Status: SHIPPED | OUTPATIENT
Start: 2025-07-10

## 2025-07-10 NOTE — TELEPHONE ENCOUNTER
Patient called to request a refill for their xanax advised a refill was requested on 7//7/25 and is pending approval. Patient verbalized understanding and is in agreement.     Does the patient have enough for 3 days?   [] Yes   [x] No - Send as HP to POD

## 2025-08-18 VITALS — WEIGHT: 206 LBS | HEIGHT: 72 IN | BODY MASS INDEX: 27.9 KG/M2

## 2025-08-18 DIAGNOSIS — M17.12 PRIMARY OSTEOARTHRITIS OF LEFT KNEE: Primary | ICD-10-CM

## 2025-08-18 PROCEDURE — 20610 DRAIN/INJ JOINT/BURSA W/O US: CPT | Performed by: PHYSICIAN ASSISTANT

## 2025-08-18 PROCEDURE — 99213 OFFICE O/P EST LOW 20 MIN: CPT | Performed by: PHYSICIAN ASSISTANT

## 2025-08-18 RX ORDER — METHYLPREDNISOLONE ACETATE 40 MG/ML
1 INJECTION, SUSPENSION INTRA-ARTICULAR; INTRALESIONAL; INTRAMUSCULAR; SOFT TISSUE
Status: COMPLETED | OUTPATIENT
Start: 2025-08-18 | End: 2025-08-18

## 2025-08-18 RX ORDER — ROPIVACAINE HYDROCHLORIDE 5 MG/ML
10 INJECTION, SOLUTION EPIDURAL; INFILTRATION; PERINEURAL
Status: COMPLETED | OUTPATIENT
Start: 2025-08-18 | End: 2025-08-18

## 2025-08-18 RX ADMIN — ROPIVACAINE HYDROCHLORIDE 10 ML: 5 INJECTION, SOLUTION EPIDURAL; INFILTRATION; PERINEURAL at 14:30

## 2025-08-18 RX ADMIN — METHYLPREDNISOLONE ACETATE 1 ML: 40 INJECTION, SUSPENSION INTRA-ARTICULAR; INTRALESIONAL; INTRAMUSCULAR; SOFT TISSUE at 14:30

## (undated) DEVICE — LIGACLIP MCA MULTIPLE CLIP APPLIERS, 20 MEDIUM CLIPS: Brand: LIGACLIP

## (undated) DEVICE — 3M™ IOBAN™ 2 ANTIMICROBIAL INCISE DRAPE 6640EZ: Brand: IOBAN™ 2

## (undated) DEVICE — SUT MONOCRYL 3-0 SH 27 IN Y416H

## (undated) DEVICE — GLOVE SRG BIOGEL 6.5

## (undated) DEVICE — SUT SILK 2-0 18 IN A185H

## (undated) DEVICE — ADHESIVE SKIN HIGH VISCOSITY EXOFIN 1ML

## (undated) DEVICE — GLOVE INDICATOR PI UNDERGLOVE SZ 8 BLUE

## (undated) DEVICE — SI BRITE TIP 7F 11CM STR: Brand: BRITE TIP

## (undated) DEVICE — SUT PROLENE 6-0 BV-1/BV-1 24 IN 8805H

## (undated) DEVICE — SURGICEL FIBRILLAR 1 X 2

## (undated) DEVICE — RADIFOCUS GLIDEWIRE ADVANTAGE GUIDEWIRE: Brand: GLIDEWIRE ADVANTAGE

## (undated) DEVICE — SPONGE SCRUB 4 PCT CHLORHEXIDINE

## (undated) DEVICE — RADIFOCUS TORQUE DEVICE MULTI-TORQUE VISE: Brand: RADIFOCUS TORQUE DEVICE

## (undated) DEVICE — CATH DIAG 5FR .035 65CM 6S OMMI-FLUSH

## (undated) DEVICE — Device

## (undated) DEVICE — MICROPUNCTURE 501

## (undated) DEVICE — PROBE COVER: Brand: STERILE PROBE COVER

## (undated) DEVICE — SUT SILK 4-0 18 IN A183H

## (undated) DEVICE — GLOVE SRG BIOGEL 7.5

## (undated) DEVICE — SNAP KOVER: Brand: UNBRANDED

## (undated) DEVICE — PETRI DISH STERILE

## (undated) DEVICE — PAD GROUNDING ADULT

## (undated) DEVICE — CURITY STRETCH BANDAGE: Brand: CURITY

## (undated) DEVICE — STERILE BETHLEHEM FEM POP PACK: Brand: CARDINAL HEALTH

## (undated) DEVICE — DECANTER: Brand: UNBRANDED

## (undated) DEVICE — ACE WRAP 3 IN STERILE

## (undated) DEVICE — SUT MONOCRYL 2-0 CT-1 27 IN Y339H

## (undated) DEVICE — GUIDEWIRE WITH ICE™ HYDROPHILIC COATING: Brand: V-18™ CONTROL WIRE™

## (undated) DEVICE — INTENDED FOR TISSUE SEPARATION, AND OTHER PROCEDURES THAT REQUIRE A SHARP SURGICAL BLADE TO PUNCTURE OR CUT.: Brand: BARD-PARKER ® CARBON RIB-BACK BLADES

## (undated) DEVICE — TELFA NON-ADHERENT ABSORBENT DRESSING: Brand: TELFA

## (undated) DEVICE — NON-DEHP HIGH FLOW RATE EXTENSION SET, MALE LUER LOCK ADAPTER

## (undated) DEVICE — PINNACLE R/O II INTRODUCER SHEATH WITH RADIOPAQUE MARKER: Brand: PINNACLE

## (undated) DEVICE — CYSTO TUBING TUR Y IRRIGATION

## (undated) DEVICE — SUT SILK 3-0 18 IN A184H

## (undated) DEVICE — GLOVE SRG BIOGEL ECLIPSE 7

## (undated) DEVICE — CULTURE TUBE ANAEROBIC

## (undated) DEVICE — DRAPE SURGIKIT SADDLE BAG

## (undated) DEVICE — OCCLUSIVE GAUZE STRIP,3% BISMUTH TRIBROMOPHENATE IN PETROLATUM BLEND: Brand: XEROFORM

## (undated) DEVICE — STERILE ICS CARDIOVASCULAR PK: Brand: CARDINAL HEALTH

## (undated) DEVICE — INFUSER BAG 500ML

## (undated) DEVICE — SUT MONOCRYL 4-0 PS-2 18 IN Y496G

## (undated) DEVICE — GAUZE SPONGES,16 PLY: Brand: CURITY

## (undated) DEVICE — CULTURE TUBE AEROBIC

## (undated) DEVICE — SURGICEL 2 X 3

## (undated) DEVICE — STERILE BETHLEHEM PLASTIC HAND: Brand: CARDINAL HEALTH

## (undated) DEVICE — PRESTO™ INFLATION DEVICE: Brand: PRESTO

## (undated) DEVICE — CUFF TOURNIQUET 18 X 4 IN QUICK CONNECT DISP 1 BLADDER

## (undated) DEVICE — FLUID MANAGEMENT KIT - IR

## (undated) DEVICE — DRESSING MEPILEX AG BORDER POST-OP 4 X 6 IN

## (undated) DEVICE — PADDING CAST 4 IN  COTTON STRL

## (undated) DEVICE — FLEXCIL HIGH PRESSURE CONTRAST INJECTION LINE: Brand: NAMIC

## (undated) DEVICE — GLOVE INDICATOR PI UNDERGLOVE SZ 7 BLUE

## (undated) DEVICE — IV SET 15 DROP STERILE 0/Y GRAVITY

## (undated) DEVICE — SYRINGE KIT,PACKAGED,,150FT,MK 7(ANGIO-ARTERION, 150ML SYR KIT W/QFT,MC)(60729385): Brand: MEDRAD® MARK 7 ARTERION DISPOSABLE SYRINGE 150 ML WITH QUICK FILL TUBE

## (undated) DEVICE — 40601 PROLONGED POSITIONING SYSTEM: Brand: 40601 PROLONGED POSITIONING SYSTEM

## (undated) DEVICE — MICROPUNCTURE 401